# Patient Record
Sex: FEMALE | Race: WHITE | NOT HISPANIC OR LATINO | Employment: OTHER | URBAN - METROPOLITAN AREA
[De-identification: names, ages, dates, MRNs, and addresses within clinical notes are randomized per-mention and may not be internally consistent; named-entity substitution may affect disease eponyms.]

---

## 2019-03-01 ENCOUNTER — APPOINTMENT (OUTPATIENT)
Dept: CT IMAGING | Facility: CLINIC | Age: 69
End: 2019-03-01
Attending: EMERGENCY MEDICINE
Payer: MEDICARE

## 2019-03-01 ENCOUNTER — APPOINTMENT (OUTPATIENT)
Dept: MRI IMAGING | Facility: CLINIC | Age: 69
End: 2019-03-01
Attending: HOSPITALIST
Payer: MEDICARE

## 2019-03-01 ENCOUNTER — APPOINTMENT (OUTPATIENT)
Dept: GENERAL RADIOLOGY | Facility: CLINIC | Age: 69
End: 2019-03-01
Attending: EMERGENCY MEDICINE
Payer: MEDICARE

## 2019-03-01 ENCOUNTER — HOSPITAL ENCOUNTER (OUTPATIENT)
Facility: CLINIC | Age: 69
Setting detail: OBSERVATION
Discharge: HOME | End: 2019-03-02
Attending: EMERGENCY MEDICINE | Admitting: HOSPITALIST
Payer: MEDICARE

## 2019-03-01 DIAGNOSIS — R53.1 LEFT-SIDED WEAKNESS: ICD-10-CM

## 2019-03-01 DIAGNOSIS — F10.920 ALCOHOLIC INTOXICATION WITHOUT COMPLICATION (H): ICD-10-CM

## 2019-03-01 PROBLEM — G45.9 TIA (TRANSIENT ISCHEMIC ATTACK): Status: ACTIVE | Noted: 2019-03-01

## 2019-03-01 LAB
ALBUMIN SERPL-MCNC: 3.2 G/DL (ref 3.4–5)
ALBUMIN SERPL-MCNC: NORMAL G/DL (ref 3.4–5)
ALBUMIN UR-MCNC: NEGATIVE MG/DL
ALP SERPL-CCNC: 116 U/L (ref 40–150)
ALP SERPL-CCNC: NORMAL U/L (ref 40–150)
ALT SERPL W P-5'-P-CCNC: 22 U/L (ref 0–50)
ALT SERPL W P-5'-P-CCNC: NORMAL U/L (ref 0–50)
ANION GAP SERPL CALCULATED.3IONS-SCNC: 12 MMOL/L (ref 3–14)
APPEARANCE UR: CLEAR
APTT PPP: 30 SEC (ref 22–37)
AST SERPL W P-5'-P-CCNC: 16 U/L (ref 0–45)
AST SERPL W P-5'-P-CCNC: NORMAL U/L (ref 0–45)
BASOPHILS # BLD AUTO: 0 10E9/L (ref 0–0.2)
BASOPHILS NFR BLD AUTO: 0.2 %
BILIRUB DIRECT SERPL-MCNC: <0.1 MG/DL (ref 0–0.2)
BILIRUB DIRECT SERPL-MCNC: NORMAL MG/DL (ref 0–0.2)
BILIRUB SERPL-MCNC: 0.2 MG/DL (ref 0.2–1.3)
BILIRUB SERPL-MCNC: NORMAL MG/DL (ref 0.2–1.3)
BILIRUB UR QL STRIP: NEGATIVE
BUN SERPL-MCNC: 11 MG/DL (ref 7–30)
CALCIUM SERPL-MCNC: 8.3 MG/DL (ref 8.5–10.1)
CHLORIDE SERPL-SCNC: 99 MMOL/L (ref 94–109)
CO2 SERPL-SCNC: 20 MMOL/L (ref 20–32)
COLOR UR AUTO: NORMAL
CREAT SERPL-MCNC: 0.62 MG/DL (ref 0.52–1.04)
DIFFERENTIAL METHOD BLD: ABNORMAL
EOSINOPHIL # BLD AUTO: 0 10E9/L (ref 0–0.7)
EOSINOPHIL NFR BLD AUTO: 0 %
ERYTHROCYTE [DISTWIDTH] IN BLOOD BY AUTOMATED COUNT: 13.7 % (ref 10–15)
ETHANOL SERPL-MCNC: 0.09 G/DL
ETHANOL SERPL-MCNC: NORMAL G/DL
GFR SERPL CREATININE-BSD FRML MDRD: >90 ML/MIN/{1.73_M2}
GLUCOSE SERPL-MCNC: 83 MG/DL (ref 70–99)
GLUCOSE UR STRIP-MCNC: NEGATIVE MG/DL
HCT VFR BLD AUTO: 34.4 % (ref 35–47)
HGB BLD-MCNC: 11.4 G/DL (ref 11.7–15.7)
HGB UR QL STRIP: NEGATIVE
IMM GRANULOCYTES # BLD: 0 10E9/L (ref 0–0.4)
IMM GRANULOCYTES NFR BLD: 0.4 %
INR PPP: 1.06 (ref 0.86–1.14)
INTERPRETATION ECG - MUSE: NORMAL
KETONES UR STRIP-MCNC: NEGATIVE MG/DL
LEUKOCYTE ESTERASE UR QL STRIP: NEGATIVE
LYMPHOCYTES # BLD AUTO: 1.3 10E9/L (ref 0.8–5.3)
LYMPHOCYTES NFR BLD AUTO: 24.1 %
MCH RBC QN AUTO: 28.1 PG (ref 26.5–33)
MCHC RBC AUTO-ENTMCNC: 33.1 G/DL (ref 31.5–36.5)
MCV RBC AUTO: 85 FL (ref 78–100)
MONOCYTES # BLD AUTO: 0.4 10E9/L (ref 0–1.3)
MONOCYTES NFR BLD AUTO: 7.1 %
NEUTROPHILS # BLD AUTO: 3.7 10E9/L (ref 1.6–8.3)
NEUTROPHILS NFR BLD AUTO: 68.2 %
NITRATE UR QL: NEGATIVE
NRBC # BLD AUTO: 0 10*3/UL
NRBC BLD AUTO-RTO: 0 /100
PH UR STRIP: 6 PH (ref 5–7)
PLATELET # BLD AUTO: 146 10E9/L (ref 150–450)
POTASSIUM SERPL-SCNC: 3.7 MMOL/L (ref 3.4–5.3)
PROT SERPL-MCNC: 7 G/DL (ref 6.8–8.8)
PROT SERPL-MCNC: NORMAL G/DL (ref 6.8–8.8)
RBC # BLD AUTO: 4.06 10E12/L (ref 3.8–5.2)
SODIUM SERPL-SCNC: 131 MMOL/L (ref 133–144)
SOURCE: NORMAL
SP GR UR STRIP: 1.01 (ref 1–1.03)
UROBILINOGEN UR STRIP-MCNC: NORMAL MG/DL (ref 0–2)
WBC # BLD AUTO: 5.5 10E9/L (ref 4–11)

## 2019-03-01 PROCEDURE — 25000125 ZZHC RX 250: Performed by: HOSPITALIST

## 2019-03-01 PROCEDURE — 25000128 H RX IP 250 OP 636: Performed by: HOSPITALIST

## 2019-03-01 PROCEDURE — 25000128 H RX IP 250 OP 636: Performed by: EMERGENCY MEDICINE

## 2019-03-01 PROCEDURE — 25000125 ZZHC RX 250: Performed by: EMERGENCY MEDICINE

## 2019-03-01 PROCEDURE — 80320 DRUG SCREEN QUANTALCOHOLS: CPT | Performed by: EMERGENCY MEDICINE

## 2019-03-01 PROCEDURE — 25000132 ZZH RX MED GY IP 250 OP 250 PS 637: Performed by: HOSPITALIST

## 2019-03-01 PROCEDURE — 72040 X-RAY EXAM NECK SPINE 2-3 VW: CPT

## 2019-03-01 PROCEDURE — G0378 HOSPITAL OBSERVATION PER HR: HCPCS

## 2019-03-01 PROCEDURE — 70551 MRI BRAIN STEM W/O DYE: CPT

## 2019-03-01 PROCEDURE — A9270 NON-COVERED ITEM OR SERVICE: HCPCS | Mod: GY | Performed by: EMERGENCY MEDICINE

## 2019-03-01 PROCEDURE — 96365 THER/PROPH/DIAG IV INF INIT: CPT | Mod: 59

## 2019-03-01 PROCEDURE — 70498 CT ANGIOGRAPHY NECK: CPT

## 2019-03-01 PROCEDURE — 99285 EMERGENCY DEPT VISIT HI MDM: CPT | Mod: 25

## 2019-03-01 PROCEDURE — 99215 OFFICE O/P EST HI 40 MIN: CPT | Performed by: PSYCHIATRY & NEUROLOGY

## 2019-03-01 PROCEDURE — A9270 NON-COVERED ITEM OR SERVICE: HCPCS | Mod: GY | Performed by: HOSPITALIST

## 2019-03-01 PROCEDURE — 80076 HEPATIC FUNCTION PANEL: CPT | Performed by: EMERGENCY MEDICINE

## 2019-03-01 PROCEDURE — 93005 ELECTROCARDIOGRAM TRACING: CPT

## 2019-03-01 PROCEDURE — 99220 ZZC INITIAL OBSERVATION CARE,LEVL III: CPT | Performed by: HOSPITALIST

## 2019-03-01 PROCEDURE — 0042T CT HEAD PERFUSION WITH CONTRAST: CPT

## 2019-03-01 PROCEDURE — 96376 TX/PRO/DX INJ SAME DRUG ADON: CPT

## 2019-03-01 PROCEDURE — 25800030 ZZH RX IP 258 OP 636: Performed by: HOSPITALIST

## 2019-03-01 PROCEDURE — 85025 COMPLETE CBC W/AUTO DIFF WBC: CPT | Performed by: EMERGENCY MEDICINE

## 2019-03-01 PROCEDURE — 85730 THROMBOPLASTIN TIME PARTIAL: CPT | Performed by: EMERGENCY MEDICINE

## 2019-03-01 PROCEDURE — 80048 BASIC METABOLIC PNL TOTAL CA: CPT | Performed by: EMERGENCY MEDICINE

## 2019-03-01 PROCEDURE — 96374 THER/PROPH/DIAG INJ IV PUSH: CPT

## 2019-03-01 PROCEDURE — 96366 THER/PROPH/DIAG IV INF ADDON: CPT

## 2019-03-01 PROCEDURE — 70450 CT HEAD/BRAIN W/O DYE: CPT

## 2019-03-01 PROCEDURE — 96375 TX/PRO/DX INJ NEW DRUG ADDON: CPT | Mod: 59

## 2019-03-01 PROCEDURE — 25000132 ZZH RX MED GY IP 250 OP 250 PS 637: Performed by: EMERGENCY MEDICINE

## 2019-03-01 PROCEDURE — 85610 PROTHROMBIN TIME: CPT | Performed by: EMERGENCY MEDICINE

## 2019-03-01 PROCEDURE — 81003 URINALYSIS AUTO W/O SCOPE: CPT | Mod: XU | Performed by: EMERGENCY MEDICINE

## 2019-03-01 RX ORDER — FOLIC ACID 1 MG/1
1 TABLET ORAL DAILY
Status: DISCONTINUED | OUTPATIENT
Start: 2019-03-02 | End: 2019-03-02 | Stop reason: HOSPADM

## 2019-03-01 RX ORDER — ALBUTEROL SULFATE 90 UG/1
1-2 AEROSOL, METERED RESPIRATORY (INHALATION) EVERY 6 HOURS PRN
COMMUNITY

## 2019-03-01 RX ORDER — AMOXICILLIN 250 MG
1 CAPSULE ORAL 2 TIMES DAILY
Status: DISCONTINUED | OUTPATIENT
Start: 2019-03-01 | End: 2019-03-02 | Stop reason: HOSPADM

## 2019-03-01 RX ORDER — LIDOCAINE 40 MG/G
CREAM TOPICAL
Status: DISCONTINUED | OUTPATIENT
Start: 2019-03-01 | End: 2019-03-02 | Stop reason: HOSPADM

## 2019-03-01 RX ORDER — THIAMINE HYDROCHLORIDE 100 MG/ML
100 INJECTION, SOLUTION INTRAMUSCULAR; INTRAVENOUS ONCE
Status: COMPLETED | OUTPATIENT
Start: 2019-03-01 | End: 2019-03-01

## 2019-03-01 RX ORDER — MAGNESIUM SULFATE HEPTAHYDRATE 40 MG/ML
4 INJECTION, SOLUTION INTRAVENOUS EVERY 4 HOURS PRN
Status: DISCONTINUED | OUTPATIENT
Start: 2019-03-01 | End: 2019-03-02 | Stop reason: HOSPADM

## 2019-03-01 RX ORDER — LORAZEPAM 2 MG/ML
1-2 INJECTION INTRAMUSCULAR EVERY 30 MIN PRN
Status: DISCONTINUED | OUTPATIENT
Start: 2019-03-01 | End: 2019-03-02 | Stop reason: HOSPADM

## 2019-03-01 RX ORDER — ALBUTEROL SULFATE 90 UG/1
2 AEROSOL, METERED RESPIRATORY (INHALATION) EVERY 6 HOURS PRN
Status: DISCONTINUED | OUTPATIENT
Start: 2019-03-01 | End: 2019-03-02 | Stop reason: HOSPADM

## 2019-03-01 RX ORDER — MULTIPLE VITAMINS W/ MINERALS TAB 9MG-400MCG
1 TAB ORAL DAILY
Status: DISCONTINUED | OUTPATIENT
Start: 2019-03-02 | End: 2019-03-02 | Stop reason: HOSPADM

## 2019-03-01 RX ORDER — ASPIRIN 300 MG/1
300 SUPPOSITORY RECTAL ONCE
Status: COMPLETED | OUTPATIENT
Start: 2019-03-01 | End: 2019-03-01

## 2019-03-01 RX ORDER — FLUTICASONE PROPIONATE 110 UG/1
1 AEROSOL, METERED RESPIRATORY (INHALATION) 2 TIMES DAILY
COMMUNITY

## 2019-03-01 RX ORDER — POTASSIUM CHLORIDE 1.5 G/1.58G
20-40 POWDER, FOR SOLUTION ORAL
Status: DISCONTINUED | OUTPATIENT
Start: 2019-03-01 | End: 2019-03-02 | Stop reason: HOSPADM

## 2019-03-01 RX ORDER — ACETAMINOPHEN 325 MG/1
650 TABLET ORAL EVERY 4 HOURS PRN
Status: DISCONTINUED | OUTPATIENT
Start: 2019-03-01 | End: 2019-03-02 | Stop reason: HOSPADM

## 2019-03-01 RX ORDER — IOPAMIDOL 755 MG/ML
120 INJECTION, SOLUTION INTRAVASCULAR ONCE
Status: COMPLETED | OUTPATIENT
Start: 2019-03-01 | End: 2019-03-01

## 2019-03-01 RX ORDER — AMOXICILLIN 250 MG
2 CAPSULE ORAL 2 TIMES DAILY
Status: DISCONTINUED | OUTPATIENT
Start: 2019-03-01 | End: 2019-03-02 | Stop reason: HOSPADM

## 2019-03-01 RX ORDER — ONDANSETRON 4 MG/1
4 TABLET, ORALLY DISINTEGRATING ORAL EVERY 6 HOURS PRN
Status: DISCONTINUED | OUTPATIENT
Start: 2019-03-01 | End: 2019-03-02 | Stop reason: HOSPADM

## 2019-03-01 RX ORDER — NALOXONE HYDROCHLORIDE 0.4 MG/ML
.1-.4 INJECTION, SOLUTION INTRAMUSCULAR; INTRAVENOUS; SUBCUTANEOUS
Status: DISCONTINUED | OUTPATIENT
Start: 2019-03-01 | End: 2019-03-02 | Stop reason: HOSPADM

## 2019-03-01 RX ORDER — GUAIFENESIN 600 MG/1
600 TABLET, EXTENDED RELEASE ORAL 2 TIMES DAILY
COMMUNITY

## 2019-03-01 RX ORDER — FLUTICASONE PROPIONATE 110 UG/1
1 AEROSOL, METERED RESPIRATORY (INHALATION) EVERY 12 HOURS
Status: DISCONTINUED | OUTPATIENT
Start: 2019-03-01 | End: 2019-03-01 | Stop reason: CLARIF

## 2019-03-01 RX ORDER — POTASSIUM CHLORIDE 29.8 MG/ML
20 INJECTION INTRAVENOUS
Status: DISCONTINUED | OUTPATIENT
Start: 2019-03-01 | End: 2019-03-02 | Stop reason: HOSPADM

## 2019-03-01 RX ORDER — LEVOTHYROXINE SODIUM 175 UG/1
175 TABLET ORAL DAILY
COMMUNITY

## 2019-03-01 RX ORDER — POTASSIUM CL/LIDO/0.9 % NACL 10MEQ/0.1L
10 INTRAVENOUS SOLUTION, PIGGYBACK (ML) INTRAVENOUS
Status: DISCONTINUED | OUTPATIENT
Start: 2019-03-01 | End: 2019-03-02 | Stop reason: HOSPADM

## 2019-03-01 RX ORDER — BENZTROPINE MESYLATE 1 MG/1
1 TABLET ORAL 2 TIMES DAILY
Status: DISCONTINUED | OUTPATIENT
Start: 2019-03-01 | End: 2019-03-02 | Stop reason: HOSPADM

## 2019-03-01 RX ORDER — ACETAMINOPHEN 650 MG/1
650 SUPPOSITORY RECTAL EVERY 4 HOURS PRN
Status: DISCONTINUED | OUTPATIENT
Start: 2019-03-01 | End: 2019-03-02 | Stop reason: HOSPADM

## 2019-03-01 RX ORDER — POTASSIUM CHLORIDE 7.45 MG/ML
10 INJECTION INTRAVENOUS
Status: DISCONTINUED | OUTPATIENT
Start: 2019-03-01 | End: 2019-03-02 | Stop reason: HOSPADM

## 2019-03-01 RX ORDER — ONDANSETRON 2 MG/ML
4 INJECTION INTRAMUSCULAR; INTRAVENOUS EVERY 6 HOURS PRN
Status: DISCONTINUED | OUTPATIENT
Start: 2019-03-01 | End: 2019-03-02 | Stop reason: HOSPADM

## 2019-03-01 RX ORDER — SIMVASTATIN 40 MG
40 TABLET ORAL AT BEDTIME
Status: DISCONTINUED | OUTPATIENT
Start: 2019-03-01 | End: 2019-03-02 | Stop reason: HOSPADM

## 2019-03-01 RX ORDER — LANOLIN ALCOHOL/MO/W.PET/CERES
100 CREAM (GRAM) TOPICAL DAILY
Status: DISCONTINUED | OUTPATIENT
Start: 2019-03-02 | End: 2019-03-02 | Stop reason: HOSPADM

## 2019-03-01 RX ORDER — GUAIFENESIN 600 MG/1
1200 TABLET, EXTENDED RELEASE ORAL 2 TIMES DAILY
Status: DISCONTINUED | OUTPATIENT
Start: 2019-03-01 | End: 2019-03-02 | Stop reason: HOSPADM

## 2019-03-01 RX ORDER — SIMVASTATIN 40 MG
40 TABLET ORAL AT BEDTIME
COMMUNITY
End: 2020-04-01

## 2019-03-01 RX ORDER — ASPIRIN 81 MG/1
81 TABLET ORAL DAILY
Status: DISCONTINUED | OUTPATIENT
Start: 2019-03-02 | End: 2019-03-02 | Stop reason: HOSPADM

## 2019-03-01 RX ORDER — DESOXIMETASONE 2.5 MG/G
OINTMENT TOPICAL 2 TIMES DAILY PRN
Status: DISCONTINUED | OUTPATIENT
Start: 2019-03-01 | End: 2019-03-02 | Stop reason: HOSPADM

## 2019-03-01 RX ORDER — POTASSIUM CHLORIDE 1500 MG/1
20-40 TABLET, EXTENDED RELEASE ORAL
Status: DISCONTINUED | OUTPATIENT
Start: 2019-03-01 | End: 2019-03-02 | Stop reason: HOSPADM

## 2019-03-01 RX ORDER — LORAZEPAM 1 MG/1
1-2 TABLET ORAL EVERY 30 MIN PRN
Status: DISCONTINUED | OUTPATIENT
Start: 2019-03-01 | End: 2019-03-02 | Stop reason: HOSPADM

## 2019-03-01 RX ORDER — BENZTROPINE MESYLATE 1 MG/1
1 TABLET ORAL 3 TIMES DAILY
COMMUNITY

## 2019-03-01 RX ADMIN — SERTRALINE HYDROCHLORIDE 150 MG: 50 TABLET ORAL at 21:12

## 2019-03-01 RX ADMIN — ASPIRIN 300 MG: 300 SUPPOSITORY RECTAL at 16:00

## 2019-03-01 RX ADMIN — IOPAMIDOL 120 ML: 755 INJECTION, SOLUTION INTRAVENOUS at 13:17

## 2019-03-01 RX ADMIN — SIMVASTATIN 40 MG: 40 TABLET, FILM COATED ORAL at 21:12

## 2019-03-01 RX ADMIN — SODIUM CHLORIDE 100 ML: 9 INJECTION, SOLUTION INTRAVENOUS at 13:17

## 2019-03-01 RX ADMIN — Medication 1 TABLET: at 21:13

## 2019-03-01 RX ADMIN — FOLIC ACID: 5 INJECTION, SOLUTION INTRAMUSCULAR; INTRAVENOUS; SUBCUTANEOUS at 19:38

## 2019-03-01 RX ADMIN — CLOZAPINE 300 MG: 100 TABLET ORAL at 21:12

## 2019-03-01 RX ADMIN — THIAMINE HYDROCHLORIDE 100 MG: 100 INJECTION, SOLUTION INTRAMUSCULAR; INTRAVENOUS at 16:00

## 2019-03-01 RX ADMIN — BENZTROPINE MESYLATE 1 MG: 1 TABLET ORAL at 21:13

## 2019-03-01 ASSESSMENT — MIFFLIN-ST. JEOR: SCORE: 1262.09

## 2019-03-01 ASSESSMENT — ENCOUNTER SYMPTOMS
WEAKNESS: 1
HEADACHES: 0

## 2019-03-01 NOTE — ED PROVIDER NOTES
"  History     Chief Complaint:  Gait abnormality    HPI   Liz Lieberman is a 68 year old female who presents via EMS with concern for a gait abnormality. EMS reports that the patient did her morning exercises with her  around 0900 , four hours ago, at her apartment and was believe to be normal at that time. She then left to do her \"hobbies\" around 1000. She then called EMS within the last hour with concern for a gait abnormality. On arrival, EMS observed the patient's apartment cluttered with alcohol, and the patient reported drinking four beers today, and her gait is abnormal because she lunges to the left. En route, her blood pressure remained around 155/100, blood sugar of 116. Here in the emergency department the patient denies headache.     Allergies:  Latex  Formaldehyde    Medications:    Simvastatin  Zoloft  Miralax  Levothyroxine  Omeprazole  Clozapine  Cyanobalamin  Cogentin  Tessalon  Aspirin  Albuterol    Past Medical History:    Schizophrenia  RLS  Hypothyroid  HLD  GERD  Depression  Chronic pain  Pneumonia    Past Surgical History:    Arthroplasty knee, left  ENT    Family History:    No past pertinent family history.    Social History:  Former smoker: quit 1994  Postive for alcohol use, 6 pack daily    Review of Systems   Unable to perform ROS: Acuity of condition   Neurological: Positive for weakness. Negative for headaches.       Physical Exam     Patient Vitals for the past 24 hrs:   BP Temp Temp src Pulse Heart Rate Resp SpO2 Weight   03/01/19 1500 97/78 -- -- 87 86 (!) 86 94 % --   03/01/19 1412 -- -- -- -- 83 12 96 % --   03/01/19 1411 151/89 -- -- 82 -- -- -- --   03/01/19 1343 149/82 -- -- 86 89 30 97 % --   03/01/19 1304 131/82 97.8  F (36.6  C) Tympanic 89 89 16 96 % 78.1 kg (172 lb 2.9 oz)       Physical Exam    General: Alert, interactive in mild distress  Head:  Scalp is atraumatic, periorbital ecchymosis left eye  Eyes:  The pupils are equal, round, and reactive to light    EOM's " intact    No scleral icterus  ENT:      Nose:  The external nose is normal  Ears:  External ears are normal  Mouth/Throat: The oropharynx is normal    Mucus membranes are dry      Neck:  Normal range of motion.      There is no rigidity.    Trachea is in the midline         CV:  Regular rate and rhythm    No murmur   Resp:  Breath sounds are clear bilaterally    Non-labored, no retractions or accessory muscle use      GI:  Abdomen is soft, no distension, no tenderness.       MS:  Left sided weakness, mild, No midline cervical, thoracic, or lumbar tenderness  Skin:  Warm and dry, No rash or lesions noted.  Neuro:   National Institutes of Health Stroke Scale  Exam Interval: Baseline   Score    Level of consciousness: (0)   Alert, keenly responsive    LOC questions: (0)   Answers both questions correctly    LOC commands: (0)   Performs both tasks correctly    Best gaze: (0)   Normal    Visual: (0)   No visual loss    Facial palsy: (0)   Normal symmetrical movements    Motor arm (left): (1)   Drift    Motor arm (right): (0)   No drift    Motor leg (left): (1)   Drift    Motor leg (right): (0)   No drift    Limb ataxia: (0)   Absent    Sensory: (0)   Normal- no sensory loss    Best language: (0)   Normal- no aphasia    Dysarthria: (0)   Normal    Extinction and inattention: (0)   No abnormality        Total Score:  2       Psych:  Awake. Alert.  Normal affect.      Appropriate interactions.      Emergency Department Course   ECG:    Indication: weakness  Time: 1330  Vent. Rate 85 bpm. NC interval 150. QRS duration 82. QT/QTc 408/485. P-R-T axis 71 49 48.  Normal sinus rhythm. Normal ECG. No significant change compared to EKG dated 10/26/16. Read time: 1337    Imaging:  Radiographic findings were communicated with the patient who voiced understanding of the findings.    CT Head without contrast:   Normal CT scan of the head. as per radiology.    CT Head Perfusion w contrast  Normal CT brain perfusion imaging.    CTA Head  neck   1. Minimal atherosclerotic disease involving the right carotid  bifurcation without stenosis.  2. No evidence for stenosis, dissection, thromboembolism, or aneurysm.    XR cervical spine   No convincing acute fracture or dislocation. Presumed  degenerative change at C4-C6. as per radiology    Laboratory:    CBC: WBC: 5.5, HGB: 11.4, PLT: 146  BMP: Sodium 131, Calcium: 8.3, o/w WNL (Creatinine: 0.62)  Hepatic: albumin: 3.2, O/W WNL  Alcohol:  0.09  UA with Microscopic: All WNL    Interventions:    Medications   sodium chloride 0.9 % 1,000 mL with INFUVITE ADULT 10 mL, thiamine 100 mg, folic acid 1 mg infusion ( Intravenous Infusion stopped per MD order 3/1/19 1600)   100mL Saline Flush (100 mLs Intravenous Given 3/1/19 1317)   iopamidol (ISOVUE-370) solution 120 mL (120 mLs Intravenous Given 3/1/19 1317)   aspirin (ASA) Suppository 300 mg (300 mg Rectal Given 3/1/19 1600)   thiamine (B-1) injection 100 mg (100 mg Intravenous Given 3/1/19 1600)       Emergency Department Course:  (1259) Patient arrives in ED. Due to concerns for the acuity of her condition she is brought immidieatly to stabilization room one where I am waiting.     (1301) Code stroke called    (1311) Dr. Traylor, neurology is here in the emergency department while we are both in CT.     IV inserted. Medicine administered as documented above. Blood drawn. This was sent to the lab for further testing, results above.    The patient was sent for a head CT, head perfusion CT, CTA head neck, while in the emergency department, findings above.     (1505) Repeat NIH is normal.     (1524)  I consulted with Dr. Deng of the hospitalist services. They are in agreement to accept the patient for admission.    Findings and plan explained to the Patient who consents to admission. Discussed the patient with Dr. Deng, who will admit the patient to a observation bed for further monitoring, evaluation, and treatment.  Impression & Plan    The patient has  stroke symptoms:           ED Stroke specific documentation           NIHSS PDF          Protocol PDF     Patient last known well time: 0900  ED Provider first to bedside at: Arrival  Patient was not treated with TPA due to the following reason(s):  Rapidly improving symptoms    Stroke Mimics were considered (including migraine headache, seizure disorder, hypoglycemia (or hyperglycemia), head or spinal trauma, CNS infection, Toxin ingestion and shock state (e.g. sepsis) .      National Institutes of Health Stroke Scale  Time Performed: Return from CT      Score    Level of consciousness: (0)   Alert, keenly responsive    LOC questions: (0)   Answers both questions correctly    LOC commands: (0)   Performs both tasks correctly    Best gaze: (0)   Normal    Visual: (0)   No visual loss    Facial palsy: (0)   Normal symmetrical movements    Motor arm (left): (0)   No drift      Motor arm (right): (0)   No drift    Motor leg (left): (0)   No drift    Motor leg (right): (0)   No drift    Limb ataxia: (0)   Absent    Sensory: (0)   Normal- no sensory loss    Best language: (0)   Normal- no aphasia    Dysarthria: (0)   Normal    Extinction and inattention: (0)   No abnormality        Total Score:  0      Medical Decision Making:  Mrs. Lieberman presents via EMS with concern for possible cerebrovascular infarction. She states that some time around 1000 she had a sudden onset of left sided weakness and was falling to the side. Code stroke was initiated and the above workup was undertaken . CT CTA were unremarkable. Upon repeat evaluation her symptoms have greatly improved. Patient was noted to have large amounts of beer strewn throughout her house and certainly alcohol may be playing a roll, however we cannot completely rule out posterior posterior circulation stroke. Given no large vessel occlusion and rapidly improving symptoms she is not a candidate for interventional procedure. She will likely need an MRI during her time in  [facility]. I spoke with Dr. Deng who is in agreement with this plan of action. Of note, the patient is demonstrating no signs of acute alcohol withdrawal at this time. She did receive a banana bag and a rectal aspirin during her time in the ED. NO signs of acte infectious etiology or more concerning illness. Patient was in agreement with this plan of action and subsequently admit the patient for further care and treatment.       Diagnosis:    ICD-10-CM    1. Left-sided weakness R53.1    2. Alcoholic intoxication without complication (H) F10.920        Disposition:  Admitted to observation under care of Dr. Deng.     Scribe Disclosure:  I, Sam Dolan, am serving as a scribe on 3/1/2019 at 1:15 PM to personally document services performed by Jesus Calixto,* based on my observations and the provider's statements to me.     Sam Dolan  3/1/2019    EMERGENCY DEPARTMENT       Jesus Calixto MD  03/01/19 173

## 2019-03-01 NOTE — CONSULTS
[facility]      Stroke Consult Note    Reason for Consult:  Stroke Code    HPI  Liz Lieberman is a 68 year old female presenting to the hospital from home with a compaint of a sudden onsent of an inability to sit upright. She was sitting at the table writing out checks to pay her bills when all of a sudden she states that she was unable to sit upright and felt like she was going to fall over. There was some report of left sided weakness and leaning to the left.     She has a history of schizoaffective disorder per her report and has feelings of akithisia and tardive dyskinesia with associated movements. She states that the Cogentin she is prescribed sometimes helps with the urge to move constantly.     TPA Treatment   Not given due to minor / isolated / quickly resolving stroke symptoms.    Endovascular Treatment  Not initiated due to absence of proximal vessel occlusion    Impression  Sudden onset of vertigo, cannot exclude a posterior circulation process.     Recommendations  MRI brain to evaluate for stroke given poor imaging of posterior fossa on CT scan  Stroke work up to continue if MRI negative    Please contact the Stroke Service with any questions. Will follow up on MRI.    Chuckie Traylor MD  Vascular Neurology/Neurocritical Care  March 1, 2019    Text Page ([phone])  __________________________________________________    Past Medical History:   Diagnosis Date     Allergic rhinitis      Bronchiectasis (H)     mild RML     Chronic pain     ft, knee, shoulders     Depression      Gastro-oesophageal reflux disease      GERD (gastroesophageal reflux disease)      Hearing loss      Hiatal hernia      History of blood transfusion     after tonsillectomy     Hyperlipidemia      Hypothyroidism      Leukocytosis      Lung nodule      Mild intermittent asthma      Numbness and tingling     hands ceasar numb R/T carpal tunnel     Osteoarthritis     knee, ft, shoulders     Paranoid schizophrenia, in  remission      RLS (restless legs syndrome)      Tardive dyskinesia      Urinary incontinence        Past Surgical History:   Procedure Laterality Date     ARTHROPLASTY KNEE  2/20/2013    Procedure: ARTHROPLASTY KNEE;  LEFT TOTAL KNEE ARTHROPLASTY ([employer]HE[employer])^;  Surgeon: Khanh Bee MD;  Location:  OR     ENT SURGERY      tonsillectomy     ORTHOPEDIC SURGERY  2011    (R) total knee       Medications   Current Facility-Administered Medications   Medication     aspirin (ASA) Suppository 300 mg     sodium chloride 0.9 % 1,000 mL with INFUVITE ADULT 10 mL, thiamine 100 mg, folic acid 1 mg infusion     sodium chloride 0.9 % 1,000 mL with INFUVITE ADULT 10 mL, thiamine 100 mg, folic acid 1 mg infusion     thiamine (B-1) injection 100 mg     Current Outpatient Medications   Medication Sig     Acetaminophen (TYLENOL PO) Take 1,000 mg by mouth every 6 hours as needed.     Albuterol (VENTOLIN IN)      ASPIRIN EC PO Take 81 mg by mouth daily.     azithromycin (ZITHROMAX Z-BINDU) 250 MG tablet Take 1 tablet daily for 4 days.     benzonatate (TESSALON) 100 MG capsule Take 1 capsule (100 mg) by mouth 3 times daily as needed for cough     Benztropine Mesylate (COGENTIN IJ)      BUPROPION HCL PO      Calcium Carbonate-Vitamin D (CALCIUM + D PO) Take 1 tablet by mouth. Calcium 500 with Vit D 400mg BID. One before breakfast and one with supper     Cholecalciferol (VITAMIN D3) 5000 UNITS TABS Take 1 tablet by mouth daily (with dinner).     CLOZAPINE PO Take 300 mg by mouth At Bedtime.     CYANOCOBALAMIN SL Place 1,000 mcg under the tongue every other day.     desoximetasone (TOPICORT) 0.25 % OINT Apply  topically as needed.     ferrous sulfate 325 (65 FE) MG tablet Take 325 mg by mouth every other day. One tab every other day before breakfast     fish oil-omega-3 fatty acids (FISH OIL) 1000 MG capsule Take 2 g by mouth daily.     fluticasone (FLONASE) 50 MCG/ACT nasal spray Spray 2 sprays into both nostrils daily      Fluticasone Propionate, Inhal, (FLOVENT IN)      GLUCOSAMINE CHONDROITIN COMPLX PO Take 3 tablets by mouth daily (with dinner). Is Double strength     guaiFENesin-dextromethorphan (ROBITUSSIN DM) 100-10 MG/5ML syrup Take 10 mLs by mouth every 4 hours as needed for cough.     LEVOTHYROXINE SODIUM 175 mcg. Take 1/2 tab on Monday and Friday  And 1 full tablet all other days. Take 1 hr before eating.     Omeprazole (PRILOSEC PO) Take 20 mg by mouth every morning. Take 1 hr before eating     polyethylene glycol (MIRALAX/GLYCOLAX) packet Take 8.5 g by mouth every other day.     Sertraline HCl (ZOLOFT PO) Take 150 mg by mouth At Bedtime.     SIMVASTATIN PO        Allergies   Allergies   Allergen Reactions     Formaldehyde Rash     Latex Rash       Family History       Social History   Social History     Socioeconomic History     Marital status:      Spouse name: Not on file     Number of children: Not on file     Years of education: Not on file     Highest education level: Not on file   Occupational History     Not on file   Social Needs     Financial resource strain: Not on file     Food insecurity:     Worry: Not on file     Inability: Not on file     Transportation needs:     Medical: Not on file     Non-medical: Not on file   Tobacco Use     Smoking status: Former Smoker     Last attempt to quit: 2/15/1994     Years since quittin.0   Substance and Sexual Activity     Alcohol use: Yes     Drug use: No     Sexual activity: Not on file   Lifestyle     Physical activity:     Days per week: Not on file     Minutes per session: Not on file     Stress: Not on file   Relationships     Social connections:     Talks on phone: Not on file     Gets together: Not on file     Attends [employer] service: Not on file     Active member of club or organization: Not on file     Attends meetings of clubs or organizations: Not on file     Relationship status: Not on file     Intimate partner violence:     Fear of current or ex  partner: Not on file     Emotionally abused: Not on file     Physically abused: Not on file     Forced sexual activity: Not on file   Other Topics Concern     Not on file   Social History Narrative     Not on file          ROS  Review of systems not obtained due to patient factors - intoxicated    PHYSICAL EXAMINATION  B/P:     97/78 (03/01/19 1500)    Heart Rate: 86 (03/01/19 1500)    Pulse: 87 (03/01/19 1500)   Resp:  (!) 86 (03/01/19 1500)  Temp: 97.8  F (36.6  C)   Tympanic (03/01/19 1304)    General:  patient lying in bed without any acute distress    HEENT:  normocephalic/atraumatic  Cardio:  RRR  Pulmonary:  no respiratory distress  Abdomen:  soft, non-tender, non-distended  Extremities:  no edema  Skin:  intact     Neurologic  Mental Status:  fully alert, attentive and oriented, follows commands, speech clear and fluent  Cranial Nerves:  visual fields intact, PERRL, EOMI with normal smooth pursuit, facial sensation intact and symmetric, facial movements symmetric, hearing not formally tested but intact to conversation, no dysarthria, shoulder shrug strong bilaterally, tongue protrusion midline  Motor:  normal tone throughout, able to move all limbs spontaneously, strength 5/5 throughout upper and lower extremities, tardive dyskinesia present  Reflexes:  no clonus  Sensory:  intact/symmetric to light touch and pin prick throughout upper and lower extremities  Coordination:  FNF and HS intact without dysmetria  Station/Gait:  not tested at this time    Stroke Scales    NIHSS  Interval and Comments baseline   1a. Level of Consciousness 0-->Alert, keenly responsive   1b. LOC Questions 0-->Answers both questions correctly   1c. LOC Commands 0-->Performs both tasks correctly   2.   Best Gaze 0-->Normal   3.   Visual 0-->No visual loss   4.   Facial Palsy 0-->Normal symmetrical movements   5a. Motor Arm, Left 0-->No drift, limb holds 90 (or 45) degrees for full 10 secs   5b. Motor Arm, Right 0-->No drift, limb  holds 90 (or 45) degrees for full 10 secs   6a. Motor Leg, Left 0-->No drift, leg holds 30 degree position for full 5 secs   6b. Motor Leg, right 0-->No drift, leg holds 30 degree position for full 5 secs   7.   Limb Ataxia 0-->Absent   8.   Sensory 0-->Normal, no sensory loss   9.   Best Language 0-->No aphasia, normal   10. Dysarthria 0-->Normal   11. Extinction and Inattention  0-->No abnormality   Total 0       Labs/Imaging  Labs and imaging were reviewed and used in developing plan; pertinent results included.  Data   CBC  WBC (10e9/L)   Date Value   03/01/2019 5.5   10/21/2016 10.5   05/09/2015 7.9    RBC Count (10e12/L)   Date Value   03/01/2019 4.06   10/21/2016 4.56   05/09/2015 3.96    Hemoglobin (g/dL)   Date Value   03/01/2019 11.4 (L)   10/21/2016 12.9   05/09/2015 10.9 (L)      Hematocrit (%)   Date Value   03/01/2019 34.4 (L)   10/21/2016 39.7   05/09/2015 33.3 (L)    Platelet Count (10e9/L)   Date Value   03/01/2019 146 (L)   10/21/2016 226   05/09/2015 286         BMP  Sodium (mmol/L)   Date Value   03/01/2019 131 (L)   10/21/2016 140   05/09/2015 136    Potassium (mmol/L)   Date Value   03/01/2019 3.7   10/21/2016 3.8   05/09/2015 4.0    Chloride (mmol/L)   Date Value   03/01/2019 99   10/21/2016 106   05/09/2015 103      Carbon Dioxide (mmol/L)   Date Value   03/01/2019 20   10/21/2016 27   05/09/2015 24    Glucose (mg/dL)   Date Value   03/01/2019 83   10/21/2016 130 (H)   05/09/2015 109 (H)    Urea Nitrogen (mg/dL)   Date Value   03/01/2019 11   10/21/2016 10   05/09/2015 11      Creatinine (mg/dL)   Date Value   03/01/2019 0.62   10/21/2016 0.63   05/09/2015 0.58    Calcium (mg/dL)   Date Value   03/01/2019 8.3 (L)   10/21/2016 9.0   05/09/2015 8.7         INR Troponin I A1C   INR (no units)   Date Value   03/01/2019 1.06   05/18/2011 1.11    Troponin I ES (ug/L)   Date Value   10/21/2016     <0.015  The 99th percentile for upper reference range is 0.045 ug/L.  Troponin values in   the range of  0.045 - 0.120 ug/L may be associated with risks of adverse   clinical events.     06/22/2014 <0.012   06/18/2013 <0.012    No results found for: A1C     Liver Panel  Protein Total (g/dL)   Date Value   03/01/2019 7.0   03/01/2019 Canceled, Test credited   06/18/2013 6.7 (L)    Albumin (g/dL)   Date Value   03/01/2019 3.2 (L)   03/01/2019 Canceled, Test credited   06/18/2013 3.6    Bilirubin Total (mg/dL)   Date Value   03/01/2019 0.2   03/01/2019 Canceled, Test credited   06/18/2013 0.2      Alkaline Phosphatase (U/L)   Date Value   03/01/2019 116   03/01/2019 Canceled, Test credited   06/18/2013 112    AST (U/L)   Date Value   03/01/2019 16   03/01/2019 Canceled, Test credited   06/18/2013 12    ALT (U/L)   Date Value   03/01/2019 22   03/01/2019 Canceled, Test credited   06/18/2013 25      No results found for: BILIDIRECT       Lipid Profile  No results found for: CHOL No results found for: HDL No results found for: LDL   No results found for: TRIG No results found for: CHOLHDLRATIO           I have personally spent a total of 70 minutes providing care supervising this patient's stroke code activation.  I personally reviewed all lab values and radiology images.     Stroke Code Data  (for stroke code without tele)  Stroke code activated 03/01/19   1302   First stroke provider response 03/01/19   1304   Last known normal 03/01/19   1200   Time of discovery   (or onset of symptoms) 03/01/19   1200   Head CT read by me 03/01/19   1315   Was stroke code de-escalated? Yes 03/01/19 7018

## 2019-03-01 NOTE — H&P
[facility]    History and Physical - Hospitalist Service       Date of Admission:  3/1/2019    Assessment & Plan   Liz Lieberman is a 68 year old female who has a past medical history which includes alcohol dependence and chronic schizophrenia.  She lives alone in an apartment and this morning did her morning exercises with her .  At around 1 PM she was sitting on a chair after reportedly having about 4 beers when suddenly she was unable to sit upright and kept falling to the left.  She had no headache or dizziness or vertigo.  She had no change in her vision or speech.  She did not feel weak on either side.  She called 911 and was taken to the emergency room as a code stroke.  In the emergency room she had normal vital signs and a nonfocal neurologic exam.  EKG showed sinus rhythm.  CT head without contrast, CT head perfusion study and CTA of the head and neck were all unremarkable.  X-ray of the cervical spine was unremarkable.  She was given a liter of normal saline aspirin and thiamine.  Her labs were unremarkable except for an ethanol level of 0.09.      Acute gait/truncal instability, rule out cerebellar stroke/TIA  Her symptoms appear to have resolved within a few hours  Head CT/CTA negative   MRI ordered  Stroke neurologist consulted  Admit to observation with a cardiac monitor  Transthoracic echocardiogram, physcial therapy and occupational therapy evaluations    Alcohol intoxication and dependence  She is not currently in withdrawal and she has not had any recent hospitalizations related to alcohol use.  Thiamine and multivitamins ordered  EKTAWA scoring with lorazepam as needed for alcohol withdrawal symptoms    Chronic schizophrenia  Tardive dyskinesia  Restless legs syndrome   Her current regimen consists of clozapine, sertraline, bupropion and benztropine which will be continued    Hypothyroidism   Continue levothyroxine     Mild intermittent asthma   Continue  fluticasone    Hyperlipidemia   Continue simvastatin and fish oil     Diet: npo until bedside swallow evaluation   DVT Prophylaxis: Pneumatic Compression Devices  Castorena Catheter: not present  Code Status: Full     Disposition Plan   Expected discharge: Tomorrow, recommended to prior living arrangement once neurologic status at baseline.  Entered: Amandeep Deng MD 03/01/2019, 3:42 PM     The patient's care was discussed with the Patient and Neurology  Consultant.    Amandeep Deng MD  [facility]    ______________________________________________________________________    Chief Complaint   Falling to the left side     History is obtained from the patient, electronic health record and emergency department physician    History of Present Illness   Liz Lieberman is a 68 year old female who has a past medical history which includes alcohol dependence and chronic schizophrenia.  She lives alone in an apartment and this morning did her morning exercises with her .  At around 1 PM she was sitting on a chair after reportedly having about 4 beers when suddenly she was unable to sit upright and kept falling to the left.  She had no headache or dizziness or vertigo.  She had no change in her vision or speech.  She did not feel weak on either side.  She called 911 and was taken to the emergency room as a code stroke.  In the emergency room she had normal vital signs and a nonfocal neurologic exam.  EKG showed sinus rhythm.  CT head without contrast, CT head perfusion study and CTA of the head and neck were all unremarkable.  X-ray of the cervical spine was unremarkable.  She was given a liter of normal saline aspirin and thiamine.  Her labs were unremarkable except for an ethanol level of 0.09.  She now feels back to her usual self.  She says that she is been feeling fine recently and denies recent fever, chills, cough, cold, chest pain, shortness of breath, nausea, vomiting, diarrhea.  She  does have a history of mild asthma which has not been bothering her lately.    Review of Systems    The 10 point Review of Systems is negative other than noted in the HPI or here.     Past Medical History    I have reviewed this patient's medical history and updated it with pertinent information if needed.   Past Medical History:   Diagnosis Date     Allergic rhinitis      Bronchiectasis (H)     mild RML     Chronic pain     ft, knee, shoulders     Depression      Diverticulosis     on colonoscopy     Gastro-oesophageal reflux disease      GERD (gastroesophageal reflux disease)      Hearing loss      Hiatal hernia      History of blood transfusion     after tonsillectomy     Hyperlipidemia      Hypothyroidism      Leukocytosis      Lung nodule      Mild intermittent asthma      Numbness and tingling     hands ceasar numb R/T carpal tunnel     Osteoarthritis     knee, ft, shoulders     Paranoid schizophrenia, in remission      RLS (restless legs syndrome)      Tardive dyskinesia      Urinary incontinence        Past Surgical History   I have reviewed this patient's surgical history and updated it with pertinent information if needed.  Past Surgical History:   Procedure Laterality Date     ARTHROPLASTY KNEE  2013    Procedure: ARTHROPLASTY KNEE;  LEFT TOTAL KNEE ARTHROPLASTY (SMITH & NEPHEW)^;  Surgeon: Khanh Bee MD;  Location:  OR     ENT SURGERY      tonsillectomy     ORTHOPEDIC SURGERY      (R) total knee       Social History   I have reviewed this patient's social history and updated it with pertinent information if needed.  Social History     Tobacco Use     Smoking status: Former Smoker     Last attempt to quit: 2/15/1994     Years since quittin.0   Substance Use Topics     Alcohol use: Yes     Drug use: No       Family History   I have reviewed this patient's family history and updated it with pertinent information if needed.   Family History   Problem Relation Age of Onset      Cerebrovascular Disease No family hx of      Diabetes No family hx of        Prior to Admission Medications   Prior to Admission Medications   Prescriptions Last Dose Informant Patient Reported? Taking?   ASPIRIN EC PO  Self Yes No   Sig: Take 81 mg by mouth daily.   Acetaminophen (TYLENOL PO)  Self Yes No   Sig: Take 1,000 mg by mouth every 6 hours as needed.   BUPROPION HCL PO   Yes No   Benztropine Mesylate (COGENTIN IJ)   Yes No   CLOZAPINE PO  Self Yes No   Sig: Take 300 mg by mouth At Bedtime.   CYANOCOBALAMIN SL  Self Yes No   Sig: Place 1,000 mcg under the tongue every other day.   Calcium Carbonate-Vitamin D (CALCIUM + D PO)  Self Yes No   Sig: Take 1 tablet by mouth. Calcium 500 with Vit D 400mg BID. One before breakfast and one with supper   Cholecalciferol (VITAMIN D3) 5000 UNITS TABS  Self Yes No   Sig: Take 1 tablet by mouth daily (with dinner).   Fluticasone Propionate, Inhal, (FLOVENT IN)   Yes No   GLUCOSAMINE CHONDROITIN COMPLX PO  Self Yes No   Sig: Take 3 tablets by mouth daily (with dinner). Is Double strength   LEVOTHYROXINE SODIUM  Self Yes No   Si mcg. Take 1/2 tab on Monday and Friday  And 1 full tablet all other days. Take 1 hr before eating.   Omeprazole (PRILOSEC PO)  Self Yes No   Sig: Take 20 mg by mouth every morning. Take 1 hr before eating   SIMVASTATIN PO   Yes No   Sertraline HCl (ZOLOFT PO)  Self Yes No   Sig: Take 150 mg by mouth At Bedtime.   benzonatate (TESSALON) 100 MG capsule   No No   Sig: Take 1 capsule (100 mg) by mouth 3 times daily as needed for cough   desoximetasone (TOPICORT) 0.25 % OINT  Self Yes No   Sig: Apply  topically as needed.   ferrous sulfate 325 (65 FE) MG tablet  Self Yes No   Sig: Take 325 mg by mouth every other day. One tab every other day before breakfast   fish oil-omega-3 fatty acids (FISH OIL) 1000 MG capsule  Self Yes No   Sig: Take 2 g by mouth daily.   fluticasone (FLONASE) 50 MCG/ACT nasal spray   Yes No   Sig: Spray 2 sprays into both  nostrils daily   guaiFENesin-dextromethorphan (ROBITUSSIN DM) 100-10 MG/5ML syrup   No No   Sig: Take 10 mLs by mouth every 4 hours as needed for cough.   polyethylene glycol (MIRALAX/GLYCOLAX) packet  Self Yes No   Sig: Take 8.5 g by mouth every other day.      Facility-Administered Medications: None     Allergies   Allergies   Allergen Reactions     Formaldehyde Rash     Latex Rash       Physical Exam   Vital Signs: Temp: 97.8  F (36.6  C) Temp src: Tympanic BP: 97/78 Pulse: 87 Heart Rate: 86 Resp: (!) 86 SpO2: 94 % O2 Device: None (Room air)    Weight: 172 lbs 2.87 oz    Constitutional: awake, alert, cooperative, no apparent distress, and appears stated age  Eyes: Lids and lashes normal, pupils equal, round and reactive to light, extra ocular muscles intact, sclera clear, conjunctiva normal  ENT: Normocephalic, without obvious abnormality, atraumatic, oral pharynx with moist mucous membranes, tonsils without erythema or exudates  Hematologic / Lymphatic: no cervical lymphadenopathy  Respiratory: No increased work of breathing, good air exchange, clear to auscultation bilaterally, no crackles or wheezing  Cardiovascular:  regular rate and rhythm, normal S1 and S2, no S3 or S4, and no murmur noted  GI: No scars, normal bowel sounds, soft, non-distended, non-tender, no masses palpated  Skin: no bruising or bleeding and no rashes  Musculoskeletal: There is no redness, warmth, or swelling of the joints.  Full range of motion noted  Neurologic: Awake, alert, oriented to name, place and time.  Cranial nerves II-XII are grossly intact.  Motor is 5 out of 5 bilaterally.  Cerebellar finger to nose intact . There is no tremor  Neuropsychiatric: General: normal, calm and normal eye contact  Level of consciousness: alert / normal  Affect: normal    Data   Data reviewed today: I reviewed all medications, new labs and imaging results over the last 24 hours. I personally reviewed the EKG tracing showing NSR  Chest X-ray  ordered    Recent Labs   Lab 03/01/19  1330 03/01/19  1305   WBC 5.5  --    HGB 11.4*  --    MCV 85  --    *  --    INR 1.06  --    *  --    POTASSIUM 3.7  --    CHLORIDE 99  --    CO2 20  --    BUN 11  --    CR 0.62  --    ANIONGAP 12  --    ADRIAN 8.3*  --    GLC 83  --    ALBUMIN 3.2* Canceled, Test credited   PROTTOTAL 7.0 Canceled, Test credited   BILITOTAL 0.2 Canceled, Test credited   ALKPHOS 116 Canceled, Test credited   ALT 22 Canceled, Test credited   AST 16 Canceled, Test credited     Recent Results (from the past 24 hour(s))   CT Head w/o Contrast    Narrative    CT SCAN OF THE HEAD WITHOUT CONTRAST   3/1/2019 1:16 PM     HISTORY: Transient ischemic attack, initial exam. Code Stroke.    TECHNIQUE:  Axial images of the head and coronal reformations without  IV contrast material.  Radiation dose for this scan was reduced using  automated exposure control, adjustment of the mA and/or kV according  to patient size, or iterative reconstruction technique.    COMPARISON: None.    FINDINGS:  The ventricles are normal in size, shape and configuration.   The brain parenchyma and subarachnoid spaces are normal. There is no  evidence of intracranial hemorrhage, mass, acute infarct or anomaly.     The visualized portions of the sinuses and mastoids appear normal.  There is no evidence of trauma.      Impression    IMPRESSION:   Normal CT scan of the head.     ARIEL GASPAR MD   CTA Head Neck with Contrast    Narrative    CTA HEAD AND NECK WITH CONTRAST 3/1/2019 1:17 PM     HISTORY: Transient ischemic attack, initial exam. Code Stroke.    TECHNIQUE: Axial images were obtained through the head and neck  without and with intravenous contrast. 70 mL of Isovue-370 was given.  Multiplanar reconstructions were performed. 3-D reconstructions off a  remote workstation for CT angiography were also acquired. Carotid  stenoses were evaluated by comparing the caliber of the proximal  internal carotid artery to the  caliber of the distal internal carotid  artery. Radiation dose for this scan was reduced using automated  exposure control, adjustment of the mA and/or kV according to patient  size, or iterative reconstruction technique.    FINDINGS:    Brachiocephalic vessels: Normal.    Right carotid system: Mild calcific and noncalcified plaque is present  but there is no stenosis or dissection.    Left carotid system: Normal.    Right vertebral artery: Normal.    Left vertebral artery: Normal.    [place] of [place]: Normal.    Other findings: Some degenerative changes are seen in the spine.      Impression    IMPRESSION:  1. Minimal atherosclerotic disease involving the right carotid  bifurcation without stenosis.  2. No evidence for stenosis, dissection, thromboembolism, or aneurysm.    ARIEL GASPAR MD   CT Head Perfusion w Contrast    Narrative    CT HEAD PERFUSION WITH CONTRAST 3/1/2019 1:27 PM     HISTORY: TIA, initial exam. Code stroke.    TECHNIQUE: Multiplanar multisequence images were obtained through the  brain without and with contrast for CT brain perfusion imaging. 50 mL  of Isovue-370 was given. Radiation dose for this scan was reduced  using automated exposure control, adjustment of the mA and/or kV  according to patient size, or iterative reconstruction technique.    FINDINGS: Cerebral blood flow, cerebral blood volume, and mean transit  time maps are relatively symmetric. There is no evidence for ischemia  or infarct.      Impression    IMPRESSION: Normal CT brain perfusion imaging.    ARIEL GASPAR MD   XR Cervical Spine 2/3 Views    Narrative    CERVICAL SPINE TWO - THREE VIEWS   3/1/2019 1:59 PM     HISTORY: Falls.    COMPARISON: None.      Impression    IMPRESSION: No convincing acute fracture or dislocation. Presumed  degenerative change at C4-C6.    XIOMARA CAMERON MD

## 2019-03-01 NOTE — PROGRESS NOTES
RECEIVING UNIT ED HANDOFF REVIEW    ED Nurse Handoff Report was reviewed by: Francine Carmen on March 1, 2019 at 5:18 PM

## 2019-03-01 NOTE — ED NOTES
"[facility]  ED Nurse Handoff Report    ED Chief complaint: Cerebrovascular Accident (pt well between 9-10 am.  Pt having weakness to left side and leaning to left.  Pt reports feeling \"odd\".  Pt has had multiple falls recently.  Reports having 4 beers today.)      ED Diagnosis:   Final diagnoses:   Left-sided weakness   Alcoholic intoxication without complication (H)       Code Status: Full Code    Allergies:   Allergies   Allergen Reactions     Formaldehyde Rash     Latex Rash       Activity level - Baseline/Home:  Independent    Activity Level - Current:   Stand with Assist     Needed?: No    Isolation: No  Infection: Not Applicable  Bariatric?: No    Vital Signs:   Vitals:    03/01/19 1304 03/01/19 1343 03/01/19 1411 03/01/19 1412   BP: 131/82 149/82 151/89    Pulse: 89 86 82    Resp: 16 30  12   Temp: 97.8  F (36.6  C)      TempSrc: Tympanic      SpO2: 96% 97%  96%   Weight: 78.1 kg (172 lb 2.9 oz)          Cardiac Rhythm: ,    NSR    Pain level:  0    Is this patient confused?: No   Does this patient have a guardian?  No         If yes, is there guardianship documents in the Epic \"Code/ACP\" activity?  N/A         Guardian Notified?  N/A  [place] - Suicide Severity Rating Scale Completed?  Yes  If yes, what color did the patient score?  White    Patient Report: Initial Complaint: Stroke sx  Focused Assessment: Pt comes into ED with gait abnormality while she was doing a lunging exercise. Patient reports drinking 4 beers today and normally has restless leg syndrome and is tremulous.  CT negative.  ETOH 0.9.  Obtained cervical XR d/t patient reporting frequent falls.  Negative XR.  Patient denied pain while in ED, \"maybe some shoulder soreness.\"  Code stroke was deescalated.  Alert/oriented x4.  Tests Performed: blood work/XR/CT/UA  Abnormal Results:   Labs Ordered and Resulted from Time of ED Arrival Up to the Time of Departure from the ED   HEPATIC PANEL - Abnormal; Notable for the " following components:       Result Value    Albumin 3.2 (*)     All other components within normal limits   BASIC METABOLIC PANEL - Abnormal; Notable for the following components:    Sodium 131 (*)     Calcium 8.3 (*)     All other components within normal limits   CBC WITH PLATELETS DIFFERENTIAL - Abnormal; Notable for the following components:    Hemoglobin 11.4 (*)     Hematocrit 34.4 (*)     Platelet Count 146 (*)     All other components within normal limits   ALCOHOL ETHYL - Abnormal; Notable for the following components:    Ethanol g/dL 0.09 (*)     All other components within normal limits   HEPATIC PANEL   ALCOHOL ETHYL   INR   PARTIAL THROMBOPLASTIN TIME   URINE MACROSCOPIC     Treatments provided: SEE MAR    Family Comments: na    OBS brochure/video discussed/provided to patient/family: YES brochure              Name of person given brochure if not patient:               Relationship to patient:     ED Medications:   Medications   sodium chloride 0.9 % 1,000 mL with INFUVITE ADULT 10 mL, thiamine 100 mg, folic acid 1 mg infusion ( Intravenous Infusion stopped per MD order 3/1/19 1600)   100mL Saline Flush (100 mLs Intravenous Given 3/1/19 1317)   iopamidol (ISOVUE-370) solution 120 mL (120 mLs Intravenous Given 3/1/19 1317)   aspirin (ASA) Suppository 300 mg (300 mg Rectal Given 3/1/19 1600)   thiamine (B-1) injection 100 mg (100 mg Intravenous Given 3/1/19 1600)       Drips infusing?:  No    For the majority of the shift this patient was Green.   Interventions performed were .    Severe Sepsis OR Septic Shock Diagnosis Present: No    To be done/followed up on inpatient unit:  na    ED NURSE PHONE NUMBER: *[id number]

## 2019-03-01 NOTE — ED NOTES
Bed: ST01  Expected date:   Expected time:   Means of arrival:   Comments:  [facility] [place] - 427 - 68F stroke alert eta 1300

## 2019-03-01 NOTE — PHARMACY-ADMISSION MEDICATION HISTORY
Admission Medication History    Admission medication history interview status for the 3/1/2019 admission is complete. See EPIC admission navigator for prior to admission medications     Medication history source reliability:Moderate, Patient did not have her list but was confident correcting list.  Clarified Cogentin and Simvastatin doses with [facility]    Actions taken by pharmacist (provider contacted, etc): Contacted Retail pharmacy      Additional medication history information not noted on PTA med list :None    Medication reconciliation/reorder completed by provider prior to medication history? No    Time spent in this activity: 30 minutes    Prior to Admission medications    Medication Sig Last Dose Taking? Auth Provider   Acetaminophen (TYLENOL PO) Take 1,000 mg by mouth every 6 hours as needed. Past Week at PRN Yes Reported, Patient   albuterol (PROAIR HFA/PROVENTIL HFA/VENTOLIN HFA) 108 (90 Base) MCG/ACT inhaler Inhale 2 puffs into the lungs every 6 hours Past Week at PRN Yes Unknown, Entered By History   ASPIRIN EC PO Take 81 mg by mouth daily. 3/1/2019 at AM Yes Unknown, Entered By History   benztropine (COGENTIN) 1 MG tablet Take 1 mg by mouth 2 times daily 3/1/2019 at AM Yes Unknown, Entered By History   Calcium Carbonate-Vitamin D (CALCIUM + D PO) Take 1 tablet by mouth. Calcium 500 with Vit D 400mg BID. One before breakfast and one with supper 3/1/2019 at AM Yes Reported, Patient   CLOZAPINE PO Take 300 mg by mouth At Bedtime. 2/28/2019 at HS Yes Unknown, Entered By History   desoximetasone (TOPICORT) 0.25 % OINT Apply  topically as needed.  at PRN Yes Reported, Patient   fluticasone (FLOVENT HFA) 110 MCG/ACT inhaler Inhale 1 puff into the lungs 2 times daily as needed  at PRN Yes Unknown, Entered By History   guaiFENesin (MUCINEX) 600 MG 12 hr tablet Take 1,200 mg by mouth 2 times daily 3/1/2019 at AM Yes Unknown, Entered By History   levothyroxine (SYNTHROID/LEVOTHROID) 175 MCG  tablet Take 175 mcg by mouth daily 3/1/2019 at AM Yes Unknown, Entered By History   Omeprazole (PRILOSEC PO) Take 20 mg by mouth every morning. Take 1 hr before eating 3/1/2019 at AM Yes Reported, Patient   polyethylene glycol (MIRALAX/GLYCOLAX) packet Take 17 g by mouth daily as needed for constipation   at PRN Yes Unknown, Entered By History   sertraline (ZOLOFT) 100 MG tablet Take 150 mg by mouth At Bedtime 2/28/2019 at HS Yes Unknown, Entered By History   simvastatin (ZOCOR) 40 MG tablet Take 40 mg by mouth At Bedtime 2/28/2019 at HS Yes Unknown, Entered By History       Khanh Paredes, PharmD

## 2019-03-02 ENCOUNTER — APPOINTMENT (OUTPATIENT)
Dept: GENERAL RADIOLOGY | Facility: CLINIC | Age: 69
End: 2019-03-02
Attending: HOSPITALIST
Payer: MEDICARE

## 2019-03-02 ENCOUNTER — APPOINTMENT (OUTPATIENT)
Dept: PHYSICAL THERAPY | Facility: CLINIC | Age: 69
End: 2019-03-02
Attending: HOSPITALIST
Payer: MEDICARE

## 2019-03-02 VITALS
WEIGHT: 160.1 LBS | RESPIRATION RATE: 20 BRPM | HEART RATE: 92 BPM | HEIGHT: 65 IN | BODY MASS INDEX: 26.67 KG/M2 | TEMPERATURE: 97.8 F | DIASTOLIC BLOOD PRESSURE: 61 MMHG | OXYGEN SATURATION: 96 % | SYSTOLIC BLOOD PRESSURE: 128 MMHG

## 2019-03-02 LAB
GLUCOSE SERPL-MCNC: 121 MG/DL (ref 70–99)
MAGNESIUM SERPL-MCNC: 2.3 MG/DL (ref 1.6–2.3)

## 2019-03-02 PROCEDURE — 97161 PT EVAL LOW COMPLEX 20 MIN: CPT | Mod: GP | Performed by: PHYSICAL THERAPIST

## 2019-03-02 PROCEDURE — 96366 THER/PROPH/DIAG IV INF ADDON: CPT

## 2019-03-02 PROCEDURE — 25000132 ZZH RX MED GY IP 250 OP 250 PS 637: Performed by: HOSPITALIST

## 2019-03-02 PROCEDURE — 83735 ASSAY OF MAGNESIUM: CPT | Performed by: HOSPITALIST

## 2019-03-02 PROCEDURE — 25000128 H RX IP 250 OP 636: Performed by: PHYSICIAN ASSISTANT

## 2019-03-02 PROCEDURE — 97116 GAIT TRAINING THERAPY: CPT | Mod: GP | Performed by: PHYSICAL THERAPIST

## 2019-03-02 PROCEDURE — 71046 X-RAY EXAM CHEST 2 VIEWS: CPT

## 2019-03-02 PROCEDURE — 99217 ZZC OBSERVATION CARE DISCHARGE: CPT | Performed by: PHYSICIAN ASSISTANT

## 2019-03-02 PROCEDURE — 36415 COLL VENOUS BLD VENIPUNCTURE: CPT | Performed by: HOSPITALIST

## 2019-03-02 PROCEDURE — 82947 ASSAY GLUCOSE BLOOD QUANT: CPT | Performed by: HOSPITALIST

## 2019-03-02 PROCEDURE — G0378 HOSPITAL OBSERVATION PER HR: HCPCS

## 2019-03-02 PROCEDURE — A9270 NON-COVERED ITEM OR SERVICE: HCPCS | Mod: GY | Performed by: HOSPITALIST

## 2019-03-02 RX ORDER — LANOLIN ALCOHOL/MO/W.PET/CERES
100 CREAM (GRAM) TOPICAL DAILY
Qty: 30 TABLET | Refills: 0 | Status: SHIPPED | OUTPATIENT
Start: 2019-03-02 | End: 2020-05-04

## 2019-03-02 RX ORDER — FOLIC ACID 1 MG/1
1 TABLET ORAL DAILY
Qty: 30 TABLET | Refills: 0 | Status: SHIPPED | OUTPATIENT
Start: 2019-03-02

## 2019-03-02 RX ORDER — MULTIPLE VITAMINS W/ MINERALS TAB 9MG-400MCG
1 TAB ORAL DAILY
Qty: 30 TABLET | Refills: 0 | Status: SHIPPED | OUTPATIENT
Start: 2019-03-03 | End: 2019-03-02

## 2019-03-02 RX ORDER — LANOLIN ALCOHOL/MO/W.PET/CERES
100 CREAM (GRAM) TOPICAL DAILY
Qty: 30 TABLET | Refills: 0 | Status: SHIPPED | OUTPATIENT
Start: 2019-03-03 | End: 2019-03-02

## 2019-03-02 RX ORDER — FOLIC ACID 1 MG/1
1 TABLET ORAL DAILY
Qty: 30 TABLET | Refills: 0 | Status: SHIPPED | OUTPATIENT
Start: 2019-03-03 | End: 2019-03-02

## 2019-03-02 RX ORDER — MULTIPLE VITAMINS W/ MINERALS TAB 9MG-400MCG
1 TAB ORAL DAILY
Qty: 30 TABLET | Refills: 0 | Status: SHIPPED | OUTPATIENT
Start: 2019-03-02

## 2019-03-02 RX ADMIN — GUAIFENESIN 1200 MG: 600 TABLET, EXTENDED RELEASE ORAL at 08:45

## 2019-03-02 RX ADMIN — BENZTROPINE MESYLATE 1 MG: 1 TABLET ORAL at 08:47

## 2019-03-02 RX ADMIN — ASPIRIN 81 MG: 81 TABLET, COATED ORAL at 08:46

## 2019-03-02 RX ADMIN — OMEPRAZOLE 20 MG: 20 CAPSULE, DELAYED RELEASE ORAL at 08:46

## 2019-03-02 RX ADMIN — LEVOTHYROXINE SODIUM 175 MCG: 125 TABLET ORAL at 08:46

## 2019-03-02 RX ADMIN — SENNOSIDES AND DOCUSATE SODIUM 1 TABLET: 8.6; 5 TABLET ORAL at 08:47

## 2019-03-02 RX ADMIN — FLUTICASONE FUROATE 1 PUFF: 100 POWDER RESPIRATORY (INHALATION) at 08:54

## 2019-03-02 RX ADMIN — SODIUM CHLORIDE 500 ML: 9 INJECTION, SOLUTION INTRAVENOUS at 11:52

## 2019-03-02 NOTE — PROGRESS NOTES
Observation Goals:    -diagnostic tests and consults completed and resulted - not met  -vital signs normal or at patient baseline - met  -returns to baseline functional status - not met  -safe disposition plan has been identified - not met

## 2019-03-02 NOTE — PROGRESS NOTES
Alert and oriented, vss. Tolerating liquids without diff.  Lungs cta. Up with assistance to bathroom with gait belt. Slight unsteadiness noted.  Oriented to obs.  neuros intact scoring 2 on ciwa.  Voiding without diff.

## 2019-03-02 NOTE — PLAN OF CARE
A&Ox4, VSS on RA. Tele NSR. C/o allover general body aches, med offered but declined. Up SBA with walker, slightly unsteady baseline due to tardive dyskinesia. Neuro cleared. Reg diet, voiding adequately. CIWA 0 this shift. IV SL. 500 ml bolus given, orthos neg after bolus. Plan to discharge today. Discharge paperwork reviewed and medications given.

## 2019-03-02 NOTE — PROGRESS NOTES
Observation Goals:    -diagnostic tests and consults completed and resulted - met   -vital signs normal or at patient baseline - met  -returns to baseline functional status - met   -safe disposition plan has been identified - met

## 2019-03-02 NOTE — PLAN OF CARE
Alert and oriented, reviewed obs protocal with pt. Vss denies pain  neuros intact. Scoring low on ci

## 2019-03-02 NOTE — PLAN OF CARE
Discharge Planner OT   Patient plan for discharge: home  Current status: OT order received on 67yo female admitted under observation status and has been evaluated by physical therapy who reports patient is up independently, indep with all mobilities and is able to perform her self-cares independently as well. Therefore, no skilled OT intervention needed at this time, OT will sign off and complete order.   Barriers to return to prior living situation: per PT, no barriers noted  Recommendations for discharge: PT has recommended home  Rationale for recommendations: defer to PT       Entered by: Apolinar Ghosh 03/02/2019 12:18 PM

## 2019-03-02 NOTE — PLAN OF CARE
Frequebncy to restroom bladder scanned with out large residual.  Neuros remain unchanged. . Poor appetite. Resting quietly

## 2019-03-02 NOTE — PLAN OF CARE
AVSS; tele SR; neuro's intact; pt denied pain; slightly unsteady; (= baseline per pt); so up with 1-2 assist and a walker; voiding; CIWA score 2; pt appeared to sleep well overnight; cxray ordered for this am.

## 2019-03-02 NOTE — DISCHARGE SUMMARY
[facility]  Hospitalist Discharge Summary       Date of Admission:  3/1/2019  Date of Discharge:  3/2/2019  Discharging Provider: Shannon Fraire PA-C      Discharge Diagnoses   Orthostatic hypotension, improved  Acute alcohol intoxication  Alcohol dependence  Unsteadiness, frequent falls  Chronic schizophrenia  Tardive dyskinesia  Restless leg syndrome  Mild intermittent asthma  Hyperlipidemia  Hypothyroidism    Follow-ups Needed After Discharge   Follow-up with primary care provider and psychiatry.    Discharge Disposition   Discharged to home  Condition at discharge: Stable    Hospital Course   Liz Lieberman is a 68 year old female with PMH alcohol dependence and chronic schizophrenia.  She lives alone in an apartment and this morning did her morning exercises with her .  At around 1 PM she was sitting on a chair after reportedly having about 4 beers when suddenly she was unable to sit upright and kept falling to the left.  She had no headache or dizziness or vertigo. She had no change in her vision or speech.  She did not feel weak on either side.  She called 911 and was taken to the ED as a code stroke where she had normal vital signs and a nonfocal neurologic exam. EKG SR. CT head without contrast, CT head perfusion study and CTA of the head and neck were all unremarkable.  X-ray of the cervical spine was unremarkable. She was given 1L NS, ASA, and thiamine. Labs unremarkable except for an ethanol level of 0.09.     Acute gait/truncal instability, ruled out cerebellar stroke/TIA  Her symptoms appear to have resolved within a few hours. Head CT/CTA negative. Brain MRI without evidence of intracranial pathology or infarct. +Mild diffuse parencymal volume loss w/ white matter changes likely c/w chronic microvascular ischemic changes. Stroke neurologist consulted and cleared for discharge, they do not feel that the gait instability was related to any type of stroke or TIA but likely more  related to her baseline alcohol dependence/intoxication gets of hernia possibly contributing. Telemetry without significant arrhythmias.      Orthostatic hypotension: Improved with IV fluid and PO intake.     Alcohol intoxication and dependence  She is not currently in withdrawal and she has not had any recent hospitalizations related to alcohol use. Thiamine and multivitamins ordered on admission and will subsequently be prescribed for discharge, patient should follow-up with primary care for continued use and refills. CIWA scoring with lorazepam as needed for alcohol withdrawal symptoms however patient did not go with through withdrawal, CIWA 0.  Encourage complete cessation of alcohol use with patient and recommended she use detox if needed to prevent withdrawal.  Continue to use alcohol will put her at risk of further gait instability, falls, bleeding, liver dysfunction, medication interactions and could result in death.     Chronic schizophrenia  Tardive dyskinesia  Restless legs syndrome   Her current regimen consists of clozapine, sertraline, bupropion and benztropine which will be continued.  She should follow-up with her psychiatrist and the person who prescribes her clozapine and benztropine to verify proper dosages and any adjustments needed.     Hypothyroidism Continue levothyroxine      Mild intermittent asthma   CXR with mild interstitial prominence c/w edema, infxn, or fibrosis - no clear etiology. Continue fluticasone     Hyperlipidemia  Continue simvastatin and fish oil    Consultations This Hospital Stay   NEUROLOGY IP CONSULT  PHYSICAL THERAPY ADULT IP CONSULT  OCCUPATIONAL THERAPY ADULT IP CONSULT    Code Status   Full Code    Time Spent on this Encounter   IShannon, personally saw the patient today and spent greater than 30 minutes discharging this patient.       Shannon Fraire PA-C  [facility]  Hospital  ______________________________________________________________________    Physical Exam   Vital Signs: Temp: 97.8  F (36.6  C) Temp src: Oral BP: 128/61 Pulse: 92 Heart Rate: 95 Resp: 20 SpO2: 96 % O2 Device: None (Room air)    Weight: 160 lbs 1.6 oz  Physical Exam   Constitutional: She is oriented to person, place, and time. Vital signs are normal. She appears well-developed and well-nourished. She is cooperative. She does not appear ill. No distress.   HENT:   Head: Normocephalic and atraumatic.   Eyes: EOM are normal. Pupils are equal, round, and reactive to light.   Neck: Normal range of motion.   Cardiovascular: Normal rate, regular rhythm, normal heart sounds and intact distal pulses. Exam reveals no gallop and no friction rub.   No murmur heard.  Pulmonary/Chest: Effort normal and breath sounds normal. No respiratory distress. She has no wheezes. She has no rales.   Abdominal: Soft. Bowel sounds are normal. She exhibits no distension. There is no tenderness. There is no guarding.   Musculoskeletal: Normal range of motion. She exhibits no edema.   Neurological: She is alert and oriented to person, place, and time. She has normal strength. Gait abnormal.   Skin: Skin is warm and dry. No rash noted. No erythema. There is pallor.   Psychiatric: Her speech is tangential. She is agitated. She is not slowed.        Primary Care Physician   Era Livingston    Immunizations       Discharge Orders      Reason for your hospital stay    Admitted with gait instability, imaging unremarkable, and seen by neurology.     Follow-up and recommended labs and tests     Follow up with primary care provider, Era Livingston, within 7 days for hospital follow- up.  The following labs/tests are recommended: follow up on any recurrence of symptoms, alcohol cessation.    Follow up with your psychiatrist regarding your dosing of clozapine and benztropine to ensure not contributing to symptoms and levels are normal.      Activity    Your activity upon discharge: activity as tolerated and no driving for today     Discharge Instructions    I recommend you stop drinking alcohol completely which can contribute to the symptoms that brought you into the hospital and put you at risk of falling, bleeding, liver problems, medication interactions and even death. Detox for withdrawal help.     Full Code     Diet    Follow this diet upon discharge: Regular Diet Adult with small frequently meals and good intake of fluids and meals.       Significant Results and Procedures   Most Recent 3 CBC's:  Recent Labs   Lab Test 03/01/19  1330 10/21/16  1456 05/09/15  2330   WBC 5.5 10.5 7.9   HGB 11.4* 12.9 10.9*   MCV 85 87 84   * 226 286     Most Recent 3 BMP's:  Recent Labs   Lab Test 03/02/19  0601 03/01/19  1330 10/21/16  1456 05/09/15  2330   NA  --  131* 140 136   POTASSIUM  --  3.7 3.8 4.0   CHLORIDE  --  99 106 103   CO2  --  20 27 24   BUN  --  11 10 11   CR  --  0.62 0.63 0.58   ANIONGAP  --  12 7 9   ADRIAN  --  8.3* 9.0 8.7   * 83 130* 109*     Most Recent 2 LFT's:  Recent Labs   Lab Test 03/01/19  1330 03/01/19  1305   AST 16 Canceled, Test credited   ALT 22 Canceled, Test credited   ALKPHOS 116 Canceled, Test credited   BILITOTAL 0.2 Canceled, Test credited     Most Recent 3 INR's:  Recent Labs   Lab Test 03/01/19  1330 05/18/11  1006   INR 1.06 1.11   ,   Results for orders placed or performed during the hospital encounter of 03/01/19   CTA Head Neck with Contrast    Narrative    CTA HEAD AND NECK WITH CONTRAST 3/1/2019 1:17 PM     HISTORY: Transient ischemic attack, initial exam. Code Stroke.    TECHNIQUE: Axial images were obtained through the head and neck  without and with intravenous contrast. 70 mL of Isovue-370 was given.  Multiplanar reconstructions were performed. 3-D reconstructions off a  remote workstation for CT angiography were also acquired. Carotid  stenoses were evaluated by comparing the caliber of the  proximal  internal carotid artery to the caliber of the distal internal carotid  artery. Radiation dose for this scan was reduced using automated  exposure control, adjustment of the mA and/or kV according to patient  size, or iterative reconstruction technique.    FINDINGS:    Brachiocephalic vessels: Normal.    Right carotid system: Mild calcific and noncalcified plaque is present  but there is no stenosis or dissection.    Left carotid system: Normal.    Right vertebral artery: Normal.    Left vertebral artery: Normal.    [place]: Normal.    Other findings: Some degenerative changes are seen in the spine.      Impression    IMPRESSION:  1. Minimal atherosclerotic disease involving the right carotid  bifurcation without stenosis.  2. No evidence for stenosis, dissection, thromboembolism, or aneurysm.    ARIEL GASPAR MD   CT Head Perfusion w Contrast    Narrative    CT HEAD PERFUSION WITH CONTRAST 3/1/2019 1:27 PM     HISTORY: TIA, initial exam. Code stroke.    TECHNIQUE: Multiplanar multisequence images were obtained through the  brain without and with contrast for CT brain perfusion imaging. 50 mL  of Isovue-370 was given. Radiation dose for this scan was reduced  using automated exposure control, adjustment of the mA and/or kV  according to patient size, or iterative reconstruction technique.    FINDINGS: Cerebral blood flow, cerebral blood volume, and mean transit  time maps are relatively symmetric. There is no evidence for ischemia  or infarct.      Impression    IMPRESSION: Normal CT brain perfusion imaging.    ARIEL GASPAR MD   CT Head w/o Contrast    Narrative    CT SCAN OF THE HEAD WITHOUT CONTRAST   3/1/2019 1:16 PM     HISTORY: Transient ischemic attack, initial exam. Code Stroke.    TECHNIQUE:  Axial images of the head and coronal reformations without  IV contrast material.  Radiation dose for this scan was reduced using  automated exposure control, adjustment of the mA and/or kV according  to  patient size, or iterative reconstruction technique.    COMPARISON: None.    FINDINGS:  The ventricles are normal in size, shape and configuration.   The brain parenchyma and subarachnoid spaces are normal. There is no  evidence of intracranial hemorrhage, mass, acute infarct or anomaly.     The visualized portions of the sinuses and mastoids appear normal.  There is no evidence of trauma.      Impression    IMPRESSION:   Normal CT scan of the head.     ARIEL GASPAR MD   XR Cervical Spine 2/3 Views    Narrative    CERVICAL SPINE TWO - THREE VIEWS   3/1/2019 1:59 PM     HISTORY: Falls.    COMPARISON: None.      Impression    IMPRESSION: No convincing acute fracture or dislocation. Presumed  degenerative change at C4-C6.    XIOMARA CAMERON MD   MR Brain w/o Contrast    Narrative    MRI BRAIN WITHOUT CONTRAST  3/1/2019 5:22 PM    HISTORY:  Focal neuro deficit, > 6 hours, stroke suspected.    TECHNIQUE:  Multiplanar, multisequence MRI of the brain without  gadolinium IV contrast material.      COMPARISON:  Head CT from earlier the same day.    FINDINGS: There is no evidence of acute infarct, hemorrhage, mass, or  herniation. Mild diffuse parenchymal volume loss. Mild patchy deep and  subcortical white matter T2 hyperintensities which are nonspecific,  but likely related to chronic microvascular ischemic disease.  Ventricular size within normal limits without hydrocephalus.     The facial structures appear normal.       Impression    IMPRESSION:     1. No evidence of acute infarct, mass, hemorrhage, or herniation.  2. Mild diffuse parenchymal volume loss and white matter changes  likely due to chronic microvascular ischemic disease.      KIRA PAN MD   XR Chest 2 Views    Narrative    CHEST TWO VIEWS  3/2/2019 7:36 AM     HISTORY: Asthma.    COMPARISON: 10/21/2016.      Impression    IMPRESSION: Mild interstitial prominence throughout both lungs has  increased since the previous exam, and could represent  edema,  infection, or fibrosis. There is mild scarring and/or linear  atelectasis in the left lower lung laterally. No pneumothorax. Heart  size and pulmonary vascularity are within normal limits. No pleural  effusions.     JENN HOGAN MD       Discharge Medications   Current Discharge Medication List      START taking these medications    Details   folic acid (FOLVITE) 1 MG tablet Take 1 tablet (1 mg) by mouth daily  Qty: 30 tablet, Refills: 0    Associated Diagnoses: Alcoholic intoxication without complication (H)      multivitamin w/minerals (THERA-VIT-M) tablet Take 1 tablet by mouth daily  Qty: 30 tablet, Refills: 0    Associated Diagnoses: Alcoholic intoxication without complication (H)      vitamin B1 (THIAMINE) 100 MG tablet Take 1 tablet (100 mg) by mouth daily  Qty: 30 tablet, Refills: 0    Associated Diagnoses: Alcoholic intoxication without complication (H)         CONTINUE these medications which have NOT CHANGED    Details   Acetaminophen (TYLENOL PO) Take 1,000 mg by mouth every 6 hours as needed.      albuterol (PROAIR HFA/PROVENTIL HFA/VENTOLIN HFA) 108 (90 Base) MCG/ACT inhaler Inhale 2 puffs into the lungs every 6 hours      ASPIRIN EC PO Take 81 mg by mouth daily.      benztropine (COGENTIN) 1 MG tablet Take 1 mg by mouth 2 times daily      Calcium Carbonate-Vitamin D (CALCIUM + D PO) Take 1 tablet by mouth. Calcium 500 with Vit D 400mg BID. One before breakfast and one with supper      CLOZAPINE PO Take 300 mg by mouth At Bedtime.      desoximetasone (TOPICORT) 0.25 % OINT Apply  topically as needed.      fluticasone (FLOVENT HFA) 110 MCG/ACT inhaler Inhale 1 puff into the lungs 2 times daily as needed      guaiFENesin (MUCINEX) 600 MG 12 hr tablet Take 1,200 mg by mouth 2 times daily      levothyroxine (SYNTHROID/LEVOTHROID) 175 MCG tablet Take 175 mcg by mouth daily      Omeprazole (PRILOSEC PO) Take 20 mg by mouth every morning. Take 1 hr before eating      polyethylene glycol  (MIRALAX/GLYCOLAX) packet Take 17 g by mouth daily as needed for constipation       sertraline (ZOLOFT) 100 MG tablet Take 150 mg by mouth At Bedtime      simvastatin (ZOCOR) 40 MG tablet Take 40 mg by mouth At Bedtime           Allergies   Allergies   Allergen Reactions     Formaldehyde Rash     Latex Rash

## 2019-03-02 NOTE — PROGRESS NOTES
Hirsch Balance Scale (BBS) Cutoff Scores: A score of ? 45/56 indicates an increased risk for falls.   Patient Score: 47/56    The BBS is a measure of static and dynamic standing balance that has been validated in community dwelling elderly individuals and individuals who have Parkinson's Disease, MS, and those who are s/p CVA and TBI. The test is administered without an assistive device. Scores from the Hirsch are used to determine the probability of falling based on the patient's previous history of falls and their test performance.     Minimal Detectable Change = 6.5   & Minimal Detectable Change (Parkinson's Disease) = 5 according to Be & Tim 2008    Assessment (rationale for performing, application to patient s function & care plan): Performed to further assess patient balance   Minutes billed as physical performance test: None billed as Hirsch was part of initial eval. 15 min spent administering.

## 2019-03-02 NOTE — PHARMACY
Pharmacy Clozapine Note    Date of Service: 3/1/2019  Patient's : 1950  68 year old, female    Current clozapine regimen: 300 mg QHS  Has there been a known interruption in therapy for greater than/equal to 48 hours? No, last dose PTA was  PM.    Recent ANC Value(s):  Recent Labs   Lab Test 19  1330 05/09/15  2330 11/15/14  1953 14  1758 13  0830 13  1245 13  1940   ANEU 3.7 4.9 6.0 8.2 5.7 6.9 6.1       Is the patient enrolled in the clozapine REMS program? Yes  Ordering prescriber: Amandeep Deng MD  Is this provider certified in the clozapine REMS program? Yes  Is the ANC within recommended limits? Yes  Does the patient have any signs or symptoms of infection, including fever or sore throat? No    Plan:  1. Continue clozapine therapy at 300 mg PO QHS.  2. A WBC with differential will be ordered at least weekly.  ANC values will be entered into the REMS program.  3. Signs/symptoms of infection will be monitored daily.    Halima Diane PharmD  Phone / Pager: [phone]

## 2019-03-02 NOTE — CONSULTS
[facility]      Stroke Consult Note    Reason for Consult:  follow up stroke code    HPI  Liz Lieberman is a 68 year old woman w hx schizophrenia, etoh use, tardive dyskinesias frequent falls, come in for falling off her chair. She had a few beers prior to this. She was writing checks and then suddenly felt off balance, was falling towards either side, unsteady, called 911. Came here as a stroke code for imbalance. BAL 0.09. MRI brain negative for stroke. No LVO.    TPA Treatment   Not given due to not stroke.    Endovascular Treatment  Not initiated due to absence of proximal vessel occlusion    Impression  Unsteadiness, frequent falls,  Cannot exclude acute alcohol intoxication as reason for imbalance    Recommendations  No further stroke workup necessary    Patient Follow-up    With PCP      Please contact the Stroke Service with any questions. Discussed w Dr. Layton Yu MD  Stroke fellow  __________________________________________________    Past Medical History:   Diagnosis Date     Allergic rhinitis      Bronchiectasis (H)     mild RML     Chronic pain     ft, knee, shoulders     Depression      Diverticulosis     on colonoscopy     Gastro-oesophageal reflux disease      GERD (gastroesophageal reflux disease)      Hearing loss      Hiatal hernia      History of blood transfusion     after tonsillectomy     Hyperlipidemia      Hypothyroidism      Leukocytosis      Lung nodule      Mild intermittent asthma      Numbness and tingling     hands ceasar numb R/T carpal tunnel     Osteoarthritis     knee, ft, shoulders     Paranoid schizophrenia, in remission      RLS (restless legs syndrome)      Tardive dyskinesia      Urinary incontinence        Past Surgical History:   Procedure Laterality Date     ARTHROPLASTY KNEE  2/20/2013    Procedure: ARTHROPLASTY KNEE;  LEFT TOTAL KNEE ARTHROPLASTY (SM[employer] NEPHEW)^;  Surgeon: Khanh Bee MD;  Location:  OR     ENT SURGERY       tonsillectomy     ORTHOPEDIC SURGERY  2011    (R) total knee       Medications   Current Facility-Administered Medications   Medication     acetaminophen (TYLENOL) Suppository 650 mg     acetaminophen (TYLENOL) tablet 650 mg     albuterol (PROAIR HFA/PROVENTIL HFA/VENTOLIN HFA) 108 (90 Base) MCG/ACT inhaler 2 puff     aspirin EC tablet 81 mg     benztropine (COGENTIN) tablet 1 mg     calcium citrate-vitamin D (CITRACAL) 315-250 MG-UNIT per tablet 1 tablet     cloZAPine (CLOZARIL) tablet 300 mg     desoximetasone (TOPICORT) 0.25 % ointment     fluticasone (ARNUITY ELLIPTA) 100 MCG/ACT inhaler 1 puff     folic acid (FOLVITE) tablet 1 mg     guaiFENesin (MUCINEX) 12 hr tablet 1,200 mg     levothyroxine (SYNTHROID/LEVOTHROID) tablet 175 mcg     lidocaine (LMX4) cream     lidocaine 1 % 0.1-1 mL     LORazepam (ATIVAN) tablet 1-2 mg    Or     LORazepam (ATIVAN) injection 1-2 mg     magnesium sulfate 4 g in 100 mL sterile water (premade)     melatonin tablet 1 mg     multivitamin w/minerals (THERA-VIT-M) tablet 1 tablet     naloxone (NARCAN) injection 0.1-0.4 mg     omeprazole (priLOSEC) CR capsule 20 mg     ondansetron (ZOFRAN-ODT) ODT tab 4 mg    Or     ondansetron (ZOFRAN) injection 4 mg     potassium chloride (KLOR-CON) Packet 20-40 mEq     potassium chloride 10 mEq in 100 mL intermittent infusion with 10 mg lidocaine     potassium chloride 10 mEq in 100 mL sterile water intermittent infusion (premix)     potassium chloride 20 mEq in 50 mL intermittent infusion     potassium chloride ER (K-DUR/KLOR-CON M) CR tablet 20-40 mEq     senna-docusate (SENOKOT-S/PERICOLACE) 8.6-50 MG per tablet 1 tablet    Or     senna-docusate (SENOKOT-S/PERICOLACE) 8.6-50 MG per tablet 2 tablet     sertraline (ZOLOFT) tablet 150 mg     simvastatin (ZOCOR) tablet 40 mg     sodium chloride (PF) 0.9% PF flush 3 mL     sodium chloride (PF) 0.9% PF flush 3 mL     sodium chloride 0.9 % 1,000 mL with INFUVITE ADULT 10 mL, thiamine 100 mg, folic acid  1 mg infusion     vitamin B1 (THIAMINE) tablet 100 mg       Medications Prior to Admission   Medication Sig Dispense Refill Last Dose     Acetaminophen (TYLENOL PO) Take 1,000 mg by mouth every 6 hours as needed.   Past Week at PRN     albuterol (PROAIR HFA/PROVENTIL HFA/VENTOLIN HFA) 108 (90 Base) MCG/ACT inhaler Inhale 2 puffs into the lungs every 6 hours   Past Week at PRN     ASPIRIN EC PO Take 81 mg by mouth daily.   3/1/2019 at AM     benztropine (COGENTIN) 1 MG tablet Take 1 mg by mouth 2 times daily   3/1/2019 at AM     Calcium Carbonate-Vitamin D (CALCIUM + D PO) Take 1 tablet by mouth. Calcium 500 with Vit D 400mg BID. One before breakfast and one with supper   3/1/2019 at AM     CLOZAPINE PO Take 300 mg by mouth At Bedtime.   2/28/2019 at HS     desoximetasone (TOPICORT) 0.25 % OINT Apply  topically as needed.    at PRN     fluticasone (FLOVENT HFA) 110 MCG/ACT inhaler Inhale 1 puff into the lungs 2 times daily as needed    at PRN     guaiFENesin (MUCINEX) 600 MG 12 hr tablet Take 1,200 mg by mouth 2 times daily   3/1/2019 at AM     levothyroxine (SYNTHROID/LEVOTHROID) 175 MCG tablet Take 175 mcg by mouth daily   3/1/2019 at AM     Omeprazole (PRILOSEC PO) Take 20 mg by mouth every morning. Take 1 hr before eating   3/1/2019 at AM     polyethylene glycol (MIRALAX/GLYCOLAX) packet Take 17 g by mouth daily as needed for constipation     at PRN     sertraline (ZOLOFT) 100 MG tablet Take 150 mg by mouth At Bedtime   2/28/2019 at HS     simvastatin (ZOCOR) 40 MG tablet Take 40 mg by mouth At Bedtime   2/28/2019 at HS       Allergies   Allergies   Allergen Reactions     Formaldehyde Rash     Latex Rash       Family History   Family History        Negative family history of: Cerebrovascular Disease, Diabetes          Social History   Social History     Socioeconomic History     Marital status:      Spouse name: Not on file     Number of children: Not on file     Years of education: Not on file      Highest education level: Not on file   Occupational History     Not on file   Social Needs     Financial resource strain: Not on file     Food insecurity:     Worry: Not on file     Inability: Not on file     Transportation needs:     Medical: Not on file     Non-medical: Not on file   Tobacco Use     Smoking status: Former Smoker     Last attempt to quit: 2/15/1994     Years since quittin.0   Substance and Sexual Activity     Alcohol use: Yes     Drug use: No     Sexual activity: Not on file   Lifestyle     Physical activity:     Days per week: Not on file     Minutes per session: Not on file     Stress: Not on file   Relationships     Social connections:     Talks on phone: Not on file     Gets together: Not on file     Attends Islam service: Not on file     Active member of club or organization: Not on file     Attends meetings of clubs or organizations: Not on file     Relationship status: Not on file     Intimate partner violence:     Fear of current or ex partner: Not on file     Emotionally abused: Not on file     Physically abused: Not on file     Forced sexual activity: Not on file   Other Topics Concern     Not on file   Social History Narrative     Not on file          ROS  The 10 point Review of Systems is negative other than noted in the HPI or here. Body aches    PHYSICAL EXAMINATION  B/P:     128/61 (19 1332)    Heart Rate: 95 (19 1332)    Pulse: 92 (19 0329)   Resp:  20 (19 1332)  Temp: 97.8  F (36.6  C)   Oral (19 0724)    General:  patient lying in bed without any acute distress    HEENT:  normocephalic/atraumatic  Cardio:  RRR  Pulmonary:  no respiratory distress  Abdomen:  non-tender  Extremities:  no edema  Skin:  Some bruises, around Left eye bruising     Neurologic  Mental Status:  fully alert, attentive and oriented, follows commands, speech clear and fluent  Cranial Nerves:  visual fields intact, EOMI with normal smooth pursuit, facial sensation intact  and symmetric, facial movements symmetric, hearing not formally tested but intact to conversation, palate elevation symmetric and uvula midline, no dysarthria, shoulder shrug strong bilaterally, tongue protrusion midline  Motor:  normal tone throughout, normal muscle bulk, no pronator drift, normal and symmetric rapid finger tapping, able to move all limbs spontaneously, strength 5/5 throughout upper and lower extremities, dyskinetic movements mouth and arms.  Reflexes:  2+ and symmetric throughout, no clonus, toes downgoing  Sensory:  Intact to LT x 4, no extinction  Coordination:  FNF and HS intact without dysmetria  Station/Gait:  deferred    Stroke Scales    NIHSS 0    Labs/Imaging  Labs and imaging were reviewed and used in developing plan; pertinent results included.  Data   CBC  WBC (10e9/L)   Date Value   03/01/2019 5.5   10/21/2016 10.5   05/09/2015 7.9    RBC Count (10e12/L)   Date Value   03/01/2019 4.06   10/21/2016 4.56   05/09/2015 3.96    Hemoglobin (g/dL)   Date Value   03/01/2019 11.4 (L)   10/21/2016 12.9   05/09/2015 10.9 (L)      Hematocrit (%)   Date Value   03/01/2019 34.4 (L)   10/21/2016 39.7   05/09/2015 33.3 (L)    Platelet Count (10e9/L)   Date Value   03/01/2019 146 (L)   10/21/2016 226   05/09/2015 286         BMP  Sodium (mmol/L)   Date Value   03/01/2019 131 (L)   10/21/2016 140   05/09/2015 136    Potassium (mmol/L)   Date Value   03/01/2019 3.7   10/21/2016 3.8   05/09/2015 4.0    Chloride (mmol/L)   Date Value   03/01/2019 99   10/21/2016 106   05/09/2015 103      Carbon Dioxide (mmol/L)   Date Value   03/01/2019 20   10/21/2016 27   05/09/2015 24    Glucose (mg/dL)   Date Value   03/02/2019 121 (H)   03/01/2019 83   10/21/2016 130 (H)    Urea Nitrogen (mg/dL)   Date Value   03/01/2019 11   10/21/2016 10   05/09/2015 11      Creatinine (mg/dL)   Date Value   03/01/2019 0.62   10/21/2016 0.63   05/09/2015 0.58    Calcium (mg/dL)   Date Value   03/01/2019 8.3 (L)   10/21/2016 9.0    05/09/2015 8.7         INR Troponin I A1C   INR (no units)   Date Value   03/01/2019 1.06   05/18/2011 1.11    Troponin I ES (ug/L)   Date Value   10/21/2016     <0.015  The 99th percentile for upper reference range is 0.045 ug/L.  Troponin values in   the range of 0.045 - 0.120 ug/L may be associated with risks of adverse   clinical events.     06/22/2014 <0.012   06/18/2013 <0.012    No results found for: A1C     Liver Panel  Protein Total (g/dL)   Date Value   03/01/2019 7.0   03/01/2019 Canceled, Test credited   06/18/2013 6.7 (L)    Albumin (g/dL)   Date Value   03/01/2019 3.2 (L)   03/01/2019 Canceled, Test credited   06/18/2013 3.6    Bilirubin Total (mg/dL)   Date Value   03/01/2019 0.2   03/01/2019 Canceled, Test credited   06/18/2013 0.2      Alkaline Phosphatase (U/L)   Date Value   03/01/2019 116   03/01/2019 Canceled, Test credited   06/18/2013 112    AST (U/L)   Date Value   03/01/2019 16   03/01/2019 Canceled, Test credited   06/18/2013 12    ALT (U/L)   Date Value   03/01/2019 22   03/01/2019 Canceled, Test credited   06/18/2013 25      No results found for: BILIDIRECT       Lipid Profile  No results found for: CHOL No results found for: HDL No results found for: LDL   No results found for: TRIG No results found for: CHOLHDLRATIO               This was a non-emergent, non-tele stroke consult.

## 2019-03-02 NOTE — PROGRESS NOTES
"   03/02/19 1058   Quick Adds   Type of Visit Initial PT Evaluation   Living Environment   Lives With alone   Living Arrangements condominium   Home Accessibility no concerns  (Patient on 3rd floor and has an elevator. )   Transportation Anticipated car, drives self   Self-Care   Usual Activity Tolerance moderate   Regular Exercise Yes   Equipment Currently Used at Home cane, straight   Activity/Exercise/Self-Care Comment Does seated ex in chair 3 days a week and yoga one time a week. Does water aerobics sometimes.    Functional Level Prior   Ambulation 0-->independent  (Reports she uses her cane outside in the winter. )   Transferring 0-->independent   Toileting 0-->independent   Bathing 0-->independent   Communication 0-->understands/communicates without difficulty   Swallowing 0-->swallows foods/liquids without difficulty   Cognition (Patient reports short term memory issues. )   Fall history within last six months yes   Number of times patient has fallen within last six months 4   Prior Functional Level Comment Patient has low back pain and has been going to OPPT one time/week for that.    General Information   Onset of Illness/Injury or Date of Surgery - Date 03/01/19   Patient/Family Goals Statement To return home.    Precautions/Limitations fall precautions   General Observations 156/76 in supine. 145/81 sitting   General Info Comments Typically amb without an AD but uses her cane on the ice because it has \"a grippy thing\" on it. She has a walker but reports she has \"so much stuff\" in her apartment she can't get through with it.    Cognitive Status Examination   Orientation orientation to person, place and time   Level of Consciousness alert   Follows Commands and Answers Questions 100% of the time   Pain Assessment   Patient Currently in Pain Yes, see Vital Sign flowsheet  (Pain in right foot. )   Integumentary/Edema   Integumentary/Edema Comments Has a bruise left eye from fall a few weeks ago.    Posture  " "  Posture Forward head position   Range of Motion (ROM)   ROM Quick Adds No deficits were identified   Strength   Manual Muscle Testing Quick Adds No deficits were identified   Bed Mobility   Bed Mobility Comments Independent   Transfer Skills   Transfer Comments Independent   Gait   Gait Comments SBA provided but no assist needed and no LOB. Due to extraneous trunk movement patient does appear slightly unsteady.    Balance   Balance Comments See chacko balance test.    General Therapy Interventions   Planned Therapy Interventions gait training   Clinical Impression   Criteria for Skilled Therapeutic Intervention yes, treatment indicated   PT Diagnosis Impaired functional independence, history of frequent falls.    Functional limitations due to impairments Needed education on safety with amb.    Clinical Presentation Stable/Uncomplicated   Clinical Decision Making (Complexity) Low complexity   Therapy Frequency` (One time eval and treat only. )   Predicted Duration of Therapy Intervention (days/wks) One time only.    Anticipated Discharge Disposition Home   Risk & Benefits of therapy have been explained Yes   Patient, Family & other staff in agreement with plan of care Yes   [facility] TM \"6 Clicks\"   2016, Trustees of [facility], under license to [employer].  All rights reserved.   6 Clicks Short Forms Basic Mobility Inpatient Short Form   [facility]  \"6 Clicks\" V.2 Basic Mobility Inpatient Short Form   1. Turning from your back to your side while in a flat bed without using bedrails? 4 - None   2. Moving from lying on your back to sitting on the side of a flat bed without using bedrails? 4 - None   3. Moving to and from a bed to a chair (including a wheelchair)? 4 - None   4. Standing up from a chair using your arms (e.g., wheelchair, or bedside chair)? 4 - None   5. To walk in hospital room? 4 - None   6. Climbing 3-5 steps with a railing? 3 - A Little   Basic Mobility Raw Score " (Score out of 24.Lower scores equate to lower levels of function) 23   Total Evaluation Time   Total Evaluation Time (Minutes) 9

## 2019-03-02 NOTE — PLAN OF CARE
Discharge Planner PT   Patient plan for discharge: To go home.  Current status: Patient seen for initial eval and treatment initiated. Patient lives in a condo with elevator access. Patient reports she is typically independent without an AD but does use a cane in the winter when outside because it has a  on the bottom of it. She also reports 4-5 falls in the past 6 months. She reports her falls are related to dizziness. Currently she is able to demonstrate independence with bed mobility, transfers and amb on level surface. Patient does have extraneous trunk movement that makes her appear slightly unsteady but no LOB. Patient scored 47/56 on chacko balance scale (45 and below is indicative of increased risk of falling). Educated patient on making sure she has items picked up off the floor to decrease tripping hazards. She was receptive. Patient did c/o dizziness after standing for about 4 min. BP had dropped from 156/76 in supine to 114/61. PA informed. No further skilled PT noted.   Barriers to return to prior living situation: Fall risk  Recommendations for discharge: Home  Rationale for recommendations: Patient was able to demonstrate independence with functional mobility and score above increased fall risk level on Chacko balance test.        Entered by: Raquel Bhardwaj 03/02/2019 11:38 AM

## 2019-10-18 ENCOUNTER — APPOINTMENT (OUTPATIENT)
Dept: CT IMAGING | Facility: CLINIC | Age: 69
End: 2019-10-18
Attending: EMERGENCY MEDICINE
Payer: MEDICARE

## 2019-10-18 ENCOUNTER — HOSPITAL ENCOUNTER (EMERGENCY)
Facility: CLINIC | Age: 69
Discharge: HOME | End: 2019-10-18
Attending: EMERGENCY MEDICINE | Admitting: EMERGENCY MEDICINE
Payer: MEDICARE

## 2019-10-18 VITALS
DIASTOLIC BLOOD PRESSURE: 80 MMHG | SYSTOLIC BLOOD PRESSURE: 150 MMHG | OXYGEN SATURATION: 97 % | HEIGHT: 65 IN | WEIGHT: 160 LBS | TEMPERATURE: 99.1 F | BODY MASS INDEX: 26.66 KG/M2 | HEART RATE: 86 BPM | RESPIRATION RATE: 16 BRPM

## 2019-10-18 DIAGNOSIS — S81.812A LACERATION OF LEFT LOWER EXTREMITY, INITIAL ENCOUNTER: ICD-10-CM

## 2019-10-18 DIAGNOSIS — R42 DIZZINESS: ICD-10-CM

## 2019-10-18 LAB
ANION GAP SERPL CALCULATED.3IONS-SCNC: 5 MMOL/L (ref 3–14)
BASOPHILS # BLD AUTO: 0 10E9/L (ref 0–0.2)
BASOPHILS NFR BLD AUTO: 0.1 %
BUN SERPL-MCNC: 10 MG/DL (ref 7–30)
CALCIUM SERPL-MCNC: 8.9 MG/DL (ref 8.5–10.1)
CHLORIDE SERPL-SCNC: 102 MMOL/L (ref 94–109)
CO2 SERPL-SCNC: 26 MMOL/L (ref 20–32)
CREAT SERPL-MCNC: 0.63 MG/DL (ref 0.52–1.04)
DIFFERENTIAL METHOD BLD: NORMAL
EOSINOPHIL # BLD AUTO: 0 10E9/L (ref 0–0.7)
EOSINOPHIL NFR BLD AUTO: 0 %
ERYTHROCYTE [DISTWIDTH] IN BLOOD BY AUTOMATED COUNT: 14.2 % (ref 10–15)
ETHANOL SERPL-MCNC: <0.01 G/DL
GFR SERPL CREATININE-BSD FRML MDRD: >90 ML/MIN/{1.73_M2}
GLUCOSE SERPL-MCNC: 105 MG/DL (ref 70–99)
HCT VFR BLD AUTO: 35.6 % (ref 35–47)
HGB BLD-MCNC: 11.8 G/DL (ref 11.7–15.7)
IMM GRANULOCYTES # BLD: 0 10E9/L (ref 0–0.4)
IMM GRANULOCYTES NFR BLD: 0.6 %
LYMPHOCYTES # BLD AUTO: 1.9 10E9/L (ref 0.8–5.3)
LYMPHOCYTES NFR BLD AUTO: 27.1 %
MCH RBC QN AUTO: 28.5 PG (ref 26.5–33)
MCHC RBC AUTO-ENTMCNC: 33.1 G/DL (ref 31.5–36.5)
MCV RBC AUTO: 86 FL (ref 78–100)
MONOCYTES # BLD AUTO: 0.5 10E9/L (ref 0–1.3)
MONOCYTES NFR BLD AUTO: 7.1 %
NEUTROPHILS # BLD AUTO: 4.5 10E9/L (ref 1.6–8.3)
NEUTROPHILS NFR BLD AUTO: 65.1 %
NRBC # BLD AUTO: 0 10*3/UL
NRBC BLD AUTO-RTO: 0 /100
PLATELET # BLD AUTO: 261 10E9/L (ref 150–450)
POTASSIUM SERPL-SCNC: 3.9 MMOL/L (ref 3.4–5.3)
RBC # BLD AUTO: 4.14 10E12/L (ref 3.8–5.2)
SODIUM SERPL-SCNC: 133 MMOL/L (ref 133–144)
WBC # BLD AUTO: 6.9 10E9/L (ref 4–11)

## 2019-10-18 PROCEDURE — 80320 DRUG SCREEN QUANTALCOHOLS: CPT | Performed by: EMERGENCY MEDICINE

## 2019-10-18 PROCEDURE — 70450 CT HEAD/BRAIN W/O DYE: CPT

## 2019-10-18 PROCEDURE — 85025 COMPLETE CBC W/AUTO DIFF WBC: CPT | Performed by: EMERGENCY MEDICINE

## 2019-10-18 PROCEDURE — 12004 RPR S/N/AX/GEN/TRK7.6-12.5CM: CPT

## 2019-10-18 PROCEDURE — 99285 EMERGENCY DEPT VISIT HI MDM: CPT | Mod: 25

## 2019-10-18 PROCEDURE — 93005 ELECTROCARDIOGRAM TRACING: CPT

## 2019-10-18 PROCEDURE — 80048 BASIC METABOLIC PNL TOTAL CA: CPT | Performed by: EMERGENCY MEDICINE

## 2019-10-18 ASSESSMENT — ENCOUNTER SYMPTOMS
WOUND: 1
FATIGUE: 1
ABDOMINAL PAIN: 0
FREQUENCY: 1
DIZZINESS: 1
VOMITING: 0
DIARRHEA: 0
FEVER: 0
NAUSEA: 0
HEADACHES: 0

## 2019-10-18 ASSESSMENT — MIFFLIN-ST. JEOR: SCORE: 1256.64

## 2019-10-18 NOTE — ED AVS SNAPSHOT
Emergency Department  [address] [facility] [address]  Phone:  [phone]  Fax:  [phone]                                    Liz Lieberman   MRN: [id number]    Department:   Emergency Department   Date of Visit:  10/18/2019           After Visit Summary Signature Page    I have received my discharge instructions, and my questions have been answered. I have discussed any challenges I see with this plan with the nurse or doctor.    ..........................................................................................................................................  Patient/Patient Representative Signature      ..........................................................................................................................................  Patient Representative Print Name and Relationship to Patient    ..................................................               ................................................  Date                                   Time    ..........................................................................................................................................  Reviewed by Signature/Title    ...................................................              ..............................................  Date                                               Time          22EPIC Rev 08/18

## 2019-10-19 LAB — INTERPRETATION ECG - MUSE: NORMAL

## 2019-10-19 NOTE — ED PROVIDER NOTES
History     Chief Complaint:  Dizziness and Laceration     HPI   Liz Lieberman is a 69 year old female who presents via EMS for evaluation of dizziness and a laceration. Over the last few days the patient reports that she has felt abnormally dizzy, fatigued, and unsteady on her feet. Tonight shortly prior to arrival the patient accidentally struck her left shin against her bed frame sustaining a laceration to the area. Due to this, the patient called EMS to bring her into the ED for evaluation.  The patient reports she drinks beer, [employer] light, on a regular basis.  She denies drinking any recently.  The patient reports some urinary frequency at baseline but otherwise she denies any recent fever, chest pain, abdominal pain, nausea, vomiting, diarrhea, or headache.     PE/DVT Risk Factors:  The patient denies a personal or family history of PE, DVT, or other clotting disorders. The patient denies any recent travel, surgery, or other prolonged immobilization. The patient denies tobacco use or hormone use.    Cardiac Risk Factors:  The patient has high cholesterol, but denies a history of diabetes, hypertension, known cardiac disease or current smoking.    Allergies:  Formaldehyde   Latex      Medications:    Tylenol  Albuterol inhaler  Aspirin  Cogentin  Calcium + D   Clozapine   Topicort  Flovent HFA  Folic acid   Mucinex  Levothyroxine   Multivitamin   Omeprazole  Miralax  Zoloft  Zocor  Thiamine     Past Medical History:    Chronic pain  Depression  Diverticulosis   GERD   Hearing loss  Hiatal hernia  Hyperlipidemia  Hypothyroidism   Mild intermittent asthma  Osteoarthritis  Paranoid schizophrenia, in remission  Restless legs syndrome  Tardive dyskinesia  Urinary incontinence     Past Surgical History:    Left total knee arthroplasty   Tonsillectomy  Right total knee     Family History:    History reviewed. No pertinent family history.     Social History:  Tobacco use:    Former smoker, quit 1994   Alcohol use:  "   Positive  Drug use:    Negative   Marital status:       Accompanied to ED by:  EMS      Review of Systems   Constitutional: Positive for fatigue. Negative for fever.   Gastrointestinal: Negative for abdominal pain, diarrhea, nausea and vomiting.   Genitourinary: Positive for frequency.   Skin: Positive for wound (left shin).   Neurological: Positive for dizziness. Negative for headaches.   All other systems reviewed and are negative.       Physical Exam     Patient Vitals for the past 24 hrs:   BP Temp Temp src Pulse Resp SpO2 Height Weight   10/18/19 1905 (!) 150/80 99.1  F (37.3  C) Oral 86 16 97 % 1.651 m (5' 5\") 72.6 kg (160 lb)        Physical Exam  Physical Exam   General:  Sitting on bed.   HENT:  No obvious trauma to head  Right Ear:  External ear normal.   Left Ear:  External ear normal.   Nose:  Nose normal.   Eyes:  Conjunctivae and EOM are normal. Pupils are equal, round, and reactive.   Neck: Normal range of motion. Neck supple. No tracheal deviation present.   CV:  Normal heart sounds. No murmur heard.  Pulm/Chest: Effort normal and breath sounds normal.   Abd: Soft. No distension. There is no tenderness. There is no rigidity, no rebound and no guarding.   M/S: Normal range of motion. Left lower anterior shin region there is a laceration that is V shaped with a flap measuring 10.6 cm that has another segment flap to it as well.  Skin is extremely thin.  Neuro: Alert. GCS 15. CN II-XII Grossly intact.   Skin: Skin is warm and dry. No rash noted. Not diaphoretic.   Psych: Normal mood and affect. Behavior is normal.     Emergency Department Course   ECG (19:16:05):  Indication: Screening for cardiovascular disease.   Rate 85 bpm. MN interval 138 ms. QRS duration 78 ms. QT/QTc 388/461 ms. P-R-T axes 40 1 35.   Interpretation: Normal sinus rhythm, Normal ECG   Agree with computer interpretation. Yes   No significant change compared to EKG dated 3/1/2019.   Interpreted at 1917 by Dr. Bee.  "     Imaging:  Radiographic findings were communicated with the patient who voiced understanding of the findings.    CT Head w/o Contrast:  IMPRESSION:   No acute intracranial abnormality.  Preliminary report per radiology.     Laboratory:  CBC: WNL (WBC 6.9, HGB 11.8, )  BMP: Glucose 105 high, o/w WNL (Creatinine 0.63)  Alcohol ethyl: <0.01      Procedures:    Laceration Repair        LACERATION:  A V-shaped minimally Contaminated, moderate complexity 10.6 cm laceration.      LOCATION:  Left lower anterior shin       FUNCTION:  Distally sensation, circulation, motor and tendon function are intact.      ANESTHESIA:  Local using 1.0% Lidocaine total of 6 mLs      PREPARATION:  Irrigation with Normal Saline and Shur Clens      DEBRIDEMENT:  no debridement      CLOSURE:  Wound was closed with One Layer.  Skin closed with 16 x 4.0 Ethylon using interrupted sutures.    Emergency Department Course:  Nursing notes and vitals reviewed.  1920: I performed an exam of the patient as documented above.     2015: A laceration repair was performed as outlined in the procedure note above.  The patient tolerated well and there were no complications.     2118: I updated and reassessed the patient.     Findings and plan explained to the Patient. Patient discharged home with instructions regarding supportive care, medications, and reasons to return. The importance of close follow-up was reviewed.     Impression & Plan    Medical Decision Making:  Liz Lieberman is a very pleasant 69 year old female who presents with a feeling of dizziness.  I considered a broad differential including cardiac arrhythmia, ACS, aortic stenosis, HOCM, PE, orthostatic hypotension, anemia.  There is no murmur on exam making AS and HOCM unlikely.  There are no malignant arrhthymias on EKG or during the patient's time on the monitor.  The patient does not appear clinically dehydrated and I would not expect this based on history and is not anemic.   Electrolytes are otherwise normal.  There are no signs of a concerning etiology for dizziness at this point.  She has no chest pain concerning for CAD, no seizure activity or post-ictal period, no murmur, and no signs of orthostasis in the ED, no focal neurologic symptoms and no complaints of concerning headache.  She admits to drinking regularly.  She is not intoxicated here.  She is able to ambulate without concern here no signs of Wernicke's.  CT of the head shows no intracranial bleed or old subdural hematoma.  The workup in the ED is negative and the physical exam is re-assuring.  The patient also had a laceration to her left lower shin after striking it against a bed frame. The wound was carefully evaluated and explored.  There is a complicated laceration to repair secondary to the skin being extremely thin.  The V-shaped flap of skin had retracted quite a bit.  Taking a prolonged period of time and stretching the skin, I was able to successfully repair the laceration without tearing through the skin.  The laceration was closed with sutures as noted above.  There is no evidence of muscular, tendon, or bony damage with this laceration.  No signs of foreign body.  Possible complications (infection, scarring) were reviewed with the patient.  Follow up with primary care as noted in the discharge section.    The treatment plan was discussed with the patient and they expressed understanding of this plan and consented to the plan.  In addition, the patient will return to the emergency department if their symptoms persist, worsen, if new symptoms arise or if there is any concern as other pathology may be present that is not evident at this time. They also understand the importance of close follow up in the clinic and if unable to do so will return to the emergency department for a reevaluation. All questions were answered.    Diagnosis:    ICD-10-CM   1. Dizziness R42   2. Laceration of left lower extremity, initial  encounter S81.991N     Disposition:  Discharged to home.     I, Jeff Lyman, am serving as a scribe at 7:20 PM on 10/18/2019 to document services personally performed by Arie Bee DO, based on my observations and the provider's statements to me.     EMERGENCY DEPARTMENT       Paramjit Bee DO  10/18/19 7877

## 2019-10-19 NOTE — ED TRIAGE NOTES
Left lower leg laceration from hitting it on the bedframe. Takes 81mg Aspirin daily. Schizophrenia. 12 lead shows NSR. Been feeling dizzy and increased fatigue for the past few days.

## 2019-10-19 NOTE — ED NOTES
Bed: ED06  Expected date:   Expected time:   Means of arrival:   Comments:  424  69 F dizziness/leg lac  Here now

## 2020-01-05 ENCOUNTER — APPOINTMENT (OUTPATIENT)
Dept: GENERAL RADIOLOGY | Facility: CLINIC | Age: 70
End: 2020-01-05
Attending: EMERGENCY MEDICINE
Payer: MEDICARE

## 2020-01-05 ENCOUNTER — HOSPITAL ENCOUNTER (EMERGENCY)
Facility: CLINIC | Age: 70
Discharge: HOME | End: 2020-01-05
Attending: EMERGENCY MEDICINE | Admitting: EMERGENCY MEDICINE
Payer: MEDICARE

## 2020-01-05 VITALS
TEMPERATURE: 98.3 F | HEIGHT: 65 IN | HEART RATE: 84 BPM | BODY MASS INDEX: 26.63 KG/M2 | OXYGEN SATURATION: 97 % | SYSTOLIC BLOOD PRESSURE: 145 MMHG | DIASTOLIC BLOOD PRESSURE: 99 MMHG

## 2020-01-05 DIAGNOSIS — S39.012A LOW BACK STRAIN, INITIAL ENCOUNTER: ICD-10-CM
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
## 2020-01-05 DIAGNOSIS — F10.920 ALCOHOLIC INTOXICATION WITHOUT COMPLICATION (H): ICD-10-CM

## 2020-01-05 LAB
ALBUMIN SERPL-MCNC: 3.2 G/DL (ref 3.4–5)
ALP SERPL-CCNC: 143 U/L (ref 40–150)
ALT SERPL W P-5'-P-CCNC: 28 U/L (ref 0–50)
ANION GAP SERPL CALCULATED.3IONS-SCNC: 9 MMOL/L (ref 3–14)
AST SERPL W P-5'-P-CCNC: 28 U/L (ref 0–45)
BASOPHILS # BLD AUTO: 0 10E9/L (ref 0–0.2)
BASOPHILS NFR BLD AUTO: 0.1 %
BILIRUB SERPL-MCNC: 0.2 MG/DL (ref 0.2–1.3)
BUN SERPL-MCNC: 8 MG/DL (ref 7–30)
CALCIUM SERPL-MCNC: 8.5 MG/DL (ref 8.5–10.1)
CHLORIDE SERPL-SCNC: 99 MMOL/L (ref 94–109)
CO2 SERPL-SCNC: 24 MMOL/L (ref 20–32)
CREAT SERPL-MCNC: 0.58 MG/DL (ref 0.52–1.04)
DIFFERENTIAL METHOD BLD: NORMAL
EOSINOPHIL # BLD AUTO: 0 10E9/L (ref 0–0.7)
EOSINOPHIL NFR BLD AUTO: 0 %
ERYTHROCYTE [DISTWIDTH] IN BLOOD BY AUTOMATED COUNT: 14.2 % (ref 10–15)
ETHANOL SERPL-MCNC: 0.13 G/DL
GFR SERPL CREATININE-BSD FRML MDRD: >90 ML/MIN/{1.73_M2}
GLUCOSE SERPL-MCNC: 94 MG/DL (ref 70–99)
HCT VFR BLD AUTO: 37 % (ref 35–47)
HGB BLD-MCNC: 12.4 G/DL (ref 11.7–15.7)
IMM GRANULOCYTES # BLD: 0.1 10E9/L (ref 0–0.4)
IMM GRANULOCYTES NFR BLD: 0.7 %
LYMPHOCYTES # BLD AUTO: 1.7 10E9/L (ref 0.8–5.3)
LYMPHOCYTES NFR BLD AUTO: 25.7 %
MCH RBC QN AUTO: 28.9 PG (ref 26.5–33)
MCHC RBC AUTO-ENTMCNC: 33.5 G/DL (ref 31.5–36.5)
MCV RBC AUTO: 86 FL (ref 78–100)
MONOCYTES # BLD AUTO: 0.3 10E9/L (ref 0–1.3)
MONOCYTES NFR BLD AUTO: 4.2 %
NEUTROPHILS # BLD AUTO: 4.7 10E9/L (ref 1.6–8.3)
NEUTROPHILS NFR BLD AUTO: 69.3 %
NRBC # BLD AUTO: 0 10*3/UL
NRBC BLD AUTO-RTO: 0 /100
PLATELET # BLD AUTO: 235 10E9/L (ref 150–450)
POTASSIUM SERPL-SCNC: 3.7 MMOL/L (ref 3.4–5.3)
PROT SERPL-MCNC: 7.4 G/DL (ref 6.8–8.8)
RBC # BLD AUTO: 4.29 10E12/L (ref 3.8–5.2)
SODIUM SERPL-SCNC: 132 MMOL/L (ref 133–144)
WBC # BLD AUTO: 6.7 10E9/L (ref 4–11)

## 2020-01-05 PROCEDURE — 80053 COMPREHEN METABOLIC PANEL: CPT | Performed by: EMERGENCY MEDICINE

## 2020-01-05 PROCEDURE — 99284 EMERGENCY DEPT VISIT MOD MDM: CPT

## 2020-01-05 PROCEDURE — 72100 X-RAY EXAM L-S SPINE 2/3 VWS: CPT

## 2020-01-05 PROCEDURE — 80320 DRUG SCREEN QUANTALCOHOLS: CPT | Performed by: EMERGENCY MEDICINE

## 2020-01-05 PROCEDURE — 85025 COMPLETE CBC W/AUTO DIFF WBC: CPT | Performed by: EMERGENCY MEDICINE

## 2020-01-05 ASSESSMENT — ENCOUNTER SYMPTOMS
ABDOMINAL PAIN: 0
BACK PAIN: 1

## 2020-01-05 NOTE — ED TRIAGE NOTES
Pt was found on the floor at her assisted living facility after coming home from being out to eat. Pt states she fell. Has pain on her right side.

## 2020-01-05 NOTE — ED AVS SNAPSHOT
Emergency Department  [address] [facility] [address]  Phone:  [phone]  Fax:  [phone]                                    Liz Lieberman   MRN: [id number]    Department:   Emergency Department   Date of Visit:  1/5/2020           After Visit Summary Signature Page    I have received my discharge instructions, and my questions have been answered. I have discussed any challenges I see with this plan with the nurse or doctor.    ..........................................................................................................................................  Patient/Patient Representative Signature      ..........................................................................................................................................  Patient Representative Print Name and Relationship to Patient    ..................................................               ................................................  Date                                   Time    ..........................................................................................................................................  Reviewed by Signature/Title    ...................................................              ..............................................  Date                                               Time          22EPIC Rev 08/18

## 2020-01-05 NOTE — ED NOTES
Bed: ED22  Expected date: 1/5/20  Expected time: 5:21 PM  Means of arrival: Ambulance  Comments:  424 69f etoh, fall, back pain  ETA [id number]

## 2020-01-05 NOTE — ED PROVIDER NOTES
History     Chief Complaint:  Fall    The history is provided by the patient and the EMS personnel. History limited by: dementia and altered mental status.      Liz Lieberman is a 69 year old female with a history of dementia and chronic pain who presents via EMS after a fall. EMS report that the patient had 4 beers at [employer] [facility] and shortly after returning to her assisted living facility, staff found her on the floor. Patient states that she fell and was just reading a book at [employer]. She says she did not walk home, but does not say how she got there. She complains of low back pain here. No abdominal pain or chest wall pain.     Allergies:  Formaldehyde   Latex     Medications:    Albuterol  Aspirin, 81 mg   Cogentin   Clozapine  Levothyroxine  Ditropan   Prilosec  Zoloft  Norvasc  Lipitor     Past Medical History:    Bronchiectasis    Chronic pain   Dementia   Depression   Diverticulosis   Gastro-oesophageal reflux disease   Hearing loss    Hyperlipidemia   Hypothyroidism    Lung nodule   Mild intermittent asthma   Osteoarthritis   Paranoid schizophrenia, in remission   Pre-diabetes   Restless legs syndrome  Tardive dyskinesia   Transient ischemic attack   Urinary incontinence   Vitreous degeneration     Past Surgical History:    Arthroplasty knee, left   Tonsillectomy  Breast biopsy, right  Knee surgery, right     Family History:    Father: heart disease, myocardial infarction, hyperlipidemia   Mother: dementia, hypertension, thyroid disease   Sister: asthma, hypertension, migraines, anesthesia reaction     Social History:  Smoking Status: Former cigarette smoker, quit in 1994   Smokeless Tobacco: Never used   Alcohol Use: Positive  Drug Use: Negative  PCP: Era Livingston  Marital Status:       Review of Systems   Gastrointestinal: Negative for abdominal pain.   Musculoskeletal: Positive for back pain.        No chest wall pain   All other systems reviewed and are negative.  ROS limited  "due to dementia and altered mental status      Physical Exam     Patient Vitals for the past 24 hrs:   BP Temp Temp src Pulse SpO2 Height   01/05/20 1735 -- -- -- -- -- 1.651 m (5' 5\")   01/05/20 1731 126/85 98.3  F (36.8  C) Oral 79 95 % --   01/05/20 1730 126/85 98.3  F (36.8  C) -- 79 96 % --     Physical Exam  Nursing note and vitals reviewed.  General: Oriented to person, place, and time. Appears well-developed and well-nourished.   Head: No signs of trauma.   Mouth/Throat: Oropharynx is clear. Dry mucous membranes.   Eyes: Conjunctivae are normal. Pupils are equal, round, and reactive to light.   Neck: Normal range of motion. No nuchal rigidity.    Cardiovascular: Normal rate and regular rhythm. Systolic murmur.   Respiratory: Effort normal and breath sounds normal. No respiratory distress.   Abdominal: Soft. There is no tenderness. There is no guarding.   Musculoskeletal: Normal range of motion. No edema. She complains of low back pain, but no obvious trauma.    Neurological: The patient is alert and oriented to person, place, and time. PERRLA, EOMI, visual fields intact, strength in upper/lower extremities normal and symmetrical.   Sensation normal. Speech normal. Frequent chorea-like movements.    GCS eye subscore is 4. GCS verbal subscore is 5. GCS motor subscore is 6.   Skin: Skin is warm and dry. No rash noted.   Psychiatric: Normal mood and affect. Behavior is normal.     Emergency Department Course     Imaging:  Radiology findings were communicated with the patient who voiced understanding of the findings.    Lumbar spine XR, 2-3 views  Lumbar spine shows satisfactory height and alignment. Endplate osteophytes and mid/lower lumbar spine facet arthropathy. Moderate amount of gas and stool in the large bowel including descending colon with paucity of gas above the rectum may indicate an element of clinical constipation. Pelvic ring appears intact. Satisfactory sacral alignment. Remainder negative. "   Reading per radiology     Laboratory:  Laboratory findings were communicated with the patient who voiced understanding of the findings.    CBC: WBC 6.7, HGB 12.4,   CMP: (L), Albumin 3.2(L) o/w WNL (Creatinine 0.58)  Alcohol ethyl: 0.13(H)     Emergency Department Course:    1730 Nursing notes and vitals reviewed.    1738 I performed an exam of the patient as documented above.     1752 IV was inserted and blood was drawn for laboratory testing, results above.    1756 The patient was sent for an XR while in the emergency department, results above.     1817 Rechecked and updated.      1840 Findings and plan explained to the patient. Patient discharged home with instructions regarding supportive care, medications, and reasons to return. The importance of close follow-up was reviewed.     Impression & Plan      Medical Decision Making:  Liz Lieberman is a 69 year old female who presents to the emergency department today for evaluation of low back pain after a fall she was returning from drinking at the bar.  Patient is legally intoxicated.  Her lower back x-rays are negative for fracture.  No signs of acute cord compression, she is ambulatory here in the ED. Her sisters are here to take her home.  They will stay with her until she is sober and encouraged her not to overindulge in alcohol going forward.    Diagnosis:    ICD-10-CM    1. Low back strain, initial encounter S39.012A    2. Fall, initial encounter W19.XXXA    3. Alcoholic intoxication without complication (H) F10.920      Disposition:   The patient is discharged to home.    Scribe Disclosure:  DARIN, Deya Artis, am serving as a scribe at 5:33 PM on 1/5/2020 to document services personally performed by Federico Lewis MD based on my observations and the provider's statements to me.       EMERGENCY DEPARTMENT       Federico Lewis MD  01/06/20 0110

## 2020-01-06 NOTE — ED NOTES
Pt able to ambulate without assistance. Pt walks with slight limp but sisters say her gait looks normal for her.

## 2020-01-12 ENCOUNTER — HOSPITAL ENCOUNTER (EMERGENCY)
Facility: CLINIC | Age: 70
Discharge: HOME | End: 2020-01-12
Attending: EMERGENCY MEDICINE | Admitting: EMERGENCY MEDICINE
Payer: MEDICARE

## 2020-01-12 VITALS
HEART RATE: 94 BPM | RESPIRATION RATE: 14 BRPM | SYSTOLIC BLOOD PRESSURE: 128 MMHG | OXYGEN SATURATION: 98 % | DIASTOLIC BLOOD PRESSURE: 89 MMHG | TEMPERATURE: 97.4 F

## 2020-01-12 DIAGNOSIS — L29.89 PRURITIC ERYTHEMATOUS RASH: ICD-10-CM

## 2020-01-12 PROCEDURE — 25000132 ZZH RX MED GY IP 250 OP 250 PS 637: Mod: GY | Performed by: EMERGENCY MEDICINE

## 2020-01-12 PROCEDURE — 99283 EMERGENCY DEPT VISIT LOW MDM: CPT

## 2020-01-12 RX ORDER — ACETAMINOPHEN 500 MG
1000 TABLET ORAL ONCE
Status: COMPLETED | OUTPATIENT
Start: 2020-01-12 | End: 2020-01-12

## 2020-01-12 RX ADMIN — ACETAMINOPHEN 1000 MG: 500 TABLET, FILM COATED ORAL at 08:55

## 2020-01-12 ASSESSMENT — ENCOUNTER SYMPTOMS
EYE PAIN: 0
MYALGIAS: 1
FEVER: 0

## 2020-01-12 NOTE — ED TRIAGE NOTES
Patient states rash developed a few days ago and is now spreading into her face. She cannot think of anything that has changed recently to cause the rash.

## 2020-01-12 NOTE — ED AVS SNAPSHOT
Emergency Department  [address] [facility] [address]  Phone:  [phone]  Fax:  [phone]                                    Liz Lieberman   MRN: [id number]    Department:   Emergency Department   Date of Visit:  1/12/2020           After Visit Summary Signature Page    I have received my discharge instructions, and my questions have been answered. I have discussed any challenges I see with this plan with the nurse or doctor.    ..........................................................................................................................................  Patient/Patient Representative Signature      ..........................................................................................................................................  Patient Representative Print Name and Relationship to Patient    ..................................................               ................................................  Date                                   Time    ..........................................................................................................................................  Reviewed by Signature/Title    ...................................................              ..............................................  Date                                               Time          22EPIC Rev 08/18

## 2020-01-12 NOTE — DISCHARGE INSTRUCTIONS
CONTINUE TO USE THE TOPICORT ON THE AREAS OF REDNESS AND VANICREAM LOTION ON YOUR BODY. USE ZYRTEC OR BENADRYL AS NEEDED FOR ITCHING. THE RASH COULD BE CAUSE BY YOUR CLOZARIL ESPECIALLY IF THE LEVEL CONTINUES TO BE HIGH. FOLLOW UP WITH YOUR PSYCHIATRIST AS SOON AS POSSIBLE. SEE YOUR DERMATOLOGIST AS SOON AS POSSIBLE.    RETURN TO THE HOSPITAL IF YOU DEVELOP OPEN SORES OR LESIONS IN THE MOUTH, EYES, OR GENITALS.

## 2020-01-12 NOTE — ED PROVIDER NOTES
History     Chief Complaint:  Rash    The history is provided by the patient.      Liz Lieberman is a 69 year old female who presents for a rash. The patient reports a rash that developed a few weeks ago that was initially on her arms and neck but has progressed and now has involved her face which is why she presented to the ED today. The rash is itchy. She denies any fevers.  She denies any sores in her mouth or eye pain. On chart review, the patient was seen by her primary care on January 2nd for the same rash and was given Topicort and Vanicream.  She reports she has been using the Vanicream but she has not use the Topicort very much.  Patient has been on clozapine for more than 30 years.  She denies any other recent antibiotics or new medications.  No chest pain shortness of breath fevers or other symptoms.    Allergies:  Formaldehyde  Latex     Medications:    Albuterol  Aspirin  Benztropine  Clozapine  Synthroid  Prilosec  Zoloft  Zocor    Past Medical History:    Allergic rhinitis  Bronchiectasis  Chronic pain  Depression  Diverticulosis  GERD  Hearing loss  Hiatal hernia  Hyperlipidemia  Hypothyroidism  Leukocytosis  Lung nodule  Asthma  Numbness and tingling  Osteoarthritis  Paranoid schizophrenia  Restless legs syndrome  Tardive dyskinesia  Urinary incontinence  TIA    Past Surgical History:    Arthroplasty knee left  Tonsillectomy  Right total knee replacement    Family History:    History reviewed. No pertinent family history.    Social History:  Patient is   Tobacco Use: Former smoker  Alcohol Use: Yes  PCP: Era Livingston     Review of Systems   Constitutional: Negative for fever.   HENT: Negative for mouth sores.    Eyes: Negative for pain.   Musculoskeletal: Positive for myalgias.   Skin: Positive for rash.   All other systems reviewed and are negative.    Physical Exam   First Vitals:  Patient Vitals for the past 24 hrs:   BP Temp Temp src Pulse Heart Rate Resp SpO2   01/12/20 0827  128/89 -- -- 94 -- -- --   01/12/20 0729 -- 97.4  F (36.3  C) Oral 93 93 14 98 %     Physical Exam  General: Well appearing, nontoxic. Resting comfortably  Head:  Scalp, face, and head appear normal  Eyes:  Pupils are equal, round, and reactive to light    Conjunctivae non-injected and sclerae white  ENT:    The external nose is normal    Pinnae are normal    The oropharynx is normal, mucous membranes moist    Posterior pharynx clear without swelling, exudates or erythema.  No oropharyngeal or mucosal lesions.  Tongue is normal.    Uvula is in the midline  Neck:  Normal range of motion    There is no rigidity noted    Trachea is in the midline  CV:  Regular rate and rhythm     Normal S1/S2, no S3/S4    No murmur or rub  Resp:  Lungs are clear and equal bilaterally    There is no tachypnea    No increased work of breathing    No rales, wheezing, or rhonchi  GI:  Abdomen is soft, no rigidity or guarding    No distension, or mass    No tenderness or rebound tenderness   MS:  Normal muscular tone    Symmetric motor strength    No lower extremity edema  Skin:  Dense erythematous nonblanching rash with fine overlying scaling and dry skin to the posterior and lateral aspects of the neck, the face most predominantly around the mouth and upper lip but not involving any mucous membranes.  Similar rash also present over the volar aspects of the bilateral wrists and distal forearms.  No palmar lesions.  No vesicles, petechiae, purpura.  No crusting weeping or purulence.  Neuro: Awake and alert    Speech is normal and fluent    Moves all extremities spontaneously  Psych:  Normal affect.  Appropriate interactions.      Emergency Department Course     Interventions:  0855: Tylenol 1000mg PO    Emergency Department Course:  7:36 AM Nursing notes and vitals reviewed.  I performed an exam of the patient as documented above.     8:29 AM Findings and plan explained to the patient. Patient discharged home with instructions regarding  supportive care, medications, and reasons to return. The importance of close follow-up was reviewed.     Impression & Plan      Medical Decision Making:  Liz Lieberman is a 69 year old female who presents with persistent and worsening erythematous rash. On my evaluation she is well appearing, hemodynamically stable and afebrile. She has a non blanching finely scaling erythematous rash over the neck, perioral region, and the bilateral wrists which has been present for approximately 2 weeks. She has been on long term Clozaril and notes that her Clozaril level was too high recently and is waiting for a recheck on the Clozaril levels. Broad diagnosis differential is considered. She has no signs of an infectious etiology such as impetigo, cellulitis, abscess, or necrotizing fasciitis. Adverse drug reaction related to the Clozaril is certainly considered given that her levels were reportedly elevated. Clozaril has been known to cause erythema multiforme, Alexandro Jim syndrome, erythroderma, among others. Her symptoms certainly could fit with erythroderma. She as no mucosal lesions or skin sloughing to suggest severe Alexandro Jim syndrome or targetoid lesions to suggest erythema multiforme. She has already been prescribed Topicort cream by her PCP and instructed to use Vanicream moisturizer. She has been on long term Clozaril for greater than 30 years by her report. At this time I do not feel that her rash is severe enough to abruptly stop her from the Clozaril. She reports that she has repeat Clozaril level pending and I stressed the importance of very close follow up with this. I have also encouraged her to follow up with dermatology if the rash is progressive as she may need further intervention. No indication for hospitalization at this point however if she develops fever, mucosal lesions, or any other worsening of her condition I recommended she return to the hospital immediately. I have encouraged her to continue  to use the Topicort and Vanicream.  I also recommended Benadryl or Zyrtec as needed for itching. Patient is agreeable to the plan of care and she was discharged in stable condition. Return precautions were discussed with patient. The patient's questions were answered and the patient was agreeable with discharge.     Diagnosis:    ICD-10-CM    1. Pruritic erythematous rash L29.8        Disposition:  discharged to home      I, Bradley Aasen, am serving as a scribe on 1/12/2020 at 7:35 AM to personally document services performed by Jean Pierre Johnson MD based on my observations and the provider's statements to me.          Jean Pierre Johnson MD  01/12/20 2523

## 2020-01-31 ENCOUNTER — HOSPITAL ENCOUNTER (EMERGENCY)
Facility: CLINIC | Age: 70
Discharge: HOME | End: 2020-01-31
Attending: PHYSICIAN ASSISTANT | Admitting: PHYSICIAN ASSISTANT
Payer: MEDICARE

## 2020-01-31 VITALS
RESPIRATION RATE: 16 BRPM | SYSTOLIC BLOOD PRESSURE: 128 MMHG | HEART RATE: 95 BPM | OXYGEN SATURATION: 92 % | TEMPERATURE: 98.5 F | DIASTOLIC BLOOD PRESSURE: 74 MMHG

## 2020-01-31 DIAGNOSIS — R21 FACIAL RASH: ICD-10-CM

## 2020-01-31 DIAGNOSIS — R21 RASH OF NECK: ICD-10-CM

## 2020-01-31 PROCEDURE — 99283 EMERGENCY DEPT VISIT LOW MDM: CPT

## 2020-01-31 PROCEDURE — 25000132 ZZH RX MED GY IP 250 OP 250 PS 637: Mod: GY | Performed by: PHYSICIAN ASSISTANT

## 2020-01-31 RX ORDER — DIPHENHYDRAMINE HCL 25 MG
25 CAPSULE ORAL ONCE
Status: COMPLETED | OUTPATIENT
Start: 2020-01-31 | End: 2020-01-31

## 2020-01-31 RX ADMIN — DIPHENHYDRAMINE HYDROCHLORIDE 25 MG: 25 CAPSULE ORAL at 16:00

## 2020-01-31 ASSESSMENT — ENCOUNTER SYMPTOMS: COLOR CHANGE: 1

## 2020-01-31 NOTE — DISCHARGE INSTRUCTIONS
Call primary provider to discuss coordination of care. You need to have someone to check repeat Clozaril to ensure it has return to normal levels. You need to follow up with dermatology to see if they have additional treatment recommendations with respect to facial rash.   Use benadryl sparingly to help with itching of rash.

## 2020-01-31 NOTE — ED AVS SNAPSHOT
Emergency Department  [address] [facility] [address]  Phone:  [phone]  Fax:  [phone]                                    Liz Lieberman   MRN: [id number]    Department:   Emergency Department   Date of Visit:  1/31/2020           After Visit Summary Signature Page    I have received my discharge instructions, and my questions have been answered. I have discussed any challenges I see with this plan with the nurse or doctor.    ..........................................................................................................................................  Patient/Patient Representative Signature      ..........................................................................................................................................  Patient Representative Print Name and Relationship to Patient    ..................................................               ................................................  Date                                   Time    ..........................................................................................................................................  Reviewed by Signature/Title    ...................................................              ..............................................  Date                                               Time          22EPIC Rev 08/18

## 2020-01-31 NOTE — ED PROVIDER NOTES
History     Chief Complaint:  Rash      HPI   Liz Lieberman is a 69 year old female, with a history of hyperlipidemia and hypothyroidism, who presents with rash. Patient states she was here on January 5th and January 12th because of a rash to her face. She first noticed the rash on January 5th. There was some concern that the rash is related to Clozaril because it didn't fit any other standard rashes. She also notes the rash is on her back, and it stings. She notes she had been using Topicort for about 5 days, then she stopped taking it. She also always takes Vanicream after her shower. Additionally, notes she scheduled an appointment for a repeat draw of her Clozaril last week, but she hasn't received a result yet. Furthermore, she has seen a dermatologist, and the dermatologist did patch testing. She was told that one of the allergens was a certain yarn or material that would inflame her skin, so she stopped using the scarves that she was using. Today she notes she has had pus from her right eye and her rash has worsened. Of note, she has been taking a higher level of Clozaril for the past couple days    Allergies:  Formaldehyde  Latex    Medications:    Albuterol  Benztropine  Clozapine  Synthroid  Prilosec  Zoloft  Zocor    Past Medical History:    Allergic rhinitis  Bronchiectasis  Chronic pain  Depression  Diverticulosis  GERD  Hiatal hernia  Hyperlipidemia  Hypothyroidism  Lung nodule  Asthma  Osteoarthritis  Paranoid schizophrenia  RLS  Tardive dyskinesia  TIA    Past Surgical History:    Arthroplasty knee, left  Tonsillectomy  Right total knee replacement    Family History:    No past pertinent family history.    Social History:  Former tobacco use  Occasional alcohol use  Denies drug use  Marital Status:   [4]     Review of Systems   HENT: Positive for ear discharge.    Skin: Positive for color change and rash.   All other systems reviewed and are negative.      Physical Exam     Patient Vitals  for the past 24 hrs:   BP Temp Temp src Pulse Heart Rate Resp SpO2   01/31/20 1437 128/74 98.5  F (36.9  C) Temporal 95 95 16 92 %       Physical Exam  Constitutional: Pleasant. Cooperative.  Eyes: Pupils equally round  HENT: Head is normal in appearance. Oropharynx is normal with moist mucus membranes. No intraoral lesions or swelling.  Cardiovascular: Regular rate and rhythm without murmurs.  Respiratory: Normal respiratory effort, lungs clear to auscultation  Musculoskeletal: No asymmetry  Skin: Erythematous nonblanching rash with overlying scaling to anterior and posterior neck as well as entirety of face. No vesicles, weeping lesions, or purulence. Does not involve remainder of body.  Lymphatic: No edema  Neurologic: Cranial nerves grossly intact, normal cognition. Tardive dyskinesia-like movements.  Psychiatric: Normal affect.  Nursing notes and vital signs reviewed.    Emergency Department Course     Interventions:  1600 Benadryl 25 mg Oral    Emergency Department Course:  Nursing notes and vitals reviewed. (1515) I performed an exam of the patient as documented above.      Medicine administered as documented above.     1528 I rechecked the patient and discussed the results of her workup thus far.     1627 I rechecked the patient and discussed the results of her workup thus far.     Findings and plan explained to the Patient. Patient discharged home with instructions regarding supportive care, medications, and reasons to return. The importance of close follow-up was reviewed.     Impression & Plan      Medical Decision Making:  Liz Lieberman is a 69 year old female with a history of schizophrenia on Clozapine who presents to the ED for evaluation of a rash.  Patient has been evaluated twice for this rash since prior to January 5.  She called her nurse line prior to presentation today and noted that she had some swelling just below her right eye and was sent to the emergency department for further evaluation.   She was noted to have an elevated Clozapine level prior to the onset of the rash and was supposed to have this level redrawn but has not done so as of yet.  In addition to being seen twice in the emergency department, patient has additionally been seen by dermatology, who thought rash may be related to allergens.  She has been using Topicort and Vanicream for her rash without improvement.  See HPI as above for additional details.  On my exam, no swelling is noted.  Patient does have diffuse erythema to face and anterior and posterior neck.  This rash may certainly be a drug eruption rash as clozapine is noted to have rash as a side effect.  No involvement of mucosa and no sloughing of skin to suggest SJS or TEN.  Rash not consistent with urticaria or anaphylaxis.  Doubt necrotizing fasciitis.  Rash only involves face and neck so not consistent with erythroderma.  As patient had previously used steroid to the face without improvement, will not recommend further use of this at this time.  Advised her to continue with the Vanicream and to apply on a more frequent basis, as she had only been doing so after a shower.  I feel that patient needs coordination of care by her PCP at this point.  She likely needs her clozapine levels checked to ensure they have returned to normal levels.  It is unclear why patient's follow-up appointment with dermatology was canceled, however she likely needs follow-up as her symptoms have not improved.  She can use Benadryl sparingly to help with the itching of the rash, however we discussed that her anticholinergic symptoms may worsen in doing so.  She likely needs follow-up with her psychiatrist to discuss discontinuation of clozapine if this is the culprit. I do however think patient is safe for discharge to home at this time.  We discussed reasons to return, such as lesions to the mouth, worsening swelling of the face, difficulty breathing, spreading of a rash to the remainder of the  body. of her rash.  Patient reports understanding of plan.  All questions answered.  Patient discharged home in stable condition.    Diagnosis:    ICD-10-CM    1. Facial rash R21    2. Rash of neck R21      Disposition:  discharged to home    Discharge Medications:  New Prescriptions    No medications on file     Scribe Disclosure:  Moses WEBSTER, am serving as a scribe on 1/31/2020 at 3:15 PM to personally document services performed by Jean Pierre Knight PA-C based on my observations and the provider's statements to me.       M[facility] Milligan  1/31/2020    EMERGENCY DEPARTMENT       Jean Pierre Knight PA-C  01/31/20 6095

## 2020-02-29 ENCOUNTER — HOSPITAL ENCOUNTER (EMERGENCY)
Facility: CLINIC | Age: 70
Discharge: HOME | End: 2020-02-29
Attending: NURSE PRACTITIONER | Admitting: NURSE PRACTITIONER
Payer: MEDICARE

## 2020-02-29 VITALS
TEMPERATURE: 98.2 F | DIASTOLIC BLOOD PRESSURE: 71 MMHG | OXYGEN SATURATION: 100 % | SYSTOLIC BLOOD PRESSURE: 160 MMHG | BODY MASS INDEX: 26.66 KG/M2 | RESPIRATION RATE: 20 BRPM | HEIGHT: 65 IN | WEIGHT: 160 LBS

## 2020-02-29 DIAGNOSIS — K02.9 DENTAL DECAY: ICD-10-CM

## 2020-02-29 DIAGNOSIS — K08.89 PAIN, DENTAL: ICD-10-CM

## 2020-02-29 PROCEDURE — 64400 NJX AA&/STRD TRIGEMINAL NRV: CPT

## 2020-02-29 PROCEDURE — 99283 EMERGENCY DEPT VISIT LOW MDM: CPT | Mod: 25

## 2020-02-29 RX ORDER — BUPIVACAINE HYDROCHLORIDE AND EPINEPHRINE 5; 5 MG/ML; UG/ML
1.8 INJECTION, SOLUTION PERINEURAL ONCE
Status: DISCONTINUED | OUTPATIENT
Start: 2020-02-29 | End: 2020-02-29 | Stop reason: HOSPADM

## 2020-02-29 RX ORDER — PENICILLIN V POTASSIUM 500 MG/1
500 TABLET, FILM COATED ORAL 3 TIMES DAILY
Qty: 30 TABLET | Refills: 0 | Status: SHIPPED | OUTPATIENT
Start: 2020-02-29 | End: 2020-04-01

## 2020-02-29 ASSESSMENT — MIFFLIN-ST. JEOR: SCORE: 1256.64

## 2020-02-29 ASSESSMENT — ENCOUNTER SYMPTOMS
FEVER: 0
TROUBLE SWALLOWING: 0

## 2020-02-29 NOTE — ED AVS SNAPSHOT
Emergency Department  [address] [facility] [address]  Phone:  [phone]  Fax:  [phone]                                    Liz Lieberman   MRN: [id number]    Department:   Emergency Department   Date of Visit:  2/29/2020           After Visit Summary Signature Page    I have received my discharge instructions, and my questions have been answered. I have discussed any challenges I see with this plan with the nurse or doctor.    ..........................................................................................................................................  Patient/Patient Representative Signature      ..........................................................................................................................................  Patient Representative Print Name and Relationship to Patient    ..................................................               ................................................  Date                                   Time    ..........................................................................................................................................  Reviewed by Signature/Title    ...................................................              ..............................................  Date                                               Time          22EPIC Rev 08/18

## 2020-03-01 NOTE — DISCHARGE INSTRUCTIONS
You can take Tylenol or Ibuprofen for the pain.     Discharge Instructions  Dental Pain    You have been seen today for a toothache. Your pain may be caused by an exposed nerve, an infection (pulpitis), a root abscess (pocket of pus), or other problems. You will need to see a dentist for a solution to your tooth problem. Emergency Department care is only to help control your problem until you can see a dentist; we cannot provide complete dental care.  Today, we did not find any sign that your toothache was caused by any dangerous or life-threatening condition, but sometimes symptoms develop over time and cannot be found during an emergency visit, so it is very important that you follow up with your dentist.      Generally, every Emergency Department visit should have a follow-up clinic visit with either a primary or a specialty clinic/provider. Please follow-up as instructed by your emergency provider today.    Return to the Emergency Department if:  You develop a new fever over 100.4 F.  You cannot open your mouth normally, cannot move your tongue well, or cannot swallow.  You have new or increased swelling of your face or neck.  You develop drainage of pus or foul smelling material from around your tooth.  What can I do to help myself?  Take any antibiotic the provider may have prescribed for you today.  Avoid very hot or very cold foods as both can cause pain.  Make an appointment to see a dentist as soon as possible. Dentists are generally not  on-staff  at hospitals so we cannot  refer  to you to dentist but we may be able to provide a list of dental clinics to help you.  If you were given a prescription for medicine here today, be sure to read all of the information (including the package insert) that comes with your prescription.  This will include important information about the medicine, its side effects, and any warnings that you need to know about.  The pharmacist who fills the prescription can provide  more information and answer questions you may have about the medicine.  If you have questions or concerns that the pharmacist cannot address, please call or return to the Emergency Department.   Remember that you can always come back to the Emergency Department if you are not able to see your regular provider in the amount of time listed above, if you get any new symptoms, or if there is anything that worries you.

## 2020-03-01 NOTE — ED PROVIDER NOTES
"  History     Chief Complaint:  Dental Pain and Rash    HPI   Liz Lieberman is a 69 year old female who presents with dental pain and a rash. The patient developed dental pain today on the lower right side. She has felt warm but has not recorded a fever and denies difficulty swallowing. The patient has a dentist that she sees who is suggesting dentures.     The patient also has an ongoing rash for which she has seen dermatology. They gave her a topical medication, though she can only apply it so many times a day. She does not currently have a follow-up appointment scheduled with them.     Allergies:  Formaldehyde  Latex     Medications:    Albuterol  Benztropine  Clozapine  Synthroid  Prilosec  Zoloft  Zocor     Past Medical History:    Allergic rhinitis  Bronchiectasis  Chronic pain  Depression  Diverticulosis  GERD  Hiatal hernia  Hyperlipidemia  Hypothyroidism  Lung nodule  Asthma  Osteoarthritis  Paranoid schizophrenia  RLS  Tardive dyskinesia  TIA     Past Surgical History:    Arthroplasty knee, left  Tonsillectomy  Right total knee replacement     Family History:    No past pertinent family history.     Social History:  Former 26 year tobacco use, quit in 1994  Occasional alcohol use, 3 to 5 beers weekly  Denies drug use  Marital Status:   [4]  PCP: Era Livingston     Review of Systems   Constitutional: Negative for fever.   HENT: Positive for dental problem. Negative for trouble swallowing.    Skin: Positive for rash.   All other systems reviewed and are negative.        Physical Exam     Patient Vitals for the past 24 hrs:   BP Temp Temp src Heart Rate Resp SpO2 Height Weight   02/29/20 1851 -- -- -- -- -- 100 % -- --   02/29/20 1849 (!) 160/71 98.2  F (36.8  C) Oral 70 20 -- 1.651 m (5' 5\") 72.6 kg (160 lb)        Physical Exam  Physical Exam   Constitutional:  Sitting up in bed with movements consistent with tardive dyskinesia.   Head: Head moves freely with normal range of motion.   ENT: " Oropharynx is clear and moist. Teeth overall are in poor repair. Tenderness to percussion of right lower 1st molar. No gingival erythema, edema or fluctuance. No trismus. Uvula midline with no edema. No submandibular fullness.   Eyes: Conjunctivae pink. EOMs intact.   Neck: Normal range of motion. No nuchal rigidity. No cervical lymphadenopathy.   Cardiovascular: Regular rate and rhythm. Normal heart sounds. No concerning murmur. Intact distal pulses: radial pulses 2+ on the right, 2+ on the left.   Pulmonary/Chest: No respiratory distress. No decreased breath sounds. No wheezes. No rhonchi. No rales. Lungs clear throughout.   Abdominal: Soft. Non-tender. No rebound, no guarding.   Musculoskeletal: No peripheral edema. Distal capillary refill and sensation intact.   Neurological: Oriented to person, place, and time. No focal deficits.   Skin: Confluent red rash extending over head and neck, she notes it is ongoing for years. Skin is flaking and dry in appearance.      Emergency Department Course     Procedures:    Dental Block     INDICATIONS:  Dental Pain   LOCATION:  Lower right molar  ANESTHESIA: Right inferior alveolar using 0.5% bupivacaine with epinephrine   PROCEDURE NOTE: The patient tolerated the procedure well with good relief of discomfort and there were no complications.    Emergency Department Course:  Past medical records, nursing notes, and vitals reviewed.    1922 I performed an exam of the patient as documented above.     1932 Patient rechecked and updated.  Procedure performed, as above.     I personally reviewed the care plan with the Patient and answered all related questions prior to discharge. I prescribed penicillin.     Impression & Plan     Medical Decision Making:  Liz Lieberman is a 69 year old female who presents to the emergency department today with dental pain.  There is no abscess detected around the tooth amenable to bedside incision and drainage. No exam concerns for Ludwigs angina,  facial cellulitis or deep space infection. She notes she was advised to get full dentures and that her teeth have been problematic. Dental block was performed as noted above.  Patient tolerated this well and had relief of dental pain.  Follow up with a dentist in the coming days is indicated for further work up and treatment. We discussed treatment with Ibuprofen or Tylenol, Penicillin as prescribed and follow up with Dentist. Reasons to return here were discussed. She is amenable to plan.     Discharge Diagnosis:    ICD-10-CM    1. Pain, dental K08.89    2. Dental decay K02.9      Disposition:  Discharged to home     Discharge Medications:  Discharge Medication List as of 2/29/2020  7:36 PM      START taking these medications    Details   penicillin V (VEETID) 500 MG tablet Take 1 tablet (500 mg) by mouth 3 times daily for 10 days, Disp-30 tablet, R-0, Local Print             Scribe Disclosure:  Sheree WEBSTER, am serving as a scribe at 7:22 PM on 2/29/2020 to document services personally performed by Lorraine Johnson NP based on my observations and the provider's statements to me.       Lorraine Johnson APRN CNP  03/01/20 6246

## 2020-04-01 ENCOUNTER — APPOINTMENT (OUTPATIENT)
Dept: CT IMAGING | Facility: CLINIC | Age: 70
End: 2020-04-01
Attending: EMERGENCY MEDICINE
Payer: MEDICARE

## 2020-04-01 ENCOUNTER — HOSPITAL ENCOUNTER (EMERGENCY)
Facility: CLINIC | Age: 70
Discharge: HOME | End: 2020-04-01
Attending: EMERGENCY MEDICINE | Admitting: EMERGENCY MEDICINE
Payer: MEDICARE

## 2020-04-01 VITALS
WEIGHT: 152 LBS | TEMPERATURE: 98.4 F | SYSTOLIC BLOOD PRESSURE: 115 MMHG | HEIGHT: 65 IN | RESPIRATION RATE: 20 BRPM | OXYGEN SATURATION: 97 % | BODY MASS INDEX: 25.33 KG/M2 | DIASTOLIC BLOOD PRESSURE: 76 MMHG

## 2020-04-01 DIAGNOSIS — M25.571 PAIN IN JOINT, ANKLE AND FOOT, RIGHT: ICD-10-CM

## 2020-04-01 DIAGNOSIS — R42 DIZZINESS: ICD-10-CM

## 2020-04-01 LAB
ALBUMIN SERPL-MCNC: 3.5 G/DL (ref 3.4–5)
ALP SERPL-CCNC: 152 U/L (ref 40–150)
ALT SERPL W P-5'-P-CCNC: 24 U/L (ref 0–50)
AMPHETAMINES UR QL SCN: NEGATIVE
ANION GAP SERPL CALCULATED.3IONS-SCNC: 6 MMOL/L (ref 3–14)
AST SERPL W P-5'-P-CCNC: 21 U/L (ref 0–45)
BARBITURATES UR QL: NEGATIVE
BENZODIAZ UR QL: NEGATIVE
BILIRUB SERPL-MCNC: 0.2 MG/DL (ref 0.2–1.3)
BUN SERPL-MCNC: 11 MG/DL (ref 7–30)
CALCIUM SERPL-MCNC: 8.9 MG/DL (ref 8.5–10.1)
CANNABINOIDS UR QL SCN: NEGATIVE
CHLORIDE SERPL-SCNC: 103 MMOL/L (ref 94–109)
CO2 SERPL-SCNC: 24 MMOL/L (ref 20–32)
COCAINE UR QL: NEGATIVE
CREAT SERPL-MCNC: 0.59 MG/DL (ref 0.52–1.04)
ETHANOL SERPL-MCNC: <0.01 G/DL
GFR SERPL CREATININE-BSD FRML MDRD: >90 ML/MIN/{1.73_M2}
GLUCOSE SERPL-MCNC: 76 MG/DL (ref 70–99)
INTERPRETATION ECG - MUSE: NORMAL
OPIATES UR QL SCN: NEGATIVE
PCP UR QL SCN: NEGATIVE
POTASSIUM SERPL-SCNC: 3.9 MMOL/L (ref 3.4–5.3)
PROT SERPL-MCNC: 7.7 G/DL (ref 6.8–8.8)
SODIUM SERPL-SCNC: 133 MMOL/L (ref 133–144)

## 2020-04-01 PROCEDURE — 70450 CT HEAD/BRAIN W/O DYE: CPT

## 2020-04-01 PROCEDURE — 25000132 ZZH RX MED GY IP 250 OP 250 PS 637: Mod: GY | Performed by: EMERGENCY MEDICINE

## 2020-04-01 PROCEDURE — 80053 COMPREHEN METABOLIC PANEL: CPT | Performed by: EMERGENCY MEDICINE

## 2020-04-01 PROCEDURE — 80320 DRUG SCREEN QUANTALCOHOLS: CPT | Performed by: EMERGENCY MEDICINE

## 2020-04-01 PROCEDURE — 93005 ELECTROCARDIOGRAM TRACING: CPT

## 2020-04-01 PROCEDURE — 80307 DRUG TEST PRSMV CHEM ANLYZR: CPT | Performed by: EMERGENCY MEDICINE

## 2020-04-01 PROCEDURE — 99285 EMERGENCY DEPT VISIT HI MDM: CPT | Mod: 25

## 2020-04-01 RX ORDER — ALBUTEROL SULFATE 0.83 MG/ML
2.5 SOLUTION RESPIRATORY (INHALATION) EVERY 6 HOURS PRN
COMMUNITY
End: 2020-05-08

## 2020-04-01 RX ORDER — ACETAMINOPHEN 325 MG/1
650 TABLET ORAL ONCE
Status: COMPLETED | OUTPATIENT
Start: 2020-04-01 | End: 2020-04-01

## 2020-04-01 RX ORDER — AMLODIPINE BESYLATE 5 MG/1
5 TABLET ORAL DAILY
Status: ON HOLD | COMMUNITY
End: 2020-05-06

## 2020-04-01 RX ORDER — MECLIZINE HYDROCHLORIDE 25 MG/1
25 TABLET ORAL ONCE
Status: COMPLETED | OUTPATIENT
Start: 2020-04-01 | End: 2020-04-01

## 2020-04-01 RX ORDER — DIPHENHYDRAMINE HCL 25 MG
25 TABLET ORAL EVERY 6 HOURS PRN
COMMUNITY
End: 2020-05-04

## 2020-04-01 RX ORDER — ATORVASTATIN CALCIUM 20 MG/1
20 TABLET, FILM COATED ORAL AT BEDTIME
COMMUNITY

## 2020-04-01 RX ORDER — MECLIZINE HYDROCHLORIDE 25 MG/1
25 TABLET ORAL 3 TIMES DAILY PRN
Qty: 15 TABLET | Refills: 0 | Status: SHIPPED | OUTPATIENT
Start: 2020-04-01 | End: 2020-05-04

## 2020-04-01 RX ADMIN — MECLIZINE HYDROCHLORIDE 25 MG: 25 TABLET ORAL at 18:32

## 2020-04-01 RX ADMIN — ACETAMINOPHEN 650 MG: 325 TABLET, FILM COATED ORAL at 19:46

## 2020-04-01 ASSESSMENT — ENCOUNTER SYMPTOMS
DIFFICULTY URINATING: 0
CONSTIPATION: 0
NUMBNESS: 0
WEAKNESS: 0
SHORTNESS OF BREATH: 0
DYSURIA: 0
HEMATURIA: 0
BLOOD IN STOOL: 0
ABDOMINAL PAIN: 0
COUGH: 0
DIZZINESS: 1
DIARRHEA: 0

## 2020-04-01 ASSESSMENT — MIFFLIN-ST. JEOR: SCORE: 1220.35

## 2020-04-01 NOTE — ED PROVIDER NOTES
History     Chief Complaint:  Dizziness    HPI   Liz Lieberman is a 69 year old female with history of Schizophrenia and dyskinesia presents to the ED for dizziness. Today patient comes from [facility] because she was told to come to the ED for further imaging. She reports that for four days she has had dizziness where she feels like she is going to fall down.  She further explains that she hasn't had these symptoms before without having a cause for them to occur.   The patient denies any fall that resulted in hitting her head, or any recent falls but she has fallen in the past.  She also reports to have right foot pain at the ankle that appeared from no injury.  She denies any headaches, vision problems, chest pain, shortness of breath, cough, abdominal pain, bowel movement complications, urinary complications, weakness, or numbness.  She also denies ringing to the ears.  She admits to have leg swelling, but to have had this for years.  patient had labs done today at Clinic that are noted below.    CBC: WBC 6.3, HGB 11.4,     UA: Negative    Allergies:  Formaldehyde  Latex     Medications:    Albuterol  Cogentin   Clozapine  Topicort  Flovent  Folvite  Mucinex  Synthroid   Prilosec  Miralax  Zoloft  Zocor      Past Medical History:    Allergic rhinitis  Bronchiectasis   Chronic pain   Depression  Diverticulitis   Gastro-oesophageal reflux disease  GERD  Hearing loss  Hiatal hernia  History of blood transfusion  Hyperlipidemia  Hypothyroidism  Leukocytes  Lung nodule  Mild intermittent asthma   Numbness and tingling  Osteoarthritis   Paranoid Schizophrenia  Restless legs syndrome  Tardive dyskinesia   Urinary incontinence    Past Surgical History:    Arthroplasty knee  Tonsillectomy   Total knee surgery R    Family History:    Cerebrovascular disease  Diabetes    Social History:  Smoking status: Former smoker  Alcohol use: Yes  Drug use: No  PCP: Era Livingston  Presents to the ED  "by herself  Marital Status:   [4]     Review of Systems   Respiratory: Negative for cough and shortness of breath.    Cardiovascular: Negative for chest pain.   Gastrointestinal: Negative for abdominal pain, blood in stool, constipation and diarrhea.   Genitourinary: Negative for difficulty urinating, dysuria and hematuria.   Neurological: Positive for dizziness. Negative for weakness and numbness.   All other systems reviewed and are negative.      Physical Exam     Patient Vitals for the past 24 hrs:   BP Temp Temp src Heart Rate Resp SpO2 Height Weight   04/01/20 2011 115/76 -- -- 78 -- 97 % -- --   04/01/20 1910 107/77 -- -- 83 20 96 % -- --   04/01/20 1720 (!) 151/90 98.4  F (36.9  C) Oral 84 18 98 % 1.651 m (5' 5\") 68.9 kg (152 lb)       Physical Exam  Constitutional: Middle aged white female.  Sitting with obvious dyskinesia of head and extremities  HENT: No signs of trauma.  Oral pharynx clear.   Eyes: EOM are normal. Pupils are equal, round, and reactive to light.   Neck: Normal range of motion. No JVD present. No cervical adenopathy.  Cardiovascular: Regular rhythm.  Exam reveals no gallop and no friction rub.    No murmur heard.  Pulmonary/Chest: Bilateral breath sounds normal. No wheezes, rhonchi or rales.  Abdominal: Soft. No tenderness. No rebound or guarding.   Musculoskeletal: No edema. Right ankle no effusion. Unable to find tenderness nor redness.   Lymphadenopathy: No lymphadenopathy.   Neurological: Alert and oriented to person, place, and time. Normal strength. Coordination normal. Movement disorder as noted  [place] stable except  when turning quickly  PSYCH: No overt psychosis.  Skin: Skin is warm and dry. No rash noted. No erythema.     Emergency Department Course   ECG (17:59:59):  Rate 76 bpm. PA interval 148. QRS duration 80. QT/QTc 396/445. P-R-T axes 57 18 39. Normal sinus rhythm with sinus arrhythmia. Normal ECG. Interpreted at 1800 by Yamil Morrison, " MD.    Imaging:  Radiology findings were communicated with the patient who voiced understanding of the findings.    CT Head without contrast:  IMPRESSION: No evidence of acute intracranial hemorrhage, mass, or   herniation.     Imaging independently reviewed and agree with radiologist interpretation.     Laboratory:  Laboratory findings were communicated with the patient who voiced understanding of the findings.    CMP: Alkphos 152, and Creatinine 0.59    Alcohol ethyl: <0.01    Drug abuse screen 77 urine: Negative    Interventions:  1829: Meclizine 25 mg tablet  1946: Tylenol 650 mg PO    Emergency Department Course:  Past medical records, nursing notes, and vitals reviewed.    1734 I performed an exam of the patient as documented above.     EKG obtained in the ED, see results above.   IV was inserted and blood was drawn for laboratory testing, results above.  The patient provided a urine sample here in the emergency department. This was sent for laboratory testing, findings above.  The patient was sent for a CT while in the emergency department, results above.     1948 I rechecked the patient and discussed the results of her workup thus far.     Findings and plan explained to the Patient. Patient discharged home with instructions regarding supportive care, medications, and reasons to return. The importance of close follow-up was reviewed. The patient was prescribed Antivert.    I personally reviewed the laboratory and imaging results with the Patient and answered all related questions prior to discharge.     Impression & Plan   Medical Decision Making:  Liz Lieberman is a 69 year old female presents to the ED with dizziness.  She is underling schizophrenia and she recently noticed she had some pain on her right ankle. She was seen on her primary care office today where a CBC was obtained that was unremarkable a urine which was clean and an EKG. Because she continued to complain of dizziness she was sent here.   Patient denies any recent falls although she sates in the past she has fallen. She does not notice any new changes in her medicine.  She has had no chest pain or irregular heart beats. She does have underlining tardive dyskinesia fairly extensive, but she is awake and alert and appropriate. I did ambulate her and other then when she tried to turned quickly she was fine. As far as her ankle went there was no swelling. I could not reproduce any pain and when the patient tried to show me where the pain was she could not show me a spot.  Patient was worked up here. We obtained chemistry, alcohol level, and CT of the head and repeated EKG which was all unremarkable. She did receive some Meclizine.  She is uncertain if this helped. However she is feeling better and feels comfortable going home.  We have placed a strap on her ankle and it is possible she has a mild sprain or mild planar fascitis.  There was no bony tenderness to consider XR. Her dizziness is unlikely to be due to sepsis, stroke, or seizure maybe something due to her medications or possibly labyrinthitis.  She should make a follow up with her PMD in the next week and if symptoms worsen to return to the ED.       Diagnosis:    ICD-10-CM    1. Dizziness  R42    2. Pain in joint, ankle and foot, right  M25.571        Disposition:  Discharged to home.    Discharge Medications:  Discharge Medication List as of 4/1/2020  7:53 PM      START taking these medications    Details   meclizine (ANTIVERT) 25 MG tablet Take 1 tablet (25 mg) by mouth 3 times daily as needed for dizziness, Disp-15 tablet,R-0, Local Print             Scribe Disclosure:  I, Andi Purdy, am serving as a scribe at 5:34 PM on 4/1/2020 to document services personally performed by Yamil Morrison MD based on my observations and the provider's statements to me.        Yamil Morrison MD  04/01/20 5105

## 2020-04-01 NOTE — ED AVS SNAPSHOT
Emergency Department  [address] [facility] [place] [address]  Phone:  [phone]  Fax:  [phone]                                    Liz Lieberman   MRN: [id number]    Department:   Emergency Department   Date of Visit:  4/1/2020           After Visit Summary Signature Page    I have received my discharge instructions, and my questions have been answered. I have discussed any challenges I see with this plan with the nurse or doctor.    ..........................................................................................................................................  Patient/Patient Representative Signature      ..........................................................................................................................................  Patient Representative Print Name and Relationship to Patient    ..................................................               ................................................  Date                                   Time    ..........................................................................................................................................  Reviewed by Signature/Title    ...................................................              ..............................................  Date                                               Time          22EPIC Rev 08/18

## 2020-04-01 NOTE — ED TRIAGE NOTES
Feeling dizzy since Sunday, feels like she is going to fall down. States that she was sent to ED for CT scan per pmd.

## 2020-04-02 NOTE — ED NOTES
Assisted pt in calling a Taxi.  EDT at bedside to apply ace wrap to right ankle/foot per order.  Kassy Nielson RN,.......................................... 4/1/2020   8:00 PM

## 2020-05-04 ENCOUNTER — APPOINTMENT (OUTPATIENT)
Dept: MRI IMAGING | Facility: CLINIC | Age: 70
DRG: 312 | End: 2020-05-04
Attending: EMERGENCY MEDICINE
Payer: MEDICARE

## 2020-05-04 ENCOUNTER — HOSPITAL ENCOUNTER (INPATIENT)
Facility: CLINIC | Age: 70
LOS: 1 days | Discharge: NURSING, REHAB OR LONG-TERM CARE | DRG: 312 | End: 2020-05-06
Attending: EMERGENCY MEDICINE | Admitting: HOSPITALIST
Payer: MEDICARE

## 2020-05-04 DIAGNOSIS — I10 HYPERTENSION, UNSPECIFIED TYPE: Primary | ICD-10-CM

## 2020-05-04 DIAGNOSIS — M62.81 GENERALIZED MUSCLE WEAKNESS: ICD-10-CM

## 2020-05-04 DIAGNOSIS — F20.0 PARANOID SCHIZOPHRENIA IN REMISSION (H): ICD-10-CM

## 2020-05-04 PROBLEM — R26.81 GAIT INSTABILITY: Status: ACTIVE | Noted: 2020-05-04

## 2020-05-04 LAB
ALBUMIN SERPL-MCNC: 3 G/DL (ref 3.4–5)
ALBUMIN UR-MCNC: NEGATIVE MG/DL
ALP SERPL-CCNC: 143 U/L (ref 40–150)
ALT SERPL W P-5'-P-CCNC: 19 U/L (ref 0–50)
ANION GAP SERPL CALCULATED.3IONS-SCNC: 6 MMOL/L (ref 3–14)
APPEARANCE UR: CLEAR
AST SERPL W P-5'-P-CCNC: 13 U/L (ref 0–45)
BASOPHILS # BLD AUTO: 0 10E9/L (ref 0–0.2)
BASOPHILS NFR BLD AUTO: 0.2 %
BILIRUB SERPL-MCNC: 0.1 MG/DL (ref 0.2–1.3)
BILIRUB UR QL STRIP: NEGATIVE
BUN SERPL-MCNC: 7 MG/DL (ref 7–30)
CALCIUM SERPL-MCNC: 8.5 MG/DL (ref 8.5–10.1)
CHLORIDE SERPL-SCNC: 102 MMOL/L (ref 94–109)
CO2 SERPL-SCNC: 25 MMOL/L (ref 20–32)
COLOR UR AUTO: YELLOW
CREAT SERPL-MCNC: 0.55 MG/DL (ref 0.52–1.04)
DIFFERENTIAL METHOD BLD: NORMAL
EOSINOPHIL # BLD AUTO: 0 10E9/L (ref 0–0.7)
EOSINOPHIL NFR BLD AUTO: 0 %
ERYTHROCYTE [DISTWIDTH] IN BLOOD BY AUTOMATED COUNT: 13.2 % (ref 10–15)
ETHANOL SERPL-MCNC: <0.01 G/DL
GFR SERPL CREATININE-BSD FRML MDRD: >90 ML/MIN/{1.73_M2}
GLUCOSE SERPL-MCNC: 91 MG/DL (ref 70–99)
GLUCOSE UR STRIP-MCNC: NEGATIVE MG/DL
HCT VFR BLD AUTO: 36.1 % (ref 35–47)
HGB BLD-MCNC: 11.8 G/DL (ref 11.7–15.7)
HGB UR QL STRIP: NEGATIVE
IMM GRANULOCYTES # BLD: 0 10E9/L (ref 0–0.4)
IMM GRANULOCYTES NFR BLD: 0.5 %
KETONES UR STRIP-MCNC: NEGATIVE MG/DL
LEUKOCYTE ESTERASE UR QL STRIP: NEGATIVE
LYMPHOCYTES # BLD AUTO: 1.4 10E9/L (ref 0.8–5.3)
LYMPHOCYTES NFR BLD AUTO: 22.3 %
MCH RBC QN AUTO: 27.7 PG (ref 26.5–33)
MCHC RBC AUTO-ENTMCNC: 32.7 G/DL (ref 31.5–36.5)
MCV RBC AUTO: 85 FL (ref 78–100)
MONOCYTES # BLD AUTO: 0.5 10E9/L (ref 0–1.3)
MONOCYTES NFR BLD AUTO: 7.6 %
NEUTROPHILS # BLD AUTO: 4.5 10E9/L (ref 1.6–8.3)
NEUTROPHILS NFR BLD AUTO: 69.4 %
NITRATE UR QL: NEGATIVE
PH UR STRIP: 6 PH (ref 5–7)
PLATELET # BLD AUTO: 293 10E9/L (ref 150–450)
POTASSIUM SERPL-SCNC: 3.8 MMOL/L (ref 3.4–5.3)
PROT SERPL-MCNC: 6.7 G/DL (ref 6.8–8.8)
RBC # BLD AUTO: 4.26 10E12/L (ref 3.8–5.2)
RBC #/AREA URNS AUTO: <1 /HPF (ref 0–2)
SODIUM SERPL-SCNC: 133 MMOL/L (ref 133–144)
SOURCE: ABNORMAL
SP GR UR STRIP: 1.01 (ref 1–1.03)
SQUAMOUS #/AREA URNS AUTO: 3 /HPF (ref 0–1)
UROBILINOGEN UR STRIP-MCNC: NORMAL MG/DL (ref 0–2)
WBC # BLD AUTO: 6.5 10E9/L (ref 4–11)
WBC #/AREA URNS AUTO: 1 /HPF (ref 0–5)

## 2020-05-04 PROCEDURE — 25000132 ZZH RX MED GY IP 250 OP 250 PS 637: Mod: GY | Performed by: HOSPITALIST

## 2020-05-04 PROCEDURE — 25800030 ZZH RX IP 258 OP 636: Performed by: HOSPITALIST

## 2020-05-04 PROCEDURE — 99285 EMERGENCY DEPT VISIT HI MDM: CPT | Mod: 25

## 2020-05-04 PROCEDURE — 81001 URINALYSIS AUTO W/SCOPE: CPT | Performed by: EMERGENCY MEDICINE

## 2020-05-04 PROCEDURE — 80053 COMPREHEN METABOLIC PANEL: CPT | Performed by: EMERGENCY MEDICINE

## 2020-05-04 PROCEDURE — 80320 DRUG SCREEN QUANTALCOHOLS: CPT | Performed by: EMERGENCY MEDICINE

## 2020-05-04 PROCEDURE — 25000128 H RX IP 250 OP 636: Performed by: EMERGENCY MEDICINE

## 2020-05-04 PROCEDURE — 99220 ZZC INITIAL OBSERVATION CARE,LEVL III: CPT | Performed by: HOSPITALIST

## 2020-05-04 PROCEDURE — 96361 HYDRATE IV INFUSION ADD-ON: CPT

## 2020-05-04 PROCEDURE — A9585 GADOBUTROL INJECTION: HCPCS | Performed by: EMERGENCY MEDICINE

## 2020-05-04 PROCEDURE — 70553 MRI BRAIN STEM W/O & W/DYE: CPT

## 2020-05-04 PROCEDURE — 85025 COMPLETE CBC W/AUTO DIFF WBC: CPT | Performed by: EMERGENCY MEDICINE

## 2020-05-04 PROCEDURE — 80320 DRUG SCREEN QUANTALCOHOLS: CPT | Performed by: HOSPITALIST

## 2020-05-04 PROCEDURE — G0378 HOSPITAL OBSERVATION PER HR: HCPCS

## 2020-05-04 PROCEDURE — 25500064 ZZH RX 255 OP 636: Performed by: EMERGENCY MEDICINE

## 2020-05-04 PROCEDURE — 96374 THER/PROPH/DIAG INJ IV PUSH: CPT | Mod: 59

## 2020-05-04 RX ORDER — POLYETHYLENE GLYCOL 3350 17 G/17G
17 POWDER, FOR SOLUTION ORAL DAILY PRN
Status: DISCONTINUED | OUTPATIENT
Start: 2020-05-04 | End: 2020-05-06 | Stop reason: HOSPADM

## 2020-05-04 RX ORDER — MULTIVITAMIN,THERAPEUTIC
1 TABLET ORAL DAILY
Status: DISCONTINUED | OUTPATIENT
Start: 2020-05-04 | End: 2020-05-06 | Stop reason: HOSPADM

## 2020-05-04 RX ORDER — PROCHLORPERAZINE MALEATE 5 MG
5 TABLET ORAL EVERY 6 HOURS PRN
Status: DISCONTINUED | OUTPATIENT
Start: 2020-05-04 | End: 2020-05-06 | Stop reason: HOSPADM

## 2020-05-04 RX ORDER — LANOLIN ALCOHOL/MO/W.PET/CERES
100 CREAM (GRAM) TOPICAL DAILY
Status: DISCONTINUED | OUTPATIENT
Start: 2020-05-04 | End: 2020-05-06 | Stop reason: HOSPADM

## 2020-05-04 RX ORDER — BENZTROPINE MESYLATE 1 MG/1
1 TABLET ORAL 3 TIMES DAILY
Status: DISCONTINUED | OUTPATIENT
Start: 2020-05-04 | End: 2020-05-06 | Stop reason: HOSPADM

## 2020-05-04 RX ORDER — AMLODIPINE BESYLATE 5 MG/1
5 TABLET ORAL DAILY
Status: DISCONTINUED | OUTPATIENT
Start: 2020-05-05 | End: 2020-05-06 | Stop reason: HOSPADM

## 2020-05-04 RX ORDER — ONDANSETRON 2 MG/ML
4 INJECTION INTRAMUSCULAR; INTRAVENOUS EVERY 6 HOURS PRN
Status: DISCONTINUED | OUTPATIENT
Start: 2020-05-04 | End: 2020-05-06 | Stop reason: HOSPADM

## 2020-05-04 RX ORDER — EMOLLIENT BASE
CREAM (GRAM) TOPICAL 3 TIMES DAILY PRN
COMMUNITY

## 2020-05-04 RX ORDER — ONDANSETRON 4 MG/1
4 TABLET, ORALLY DISINTEGRATING ORAL EVERY 6 HOURS PRN
Status: DISCONTINUED | OUTPATIENT
Start: 2020-05-04 | End: 2020-05-06 | Stop reason: HOSPADM

## 2020-05-04 RX ORDER — ALBUTEROL SULFATE 0.83 MG/ML
2.5 SOLUTION RESPIRATORY (INHALATION) EVERY 6 HOURS PRN
Status: DISCONTINUED | OUTPATIENT
Start: 2020-05-04 | End: 2020-05-06 | Stop reason: HOSPADM

## 2020-05-04 RX ORDER — AMOXICILLIN 250 MG
1 CAPSULE ORAL 2 TIMES DAILY PRN
Status: DISCONTINUED | OUTPATIENT
Start: 2020-05-04 | End: 2020-05-06 | Stop reason: HOSPADM

## 2020-05-04 RX ORDER — FOLIC ACID 1 MG/1
1 TABLET ORAL DAILY
Status: DISCONTINUED | OUTPATIENT
Start: 2020-05-05 | End: 2020-05-06 | Stop reason: HOSPADM

## 2020-05-04 RX ORDER — ACETAMINOPHEN 500 MG
1000 TABLET ORAL EVERY 6 HOURS PRN
COMMUNITY

## 2020-05-04 RX ORDER — FERROUS GLUCONATE 324(37.5)
324 TABLET ORAL
COMMUNITY

## 2020-05-04 RX ORDER — FLUTICASONE PROPIONATE 50 MCG
2 SPRAY, SUSPENSION (ML) NASAL DAILY
COMMUNITY

## 2020-05-04 RX ORDER — SODIUM CHLORIDE 9 MG/ML
INJECTION, SOLUTION INTRAVENOUS CONTINUOUS
Status: ACTIVE | OUTPATIENT
Start: 2020-05-04 | End: 2020-05-05

## 2020-05-04 RX ORDER — LORAZEPAM 2 MG/ML
1 INJECTION INTRAMUSCULAR ONCE
Status: COMPLETED | OUTPATIENT
Start: 2020-05-04 | End: 2020-05-04

## 2020-05-04 RX ORDER — GADOBUTROL 604.72 MG/ML
7 INJECTION INTRAVENOUS ONCE
Status: COMPLETED | OUTPATIENT
Start: 2020-05-04 | End: 2020-05-04

## 2020-05-04 RX ORDER — NALOXONE HYDROCHLORIDE 0.4 MG/ML
.1-.4 INJECTION, SOLUTION INTRAMUSCULAR; INTRAVENOUS; SUBCUTANEOUS
Status: DISCONTINUED | OUTPATIENT
Start: 2020-05-04 | End: 2020-05-06 | Stop reason: HOSPADM

## 2020-05-04 RX ORDER — AMOXICILLIN 250 MG
2 CAPSULE ORAL 2 TIMES DAILY PRN
Status: DISCONTINUED | OUTPATIENT
Start: 2020-05-04 | End: 2020-05-06 | Stop reason: HOSPADM

## 2020-05-04 RX ORDER — ACETAMINOPHEN 325 MG/1
650 TABLET ORAL EVERY 4 HOURS PRN
Status: DISCONTINUED | OUTPATIENT
Start: 2020-05-04 | End: 2020-05-06 | Stop reason: HOSPADM

## 2020-05-04 RX ORDER — ACETAMINOPHEN 650 MG/1
650 SUPPOSITORY RECTAL EVERY 4 HOURS PRN
Status: DISCONTINUED | OUTPATIENT
Start: 2020-05-04 | End: 2020-05-06 | Stop reason: HOSPADM

## 2020-05-04 RX ORDER — CLINDAMYCIN PHOSPHATE 11.9 MG/ML
SOLUTION TOPICAL DAILY PRN
COMMUNITY
End: 2021-05-25

## 2020-05-04 RX ORDER — PROCHLORPERAZINE 25 MG
12.5 SUPPOSITORY, RECTAL RECTAL EVERY 12 HOURS PRN
Status: DISCONTINUED | OUTPATIENT
Start: 2020-05-04 | End: 2020-05-06 | Stop reason: HOSPADM

## 2020-05-04 RX ADMIN — ACETAMINOPHEN 650 MG: 325 TABLET, FILM COATED ORAL at 21:05

## 2020-05-04 RX ADMIN — THIAMINE HCL TAB 100 MG 100 MG: 100 TAB at 18:53

## 2020-05-04 RX ADMIN — OMEPRAZOLE 20 MG: 20 CAPSULE, DELAYED RELEASE ORAL at 20:57

## 2020-05-04 RX ADMIN — THERA TABS 1 TABLET: TAB at 18:53

## 2020-05-04 RX ADMIN — BENZTROPINE MESYLATE 1 MG: 1 TABLET ORAL at 20:58

## 2020-05-04 RX ADMIN — CLOZAPINE 250 MG: 25 TABLET ORAL at 20:58

## 2020-05-04 RX ADMIN — GADOBUTROL 7 ML: 604.72 INJECTION INTRAVENOUS at 15:20

## 2020-05-04 RX ADMIN — LORAZEPAM 1 MG: 2 INJECTION INTRAMUSCULAR; INTRAVENOUS at 15:11

## 2020-05-04 RX ADMIN — SERTRALINE HYDROCHLORIDE 150 MG: 100 TABLET ORAL at 20:57

## 2020-05-04 RX ADMIN — SODIUM CHLORIDE: 9 INJECTION, SOLUTION INTRAVENOUS at 18:54

## 2020-05-04 ASSESSMENT — ENCOUNTER SYMPTOMS
DIZZINESS: 1
HEADACHES: 1
WEAKNESS: 0
NUMBNESS: 0

## 2020-05-04 ASSESSMENT — MIFFLIN-ST. JEOR
SCORE: 1233.96
SCORE: 1195.88

## 2020-05-04 NOTE — ED TRIAGE NOTES
Pt called 911 because she felt like she was going to fall. Pt has had episode like this in the past. Pt has  Amada Viera

## 2020-05-04 NOTE — PHARMACY-ADMISSION MEDICATION HISTORY
Pharmacy Medication History  Admission medication history interview status for the 5/4/2020  admission is complete. See EPIC admission navigator for prior to admission medications     Medication history sources: Patient, Surescripts and Pharmacy (Melinda)  Medication history source reliability: Moderate  Adherence assessment: Moderate    Significant changes made to the medication list:  Added: ferrous gluconate, vanicream, clindamycin topical, flonase  Stopped: vitamin B1, meclizine   Adjusted doses for benztropine from BID to TID and clozapine from 300 to 250 mg at bedtime    Additional medication history information:   Moderate historian for med history - was able to say exact doses for some medications and not familiar with others. I verified doses/medications based on recent clinic visit in March and recent pharmacy fill history as well.     Medication reconciliation completed by provider prior to medication history? No    Time spent in this activity: 30 minutes      Prior to Admission medications    Medication Sig Last Dose Taking? Auth Provider   acetaminophen (TYLENOL) 500 MG tablet Take 1,000 mg by mouth every 6 hours as needed for mild pain  at prn Yes Unknown, Entered By History   albuterol (PROAIR HFA/PROVENTIL HFA/VENTOLIN HFA) 108 (90 Base) MCG/ACT inhaler Inhale 2 puffs into the lungs every 6 hours as needed   at prn Yes Unknown, Entered By History   albuterol (PROVENTIL) (2.5 MG/3ML) 0.083% neb solution Take 2.5 mg by nebulization every 6 hours as needed for shortness of breath / dyspnea or wheezing  at prn Yes Unknown, Entered By History   amLODIPine (NORVASC) 5 MG tablet Take 5 mg by mouth daily 5/4/2020 at am Yes Unknown, Entered By History   aspirin 81 MG EC tablet Take 81 mg by mouth daily  5/4/2020 at am Yes Unknown, Entered By History   atorvastatin (LIPITOR) 20 MG tablet Take 20 mg by mouth daily 5/4/2020 at am Yes Unknown, Entered By History   benztropine (COGENTIN) 1 MG tablet Take 1 mg by  mouth 3 times daily  5/4/2020 at am Yes Unknown, Entered By History   Calcium Carbonate-Vitamin D (CALCIUM + D PO) Take 1 tablet by mouth. Calcium 500 with Vit D 400mg BID. One before breakfast and one with supper 5/4/2020 at am Yes Reported, Patient   clindamycin (CLEOCIN T) 1 % external solution Apply topically daily as needed  at prn Yes Unknown, Entered By History   cloZAPine (CLOZARIL) 100 MG tablet Take 250 mg by mouth At Bedtime  5/3/2020 at pm Yes Unknown, Entered By History   desoximetasone (TOPICORT) 0.25 % OINT Apply  topically as needed.  at prn Yes Reported, Patient   emollient (VANICREAM) external cream Apply topically 3 times daily as needed for other (irritation)  at prn Yes Unknown, Entered By History   Ferrous Gluconate 324 (37.5 Fe) MG TABS Take 324 mg by mouth daily 5/4/2020 at am Yes Unknown, Entered By History   fluticasone (FLONASE) 50 MCG/ACT nasal spray Spray 2 sprays into both nostrils daily as needed for rhinitis or allergies  at prn Yes Unknown, Entered By History   fluticasone (FLOVENT HFA) 110 MCG/ACT inhaler Inhale 1 puff into the lungs 2 times daily as needed  at prn Yes Unknown, Entered By History   folic acid (FOLVITE) 1 MG tablet Take 1 tablet (1 mg) by mouth daily 5/4/2020 at am Yes Shannon Martinez PA-C   guaiFENesin (MUCINEX) 600 MG 12 hr tablet Take 1,200 mg by mouth 2 times daily as needed for congestion   at prn Yes Unknown, Entered By History   levothyroxine (SYNTHROID/LEVOTHROID) 175 MCG tablet Take 175 mcg by mouth daily 5/4/2020 at am Yes Unknown, Entered By History   omeprazole (PRILOSEC) 20 MG DR capsule Take 20 mg by mouth 2 times daily 5/4/2020 at am Yes Unknown, Entered By History   polyethylene glycol (MIRALAX/GLYCOLAX) packet Take 17 g by mouth daily as needed for constipation   at prn Yes Unknown, Entered By History   sertraline (ZOLOFT) 50 MG tablet Take 150 mg by mouth At Bedtime  5/3/2020 at pm Yes Unknown, Entered By History   multivitamin w/minerals  (THERA-VIT-M) tablet Take 1 tablet by mouth daily   Shannon Martinez PA-C Chelsea Mayer, PharmD  Inpatient Clinical Pharmacist  [phone]

## 2020-05-04 NOTE — PROGRESS NOTES
RECEIVING UNIT ED HANDOFF REVIEW    ED Nurse Handoff Report was reviewed by: Shannon Oates RN on May 4, 2020 at 6:03 PM

## 2020-05-04 NOTE — ED NOTES
Bed: ED18  Expected date:   Expected time:   Means of arrival:   Comments:  448  69 F unable to ambulate [place] to involuntary movements/psych meds  1342

## 2020-05-04 NOTE — H&P
[facility]    History and Physical  Hospitalist       Date of Admission:  5/4/2020    Assessment & Plan   Liz Lieberman is a 69 year old female who presents with dizziness/gait instability intermittently for one month    Dizziness  Orthostatic hypotension  Noted with standing/ambulating, present intermittently for 1 month. Sometimes if she stands for a while, she can wait it out. Admitted to hospital for similar March 2019, but had more specific truncal instability and stroke was ruled out. She was intoxicated and found to have orthostatic hypotension at that time. MRI brain with no acute ischemia or hemorrhage, does show chronic small vessel ischemic change and some sphenoid sinus mucosal thickening. UA clear, no suspicion of infectious etiology contributing. Does have lower albumin level (3), so perhaps there is some decreased dietary/fluid intake contributing. Could also consider psychiatric medications having impact on her dizziness.  - Observation status  - check orthostatic vitals q shift (positive on arrival to floor)  - Give 1L IVF overnight  - fall precautions  - PT/OT eval--lives alone  - Care transition consult--she does have  that follows her outside the hospital    Hypertension  PTA amlodipine 5mg daily  - Hold parameters for SBP<110. Monitor orthostatics as above. Consideration for changing timing (switch to evening dosing?), splitting dose pending how BP changes after med administration.    Hx alcohol use disorder  Last admitted March 2019, at that time recommended alcohol cessation--reports she drinks 2 drinks maybe twice per week  - Daily MVI, thiamine, folic acid, continued  - Check alcohol level     Chronic schizophrenia  Tardive dyskinesia  Restless legs syndrome   Chronic suicidal ideation  PTA clozapine 250mg at bedtime, sertraline 150mg at bedtime and benztropine 1mg TID. No intent to act on suicidal thoughts.  - Continue PTA medications, monitor orthostatics  -  "Follow up with outpatient psychiatrist.     Iron deficiency anemia  Hgb 11.8 on admit. Started on daily iron supplement by PCP recently, on hold due to obs status    Hypothyroidism  Hold PTA levothyroxine while observation status, resume on discharge    Mild intermittent asthma   PTA fluticasone and PRN albuterol, held due to obs status, resume on discharge. No signs of acute exacerbation.  - PRN albuterol neb available     Hyperlipidemia  PTA atorvastatin, resume on discharge    GERD    Continue PTA omeprazole 20mg BID     DVT Prophylaxis: Pneumatic Compression Devices  Code Status: DNR / DNI, discussed with patient on admission, she elects for no intervention in this scenario.  Expected discharge: 24-48 hours, recommended to prior living arrangement vs TCU vs home with home care once mobility at baseline or plan in place.    Gerri Stoddard DO    Primary Care Physician   [facility]ston    Chief Complaint   Gait instability, dizziness    History is obtained from the patient and record review    History of Present Illness   Liz Lieberman is a 69 year old female who presents with dizziness. To her this means that when she stands up, she feels like she is going to fall. Sometimes she can ride it out and wait for the feeling to subside then continue to ambulate, then other times it continues and she uses chairs to hold herself up. She has had close calls where she has almost fallen, but no falls yet. She does not use a walker or cane unless there is ice outside. Denies room spinning, change in feeling with laying to sitting. Just that it occurs when going up to standing. Denies change in her intake, reports getting frozen meals from neighbors during the pandemic. Drinks about 2 beers \"every so often\" which can be twice per week. She does not feel that feeling of dizziness is worse when she drinks or the next day.  Denies fevers, chills, nausea, vomiting, falls, swelling, numbness, tingling.    Past Medical History  "   I have reviewed this patient's medical history and updated it with pertinent information if needed.   Past Medical History:   Diagnosis Date     Allergic rhinitis      Bronchiectasis (H)     mild RML     Chronic pain     ft, knee, shoulders     Depression      Diverticulosis     on colonoscopy     Gastro-oesophageal reflux disease      GERD (gastroesophageal reflux disease)      Hearing loss      Hiatal hernia      History of blood transfusion     after tonsillectomy     Hyperlipidemia      Hypothyroidism      Leukocytosis      Lung nodule      Mild intermittent asthma      Numbness and tingling     hands ceasar numb R/T carpal tunnel     Osteoarthritis     knee, ft, shoulders     Paranoid schizophrenia, in remission      RLS (restless legs syndrome)      Tardive dyskinesia      Urinary incontinence        Past Surgical History   I have reviewed this patient's surgical history and updated it with pertinent information if needed.  Past Surgical History:   Procedure Laterality Date     ARTHROPLASTY KNEE  2/20/2013    Procedure: ARTHROPLASTY KNEE;  LEFT TOTAL KNEE ARTHROPLASTY (SMITH & NEPHEW)^;  Surgeon: Khanh Bee MD;  Location:  OR     ENT SURGERY      tonsillectomy     ORTHOPEDIC SURGERY  2011    (R) total knee       Prior to Admission Medications   Prior to Admission Medications   Prescriptions Last Dose Informant Patient Reported? Taking?   Calcium Carbonate-Vitamin D (CALCIUM + D PO) 5/4/2020 at am Self Yes Yes   Sig: Take 1 tablet by mouth. Calcium 500 with Vit D 400mg BID. One before breakfast and one with supper   Ferrous Gluconate 324 (37.5 Fe) MG TABS 5/4/2020 at am  Yes Yes   Sig: Take 324 mg by mouth daily   acetaminophen (TYLENOL) 500 MG tablet  at prn  Yes Yes   Sig: Take 1,000 mg by mouth every 6 hours as needed for mild pain   albuterol (PROAIR HFA/PROVENTIL HFA/VENTOLIN HFA) 108 (90 Base) MCG/ACT inhaler  at prn Self Yes Yes   Sig: Inhale 2 puffs into the lungs every 6 hours as needed     albuterol (PROVENTIL) (2.5 MG/3ML) 0.083% neb solution  at prn  Yes Yes   Sig: Take 2.5 mg by nebulization every 6 hours as needed for shortness of breath / dyspnea or wheezing   amLODIPine (NORVASC) 5 MG tablet 5/4/2020 at am  Yes Yes   Sig: Take 5 mg by mouth daily   aspirin 81 MG EC tablet 5/4/2020 at am Self Yes Yes   Sig: Take 81 mg by mouth daily    atorvastatin (LIPITOR) 20 MG tablet 5/4/2020 at am  Yes Yes   Sig: Take 20 mg by mouth daily   benztropine (COGENTIN) 1 MG tablet 5/4/2020 at am Self Yes Yes   Sig: Take 1 mg by mouth 3 times daily    clindamycin (CLEOCIN T) 1 % external solution  at prn  Yes Yes   Sig: Apply topically daily as needed   cloZAPine (CLOZARIL) 100 MG tablet 5/3/2020 at pm Self Yes Yes   Sig: Take 250 mg by mouth At Bedtime    desoximetasone (TOPICORT) 0.25 % OINT  at prn Self Yes Yes   Sig: Apply  topically as needed.   emollient (VANICREAM) external cream  at prn  Yes Yes   Sig: Apply topically 3 times daily as needed for other (irritation)   fluticasone (FLONASE) 50 MCG/ACT nasal spray  at prn  Yes Yes   Sig: Spray 2 sprays into both nostrils daily as needed for rhinitis or allergies   fluticasone (FLOVENT HFA) 110 MCG/ACT inhaler  at prn Self Yes Yes   Sig: Inhale 1 puff into the lungs 2 times daily as needed   folic acid (FOLVITE) 1 MG tablet 5/4/2020 at am  No Yes   Sig: Take 1 tablet (1 mg) by mouth daily   guaiFENesin (MUCINEX) 600 MG 12 hr tablet  at prn Self Yes Yes   Sig: Take 1,200 mg by mouth 2 times daily as needed for congestion    levothyroxine (SYNTHROID/LEVOTHROID) 175 MCG tablet 5/4/2020 at am Self Yes Yes   Sig: Take 175 mcg by mouth daily   multivitamin w/minerals (THERA-VIT-M) tablet   No No   Sig: Take 1 tablet by mouth daily   omeprazole (PRILOSEC) 20 MG DR capsule 5/4/2020 at am  Yes Yes   Sig: Take 20 mg by mouth 2 times daily   polyethylene glycol (MIRALAX/GLYCOLAX) packet  at prn Self Yes Yes   Sig: Take 17 g by mouth daily as needed for constipation     sertraline (ZOLOFT) 50 MG tablet 5/3/2020 at pm Self Yes Yes   Sig: Take 150 mg by mouth At Bedtime       Facility-Administered Medications: None     Allergies   Allergies   Allergen Reactions     Formaldehyde Rash     Latex Rash       Social History   I have reviewed this patient's social history and updated it with pertinent information if needed. Liz Lieberman  reports that she quit smoking about 26 years ago. She smoked 0.00 packs per day. She has never used smokeless tobacco. She reports current alcohol use of about 3.0 - 5.0 standard drinks of alcohol per week. She reports that she does not use drugs.    Family History   I have reviewed this patient's family history and updated it with pertinent information if needed.   Family History   Problem Relation Age of Onset     Cerebrovascular Disease No family hx of      Diabetes No family hx of        Review of Systems   The 10 point Review of Systems is negative other than noted in the HPI    Physical Exam   Temp: 98.6  F (37  C) Temp src: Oral BP: (!) 141/73 Pulse: 81 Heart Rate: 81 Resp: 18 SpO2: 98 %      Vital Signs with Ranges  Temp:  [98.6  F (37  C)] 98.6  F (37  C)  Pulse:  [81] 81  Heart Rate:  [81] 81  Resp:  [18] 18  BP: (141)/(73) 141/73  SpO2:  [98 %] 98 %  155 lbs 0 oz    Constitutional: Slightly drowsy, cooperative, no apparent distress.  Eyes: Conjunctiva and pupils examined and normal.  HEENT: Moist mucous membranes, normal dentition.  Respiratory: Clear to auscultation bilaterally, no crackles or wheezing.  Cardiovascular: Regular rate and rhythm, normal S1 and S2, and no murmur noted.  GI: Soft, non-distended, non-tender, normal bowel sounds.  Lymph/Hematologic: No anterior cervical or supraclavicular adenopathy.  Skin: No rashes, no cyanosis, no edema. Old scars left lower extremity from healed wound, scattered ecchymosis (healing)  Musculoskeletal: No joint swelling, erythema or tenderness.  Neurologic: Cranial nerves 2-12 intact, normal  strength and sensation. + dyskinetic movements of head/mouth/arms. Moving feet constantly  Psychiatric: Alert, oriented to person, place and time, no obvious anxiety or depression.    Data   Data reviewed today:  I personally reviewed MRI brain with no acute ischemia or hemorrhage, does show chronic small vessel ischemic change and some sphenoid sinus mucosal thickening.  Recent Labs   Lab 05/04/20  1454   WBC 6.5   HGB 11.8   MCV 85         POTASSIUM 3.8   CHLORIDE 102   CO2 25   BUN 7   CR 0.55   ANIONGAP 6   ADRIAN 8.5   GLC 91   ALBUMIN 3.0*   PROTTOTAL 6.7*   BILITOTAL 0.1*   ALKPHOS 143   ALT 19   AST 13       Recent Results (from the past 24 hour(s))   MR Brain w/o & w Contrast    Narrative    MRI BRAIN WITHOUT AND WITH CONTRAST  5/4/2020 3:54 PM    HISTORY: Ataxia, balance difficulty.     TECHNIQUE:  Multiplanar, multisequence MRI of the brain without and  with 7 mL Gadavist.    COMPARISON: Head CT 10/18/2019, head MRI 3/1/2019.    FINDINGS: Mild parenchymal volume loss is present. Frontoparietal  prominent white matter T2 hyperintensities likely represent chronic  small vessel ischemic change, considered to be within normal limits  for age, not significantly changed. No evidence of recent ischemia,  hemorrhage, mass, mass effect, or hydrocephalus. Major intracranial  flow voids are maintained. No abnormal enhancement or diffusion  restriction. The visualized calvarium and tympanic cavities are  unremarkable. Right sphenoid sinus mucosal thickening. Trace  opacification of left mastoid cavity, likely inflammatory.      Impression    IMPRESSION:  1. No evidence of acute ischemia or hemorrhage.  2. Mild volume loss and white matter T2 hyperintensities likely  representing chronic small vessel ischemic change.  3. Sphenoid sinus mucosal thickening.    MAHESH JORDAN MD

## 2020-05-04 NOTE — ED NOTES
"[facility]  ED Nurse Handoff Report    ED Chief complaint: Fall      ED Diagnosis:   Final diagnoses:   Generalized muscle weakness       Code Status: Full Code    Allergies:   Allergies   Allergen Reactions     Formaldehyde Rash     Latex Rash       Patient Story: \"I felt like I was going to fall\"  Focused Assessment:   69 year old female  who presents to the emergency department today via EMS for evaluation of gait instability.  The patient is somewhat of a difficult historian secondary to mumbling speech and facemask.  She comes from home where she lives alone.  She states that she feels \"so dizzy, I feel like I am going to fall.\"  They are most notable when she tries to walk.  She does not note a particular feeling of vertigo or room spinning.   She denies any focal numbness or weakness.  She denies any actual falls.  She told the nurse that she ran out of her meds about a week ago but since then has been able to resume her normal medications.  She has been shopping on her own and has neighbors that are helping provide her with food during the shelter at home for the coronavirus epidemic.  She does states that she has chronic feelings of suicidal ideation, specifically stating that \"sometimes I just think about swimming out into the ocean and just going on and on.\"  She states that she would never actually harm herself, and these symptoms have been ongoing and not changed in strength or frequency for some time.    Treatments and/or interventions provided:   Patient's response to treatments and/or interventions:     To be done/followed up on inpatient unit:      Does this patient have any cognitive concerns?:     Activity level - Baseline/Home:  Independent  Activity Level - Current:   Independent    Patient's Preferred language: English   Needed?: No    Isolation: None  Infection: Not Applicable  Bariatric?: No    Vital Signs:   Vitals:    05/04/20 1356   BP: (!) 141/73   Pulse: 81 " "  Resp: 18   Temp: 98.6  F (37  C)   TempSrc: Oral   SpO2: 98%   Weight: 70.3 kg (155 lb)   Height: 1.651 m (5' 5\")       Cardiac Rhythm:     Was the PSS-3 completed:   Yes  What interventions are required if any?    Interventions: DEC consulted;Monitored via video  Required Interventions: Provider notified;Room searched;Room made safe;Patient searched;Belongings removed  High Risk Required Interventions: On continuous in person observation    Family Comments: no family here  OBS brochure/video discussed/provided to patient/family: No              Name of person given brochure if not patient:               Relationship to patient:     For the majority of the shift this patient's behavior was Green.   Behavioral interventions performed were .    ED NURSE PHONE NUMBER: [phone]         "

## 2020-05-04 NOTE — ED PROVIDER NOTES
"  History     Chief Complaint:  Gait Instability     HPI   Liz Lieberman is a 69 year old female with a history of TIA, paranoid schizophrenia, hypertriglyceridemia, prediabetes, transient ischemic attack, and dementia among others who presents to the emergency department today via EMS for evaluation of gait instability.  The patient is somewhat of a difficult historian secondary to mumbling speech and facemask.  She comes from home where she lives alone.  She states that she feels \"so dizzy, I feel like I am going to fall.\"  He was seen in the ED April 1 for dizziness, and feels like the symptoms have not completely abated.  They are most notable when she tries to walk.  She does not note a particular feeling of vertigo or room spinning.  She has had intermittent headaches, and when pressed she says they are at the top of her head.  She wears hearing aids, and has been dealing with tinnitus for some period of time.  She denies any focal numbness or weakness.  She denies any actual falls.  She told the nurse that she ran out of her meds about a week ago but since then has been able to resume her normal medications.  She has been shopping on her own and has neighbors that are helping provide her with food during the shelter at home for the coronavirus epidemic.  She does states that she has chronic feelings of suicidal ideation, specifically stating that \"sometimes I just think about swimming out into the ocean and just going on and on.\"  She states that she would never actually harm herself, and these symptoms have been ongoing and not changed in strength or frequency for some time.    Allergies:  Formaldehyde  Latex      Medications:    Albuterol  Norvasc  Aspirin  Lipitor  Cogentin  Clozapine  Flovent  Synthroid  Antivert  Prilosec  Miralax  Zoloft  Fergon  Zoloft    Past Medical History:    Transient ischemic attack  Pneumonia  Paranoid schizophrenia  Allergic rhinitis  Bronchiectasis  Chronic " "pain  Depression  Diverticulosis  Gastroesophageal reflux disease  Hearing loss  Hiatal hernia  Blood transfusion  Hyperlipidemia  Hypothyroidism  Lung nodule  Asthma  Osteoarthritis  Restless legs syndrome  Tardive dyskinesia   Mixed stress and urge urinary incontinence  Left ovarian cyst  Dysphagia, pharyngeal phase  Pre-diabetes  Atopic dermatitis   Hypertriglyceridemia   Cold sore  Iron deficiency anemia  Vitamin D deficiency   Talipes cavus  Dementia   Osteoarthrosis  Thyroiditis   Vitreous degeneration  Irregular Z line of esophagus    Past Surgical History:    Arthroplasty knee  Tonsillectomy  Total knee, right  Breast biopsy    Family History:    Father: coronary artery disease, hyperlipidemia  Mother: dementia, hypertension, thyroid disorder  Sister: asthma, hypertension, migraines, anesthesia reaction    Social History:  The patient was accompanied to the ED by EMS.  Patient's : Amada Viera.  Smoking Status: Former Smoker  Smokeless Tobacco: Never Used  Alcohol Use: Positive  Drug Use: Negative  PCP: Era Livingston  Marital Status:       Review of Systems   HENT: Positive for tinnitus.    Musculoskeletal: Positive for gait problem.   Neurological: Positive for dizziness and headaches. Negative for weakness and numbness.   Psychiatric/Behavioral: Positive for suicidal ideas. Negative for self-injury.   All other systems reviewed and are negative.      Physical Exam     Patient Vitals for the past 24 hrs:   BP Temp Temp src Pulse Heart Rate Resp SpO2 Height Weight   05/04/20 1356 (!) 141/73 98.6  F (37  C) Oral 81 81 18 98 % 1.651 m (5' 5\") 70.3 kg (155 lb)     Physical Exam  General: thin female, wearing mask.  Difficult to understand.  HENT: mucous membranes moist  CV: regular rate, regular rhythm  Resp: normal effort, clear throughout, no crackles or wheezing  GI: abdomen soft and nontender, no guarding  MSK: no bony tenderness  Skin: appropriately warm and dry  Neuro: alert, " answering questions appropriately.  Normal strength in BUE and BLE.  Sensation is intact.  When asleep, she is still but when awake, she has involuntary movements of head, arms and sometimes legs.  Gait is unsteady, shuffling and narrow based.  Psych: endorses chronic thoughts of self harm, no change or increase.    Emergency Department Course     Imaging:  Radiology findings were communicated with the patient who voiced understanding of the findings.    MR Brain w/o & w Contrast  1. No evidence of acute ischemia or hemorrhage.  2. Mild volume loss and white matter T2 hyperintensities likely representing chronic small vessel ischemic change.  3. Sphenoid sinus mucosal thickening.  Reading per radiology    Laboratory:  Laboratory findings were communicated with the patient who voiced understanding of the findings.    CBC: WBC 6.5, HGB 11.8,   CMP: Bilirubin Total 0.1 (L), Albumin 3.0 (L), Protein Total 6.7 (L) o/w WNL (Creatinine 0.55)  Alcohol Ethyl: <0.01    UA with Microscopic: Squamous Epithelial 3 (H), o/w WNL    Interventions:  1511 Ativan 1 mg IV    Emergency Department Course:    1417 Nursing notes and vitals reviewed. I performed an exam of the patient as documented above.     1454 IV was inserted and blood was drawn for laboratory testing, results above. The patient provided a urine sample here in the emergency department. This was sent for laboratory testing, findings above.    1520 The patient was sent for an MRI of the brain while in the emergency department, results above.     I spoke with Dr. Stoddard of the hospitalist service from [facility] regarding patient's presentation, findings, and plan of care.    Findings and plan explained to the patient who consents to admission. Discussed the patient with Dr. Stoddard, who will admit the patient to an observation bed for further monitoring, evaluation, and treatment.    Impression & Plan      Medical Decision Making:  Liz Lieberman is a 69 year  old female  history of TIA, paranoid schizophrenia, hypertriglyceridemia, prediabetes, transient ischemic attack, and dementia  who presents to the emergency department today for evaluation of gait insecurity.  No history of fall and no trauma on exam.  Neuro exam is notable for shuffling, unsteady gait - it is unclear if this is baseline, but the patient feels much more unsteady than usual.  MRI checked given ongoing dizziness for the past month, but it is negative for stroke, mass, or bleed.  Labs are unremarkable.  Plan for observation for PT evaluation.    Diagnosis:    ICD-10-CM    1. Generalized muscle weakness  M62.81      Disposition:   The patient is admitted into the care of Dr. Stoddard.    Scribe Disclosure:  I, Clarisas Joyce, am serving as a scribe at 2:15 PM on 5/4/2020 to document services personally performed by Puja Escobar MD based on my observations and the provider's statements to me.     EMERGENCY DEPARTMENT       Puja Escobar MD  05/05/20 6449

## 2020-05-05 ENCOUNTER — APPOINTMENT (OUTPATIENT)
Dept: OCCUPATIONAL THERAPY | Facility: CLINIC | Age: 70
DRG: 312 | End: 2020-05-05
Attending: HOSPITALIST
Payer: MEDICARE

## 2020-05-05 PROBLEM — I95.1 ORTHOSTATIC HYPOTENSION: Status: ACTIVE | Noted: 2020-05-05

## 2020-05-05 LAB
ANION GAP SERPL CALCULATED.3IONS-SCNC: 5 MMOL/L (ref 3–14)
BUN SERPL-MCNC: 7 MG/DL (ref 7–30)
CALCIUM SERPL-MCNC: 8.7 MG/DL (ref 8.5–10.1)
CHLORIDE SERPL-SCNC: 109 MMOL/L (ref 94–109)
CO2 SERPL-SCNC: 26 MMOL/L (ref 20–32)
CREAT SERPL-MCNC: 0.49 MG/DL (ref 0.52–1.04)
ERYTHROCYTE [DISTWIDTH] IN BLOOD BY AUTOMATED COUNT: 13.3 % (ref 10–15)
GFR SERPL CREATININE-BSD FRML MDRD: >90 ML/MIN/{1.73_M2}
GLUCOSE SERPL-MCNC: 132 MG/DL (ref 70–99)
HCT VFR BLD AUTO: 38.2 % (ref 35–47)
HGB BLD-MCNC: 12.3 G/DL (ref 11.7–15.7)
MCH RBC QN AUTO: 28.3 PG (ref 26.5–33)
MCHC RBC AUTO-ENTMCNC: 32.2 G/DL (ref 31.5–36.5)
MCV RBC AUTO: 88 FL (ref 78–100)
PLATELET # BLD AUTO: 278 10E9/L (ref 150–450)
POTASSIUM SERPL-SCNC: 4 MMOL/L (ref 3.4–5.3)
RBC # BLD AUTO: 4.34 10E12/L (ref 3.8–5.2)
SODIUM SERPL-SCNC: 140 MMOL/L (ref 133–144)
WBC # BLD AUTO: 7 10E9/L (ref 4–11)

## 2020-05-05 PROCEDURE — 25800030 ZZH RX IP 258 OP 636: Performed by: PHYSICIAN ASSISTANT

## 2020-05-05 PROCEDURE — 80048 BASIC METABOLIC PNL TOTAL CA: CPT | Performed by: HOSPITALIST

## 2020-05-05 PROCEDURE — 25000132 ZZH RX MED GY IP 250 OP 250 PS 637: Mod: GY | Performed by: HOSPITALIST

## 2020-05-05 PROCEDURE — 36415 COLL VENOUS BLD VENIPUNCTURE: CPT | Performed by: HOSPITALIST

## 2020-05-05 PROCEDURE — 25800030 ZZH RX IP 258 OP 636: Performed by: HOSPITALIST

## 2020-05-05 PROCEDURE — 12000000 ZZH R&B MED SURG/OB

## 2020-05-05 PROCEDURE — 25000132 ZZH RX MED GY IP 250 OP 250 PS 637: Mod: GY | Performed by: PHYSICIAN ASSISTANT

## 2020-05-05 PROCEDURE — 85027 COMPLETE CBC AUTOMATED: CPT | Performed by: HOSPITALIST

## 2020-05-05 PROCEDURE — G0378 HOSPITAL OBSERVATION PER HR: HCPCS

## 2020-05-05 PROCEDURE — 99225 ZZC SUBSEQUENT OBSERVATION CARE,LEVEL II: CPT | Performed by: PHYSICIAN ASSISTANT

## 2020-05-05 PROCEDURE — 99207 ZZC CDG-CODE CATEGORY CHANGED: CPT | Performed by: PHYSICIAN ASSISTANT

## 2020-05-05 PROCEDURE — 96361 HYDRATE IV INFUSION ADD-ON: CPT

## 2020-05-05 PROCEDURE — 97165 OT EVAL LOW COMPLEX 30 MIN: CPT | Mod: GO

## 2020-05-05 RX ORDER — GUAIFENESIN 600 MG/1
1200 TABLET, EXTENDED RELEASE ORAL 2 TIMES DAILY PRN
Status: DISCONTINUED | OUTPATIENT
Start: 2020-05-05 | End: 2020-05-06 | Stop reason: HOSPADM

## 2020-05-05 RX ORDER — SODIUM CHLORIDE 9 MG/ML
INJECTION, SOLUTION INTRAVENOUS CONTINUOUS
Status: ACTIVE | OUTPATIENT
Start: 2020-05-05 | End: 2020-05-05

## 2020-05-05 RX ORDER — GABAPENTIN 100 MG/1
200 CAPSULE ORAL ONCE
Status: COMPLETED | OUTPATIENT
Start: 2020-05-06 | End: 2020-05-06

## 2020-05-05 RX ORDER — SODIUM CHLORIDE 9 MG/ML
INJECTION, SOLUTION INTRAVENOUS CONTINUOUS
Status: DISCONTINUED | OUTPATIENT
Start: 2020-05-05 | End: 2020-05-05

## 2020-05-05 RX ADMIN — CLOZAPINE 250 MG: 25 TABLET ORAL at 21:12

## 2020-05-05 RX ADMIN — THIAMINE HCL TAB 100 MG 100 MG: 100 TAB at 09:02

## 2020-05-05 RX ADMIN — ACETAMINOPHEN 650 MG: 325 TABLET, FILM COATED ORAL at 01:46

## 2020-05-05 RX ADMIN — BENZTROPINE MESYLATE 1 MG: 1 TABLET ORAL at 16:20

## 2020-05-05 RX ADMIN — OMEPRAZOLE 20 MG: 20 CAPSULE, DELAYED RELEASE ORAL at 09:01

## 2020-05-05 RX ADMIN — OMEPRAZOLE 20 MG: 20 CAPSULE, DELAYED RELEASE ORAL at 21:13

## 2020-05-05 RX ADMIN — SODIUM CHLORIDE: 9 INJECTION, SOLUTION INTRAVENOUS at 09:47

## 2020-05-05 RX ADMIN — BENZTROPINE MESYLATE 1 MG: 1 TABLET ORAL at 09:01

## 2020-05-05 RX ADMIN — BENZTROPINE MESYLATE 1 MG: 1 TABLET ORAL at 21:12

## 2020-05-05 RX ADMIN — SODIUM CHLORIDE: 9 INJECTION, SOLUTION INTRAVENOUS at 13:39

## 2020-05-05 RX ADMIN — SERTRALINE HYDROCHLORIDE 150 MG: 100 TABLET ORAL at 21:13

## 2020-05-05 RX ADMIN — SODIUM CHLORIDE 500 ML: 9 INJECTION, SOLUTION INTRAVENOUS at 13:39

## 2020-05-05 RX ADMIN — GUAIFENESIN 1200 MG: 600 TABLET, EXTENDED RELEASE ORAL at 09:47

## 2020-05-05 RX ADMIN — SODIUM CHLORIDE: 9 INJECTION, SOLUTION INTRAVENOUS at 17:25

## 2020-05-05 RX ADMIN — SODIUM CHLORIDE, POTASSIUM CHLORIDE, SODIUM LACTATE AND CALCIUM CHLORIDE 1000 ML: 600; 310; 30; 20 INJECTION, SOLUTION INTRAVENOUS at 05:36

## 2020-05-05 RX ADMIN — FOLIC ACID 1 MG: 1 TABLET ORAL at 09:01

## 2020-05-05 RX ADMIN — ACETAMINOPHEN 650 MG: 325 TABLET, FILM COATED ORAL at 21:12

## 2020-05-05 RX ADMIN — THERA TABS 1 TABLET: TAB at 09:01

## 2020-05-05 ASSESSMENT — ACTIVITIES OF DAILY LIVING (ADL): ADLS_ACUITY_SCORE: 15

## 2020-05-05 NOTE — PLAN OF CARE
Arrived to floor at 1845. A&Ox4. VSS ex HTN. Denies pain. Up SBA to BR. R PIV infusing 100 mL/hr. PT/OT/SW consulted.

## 2020-05-05 NOTE — PLAN OF CARE
PT-Patient seen by OT and current recommendation is TCU. Since plan is in place for discharge will hold PT eval. Will check back tomorrow to make sure plan hasn't changed. If plan for TCU still in place will cancel PT order and hold PT eval for next level of care.

## 2020-05-05 NOTE — PROGRESS NOTES
Met with patient to discuss observation status.  Explained observation status and gave patient an observation brochure. Informed patient that OT recommends TCU.  Patient stated that she lives alone in a condo and has been to [facility]CU in the past.  Patient stated she is ok with TCU at discharge and requested a referral be sent to [facility] TCU.  Explained to patient that Medicare may be able to waive the observation status and cover TCU stay under pt's Medicare benefit during this time of Covid-19.  Informed patient that  would be following up with patient to discuss TCU stay and coverage.

## 2020-05-05 NOTE — PROGRESS NOTES
"MD Notification    Notified Person: MD    Notified Person Name: Gerri Stoddard     Notification Date/Time: 5/4/2020 at 2143    Notification Interaction: AMCOM    Purpose of Notification: \"FYI, pt orthostatics positive from sitting to standing\"    Orders Received: Patient called this writer back and stated no need for new orders at this time.     Comments:    "

## 2020-05-05 NOTE — PROGRESS NOTES
Observation goals  PRIOR TO DISCHARGE     Comments: -diagnostic tests and consults completed and resulted (not met-pt/ot/sw to see)  -vital signs normal or at patient baseline (not met, orthos positive)  -safe disposition plan has been identified (partially met- Need for TCU established).    Nurse to notify provider when observation goals have been met and patient is ready for discharge.

## 2020-05-05 NOTE — PLAN OF CARE
Discharge Planner OT   Patient plan for discharge: home  Current status: OT juanal completed. Pt lives alone in a condo and is ind w/ all ADL's/IADL's, including driving and med mgmt w/o use of A.D. Pt does own a cane and walker but only uses when its icy out.    Currently, pt SBA for bed mobility, SBA-CGA for sit>stand tx, CGA for functional mobility to/from chair, very unsteady but no overt LOB. Pt SBA-CGA to don/doff socks, had slight LOB but self-corrected. Ambulated ~ 7 ft. Pt positive for orthostatics, /80 in supine, 116/58 standing, after getting to chair, BP decreased to 84/67. Pt dizzy when OOB. RN informed of BP drop.   Barriers to return to prior living situation: lives alone, decreased balance, functional act tolerance, dizziness w/ mobility/orthostatic hypotension  Recommendations for discharge: TCU  Rationale for recommendations: Currently, pt would require TCU to improve safety w/ functional mobility, ADL's, and IADL's prior to discharge as pt lives alone. Pt states her sister could A that lives in [place] but states it wouldn't be feasible to sleep overnight and assist 24/7.     Pt would currently need A x 1 for all ADL's and mobility to maximize safety and reduce risk of falls.       Entered by: Karen Joseph 05/05/2020 8:52 AM

## 2020-05-05 NOTE — CONSULTS
Care Transition Initial Assessment - SW     Met with: met with patient and then called her sister Mia with her permission  Active Problems:    Gait instability       DATA  Lives With: alone   Living Arrangements: condominium  Insurance is Medicare and [employer] supplement  Health Care Directive none on file.  Identified issues/concerns regarding health management:   Patient admitted on 5/4/20 under Observation status with dizziness, orthostatic hypotension.  Per H&P, patient has history of alcohol use disorder with current use of maybe 1-2 drinks per week. She has chronic schizophrenia, chronic suicidal ideation with no intact to act on thoughts, Tardive dyskinesia.  Followed by outpatient psychiatrist.    Patient was seen by OT today and therapist recommended TCU as patient was not independent with walking a short distance and not independent with transfers.  OT and Care Transition RN report patient is in agreement with TCU. Patient requesting [facility] as she has been there previously.  ASSESSMENT  Cognitive Status:  Alert and oriented x 4 per RN notes, per H&P, patient had neuro testing in 2019 which indicated a level of dementia.  Concerns to be addressed: Safe discharge plan.  Writer met with patient to discuss the TCU arrangement and to explain [facility], if she is d/c'd tomorrow,  doesn't have a vacancy.  Suggested other TCU's in the [facility] area.  Patient explained she now wants to go home and feels she is able to function fine at home.  Writer reviewed the OT notes with her.  She shares at baseline, she is independent with self cares and home making.  She can get some meals thru the condo building but makes most of her meals.  She states she would like a  to check on her once a week or once a month.   Patient did give writer permission to speak with her sister Lashay or Mia.  Writer contacted Lashay who was busy with work however reached Mia who lives with Lashay.  Introduced  role of SW and the discussion with patient regarding TCU and patient's plan to discharge home.  Mia said patient is independent, handles her finances well and is financially well off.  She said she and her sisters are concerned that patient may be still drinking.  They are concerned with recent trips patient has taken to the ED questioning the need.  Mia reports patient is under the care of a psychiatrist and thinks patient may also see a psychologist.   Patient does drive however has been told to limit her driving to the immediate area.   Mia would like to see patient move into assisted living.   If TCU is recommended she reports she and her sisters would strongly support this and are willing to speak with patient about TCU.     PLAN  Patient agrees to work with therapy tomorrow and writer will return to further discuss her discharge plan.  Patient's sisters will call patient tonight to share their opinion with patient.  Financial costs for the patient includes to be determined  Patient given options and choices for discharge yes  Patient/family is agreeable to the plan? At this time, patient is not in agreement with recommended TCU.   Transportation:  Patient reports her sister can transport.  Patient Goals and Preferences: to discharge home.  Patient anticipates discharging to:  home

## 2020-05-05 NOTE — PLAN OF CARE
A&O, flat affect. [place]. Pain in R foot, tylenol given x1. VSS ex hypotensive while standing. 1L IVF given overnight. Baseline tremor. Still having positive orthostatics after IVF- feels dizzy upon standing. MD notified and giving a 1L bolus of LR. Consults to see today.

## 2020-05-05 NOTE — PROGRESS NOTES
Observation Goals:     -diagnostic tests and consults completed and resulted - not met.   -vital signs normal or at patient baseline. Not met. Orthostatic's positive  -safe disposition plan has been identified. Not met.     Nurse to notify provider when observation goals have been met and patient is ready for discharge.

## 2020-05-05 NOTE — PLAN OF CARE
Alert and oriented x 4. R foot pain controlled with PRN tylenol. Declines ice or elevation. Orthostatics positive and provider notified. Tremors present and speech slowed dt hx of tardive dyskinesia. [place]. Regular diet. Stand by assist when out of bed due to dizziness. NS running at 100 mL/hr. Voiding adequatley. Bruising on BLE, but otherwise skin intact. Bed alarm as patient will get out of bed before calling for assistance.

## 2020-05-05 NOTE — PLAN OF CARE
A&O, flat affect. [place]. . VSS ex hypotensive while standing. Baseline tremor. Still having positive orthostatics after IVF- feels dizzy upon standing: MD aware; NS restarted at 100 mL/hr. Plan to discharge to TCU when able.

## 2020-05-05 NOTE — PROGRESS NOTES
Observation goals  PRIOR TO DISCHARGE      Comments: -diagnostic tests and consults completed and resulted (not met-pt/ot/sw to see)  -vital signs normal or at patient baseline (not met, orthos positive)  -safe disposition plan has been identified (not met, SW to see, pt comes from home independently).  Nurse to notify provider when observation goals have been met and patient is ready for discharge.

## 2020-05-05 NOTE — PROGRESS NOTES
SW:  D:  Care Coordinator reports she met with patient to explain OBS admission status and during this conversation, patient shared she lives alone in a condo and anticipates she may be too weak to go home directly and she may need TCU.  She has been at [facility] before and would like to return there for TCU.  Discharge date is not known yet though LOS anticipated short due to OBS status.    Writer contacted [facility] and they do not have any vacancies for today however do anticipate vacancy for tomorrow.   Referral has been sent to [facility].  [facility] will follow for a therapy evaluation for their recommendation of home vs TCU.    The TCU may be able to accept patient under the COVID 19 guidelines where Medicare is not requiring the standard 3 night In-patient stay.

## 2020-05-05 NOTE — PROGRESS NOTES
05/05/20 0827   Quick Adds   Type of Visit Initial Occupational Therapy Evaluation   Living Environment   Lives With alone   Living Arrangements condominium   Home Accessibility no concerns   Living Environment Comment Pt has a tub shower w/ grab bars   Self-Care   Activity/Exercise/Self-Care Comment pt owns a walker and cane but only uses in the winter when it is icy out   Functional Level   Ambulation 0-->independent   Transferring 0-->independent   Toileting 0-->independent   Bathing 0-->independent   Dressing 0-->independent   Eating 0-->independent   Communication 0-->understands/communicates without difficulty   Swallowing 0-->swallows foods/liquids without difficulty   Cognition 0 - no cognition issues reported   Fall history within last six months no   Which of the above functional risks had a recent onset or change? ambulation;toileting;bathing;dressing;transferring   Prior Functional Level Comment PTA, pt ind w/ all ADL's/IADL's w/o A.D., including driving and med mgmt. Per chart, pt does have a  that follows her.   General Information   Onset of Illness/Injury or Date of Surgery - Date 05/04/20   Referring Physician Gerri Stoddard,     Patient/Family Goals Statement return home   Additional Occupational Profile Info/Pertinent History of Current Problem Liz Lieberman is a 69 year old female who presents with dizziness/gait instability intermittently for one month. Pt found to have orthostatic hypotension. Noted with standing/ambulating, present intermittently for 1 month. Sometimes if she stands for a while, she can wait it out. Admitted to hospital for similar March 2019, but had more specific truncal instability and stroke was ruled out. She was intoxicated and found to have orthostatic hypotension at that time. MRI brain with no acute ischemia or hemorrhage, does show chronic small vessel ischemic change and some sphenoid sinus mucosal thickening. UA clear, no suspicion of  infectious etiology contributing. Does have lower albumin level (3), so perhaps there is some decreased dietary/fluid intake contributing. Could also consider psychiatric medications having impact on her dizziness.   Precautions/Limitations fall precautions   General Info Comments pt is [place]   Cognitive Status Examination   Orientation orientation to person, place and time   Level of Consciousness alert   Follows Commands (Cognition) WNL   Cognitive Comment Pt alert and oriented to all except was off 1 day on the date, stated it was May 6th instead of May 5th   Visual Perception   Visual Perception Wears glasses   Sensory Examination   Sensory Comments denied any numbness/tingling   Range of Motion (ROM)   ROM Comment BUE AROM WFL   Strength   Strength Comments BUE strength 4-/5, symmetrical   Hand Strength   Hand Strength Comments decreased bilaterally, symmetrical   Coordination   Coordination Comments difficulty w/ serial finger to thumb opposition, impaired coordination at baseline   Mobility   Bed Mobility Comments SBA   Transfer Skill: Sit to Stand   Level of [place]: Sit/Stand stand-by assist   Balance   Balance Comments unsteady w/ standing and mobility   Bathing   Level of [place] minimum assist (75% patients effort)   Upper Body Dressing   Level of [place]: Dress Upper Body stand-by assist   Lower Body Dressing   Level of [place]: Dress Lower Body contact guard   Toileting   Level of [place]: Toilet contact guard   Grooming   Level of [place]: Grooming contact guard   Eating/Self Feeding   Level of [place]: Eating independent   General Therapy Interventions   Planned Therapy Interventions ADL retraining;strengthening;home program guidelines;progressive activity/exercise   Clinical Impression   Criteria for Skilled Therapeutic Interventions Met yes, treatment indicated   OT Diagnosis decreased ind/safety w/ ADL's and IADL's   Influenced by the following impairments Impaired  "balance, functional act tolerance, dizziness w/ OOB mobility   Assessment of Occupational Performance 1-3 Performance Deficits   Identified Performance Deficits decreased ind/safety w/ dressing, g/h, toileting, bathing, all IADL's, functional mobility   Clinical Decision Making (Complexity) Low complexity   Therapy Frequency 3x/week   Predicted Duration of Therapy Intervention (days/wks) 7 days   Anticipated Discharge Disposition Transitional Care Facility   Risks and Benefits of Treatment have been explained. Yes   Patient, Family & other staff in agreement with plan of care Yes   [facility] TM \"6 Clicks\"   2016, Trustees of [facility], under license to [employer]).  All rights reserved.   6 Clicks Short Forms Daily Activity Inpatient Short Form   [facility]  \"6 Clicks\" Daily Activity Inpatient Short Form   1. Putting on and taking off regular lower body clothing? 3 - A Little   2. Bathing (including washing, rinsing, drying)? 3 - A Little   3. Toileting, which includes using toilet, bedpan or urinal? 3 - A Little   4. Putting on and taking off regular upper body clothing? 3 - A Little   5. Taking care of personal grooming such as brushing teeth? 3 - A Little   6. Eating meals? 4 - None   Daily Activity Raw Score (Score out of 24.Lower scores equate to lower levels of function) 19   Total Evaluation Time   Total Evaluation Time (Minutes) 24     "

## 2020-05-05 NOTE — PROVIDER NOTIFICATION
Paged dr. fitzgerald regarding positive orthostatic bp- pt symptomatic and states feeling dizzy upon standing.

## 2020-05-05 NOTE — PROGRESS NOTES
[facility]    Hospitalist Progress Note      Assessment & Plan   Liz Lieberman is a 69 year old female with a past medical history significnt for hypertension, hx alcohol use disorder, schizophrenia, tardive dyskinesia, RLS, chronic SI, hypothyroidism, RAJANI, HLD, and asthma who was admitted on 5/4/2020 after presenting with dizziness, feeling unsafe at home.    Dizziness  Orthostatic hypotension  Noted with standing/ambulating, present intermittently for 1 month. Sometimes if she stands for a while, she can wait it out. Admitted to hospital for similar March 2019, but had more specific truncal instability and stroke was ruled out. She was intoxicated and found to have orthostatic hypotension at that time. MRI brain with no acute ischemia or hemorrhage, does show chronic small vessel ischemic change and some sphenoid sinus mucosal thickening. UA clear, no suspicion of infectious etiology contributing. Does have lower albumin level (3), so perhaps there is some decreased dietary/fluid intake contributing. Could also consider psychiatric medications having impact on her dizziness.  - check orthostatic vitals q shift, will recheck later this afternoon   - fall precautions  - PT/OT eval--lives alone  - Care transition consult--she does have  that follows her outside the hospital  --compression socks   --if persistent symptomatic orthostatic hypotension after continued volume ressuscition could consider midodrine tomorrow. Historically this has improved with fluids.      Hypertension  PTA amlodipine 5mg daily .  - Held today, plan to resume tomorrow pending symptoms/orthostatic vitals. Will consider for changing timing (switch to evening dosing?), splitting dose pending how BP changes after med administration.     Hx alcohol use disorder  Last admitted March 2019, at that time recommended alcohol cessation--reports she drinks 2 drinks maybe twice per week though may be minimizing. Etoh 0 on  admission.  - Daily MVI, thiamine, folic acid, continued  --encouraged cessation      Chronic schizophrenia  Tardive dyskinesia  Restless legs syndrome   Chronic suicidal ideation  PTA clozapine 250mg at bedtime, sertraline 150mg at bedtime and benztropine 1mg TID. No intent to act on suicidal thoughts. She reports clozaril dose decreased recently, per chart review appears this was back in January.   - Continue PTA medications, monitor orthostatics  - Follow up with outpatient psychiatrist.    Dementia. Note Neuropsych testing completed 9/2019 in Care Everywhere with diagnosis of alcohol abuse and dementia in the setting of schizophrenia/schizoaffective disorder. Oriented x4 here.   --outpatient follow up     Iron deficiency anemia  Hgb 11.8 on admit. Started on daily iron supplement by PCP recently, on hold due to obs status     Hypothyroidism  Hold PTA levothyroxine while observation status, resume on discharge     Mild intermittent asthma   PTA fluticasone and PRN albuterol, held due to obs status, resume on discharge. No signs of acute exacerbation.  - PRN albuterol neb available     Hyperlipidemia  PTA atorvastatin, resume on discharge     GERD    Continue PTA omeprazole 20mg BID    DVT Prophylaxis: Pneumatic Compression Devices  Code Status: DNR/DNI    Disposition: Expected discharge in 1-2 days pending improvement in orthostatic vitals, dizziness. Anticipate she will discharge to TCU    This patient was discussed with Dr. James who agrees with the above plan.    Isabel Neeyl PA-C    Interval History   Continues to feel dizzy when standing resulting in some unsteadiness. No dizziness at rest.  Feels slight improvement from admit but still quite symptomatic. Feels dehydrated. Reports poor fluid intake at home. Denies CP, SOB, nausea, abdominal pain, palpitations. Denies fevers or chills. No cough.  Right foot pain from plantar fascitis. Agreeable to TCU.     -Data reviewed today: I reviewed all new  labs and imaging results over the last 24 hours. I personally reviewed no images or EKG's today.    Physical Exam   Temp: 96.5  F (35.8  C) Temp src: Oral BP: 122/61 Pulse: 79 Heart Rate: 97 Resp: 16 SpO2: 94 % O2 Device: None (Room air)    Vitals:    05/04/20 1356 05/04/20 1835   Weight: 70.3 kg (155 lb) 66.5 kg (146 lb 9.7 oz)     Vital Signs with Ranges  Temp:  [95.9  F (35.5  C)-98.6  F (37  C)] 96.5  F (35.8  C)  Pulse:  [79-82] 79  Heart Rate:  [81-97] 97  Resp:  [16-20] 16  BP: (122-158)/(61-80) 122/61  SpO2:  [94 %-98 %] 94 %  I/O last 3 completed shifts:  In: 1240 [P.O.:240; I.V.:1000]  Out: -     Constitutional: Alert and oriented to person, place, time, situation. Can be forgetful and knowledge of timing (when were meds changed, when did you see PCP) she is less confident with. appropriately conversant   ENT: normal cephalic, moist mucous membranes  Eyes:  Sclera anicteric, EOMI.   Respiratory: Lungs clear to auscultation bilaterally, no increased work of breathing or wheezing  Cardiovascular: Regular rate and rhythm, no murmur. Trace bilateral LE edema   GI:  active bowel sounds, abdomen soft, non-tender  MSK:  Moves all four extremities. Normal tone  Neuro:  Speech slow at times. +dyskinetic movements. Cranial nerves 2-12 grossly intact. Strength +5/5 and equal bilaterally.     Medications     sodium chloride         amLODIPine  5 mg Oral Daily     benztropine  1 mg Oral TID     cloZAPine  250 mg Oral At Bedtime     folic acid  1 mg Oral Daily     lactated ringers  1,000 mL Intravenous Once     multivitamin, therapeutic  1 tablet Oral Daily     omeprazole  20 mg Oral BID     sertraline  150 mg Oral At Bedtime     vitamin B1  100 mg Oral Daily       Data   Recent Labs   Lab 05/04/20  1454   WBC 6.5   HGB 11.8   MCV 85         POTASSIUM 3.8   CHLORIDE 102   CO2 25   BUN 7   CR 0.55   ANIONGAP 6   ADRIAN 8.5   GLC 91   ALBUMIN 3.0*   PROTTOTAL 6.7*   BILITOTAL 0.1*   ALKPHOS 143   ALT 19   AST 13        Recent Results (from the past 24 hour(s))   MR Brain w/o & w Contrast    Narrative    MRI BRAIN WITHOUT AND WITH CONTRAST  5/4/2020 3:54 PM    HISTORY: Ataxia, balance difficulty.     TECHNIQUE:  Multiplanar, multisequence MRI of the brain without and  with 7 mL Gadavist.    COMPARISON: Head CT 10/18/2019, head MRI 3/1/2019.    FINDINGS: Mild parenchymal volume loss is present. Frontoparietal  prominent white matter T2 hyperintensities likely represent chronic  small vessel ischemic change, considered to be within normal limits  for age, not significantly changed. No evidence of recent ischemia,  hemorrhage, mass, mass effect, or hydrocephalus. Major intracranial  flow voids are maintained. No abnormal enhancement or diffusion  restriction. The visualized calvarium and tympanic cavities are  unremarkable. Right sphenoid sinus mucosal thickening. Trace  opacification of left mastoid cavity, likely inflammatory.      Impression    IMPRESSION:  1. No evidence of acute ischemia or hemorrhage.  2. Mild volume loss and white matter T2 hyperintensities likely  representing chronic small vessel ischemic change.  3. Sphenoid sinus mucosal thickening.    MAHESH JORDAN MD

## 2020-05-06 ENCOUNTER — APPOINTMENT (OUTPATIENT)
Dept: OCCUPATIONAL THERAPY | Facility: CLINIC | Age: 70
DRG: 312 | End: 2020-05-06
Payer: MEDICARE

## 2020-05-06 VITALS
DIASTOLIC BLOOD PRESSURE: 79 MMHG | TEMPERATURE: 96.8 F | WEIGHT: 146.61 LBS | HEART RATE: 79 BPM | SYSTOLIC BLOOD PRESSURE: 139 MMHG | OXYGEN SATURATION: 97 % | RESPIRATION RATE: 20 BRPM | BODY MASS INDEX: 24.43 KG/M2 | HEIGHT: 65 IN

## 2020-05-06 PROCEDURE — 25000132 ZZH RX MED GY IP 250 OP 250 PS 637: Mod: GY | Performed by: HOSPITALIST

## 2020-05-06 PROCEDURE — 99238 HOSP IP/OBS DSCHRG MGMT 30/<: CPT | Performed by: PHYSICIAN ASSISTANT

## 2020-05-06 PROCEDURE — 97530 THERAPEUTIC ACTIVITIES: CPT | Mod: GO | Performed by: OCCUPATIONAL THERAPIST

## 2020-05-06 PROCEDURE — 97535 SELF CARE MNGMENT TRAINING: CPT | Mod: GO | Performed by: OCCUPATIONAL THERAPIST

## 2020-05-06 RX ORDER — CLOZAPINE 100 MG/1
250 TABLET ORAL AT BEDTIME
Qty: 60 TABLET | Refills: 0 | Status: SHIPPED | OUTPATIENT
Start: 2020-05-06

## 2020-05-06 RX ORDER — AMLODIPINE BESYLATE 5 MG/1
2.5 TABLET ORAL AT BEDTIME
DISCHARGE
Start: 2020-05-06 | End: 2020-05-15

## 2020-05-06 RX ADMIN — ACETAMINOPHEN 650 MG: 325 TABLET, FILM COATED ORAL at 14:20

## 2020-05-06 RX ADMIN — FOLIC ACID 1 MG: 1 TABLET ORAL at 08:31

## 2020-05-06 RX ADMIN — THERA TABS 1 TABLET: TAB at 08:31

## 2020-05-06 RX ADMIN — GABAPENTIN 200 MG: 100 CAPSULE ORAL at 00:03

## 2020-05-06 RX ADMIN — THIAMINE HCL TAB 100 MG 100 MG: 100 TAB at 08:31

## 2020-05-06 RX ADMIN — BENZTROPINE MESYLATE 1 MG: 1 TABLET ORAL at 08:31

## 2020-05-06 RX ADMIN — OMEPRAZOLE 20 MG: 20 CAPSULE, DELAYED RELEASE ORAL at 08:31

## 2020-05-06 ASSESSMENT — ACTIVITIES OF DAILY LIVING (ADL)
ADLS_ACUITY_SCORE: 15

## 2020-05-06 NOTE — PLAN OF CARE
Aox4, VSS on RA. [place]. Ortho (+). Baseline tremors. C/o pain in R foot/leg, PRN tylenol given. Tolerating regular diet, good appetite. Up SBA with GB/IV pole to BR; voiding adequately; ambulated in halls this evening to relieve restlessness. Compression stockings off for the night. ROQUE. PIV infusing NS at 100 mL/hr. Discharge to TCU pending.

## 2020-05-06 NOTE — PROVIDER NOTIFICATION
MD Notification    Notified Person: MD    Notified Person Name: Dr. Silva    Notification Date/Time: 5/6/2020, 9am    Notification Interaction: Text page    Purpose of Notification:     Positive orthostatic BPs this morning, pt denies symptoms. Please advise. Thanks     Orders Received: No new interventions at this time, continue to monitor.     Comments:

## 2020-05-06 NOTE — PLAN OF CARE
Discharge Planner PT   Patient plan for discharge: per discussion with OT and chart review, plan is for TCU  Current status: PT eval received, chart reviewed. OT have completed their assessment and per discussion with the evaluating OT, PT can defer assessment to the next LOC. PT is at [facility] for mobility and will benefit from TCU. PT will complete orders this date.   Barriers to return to prior living situation:Refer to OT notes  Recommendations for discharge: Defer to OT  Rationale for recommendations: Defer to OT.        Entered by: Mayco Romero 05/06/2020 4:04 PM

## 2020-05-06 NOTE — PROGRESS NOTES
[facility]    Hospitalist Progress Note    Assessment & Plan   Liz Lieberman is a 69 year old female who was admitted on 5/4/2020.     Past medical history significant for hypertension, hx alcohol use disorder, schizophrenia, tardive dyskinesia, RLS, chronic SI, hypothyroidism, RAJANI, HLD, and asthma who was admitted on 5/4/2020 after presenting with dizziness, feeling unsafe at home.     Dizziness  Orthostatic hypotension  Noted with standing/ambulating, present intermittently for 1 month. Sometimes if she stands for a while, she can wait it out.   -Admitted to hospital for similar March 2019, but had more specific truncal instability and stroke was ruled out. She was intoxicated and found to have orthostatic hypotension at that time. MRI brain with no acute ischemia or hemorrhage, does show chronic small vessel ischemic change and some sphenoid sinus mucosal thickening.   -UA clear, no suspicion of infectious etiology contributing. Does have lower albumin level (3), so perhaps there is some decreased dietary/fluid intake contributing. Could also consider psychiatric medications having impact on her dizziness.  --Monitor orthostatic vitals q shift.   --Overnight orthostatic blood pressures negative.     --Negative orthostatic blood pressures this morning/afternoon.   --Fall precautions.  --PT/OT eval--lives alone.   --Recommending TCU though patient is not in agreement at this time.     --Sister might be available to assist.    --Care transition consult--she does have  that follows her outside the hospital.  --Compression socks.   --IVF at 100 ml/hr with NS.    --If persistent symptomatic orthostatic hypotension after continued volume ressuscition could consider midodrine tomorrow. Historically this has improved with fluids.      Hypertension:  PTA amlodipine 5mg daily.  --PTA amlodipine held.   --Will discharge with reduced dose 2.5 mg and to be administered at bedtime.       Right foot  pain:  Overnight Hospitalist paged regarding foot pain.  One time dose of gabapentin administered (200 mg).     Hx alcohol use disorder:  Last admitted March 2019, at that time recommended alcohol cessation--reports she drinks 2 drinks maybe twice per week though may be minimizing. Etoh 0 on admission.  --Daily MVI, thiamine, folic acid, continued.  --Encouraged cessation.     Chronic schizophrenia  Tardive dyskinesia  Restless legs syndrome   Chronic suicidal ideation  PTA clozapine 250mg at bedtime, sertraline 150mg at bedtime and benztropine 1mg TID. No intent to act on suicidal thoughts. She reports clozaril dose decreased recently, per chart review appears this was back in January.   --Continue PTA medications, monitor orthostatics  --Follow up with outpatient psychiatrist.     Dementia:  Note Neuropsych testing completed 9/2019 in [facility] with diagnosis of alcohol abuse and dementia in the setting of schizophrenia/schizoaffective disorder. Oriented x4 here.   --Outpatient follow up.     Iron deficiency anemia:  Hgb 11.8 on admit. Started on daily iron supplement by PCP recently, on hold due to obs status.     Hypothyroidism:  Hold PTA levothyroxine while observation status, resume on discharge.     Mild intermittent asthma:  PTA fluticasone and PRN albuterol, held due to obs status, resume on discharge. No signs of acute exacerbation.  --PRN albuterol neb available.     Hyperlipidemia:  --Hold PTA atorvastatin, resume on discharge.     GERD:  Continue PTA omeprazole 20mg BID.    Diet: Regular Diet Adult     DVT Prophylaxis: Ambulation   [place]na Catheter: not present  Code Status: DNR/DNI     Disposition Plan    Expected discharge: Today, recommended to transitional care unit once blood pressures stable and orthostatic pressures improved.   Entered: Chuckie Silva PA-C 05/06/2020, 7:53 AM        The patient has been discussed with Dr. James, who agrees with the assessment and plan at this  time.  Dr. James will evaluate the patient independently.      Xander Silva PA-C   [phone]    Interval History   Patient was seated in a chair upon arrival.  She denied fever, chest pain, shortness of breath or abdominal pain.  She said she always feels cold/chilled.  She complained of foot pain.  She had minimal lightheadedness or dizziness today.      Discussed plan for discharge.      -Data reviewed today: I reviewed all new labs and imaging results over the last 24 hours. I personally reviewed no images or EKG's today.    Physical Exam   Temp: 96.2  F (35.7  C) Temp src: Oral BP: (!) 158/70   Heart Rate: 88 Resp: 20 SpO2: 94 % O2 Device: None (Room air)    Vitals:    05/04/20 1356 05/04/20 1835   Weight: 70.3 kg (155 lb) 66.5 kg (146 lb 9.7 oz)     Vital Signs with Ranges  Temp:  [95.5  F (35.3  C)-98.3  F (36.8  C)] 96.2  F (35.7  C)  Heart Rate:  [73-89] 88  Resp:  [16-20] 20  BP: (129-158)/(63-80) 158/70  SpO2:  [94 %-96 %] 94 %  I/O last 3 completed shifts:  In: 1019 [I.V.:1019]  Out: -       Constitutional: Awake, alert, cooperative, no apparent distress.    ENT: Normocephalic, without obvious abnormality, atraumatic, oral pharynx with moist mucus membranes, tonsils without erythema or exudates.  Neck: Supple, symmetrical, trachea midline, no adenopathy.  Pulmonary: No increased work of breathing, good air exchange, clear to auscultation bilaterally, no crackles or wheezing.  Cardiovascular: Regular rate and rhythm, normal S1 and S2, no S3 or S4, and no murmur noted.  GI: Normal bowel sounds, soft, non-distended, non-tender.  Skin/Integumen: Clear.  Neuro: CN II-XII grossly intact.  Psych:  Alert and oriented x 3. Flat affect and poor eye contact.    Extremities: No lower extremity edema noted, and calves are non-TTP bilaterally.       Medications       [Held by provider] amLODIPine  5 mg Oral Daily     benztropine  1 mg Oral TID     cloZAPine  250 mg Oral At Bedtime     folic acid  1 mg Oral  Daily     multivitamin, therapeutic  1 tablet Oral Daily     omeprazole  20 mg Oral BID     sertraline  150 mg Oral At Bedtime     vitamin B1  100 mg Oral Daily       Data   Recent Labs   Lab 05/05/20  1024 05/04/20  1454   WBC 7.0 6.5   HGB 12.3 11.8   MCV 88 85    293    133   POTASSIUM 4.0 3.8   CHLORIDE 109 102   CO2 26 25   BUN 7 7   CR 0.49* 0.55   ANIONGAP 5 6   ADRIAN 8.7 8.5   * 91   ALBUMIN  --  3.0*   PROTTOTAL  --  6.7*   BILITOTAL  --  0.1*   ALKPHOS  --  143   ALT  --  19   AST  --  13       No results found for this or any previous visit (from the past 24 hour(s)).

## 2020-05-06 NOTE — PLAN OF CARE
A&Ox4. VSS ex +Orthos, MD aware. C/o R foot pain, tylenol given x1. BLE edema +1, compression stockings on. Up SBA + walker to BR, unsteady at times. R PIV SL. Plan to discharge to [facility] at 4pm.

## 2020-05-06 NOTE — PROGRESS NOTES
SW:  D:  Met with patient following OT. Reviewed OT note with patient and patient states she is  agreement with TCU.  She is requesting [facility]. Patient given their Medicare rating report and address/phone number of [facility].  Explained [facility] staff will transport her.  Discussed the need for clothing and writer volunteered to call her sister.  Patient requested writer coordinate plans with her family.    PLAN:  Discharge is pending BP checks.    Will await PA decision.    Update: Patient has been discharged.  [facility] will arrive at 1600 to transport.PA approved.  Effective date: 5/6/20  PA reference #: [id number]  Pt. notified:   patient

## 2020-05-06 NOTE — PROVIDER NOTIFICATION
MD Notification    Notified Person: MD    Notified Person Name: Dr. Loera    Notification Date/Time: 05/05/20 @ 9028    Notification Interaction: Phone Call    Purpose of Notification: Neuropathy in feet    Orders Received: One time dose of Gabapentin 200 mg

## 2020-05-06 NOTE — PROGRESS NOTES
SW:  D:  Met with patient this morning to review discharge plan.  She continues to prefer discharge to home and be given exercises to use.  Writer reviewed concern that she is not walking independently here.   Expressed concern if she goes home and is not stable on her feet, she could sustain a serious injury as a fracture bone.  Patient is slow to respond however did acknowledge writer's concerns.  She did agree to work with therapy again and listen to their recommendation.  We did speak about TCU options if she agrees with TCU.  She will accept a private room at [facility] and is aware of the private room fee.  P: Anticipate patient will agree with TCU placement  Both OT and PT were paged to ask one discipline can see patient today.

## 2020-05-06 NOTE — PLAN OF CARE
Patient is A&Ox4. VSS ex hypertensive. C/o right foot pain, gave one time dose of Gabapentin. Up with assist of 1 and walker/gait belt. BLE edema +1.Can be ROQUE. R PIV SL. Calls appropriately.

## 2020-05-06 NOTE — PROGRESS NOTES
Paged for pain in the feet; one time low dose of Neurontin carefully ordered, to monitor for sedation

## 2020-05-06 NOTE — DISCHARGE SUMMARY
Admit Date:     05/04/2020   Discharge Date:      05/06/2020     PRIMARY CARE PHYSICIAN:  Era Livingston PA-C      DATE OF ADMISSION:  05/04/2020   DATE OF DISCHARGE:  05/06/2020      DISCHARGE DIAGNOSES:   1.  Dizziness with orthostatic hypotension.   2.  Hypertension.   3.  Right foot pain.   4.  History of alcohol use disorder.   5.  Chronic schizophrenia with tardive dyskinesia, restless legs syndrome and chronic suicidal ideation.   6.  Dementia.   7.  Iron deficiency anemia.   8.  Hypothyroidism.   9.  Mild intermittent asthma.   10.  Hyperlipidemia.      DISCHARGE MEDICATIONS:       Review of your medicines      CONTINUE these medicines which may have CHANGED, or have new prescriptions. If we are uncertain of the size of tablets/capsules you have at home, strength may be listed as something that might have changed.      Dose / Directions   amLODIPine 5 MG tablet  Commonly known as:  NORVASC  This may have changed:      how much to take    when to take this  Used for:  Hypertension, unspecified type      Dose:  2.5 mg  Take 0.5 tablets (2.5 mg) by mouth At Bedtime  Refills:  0        CONTINUE these medicines which have NOT CHANGED      Dose / Directions   acetaminophen 500 MG tablet  Commonly known as:  TYLENOL      Dose:  1,000 mg  Take 1,000 mg by mouth every 6 hours as needed for mild pain  Refills:  0     * albuterol 108 (90 Base) MCG/ACT inhaler  Commonly known as:  PROAIR HFA/PROVENTIL HFA/VENTOLIN HFA      Dose:  2 puff  Inhale 2 puffs into the lungs every 6 hours as needed  Refills:  0     * albuterol (2.5 MG/3ML) 0.083% neb solution  Commonly known as:  PROVENTIL      Dose:  2.5 mg  Take 2.5 mg by nebulization every 6 hours as needed for shortness of breath / dyspnea or wheezing  Refills:  0     aspirin 81 MG EC tablet      Dose:  81 mg  Take 81 mg by mouth daily  Refills:  0     atorvastatin 20 MG tablet  Commonly known as:  LIPITOR      Dose:  20 mg  Take 20 mg by mouth daily  Refills:  0      benztropine 1 MG tablet  Commonly known as:  COGENTIN      Dose:  1 mg  Take 1 mg by mouth 3 times daily  Refills:  0     CALCIUM + D PO      Dose:  1 tablet  Take 1 tablet by mouth. Calcium 500 with Vit D 400mg BID. One before breakfast and one with supper  Refills:  0     clindamycin 1 % external solution  Commonly known as:  CLEOCIN T      Apply topically daily as needed  Refills:  0     cloZAPine 100 MG tablet  Commonly known as:  CLOZARIL  Used for:  Paranoid schizophrenia in remission (H)      Dose:  250 mg  Take 2.5 tablets (250 mg) by mouth At Bedtime  Quantity:  60 tablet  Refills:  0     desoximetasone 0.25 % Oint ointment  Commonly known as:  TOPICORT      Apply  topically as needed.  Refills:  0     emollient external cream      Apply topically 3 times daily as needed for other (irritation)  Refills:  0     Ferrous Gluconate 324 (37.5 Fe) MG Tabs      Dose:  324 mg  Take 324 mg by mouth daily  Refills:  0     fluticasone 110 MCG/ACT inhaler  Commonly known as:  FLOVENT HFA      Dose:  1 puff  Inhale 1 puff into the lungs 2 times daily as needed  Refills:  0     fluticasone 50 MCG/ACT nasal spray  Commonly known as:  FLONASE      Dose:  2 spray  Spray 2 sprays into both nostrils daily as needed for rhinitis or allergies  Refills:  0     folic acid 1 MG tablet  Commonly known as:  FOLVITE  Used for:  Alcoholic intoxication without complication (H)      Dose:  1 mg  Take 1 tablet (1 mg) by mouth daily  Quantity:  30 tablet  Refills:  0     guaiFENesin 600 MG 12 hr tablet  Commonly known as:  MUCINEX      Dose:  1,200 mg  Take 1,200 mg by mouth 2 times daily as needed for congestion  Refills:  0     levothyroxine 175 MCG tablet  Commonly known as:  SYNTHROID/LEVOTHROID      Dose:  175 mcg  Take 175 mcg by mouth daily  Refills:  0     multivitamin w/minerals tablet  Used for:  Alcoholic intoxication without complication (H)      Dose:  1 tablet  Take 1 tablet by mouth daily  Quantity:  30 tablet  Refills:   0     omeprazole 20 MG DR capsule  Commonly known as:  priLOSEC      Dose:  20 mg  Take 20 mg by mouth 2 times daily  Refills:  0     polyethylene glycol 17 g packet  Commonly known as:  MIRALAX      Dose:  17 g  Take 17 g by mouth daily as needed for constipation  Refills:  0     sertraline 50 MG tablet  Commonly known as:  ZOLOFT      Dose:  150 mg  Take 150 mg by mouth At Bedtime  Refills:  0         * This list has 2 medication(s) that are the same as other medications prescribed for you. Read the directions carefully, and ask your doctor or other care provider to review them with you.               Where to get your medicines      Some of these will need a paper prescription and others can be bought over the counter. Ask your nurse if you have questions.    Bring a paper prescription for each of these medications    cloZAPine 100 MG tablet          ALLERGIES:    Allergies   Allergen Reactions     Formaldehyde Rash     Latex Rash       DISPOSITION:  TCU.      FOLLOWUP WITH RECOMMENDATIONS:     1.  The patient should follow up with primary care provider once discharged from TCU.   2.  The patient will follow up with nursing home provider per protocol.      AFTERCARE INSTRUCTIONS:   1.  Activity is up with nursing assistance and to ambulate the hallways every shift.   2.  Diet:  Regular diet.   3.  Monitoring of daily weights and I's and O's.   4.  Patient will be maintained on fall precautions.   5.  Encouragement of oral fluids.      ADDITIONAL DISCHARGE INSTRUCTIONS:  The patient should continue wearing compression socks or stockings throughout the day and remove at night.      CONSULTS:  None.      LABORATORY AND PROCEDURES:   1.  Routine laboratory studies that included a CBC with platelet differential, comprehensive metabolic panel, urinalysis, ethyl alcohol level, repeat CBC with platelet differential, basic metabolic panel and CBC with platelets.   2.  Brain MRI with and without contrast.      PENDING  RESULTS:  None.      PHYSICAL EXAMINATION ON DAY OF DISCHARGE:  Please review the progress note as written earlier today.      BRIEF HISTORY OF PRESENT ILLNESS:  Liz Lieberman is a 69-year-old female with a past medical history significant for hypertension, hx alcohol use disorder, schizophrenia, tardive dyskinesia, RLS, chronic SI, hypothyroidism, RAJANI, HLD, and asthma who was admitted on 5/4/2020 after presenting with dizziness, feeling unsafe at home.      HOSPITAL COURSE:   1.  Dizziness with orthostatic hypotension:  The patient was noted to have dizziness upon standing and ambulating that has been present for 1 month.  She was admitted previously in 03/2019 with a similar presentation and at that time stroke was ruled out.  She was intoxicated at the time and also found to have orthostatic hypotension.  A brain MRI was performed during this admission, which was negative for acute ischemia or hemorrhage, but did show chronic small vessel ischemic changes and some sphenoid sinus mucosal thickening.  Urinalysis was clear.  The patient's amlodipine was held and the patient was started on IV fluids and compression stockings with improvement of blood pressures.  PT evaluated the patient and recommended TCU, which the patient is agreeable to.  At the time of discharge, the patient will continue with compression stocking and will decrease amlodipine to 2.5 mg daily, but to be administered in the evening.  The patient will continue working with PT and OT at TCU.   2.  Hypertension:  The patient is historically on 5 mg daily.  This was held during admission due to orthostatic hypotension that was symptomatic.  At time of discharge, amlodipine will be reduced to 2.5 mg and to be administered at bedtime.     3.  Right foot pain:  The patient complained of some right foot pain and received a single dose of gabapentin 200 mg.  No further interventions were necessary and will not prescribe gabapentin upon discharge.   4.   Alcohol use disorder:  The patient was admitted in 03/2019 with a noted elevated alcohol level and a similar presentation.  She has been counseled regarding alcohol cessation and I believe, drinks 2 drinks twice per week.  She was maintained on a multivitamin, thiamine and folic acid during this stay.   5.  Chronic schizophrenia, tardive dyskinesia, restless legs syndrome and chronic suicidal ideation:  The patient's zefhy-li-dbdqk clozapine 250 mg every evening at bedtime, sertraline 150 mg every evening at bedtime and benztropine 1  mg 3 times daily were continued during this stay and will be resumed at time of discharge.   6.  Dementia:  Noted.  Neuropsych testing completed 09/2019 in [facility]where with a diagnosis of alcohol abuse and dementia in the setting of schizophrenia and schizoaffective disorder.  Recommend outpatient followup as needed.   7.  Iron deficiency anemia:  Hemoglobin was stable throughout this stay.  Iron supplement was held due to observation status, but will be resumed at the time of discharge.   8.  Hypothyroidism:  The patient's txpld-gu-fmpdu levothyroxine was held during this stay due to observation status, but will be resumed at the time of discharge.   9.  Mild intermittent asthma:  This remained stable.  The patient had no acute exacerbations during this stay.  As-needed albuterol was available and will be continued at the time of discharge.   10.  Hyperlipidemia:  The patient's ymmuj-cv-imoqt atorvastatin was held during this stay, but will be resumed at the time of discharge.   11.  Gastroesophageal reflux disease:  The patient's zrecv-tk-kpsse omeprazole 20 mg 2 times daily was continued and will be resumed at the time of discharge.      CODE STATUS:  DNR/DNI.      The patient was discussed with Dr. Robinson James, who agrees with discharge at this time.  Dr. James will evaluate the patient independently.      TOTAL DISCHARGE TIME:  Less than 30 minutes.         ROBINSON ALCARAZ  DO GABRIEL       As dictated by DIONICIO CHAVEZ PA-C            D: 2020   T: 2020   MT: JOEY      Name:     KATT DILL   MRN:      [id number]        Account:        [id number]   :      1950           Admit Date:     2020                                  Discharge Date:       Document: [id number]       cc: Era Livingston PA-C

## 2020-05-06 NOTE — PLAN OF CARE
"Discharge Planner OT   Patient plan for discharge: home   Current status: Supine to sit independent, sit <> stand with SBA/CGA with FWW and CGA to ambulate to BR and back with FWW. Pt completed toilet transfer with SBA/CGA. Pt requires cues for FWW use and hand placement. Pt able to stand at sink for grooming and  hygiene tasks with SBA with cues for FWW placement. Pt completed SL[facility] cognitive screen to assess safety with IADL\"S. Pt scored 25/30 indicating minimal impairments with executive functioning, sequencing and comprehension. Pt reports she does have some dementia and feels things are going ok at home except needs A with finances (balancing check book) and housekeeping ie vacuuming, washing floors etc. Pt reports meds and shopping etc OK. Pt denies any dizziness and orthostatics taken, positive from supine to sit but BP WFL with sit to stand and post activity. Pt agrees to use ww. Pt educated in TCU rec and reluctantly agreed.    Barriers to return to prior living situation: lives alone, impaired balance, activity tolerance, cognition ie executive functioning, falls risk, etc  Recommendations for discharge: TCU  Rationale for recommendations: pt lives alone and requires increased A with functional mobility and ADL's and Pt will require daily therapy to increase ADL and functional mobility independence and safety to PLOF. Pt reports if the professionals feel she needs TCU then she'll go. If pt declines will need A with IADl's ie med mgmt, finances, cleaning and home RN for med mgmt, HHA for bathing and cleaning, etc and home PT and OT.       Entered by: Melinda Lira 05/06/2020 11:06 AM      "

## 2020-05-07 ENCOUNTER — NURSING HOME VISIT (OUTPATIENT)
Dept: GERIATRICS | Facility: CLINIC | Age: 70
End: 2020-05-07
Payer: MEDICARE

## 2020-05-07 VITALS
RESPIRATION RATE: 20 BRPM | WEIGHT: 209.2 LBS | HEART RATE: 90 BPM | HEIGHT: 65 IN | TEMPERATURE: 98.3 F | OXYGEN SATURATION: 94 % | SYSTOLIC BLOOD PRESSURE: 115 MMHG | DIASTOLIC BLOOD PRESSURE: 72 MMHG | BODY MASS INDEX: 34.85 KG/M2

## 2020-05-07 DIAGNOSIS — R42 DIZZINESS: Primary | ICD-10-CM

## 2020-05-07 DIAGNOSIS — R53.81 PHYSICAL DECONDITIONING: ICD-10-CM

## 2020-05-07 DIAGNOSIS — I10 ESSENTIAL HYPERTENSION: ICD-10-CM

## 2020-05-07 DIAGNOSIS — Z87.898 HISTORY OF ALCOHOL USE DISORDER: ICD-10-CM

## 2020-05-07 DIAGNOSIS — G25.81 RESTLESS LEGS SYNDROME: ICD-10-CM

## 2020-05-07 DIAGNOSIS — R45.851 SUICIDAL IDEATION: ICD-10-CM

## 2020-05-07 DIAGNOSIS — E78.5 HYPERLIPIDEMIA LDL GOAL <100: ICD-10-CM

## 2020-05-07 DIAGNOSIS — F20.0 CHRONIC PARANOID SCHIZOPHRENIA (H): ICD-10-CM

## 2020-05-07 DIAGNOSIS — G24.01 TARDIVE DYSKINESIA: ICD-10-CM

## 2020-05-07 DIAGNOSIS — F03.90 DEMENTIA WITHOUT BEHAVIORAL DISTURBANCE, UNSPECIFIED DEMENTIA TYPE: ICD-10-CM

## 2020-05-07 DIAGNOSIS — D50.0 IRON DEFICIENCY ANEMIA DUE TO CHRONIC BLOOD LOSS: ICD-10-CM

## 2020-05-07 DIAGNOSIS — J45.20 MILD INTERMITTENT ASTHMA WITHOUT COMPLICATION: ICD-10-CM

## 2020-05-07 DIAGNOSIS — E03.9 HYPOTHYROIDISM, UNSPECIFIED TYPE: ICD-10-CM

## 2020-05-07 DIAGNOSIS — I95.1 ORTHOSTATIC HYPOTENSION: ICD-10-CM

## 2020-05-07 DIAGNOSIS — Z71.89 ADVANCED DIRECTIVES, COUNSELING/DISCUSSION: ICD-10-CM

## 2020-05-07 PROCEDURE — 99309 SBSQ NF CARE MODERATE MDM 30: CPT | Performed by: NURSE PRACTITIONER

## 2020-05-07 ASSESSMENT — MIFFLIN-ST. JEOR: SCORE: 1479.8

## 2020-05-07 NOTE — PROGRESS NOTES
[facility]  PRIMARY CARE PROVIDER AND CLINIC:  Era [facility]ston, Formerly [facility] [address] / [place]*  Chief Complaint   Patient presents with     Hospital F/U     [facility] Record Number:  [id number]  Place of Service where encounter took place:  [facility] [[id number]]    Liz Lieberman  is a 69 year old  (1950), admitted to the above facility from  [facility]. Hospital stay 5/4/2020 through 5/6/2020..  Admitted to this facility for  rehab, medical management and nursing care.    HPI:    HPI information obtained from: facility chart records, facility staff, patient report and [facility] chart review.   Brief Summary of Hospital Course:   69-year-old female with hx of HTN, alcohol use disorder, schizophrenia, dementia, TD, RLS, chronic SI, hypothyroidism, RAJANI, HLD on statin, GERD on PPI and asthma hospitalized with dizziness and orthostatic hypotension, felt unsafe at home. Previous hospital stay March 2019 with similar c/o dizziness found to be intoxicated and had orthostatic hypotension. At this time given IV fluids, recommend compression stockings and decreased Norvasc to 2.5. ETOH: had counseling, on MVI, thiamine and folic acid. Chronic schizophrenia, TD, RLS and suicidal ideation: continued on PTA meds of clozapine 250 mg HS, sertraline 150 mg HS and benztropine 1 mg TID. On iron due to iron def anemia. Hypothyroid on synthroid. Asthma: stable, PRN albuterol available.     Updates on Status Since Skilled nursing Admission:   Patient seen for initial TCU visit. Reports doing OK - anxious about how soon she can discharge home. Denies headaches, dizziness, chest pain, dyspnea, bowel or  Bladder issues. Reports she is up with walker at home. At TCU has been up 30 ft with 2WW and CGA. Per EMR review note SBP range 115-154 and sats 94% on room air. Takes Clozaril - only has valid scrip for three days. Per chart review  primary psychiatrist listed as Dr Villanueva - gave name/contact info to nurse manager to contact for further Clozaril order and monitoring parameters. Discussed POLST: Request DNR/DNI status    CODE STATUS/ADVANCE DIRECTIVES DISCUSSION:   DNR/DNI per patient request  Patient's living condition: lives alone  ALLERGIES: Formaldehyde and Latex  PAST MEDICAL HISTORY:  has a past medical history of Allergic rhinitis, Bronchiectasis (H), Chronic pain, Depression, Diverticulosis, Gastro-oesophageal reflux disease, GERD (gastroesophageal reflux disease), Hearing loss, Hiatal hernia, History of blood transfusion, Hyperlipidemia, Hypothyroidism, Leukocytosis, Lung nodule, Mild intermittent asthma, Numbness and tingling, Osteoarthritis, Paranoid schizophrenia, in remission, RLS (restless legs syndrome), Tardive dyskinesia, and Urinary incontinence.  PAST SURGICAL HISTORY:   has a past surgical history that includes orthopedic surgery (2011); ENT surgery; and Arthroplasty knee (2/20/2013).  FAMILY HISTORY: family history is not on file.  SOCIAL HISTORY:   reports that she quit smoking about 26 years ago. She smoked 0.00 packs per day. She has never used smokeless tobacco. She reports current alcohol use of about 3.0 - 5.0 standard drinks of alcohol per week. She reports that she does not use drugs.    Post Discharge Medication Reconciliation Status: discharge medications reconciled and changed, per note/orders (see AVS)    Current Outpatient Medications   Medication Sig Dispense Refill     acetaminophen (TYLENOL) 500 MG tablet Take 1,000 mg by mouth every 6 hours as needed for mild pain       albuterol (PROAIR HFA/PROVENTIL HFA/VENTOLIN HFA) 108 (90 Base) MCG/ACT inhaler Inhale 2 puffs into the lungs every 6 hours as needed        amLODIPine (NORVASC) 5 MG tablet Take 0.5 tablets (2.5 mg) by mouth At Bedtime       aspirin 81 MG EC tablet Take 81 mg by mouth daily        atorvastatin (LIPITOR) 20 MG tablet Take 20 mg by mouth  "daily       benztropine (COGENTIN) 1 MG tablet Take 1 mg by mouth 3 times daily        Calcium Carbonate-Vitamin D (CALCIUM + D PO) Take 1 tablet by mouth. Calcium 500 with Vit D 400mg BID. One before breakfast and one with supper       clindamycin (CLEOCIN T) 1 % external solution Apply topically daily as needed       cloZAPine (CLOZARIL) 100 MG tablet Take 2.5 tablets (250 mg) by mouth At Bedtime 60 tablet 0     desoximetasone (TOPICORT) 0.25 % OINT Apply  topically as needed.       emollient (VANICREAM) external cream Apply topically 3 times daily as needed for other (irritation)       Ferrous Gluconate 324 (37.5 Fe) MG TABS Take 324 mg by mouth daily       fluticasone (FLONASE) 50 MCG/ACT nasal spray Spray 2 sprays into both nostrils daily as needed for rhinitis or allergies       fluticasone (FLOVENT HFA) 110 MCG/ACT inhaler Inhale 1 puff into the lungs 2 times daily as needed       folic acid (FOLVITE) 1 MG tablet Take 1 tablet (1 mg) by mouth daily 30 tablet 0     guaiFENesin (MUCINEX) 600 MG 12 hr tablet Take 1,200 mg by mouth 2 times daily as needed for congestion        levothyroxine (SYNTHROID/LEVOTHROID) 175 MCG tablet Take 175 mcg by mouth daily       multivitamin w/minerals (THERA-VIT-M) tablet Take 1 tablet by mouth daily 30 tablet 0     omeprazole (PRILOSEC) 20 MG DR capsule Take 20 mg by mouth 2 times daily       polyethylene glycol (MIRALAX/GLYCOLAX) packet Take 17 g by mouth daily as needed for constipation        sertraline (ZOLOFT) 50 MG tablet Take 150 mg by mouth At Bedtime        ROS:  10 point ROS of systems including Constitutional, Eyes, Respiratory, Cardiovascular, Gastroenterology, Genitourinary, Integumentary, Musculoskeletal, Psychiatric were all negative except for pertinent positives noted in my HPI.    Vitals:  /72   Pulse 90   Temp 98.3  F (36.8  C)   Resp 20   Ht 1.651 m (5' 5\")   Wt 94.9 kg (209 lb 3.2 oz)   SpO2 94%   BMI 34.81 kg/m    Exam:  GENERAL APPEARANCE:  " Alert, in no distress, pleasant, cooperative, oriented x self, place and recent events  EYES:  EOM, lids, pupils and irises normal, sclera clear and conjunctiva normal, no discharge or mattering on lids or lashes noted  ENT:  Mouth normal, moist mucous membranes, nose normal without drainage or crusting, external ears without lesions, hearing acuity intact  NECK: symmetrical, trachea midline  RESP:  respiratory effort normal, no respiratory distress, patient is on room air  M/S:   Gait and station walks with assist , no tenderness or swelling of the joints; able to move all extremities  NEURO: cranial nerves 2-12 grossly intact, no facial asymmetry, no speech deficits and able to follow directions, resting extremity and trunk movements  PSYCH:  insight and judgement and memory at baseline, affect and mood flat    Lab/Diagnostic data:  Most Recent 3 CBC's:  Recent Labs   Lab Test 05/05/20  1024 05/04/20  1454 01/05/20  1752   WBC 7.0 6.5 6.7   HGB 12.3 11.8 12.4   MCV 88 85 86    293 235     Most Recent 3 BMP's:  Recent Labs   Lab Test 05/05/20  1024 05/04/20  1454 04/01/20  1758    133 133   POTASSIUM 4.0 3.8 3.9   CHLORIDE 109 102 103   CO2 26 25 24   BUN 7 7 11   CR 0.49* 0.55 0.59   ANIONGAP 5 6 6   ADRIAN 8.7 8.5 8.9   * 91 76       ASSESSMENT/PLAN:  Dizziness  Orthostatic hypotension  Essential hypertension  Acute on chronic issues. Dose of Norvasc reduced, BPs good range since admission. Monitor per routine. Compression socks on daily. F/U with ranges next visit.     History of alcohol use disorder  Chronic paranoid schizophrenia (H)  Tardive dyskinesia  Restless legs syndrome  Suicidal ideation  Dementia without behavioral disturbance, unspecified dementia type (H)  Chronic issues, appears at baseline now. Meds as PTA. Staff to contact primary psychiatrist for further orders and monitoring parameters for Clozaril.     Iron deficiency anemia due to chronic blood loss  Chronic, stable. Meds and  monitoring per PCP.     Hypothyroidism, unspecified type  Chronic, meds as PTA.     Mild intermittent asthma without complication  Chronic, no symptoms. Discontinue albuterol nebs.     Hyperlipidemia LDL goal <100  Chronic, meds and monitoring as PTA.     Physical deconditioning  Acute on chronic. Therapies - f/u with status next visit.     Advanced directives, counseling/discussion  Reviewed POLST: patient requests to be DNR/DNI.     Orders written by provider at facility  DNR/DNI  discontinue albuterol nebs     Electronically signed by:  TOPHER Long CNP

## 2020-05-07 NOTE — PLAN OF CARE
Occupational Therapy Discharge Summary    Reason for therapy discharge:    Discharged to transitional care facility.    Progress towards therapy goal(s). See goals on Care Plan in [facility] electronic health record for goal details.  Goals not met.  Barriers to achieving goals:   discharge from facility.    Therapy recommendation(s):    Continued therapy is recommended.  Rationale/Recommendations:  at TCU to improve ind/safety w/ ADL's.

## 2020-05-07 NOTE — LETTER
5/7/2020        RE: Liz Lieberman  [address]  [place] [facility] [address]        [facility]  [facility] [address] / [place]*  Chief Complaint   Patient presents with     Hospital F/U     [facility] Record Number:  [id number]  Place of Service where encounter took place:  [facility] [[id number]]    Liz Lieberman  is a 69 year old  (1950), admitted to the above facility from  [facility]. Hospital stay 5/4/2020 through 5/6/2020..  Admitted to this facility for  rehab, medical management and nursing care.    HPI:    HPI information obtained from: facility chart records, facility staff, patient report and [facility] chart review.   Brief Summary of Hospital Course:   69-year-old female with hx of HTN, alcohol use disorder, schizophrenia, dementia, TD, RLS, chronic SI, hypothyroidism, RAJANI, HLD on statin, GERD on PPI and asthma hospitalized with dizziness and orthostatic hypotension, felt unsafe at home. Previous hospital stay March 2019 with similar c/o dizziness found to be intoxicated and had orthostatic hypotension. At this time given IV fluids, recommend compression stockings and decreased Norvasc to 2.5. ETOH: had counseling, on MVI, thiamine and folic acid. Chronic schizophrenia, TD, RLS and suicidal ideation: continued on PTA meds of clozapine 250 mg HS, sertraline 150 mg HS and benztropine 1 mg TID. On iron due to iron def anemia. Hypothyroid on synthroid. Asthma: stable, PRN albuterol available.     Updates on Status Since Skilled nursing Admission:   Patient seen for initial TCU visit. Reports doing OK - anxious about how soon she can discharge home. Denies headaches, dizziness, chest pain, dyspnea, bowel or  Bladder issues. Reports she is up with walker at home. At TCU has been up 30 ft with 2WW and CGA. Per EMR review note SBP range 115-154 and  sats 94% on room air. Takes Clozaril - only has valid scrip for three days. Per chart review primary psychiatrist listed as Dr Villanueva - gave name/contact info to nurse manager to contact for further Clozaril order and monitoring parameters. Discussed POLST: Request DNR/DNI status    CODE STATUS/ADVANCE DIRECTIVES DISCUSSION:   DNR/DNI per patient request  Patient's living condition: lives alone  ALLERGIES: Formaldehyde and Latex  PAST MEDICAL HISTORY:  has a past medical history of Allergic rhinitis, Bronchiectasis (H), Chronic pain, Depression, Diverticulosis, Gastro-oesophageal reflux disease, GERD (gastroesophageal reflux disease), Hearing loss, Hiatal hernia, History of blood transfusion, Hyperlipidemia, Hypothyroidism, Leukocytosis, Lung nodule, Mild intermittent asthma, Numbness and tingling, Osteoarthritis, Paranoid schizophrenia, in remission, RLS (restless legs syndrome), Tardive dyskinesia, and Urinary incontinence.  PAST SURGICAL HISTORY:   has a past surgical history that includes orthopedic surgery (2011); ENT surgery; and Arthroplasty knee (2/20/2013).  FAMILY HISTORY: family history is not on file.  SOCIAL HISTORY:   reports that she quit smoking about 26 years ago. She smoked 0.00 packs per day. She has never used smokeless tobacco. She reports current alcohol use of about 3.0 - 5.0 standard drinks of alcohol per week. She reports that she does not use drugs.    Post Discharge Medication Reconciliation Status: discharge medications reconciled and changed, per note/orders (see AVS)    Current Outpatient Medications   Medication Sig Dispense Refill     acetaminophen (TYLENOL) 500 MG tablet Take 1,000 mg by mouth every 6 hours as needed for mild pain       albuterol (PROAIR HFA/PROVENTIL HFA/VENTOLIN HFA) 108 (90 Base) MCG/ACT inhaler Inhale 2 puffs into the lungs every 6 hours as needed        amLODIPine (NORVASC) 5 MG tablet Take 0.5 tablets (2.5 mg) by mouth At Bedtime       aspirin 81 MG EC  tablet Take 81 mg by mouth daily        atorvastatin (LIPITOR) 20 MG tablet Take 20 mg by mouth daily       benztropine (COGENTIN) 1 MG tablet Take 1 mg by mouth 3 times daily        Calcium Carbonate-Vitamin D (CALCIUM + D PO) Take 1 tablet by mouth. Calcium 500 with Vit D 400mg BID. One before breakfast and one with supper       clindamycin (CLEOCIN T) 1 % external solution Apply topically daily as needed       cloZAPine (CLOZARIL) 100 MG tablet Take 2.5 tablets (250 mg) by mouth At Bedtime 60 tablet 0     desoximetasone (TOPICORT) 0.25 % OINT Apply  topically as needed.       emollient (VANICREAM) external cream Apply topically 3 times daily as needed for other (irritation)       Ferrous Gluconate 324 (37.5 Fe) MG TABS Take 324 mg by mouth daily       fluticasone (FLONASE) 50 MCG/ACT nasal spray Spray 2 sprays into both nostrils daily as needed for rhinitis or allergies       fluticasone (FLOVENT HFA) 110 MCG/ACT inhaler Inhale 1 puff into the lungs 2 times daily as needed       folic acid (FOLVITE) 1 MG tablet Take 1 tablet (1 mg) by mouth daily 30 tablet 0     guaiFENesin (MUCINEX) 600 MG 12 hr tablet Take 1,200 mg by mouth 2 times daily as needed for congestion        levothyroxine (SYNTHROID/LEVOTHROID) 175 MCG tablet Take 175 mcg by mouth daily       multivitamin w/minerals (THERA-VIT-M) tablet Take 1 tablet by mouth daily 30 tablet 0     omeprazole (PRILOSEC) 20 MG DR capsule Take 20 mg by mouth 2 times daily       polyethylene glycol (MIRALAX/GLYCOLAX) packet Take 17 g by mouth daily as needed for constipation        sertraline (ZOLOFT) 50 MG tablet Take 150 mg by mouth At Bedtime        ROS:  10 point ROS of systems including Constitutional, Eyes, Respiratory, Cardiovascular, Gastroenterology, Genitourinary, Integumentary, Musculoskeletal, Psychiatric were all negative except for pertinent positives noted in my HPI.    Vitals:  /72   Pulse 90   Temp 98.3  F (36.8  C)   Resp 20   Ht 1.651 m (5'  "5\")   Wt 94.9 kg (209 lb 3.2 oz)   SpO2 94%   BMI 34.81 kg/m    Exam:  GENERAL APPEARANCE:  Alert, in no distress, pleasant, cooperative, oriented x self, place and recent events  EYES:  EOM, lids, pupils and irises normal, sclera clear and conjunctiva normal, no discharge or mattering on lids or lashes noted  ENT:  Mouth normal, moist mucous membranes, nose normal without drainage or crusting, external ears without lesions, hearing acuity intact  NECK: symmetrical, trachea midline  RESP:  respiratory effort normal, no respiratory distress, patient is on room air  M/S:   Gait and station walks with assist , no tenderness or swelling of the joints; able to move all extremities  NEURO: cranial nerves 2-12 grossly intact, no facial asymmetry, no speech deficits and able to follow directions, resting extremity and trunk movements  PSYCH:  insight and judgement and memory at baseline, affect and mood flat    Lab/Diagnostic data:  Most Recent 3 CBC's:  Recent Labs   Lab Test 05/05/20  1024 05/04/20  1454 01/05/20  1752   WBC 7.0 6.5 6.7   HGB 12.3 11.8 12.4   MCV 88 85 86    293 235     Most Recent 3 BMP's:  Recent Labs   Lab Test 05/05/20  1024 05/04/20  1454 04/01/20  1758    133 133   POTASSIUM 4.0 3.8 3.9   CHLORIDE 109 102 103   CO2 26 25 24   BUN 7 7 11   CR 0.49* 0.55 0.59   ANIONGAP 5 6 6   ADRIAN 8.7 8.5 8.9   * 91 76       ASSESSMENT/PLAN:  Dizziness  Orthostatic hypotension  Essential hypertension  Acute on chronic issues. Dose of Norvasc reduced, BPs good range since admission. Monitor per routine. Compression socks on daily. F/U with ranges next visit.     History of alcohol use disorder  Chronic paranoid schizophrenia (H)  Tardive dyskinesia  Restless legs syndrome  Suicidal ideation  Dementia without behavioral disturbance, unspecified dementia type (H)  Chronic issues, appears at baseline now. Meds as PTA. Staff to contact primary psychiatrist for further orders and monitoring " parameters for Clozaril.     Iron deficiency anemia due to chronic blood loss  Chronic, stable. Meds and monitoring per PCP.     Hypothyroidism, unspecified type  Chronic, meds as PTA.     Mild intermittent asthma without complication  Chronic, no symptoms. Discontinue albuterol nebs.     Hyperlipidemia LDL goal <100  Chronic, meds and monitoring as PTA.     Physical deconditioning  Acute on chronic. Therapies - f/u with status next visit.     Advanced directives, counseling/discussion  Reviewed POLST: patient requests to be DNR/DNI.     Orders written by provider at facility  DNR/DNI  discontinue albuterol nebs     Electronically signed by:  TOPHER Long CNP                         Sincerely,        TOPHER Long CNP

## 2020-05-11 ENCOUNTER — VIRTUAL VISIT (OUTPATIENT)
Dept: GERIATRICS | Facility: CLINIC | Age: 70
End: 2020-05-11
Payer: MEDICARE

## 2020-05-11 VITALS
HEIGHT: 65 IN | OXYGEN SATURATION: 96 % | WEIGHT: 144.4 LBS | RESPIRATION RATE: 18 BRPM | HEART RATE: 80 BPM | TEMPERATURE: 97 F | BODY MASS INDEX: 24.06 KG/M2 | SYSTOLIC BLOOD PRESSURE: 118 MMHG | DIASTOLIC BLOOD PRESSURE: 76 MMHG

## 2020-05-11 DIAGNOSIS — J45.20 MILD INTERMITTENT ASTHMA WITHOUT COMPLICATION: ICD-10-CM

## 2020-05-11 DIAGNOSIS — G24.01 TARDIVE DYSKINESIA: ICD-10-CM

## 2020-05-11 DIAGNOSIS — I10 BENIGN ESSENTIAL HYPERTENSION: ICD-10-CM

## 2020-05-11 DIAGNOSIS — F20.0 PARANOID SCHIZOPHRENIA IN REMISSION (H): ICD-10-CM

## 2020-05-11 DIAGNOSIS — E03.9 HYPOTHYROIDISM, UNSPECIFIED TYPE: ICD-10-CM

## 2020-05-11 DIAGNOSIS — R26.81 GAIT INSTABILITY: ICD-10-CM

## 2020-05-11 DIAGNOSIS — Z87.898 HISTORY OF ALCOHOL USE DISORDER: ICD-10-CM

## 2020-05-11 DIAGNOSIS — R53.81 PHYSICAL DECONDITIONING: ICD-10-CM

## 2020-05-11 DIAGNOSIS — I95.1 ORTHOSTATIC HYPOTENSION: Primary | ICD-10-CM

## 2020-05-11 DIAGNOSIS — G25.81 RLS (RESTLESS LEGS SYNDROME): ICD-10-CM

## 2020-05-11 PROCEDURE — 99309 SBSQ NF CARE MODERATE MDM 30: CPT | Mod: 95 | Performed by: NURSE PRACTITIONER

## 2020-05-11 ASSESSMENT — MIFFLIN-ST. JEOR: SCORE: 1185.87

## 2020-05-11 NOTE — PROGRESS NOTES
"[facility]   Liz Lieberman is being evaluated via a billable video visit due to the restrictions of the Covid-19 pandemic.   The patient has been notified of following:  \"This video visit will be conducted via a call between you and your provider. We have found that certain health care needs can be provided without the need for an in-person physical exam.  This service lets us provide the care you need with a video conversation. If during the course of the call the provider feels a video visit is not appropriate, you will not be charged for this service.\"   The provider has received verbal consent for a Video Visit from the patient or first contact? Yes  Patient  or facility staff would like the video invitation sent by: N/A   Video Start Time: 12:18pm    [facility] Medical Record Number:  [id number]  Place of Location at the time of visit: [facility]   Chief Complaint   Patient presents with     RECHECK     HPI:  Liz Lieberman  is a 69 year old (1950), who is being seen today for a visit.  HPI information obtained from: facility chart records, facility staff, patient report and [facility] chart review. Today's concern is:  Patient has been in TCU following hospitalization due to dizziness and orthostatic hypotension. PMH includes hypertension, alcohol use disorder, schizophrenia, dementia, tardive dyskinesia, restless leg syndrome, hypothryoidism, iron deficiency anemia, hyperlipidemia, GERD and asthma. In hospital she was treated with IVF and compression stockings, norvasc was reduced to 2.5mg q day, and counseled on alcohol abuse.   Since in TCU she has walked up to 30' with walker with a goal of 150' with walker. She does live alone.     Past Medical and Surgical History reviewed in Epic today.  MEDICATIONS:    Current Outpatient Medications   Medication Sig Dispense Refill     acetaminophen (TYLENOL) 500 MG tablet Take 1,000 mg by mouth every 6 hours as needed for mild pain       " albuterol (PROAIR HFA/PROVENTIL HFA/VENTOLIN HFA) 108 (90 Base) MCG/ACT inhaler Inhale 2 puffs into the lungs every 6 hours as needed        amLODIPine (NORVASC) 5 MG tablet Take 0.5 tablets (2.5 mg) by mouth At Bedtime       aspirin 81 MG EC tablet Take 81 mg by mouth daily        atorvastatin (LIPITOR) 20 MG tablet Take 20 mg by mouth daily       benztropine (COGENTIN) 1 MG tablet Take 1 mg by mouth 3 times daily        Calcium Carbonate-Vitamin D (CALCIUM + D PO) Take 1 tablet by mouth. Calcium 500 with Vit D 400mg BID. One before breakfast and one with supper       clindamycin (CLEOCIN T) 1 % external solution Apply topically daily as needed       cloZAPine (CLOZARIL) 100 MG tablet Take 2.5 tablets (250 mg) by mouth At Bedtime 60 tablet 0     desoximetasone (TOPICORT) 0.25 % OINT Apply  topically as needed.       emollient (VANICREAM) external cream Apply topically 3 times daily as needed for other (irritation)       Ferrous Gluconate 324 (37.5 Fe) MG TABS Take 324 mg by mouth daily       fluticasone (FLONASE) 50 MCG/ACT nasal spray Spray 2 sprays into both nostrils daily as needed for rhinitis or allergies       fluticasone (FLOVENT HFA) 110 MCG/ACT inhaler Inhale 1 puff into the lungs 2 times daily as needed       folic acid (FOLVITE) 1 MG tablet Take 1 tablet (1 mg) by mouth daily 30 tablet 0     guaiFENesin (MUCINEX) 600 MG 12 hr tablet Take 1,200 mg by mouth 2 times daily as needed for congestion        levothyroxine (SYNTHROID/LEVOTHROID) 175 MCG tablet Take 175 mcg by mouth daily       multivitamin w/minerals (THERA-VIT-M) tablet Take 1 tablet by mouth daily 30 tablet 0     omeprazole (PRILOSEC) 20 MG DR capsule Take 20 mg by mouth 2 times daily       polyethylene glycol (MIRALAX/GLYCOLAX) packet Take 17 g by mouth daily as needed for constipation        sertraline (ZOLOFT) 50 MG tablet Take 150 mg by mouth At Bedtime        REVIEW OF SYSTEMS: 4 point ROS including Respiratory, CV, GI and , other than  "that noted in the HPI,  is negative  Objective: /76   Pulse 80   Temp 97  F (36.1  C)   Resp 18   Ht 1.651 m (5' 5\")   Wt 65.5 kg (144 lb 6.4 oz)   SpO2 96%   BMI 24.03 kg/m    Limited visit exam done given COVID-19 precautions.   GENERAL APPEARANCE:  Alert, in no distress  ENT:hearing acuity adequate     RESP:  respiratory effort normal, no respiratory distress,     M/S:   Gait and station not observed,   SKIN:  Inspection/Palpation of skin and subcutaneous tissue no rash  NEURO: 2-12 in normal limits and at patient's baseline, constant upper body movement, almost writhing  PSYCH:  insight and judgement, memory intact , affect and mood normal  Labs:   CBC RESULTS:   Recent Labs   Lab Test 05/05/20  1024 05/04/20  1454   WBC 7.0 6.5   RBC 4.34 4.26   HGB 12.3 11.8   HCT 38.2 36.1   MCV 88 85   MCH 28.3 27.7   MCHC 32.2 32.7   RDW 13.3 13.2    293       Last Basic Metabolic Panel:  Recent Labs   Lab Test 05/05/20  1024 05/04/20  1454    133   POTASSIUM 4.0 3.8   CHLORIDE 109 102   ADRIAN 8.7 8.5   CO2 26 25   BUN 7 7   CR 0.49* 0.55   * 91       Liver Function Studies -   Recent Labs   Lab Test 05/04/20  1454 04/01/20  1758   PROTTOTAL 6.7* 7.7   ALBUMIN 3.0* 3.5   BILITOTAL 0.1* 0.2   ALKPHOS 143 152*   AST 13 21   ALT 19 24           ASSESSMENT/PLAN:  Orthostatic hypotension  Benign essential hypertension  Gait instability  Alcohol abuse  Recent hospitalization due to orthostatic hypotension, gait instability and dizziness. Apparently this has been occurring for about a month. At previous ED visits she has been intoxicated,   -continue MVI, thiamin, folic acid. During recent hospitalization amlodipine was reduced to 2.5mg q day. She continued to have some orthostasis but was treated with IVF. She did c/o neuropathy in hopsital in feet and was treated with gabapentin.   BPs in /76, 125/55, 134/75. Therapy did report SBP sitting 140 and standing 100. ? Due to medications. At this " point she is not on midodrine. The orthostatic drops raise her potential to fall.   -continue to monitor blood pressures  -continue amlodipine 2.5mg q day  -continue MVI, thiamine and folic acid.   -consider midodrine     Paranoid schizophrenia in remission (H)  Tardive dyskinesia  RLS (restless legs syndrome)  She has chronic comments about suicidal ideation. She is followed by psychiatry at [facility]. It is possible that OH due to clozapine and or sertraline. She does have a video visit with her psychologist today.   -continue PTA clozapine 250mg q hs, benztorpine 1mg TID, and sertraline 150mg q hs    Hypothyroidism, unspecified type  TSH 1.92 03/13/2019 03:21 PM   -no recent change in synthroid dose  -TSH at next lab draw      Mild intermittent asthma without complication  No c/o shortness of breath. No cough  -continue plan of care    Physical deconditioning  Lives alone in [facility]. She has had 6 trips to ED since 1/1/20.  Has a walker and a cane. Therapy in TCU reports she is walking about 30'. SLUMs 26/30. At this point she is still a black tag and cannot go to the BR unattended. She does have a SW in the community Amadaconstantino Gallego  -continue physical therapy and OCCUPATIONAL THERAPY   -care conference 5/12    Orders written by provider at facility  TSH on 5/13  Electronically signed by:  TOPHER Dempsey CNP     Video-Visit Details  Type of service:  Video Visit  Video End Time (time video stopped): 12:24pm  Distant Location (provider location):  [facility]

## 2020-05-11 NOTE — PATIENT INSTRUCTIONS
[facility] TELEPHONE ENCOUNTER    Chief Complaint   Patient presents with     RECHECK       Liz NUZHAT Lieberman is a 69 year old  (1950), seen today for follow up.     ASSESSMENT/PLAN  Hypothyroidism- no recent TSH  Orthostatic hypotension in setting of clozaril and sertraline    Daily orthostatic blood pressure checks    TSH on 5/13  Electronically signed by:   TOPHER Dempsey CNP

## 2020-05-11 NOTE — LETTER
"    5/11/2020        RE: Liz Lieberman  [address]  [facility] [facility] [place] [address]        [facility]   Liz Lieberman is being evaluated via a billable video visit due to the restrictions of the Covid-19 pandemic.   The patient has been notified of following:  \"This video visit will be conducted via a call between you and your provider. We have found that certain health care needs can be provided without the need for an in-person physical exam.  This service lets us provide the care you need with a video conversation. If during the course of the call the provider feels a video visit is not appropriate, you will not be charged for this service.\"   The provider has received verbal consent for a Video Visit from the patient or first contact? Yes  Patient  or facility staff would like the video invitation sent by: N/A   Video Start Time: 12:18pm    [facility] Medical Record Number:  [id number]  Place of Location at the time of visit: [facility]   Chief Complaint   Patient presents with     RECHECK     HPI:  Liz Lieberman  is a 69 year old (1950), who is being seen today for a visit.  HPI information obtained from: facility chart records, facility staff, patient report and [facility] chart review. Today's concern is:  Patient has been in TCU following hospitalization due to dizziness and orthostatic hypotension. PMH includes hypertension, alcohol use disorder, schizophrenia, dementia, tardive dyskinesia, restless leg syndrome, hypothryoidism, iron deficiency anemia, hyperlipidemia, GERD and asthma. In hospital she was treated with IVF and compression stockings, norvasc was reduced to 2.5mg q day, and counseled on alcohol abuse.   Since in TCU she has walked up to 30' with walker with a goal of 150' with walker. She does live alone.     Past Medical and Surgical History reviewed in Epic today.  MEDICATIONS:    Current Outpatient Medications   Medication Sig Dispense Refill     " acetaminophen (TYLENOL) 500 MG tablet Take 1,000 mg by mouth every 6 hours as needed for mild pain       albuterol (PROAIR HFA/PROVENTIL HFA/VENTOLIN HFA) 108 (90 Base) MCG/ACT inhaler Inhale 2 puffs into the lungs every 6 hours as needed        amLODIPine (NORVASC) 5 MG tablet Take 0.5 tablets (2.5 mg) by mouth At Bedtime       aspirin 81 MG EC tablet Take 81 mg by mouth daily        atorvastatin (LIPITOR) 20 MG tablet Take 20 mg by mouth daily       benztropine (COGENTIN) 1 MG tablet Take 1 mg by mouth 3 times daily        Calcium Carbonate-Vitamin D (CALCIUM + D PO) Take 1 tablet by mouth. Calcium 500 with Vit D 400mg BID. One before breakfast and one with supper       clindamycin (CLEOCIN T) 1 % external solution Apply topically daily as needed       cloZAPine (CLOZARIL) 100 MG tablet Take 2.5 tablets (250 mg) by mouth At Bedtime 60 tablet 0     desoximetasone (TOPICORT) 0.25 % OINT Apply  topically as needed.       emollient (VANICREAM) external cream Apply topically 3 times daily as needed for other (irritation)       Ferrous Gluconate 324 (37.5 Fe) MG TABS Take 324 mg by mouth daily       fluticasone (FLONASE) 50 MCG/ACT nasal spray Spray 2 sprays into both nostrils daily as needed for rhinitis or allergies       fluticasone (FLOVENT HFA) 110 MCG/ACT inhaler Inhale 1 puff into the lungs 2 times daily as needed       folic acid (FOLVITE) 1 MG tablet Take 1 tablet (1 mg) by mouth daily 30 tablet 0     guaiFENesin (MUCINEX) 600 MG 12 hr tablet Take 1,200 mg by mouth 2 times daily as needed for congestion        levothyroxine (SYNTHROID/LEVOTHROID) 175 MCG tablet Take 175 mcg by mouth daily       multivitamin w/minerals (THERA-VIT-M) tablet Take 1 tablet by mouth daily 30 tablet 0     omeprazole (PRILOSEC) 20 MG DR capsule Take 20 mg by mouth 2 times daily       polyethylene glycol (MIRALAX/GLYCOLAX) packet Take 17 g by mouth daily as needed for constipation        sertraline (ZOLOFT) 50 MG tablet Take 150 mg by  "mouth At Bedtime        REVIEW OF SYSTEMS: 4 point ROS including Respiratory, CV, GI and , other than that noted in the HPI,  is negative  Objective: /76   Pulse 80   Temp 97  F (36.1  C)   Resp 18   Ht 1.651 m (5' 5\")   Wt 65.5 kg (144 lb 6.4 oz)   SpO2 96%   BMI 24.03 kg/m    Limited visit exam done given COVID-19 precautions.   GENERAL APPEARANCE:  Alert, in no distress  ENT:hearing acuity adequate     RESP:  respiratory effort normal, no respiratory distress,     M/S:   Gait and station not observed,   SKIN:  Inspection/Palpation of skin and subcutaneous tissue no rash  NEURO: 2-12 in normal limits and at patient's baseline, constant upper body movement, almost writhing  PSYCH:  insight and judgement, memory intact , affect and mood normal  Labs:   CBC RESULTS:   Recent Labs   Lab Test 05/05/20  1024 05/04/20  1454   WBC 7.0 6.5   RBC 4.34 4.26   HGB 12.3 11.8   HCT 38.2 36.1   MCV 88 85   MCH 28.3 27.7   MCHC 32.2 32.7   RDW 13.3 13.2    293       Last Basic Metabolic Panel:  Recent Labs   Lab Test 05/05/20  1024 05/04/20  1454    133   POTASSIUM 4.0 3.8   CHLORIDE 109 102   ADRIAN 8.7 8.5   CO2 26 25   BUN 7 7   CR 0.49* 0.55   * 91       Liver Function Studies -   Recent Labs   Lab Test 05/04/20  1454 04/01/20  1758   PROTTOTAL 6.7* 7.7   ALBUMIN 3.0* 3.5   BILITOTAL 0.1* 0.2   ALKPHOS 143 152*   AST 13 21   ALT 19 24           ASSESSMENT/PLAN:  Orthostatic hypotension  Benign essential hypertension  Gait instability  Alcohol abuse  Recent hospitalization due to orthostatic hypotension, gait instability and dizziness. Apparently this has been occurring for about a month. At previous ED visits she has been intoxicated,   -continue MVI, thiamin, folic acid. During recent hospitalization amlodipine was reduced to 2.5mg q day. She continued to have some orthostasis but was treated with IVF. She did c/o neuropathy in hopsital in feet and was treated with gabapentin.   BPs in TCU " 118/76, 125/55, 134/75. Therapy did report SBP sitting 140 and standing 100. ? Due to medications. At this point she is not on midodrine. The orthostatic drops raise her potential to fall.   -continue to monitor blood pressures  -continue amlodipine 2.5mg q day  -continue MVI, thiamine and folic acid.   -consider midodrine     Paranoid schizophrenia in remission (H)  Tardive dyskinesia  RLS (restless legs syndrome)  She has chronic comments about suicidal ideation. She is followed by psychiatry at [facility]. It is possible that OH due to clozapine and or sertraline. She does have a video visit with her psychologist today.   -continue PTA clozapine 250mg q hs, benztorpine 1mg TID, and sertraline 150mg q hs    Hypothyroidism, unspecified type  TSH 1.92 03/13/2019 03:21 PM   -no recent change in synthroid dose  -TSH at next lab draw      Mild intermittent asthma without complication  No c/o shortness of breath. No cough  -continue plan of care    Physical deconditioning  Lives alone in [facility]. She has had 6 trips to ED since 1/1/20.  Has a walker and a cane. Therapy in TCU reports she is walking about 30'. SLUMs 26/30. At this point she is still a black tag and cannot go to the BR unattended. She does have a SW in the community Amada Gallego  -continue physical therapy and OCCUPATIONAL THERAPY   -care conference 5/12    Orders written by provider at facility  TSH on 5/13  Electronically signed by:  TOPHER Dempsey CNP     Video-Visit Details  Type of service:  Video Visit  Video End Time (time video stopped): 12:24pm  Distant Location (provider location):  [facility]             Sincerely,        TOPHER Dempsey CNP

## 2020-05-13 ENCOUNTER — HOSPITAL LABORATORY (OUTPATIENT)
Dept: OTHER | Facility: CLINIC | Age: 70
End: 2020-05-13

## 2020-05-13 LAB — TSH SERPL DL<=0.005 MIU/L-ACNC: 7.13 MU/L (ref 0.4–4)

## 2020-05-14 NOTE — PROGRESS NOTES
"[facility] DISCHARGE SUMMARY  Liz Lieberman is being evaluated via a billable video visit due to the restrictions of the Covid-19 pandemic.   The patient has been notified of following:  \"This video visit will be conducted via a call between you and a [facility] provider. We have found that certain health care needs can be provided without the need for an in-person physical exam.  This service lets us provide the care you need with a video conversation. If during the course of the call the provider feels a video visit is not appropriate, you will not be charged for this service.\"   The provider has received verbal consent for a Video Visit from the patient or family/first contact? Yes  Patient or /facility staff would like the video invitation sent by: N/A   Video Start Time: 11:25 AM    PATIENT'S NAME: Liz Lieberman  YOB: 1950  MEDICAL RECORD NUMBER:  [id number]  Place of Location at the time of visit: [facility]   PRIMARY CARE PROVIDER AND CLINIC RESPONSIBLE AFTER TRANSFER:   Era Livingston [facility] [address] [facility]* ;  Non-[facility] Provider   Transferring providers: TOPHER Dempsey CNP, H Arbabi, MD  Recent Hospitalization/ED:  [facility] stay 5/4 to 5/6/20.  Date of SNF Admission: May / 06 / 2020  Date of SNF (anticipated) Discharge: May / 19 / 2020  Discharged to: previous independent home  Cognitive Scores: SLUMS: 26/30  Physical Function: Ambulating 300 ft with walker  DME: none  CODE STATUS/ADVANCE DIRECTIVES DISCUSSION:  DNR / DNI   ALLERGIES: Formaldehyde and Latex    DISCHARGE DIAGNOSIS/NURSING FACILITY COURSE:     Patient has been in TCU following hospitalization due to dizziness and orthostatic hypotension. PMH includes hypertension, alcohol use disorder, schizophrenia, dementia, tardive dyskinesia, restless leg syndrome, hypothryoidism, iron deficiency anemia, hyperlipidemia, GERD and asthma. In " hospital she was treated with IVF and compression stockings, norvasc was reduced to 2.5mg q day, and counseled on alcohol abuse.     Orthostatic hypotension  Benign essential hypertension  Gait instability  Alcohol abuse  Recent hospitalization due to orthostatic hypotension, gait instability and dizziness. Apparently this has been occurring for about a month. At previous ED visits she has been intoxicated,   -continue MVI, thiamin, folic acid. During recent hospitalization amlodipine was reduced to 2.5mg q day. She continued to have some orthostasis in TCU. Counseled on changing positions slowly. BPs in /76, 125/55, 134/75. Therapy did report SBP sitting 140 and standing 100. ? Due to medications. At this point she is not on midodrine. The orthostatic drops raise her potential to fall.   -continue to monitor blood pressures  -continue amlodipine 2.5mg q day  -continue MVI, thiamine and folic acid.   -consider midodrine- follow up with PCP     Paranoid schizophrenia in remission (H)  Tardive dyskinesia  RLS (restless legs syndrome)  She has chronic comments about suicidal ideation. She is followed by psychiatry at [facility]. It is possible that OH due to clozapine and or sertraline. She does have a video visit with her psychologist while in TCU.   -continue PTA clozapine 250mg q hs, benztorpine 1mg TID, and sertraline 150mg q hs     Hypothyroidism, unspecified type  TSH 7.13 on 5/13  -no recent change in synthroid dose  -follow up with PCP        Mild intermittent asthma without complication  No c/o shortness of breath. No cough  -continue plan of care     Physical deconditioning  Lives alone in [facility]. She has had 6 trips to ED since 1/1/20.  Has a walker and a cane. Therapy in TCU reports she is walking about 300'. SLUMs 26/30. She did make some progress with therapy and is ready to work with home care team. She does have a SW in the community Amada Gallego  -discharge home on 5/19 with home care from  [facility].    Past Medical History:  has a past medical history of Allergic rhinitis, Bronchiectasis (H), Chronic pain, Depression, Diverticulosis, Gastro-oesophageal reflux disease, GERD (gastroesophageal reflux disease), Hearing loss, Hiatal hernia, History of blood transfusion, Hyperlipidemia, Hypothyroidism, Leukocytosis, Lung nodule, Mild intermittent asthma, Numbness and tingling, Osteoarthritis, Paranoid schizophrenia, in remission, RLS (restless legs syndrome), Tardive dyskinesia, and Urinary incontinence.  Discharge Medications:    Current Outpatient Medications   Medication Sig Dispense Refill     acetaminophen (TYLENOL) 500 MG tablet Take 1,000 mg by mouth every 6 hours as needed for mild pain       albuterol (PROAIR HFA/PROVENTIL HFA/VENTOLIN HFA) 108 (90 Base) MCG/ACT inhaler Inhale 2 puffs into the lungs every 6 hours as needed        amLODIPine (NORVASC) 5 MG tablet Take 0.5 tablets (2.5 mg) by mouth At Bedtime       aspirin 81 MG EC tablet Take 81 mg by mouth daily        atorvastatin (LIPITOR) 20 MG tablet Take 20 mg by mouth daily       benztropine (COGENTIN) 1 MG tablet Take 1 mg by mouth 3 times daily        Calcium Carbonate-Vitamin D (CALCIUM + D PO) Take 1 tablet by mouth. Calcium 500 with Vit D 400mg BID. One before breakfast and one with supper       clindamycin (CLEOCIN T) 1 % external solution Apply topically daily as needed       cloZAPine (CLOZARIL) 100 MG tablet Take 2.5 tablets (250 mg) by mouth At Bedtime 60 tablet 0     desoximetasone (TOPICORT) 0.25 % OINT Apply  topically as needed.       emollient (VANICREAM) external cream Apply topically 3 times daily as needed for other (irritation)       Ferrous Gluconate 324 (37.5 Fe) MG TABS Take 324 mg by mouth daily       fluticasone (FLONASE) 50 MCG/ACT nasal spray Spray 2 sprays into both nostrils daily as needed for rhinitis or allergies       fluticasone (FLOVENT HFA) 110 MCG/ACT inhaler Inhale 1 puff into the lungs 2 times daily  "as needed       folic acid (FOLVITE) 1 MG tablet Take 1 tablet (1 mg) by mouth daily 30 tablet 0     guaiFENesin (MUCINEX) 600 MG 12 hr tablet Take 1,200 mg by mouth 2 times daily as needed for congestion        levothyroxine (SYNTHROID/LEVOTHROID) 175 MCG tablet Take 175 mcg by mouth daily       multivitamin w/minerals (THERA-VIT-M) tablet Take 1 tablet by mouth daily 30 tablet 0     omeprazole (PRILOSEC) 20 MG DR capsule Take 20 mg by mouth 2 times daily       polyethylene glycol (MIRALAX/GLYCOLAX) packet Take 17 g by mouth daily as needed for constipation        sertraline (ZOLOFT) 50 MG tablet Take 150 mg by mouth At Bedtime         Medication Changes/Rationale:     none  Controlled medications sent with patient:   not applicable/none     ROS: 4 point ROS including Respiratory, CV, GI and , other than that noted in the HPI,  is negative    Physical Exam: Vitals: /82   Pulse 79   Temp 97  F (36.1  C)   Resp 22   Ht 1.651 m (5' 5\")   Wt 64.8 kg (142 lb 12.8 oz)   SpO2 95%   BMI 23.76 kg/m   BMI= Body mass index is 23.76 kg/m .  Limited Visit exam done for COVID-19 precautions  GENERAL APPEARANCE:  Alert, in no distress  ENT:  hearing acuity adequate   EYES:  EOM, conjunctivae, lids, pupils and irises normal    RESP:  respiratory effort normal, no respiratory distress,   M/S:   Gait and station not observed,   SKIN:  Inspection/Palpation of skin and subcutaneous tissue no rash  NEURO: 2-12 in normal limits and at patient's baseline, constant movement  PSYCH:  insight and judgement, memory intact , affect and mood normal     SNF labs:  CBC RESULTS:   Recent Labs   Lab Test 05/05/20  1024 05/04/20  1454   WBC 7.0 6.5   RBC 4.34 4.26   HGB 12.3 11.8   HCT 38.2 36.1   MCV 88 85   MCH 28.3 27.7   MCHC 32.2 32.7   RDW 13.3 13.2    293       Last Basic Metabolic Panel:  Recent Labs   Lab Test 05/05/20  1024 05/04/20  1454    133   POTASSIUM 4.0 3.8   CHLORIDE 109 102   ADRIAN 8.7 8.5   CO2 26 25 "   BUN 7 7   CR 0.49* 0.55   * 91       Liver Function Studies -   Recent Labs   Lab Test 05/04/20  1454 04/01/20  1758   PROTTOTAL 6.7* 7.7   ALBUMIN 3.0* 3.5   BILITOTAL 0.1* 0.2   ALKPHOS 143 152*   AST 13 21   ALT 19 24       TSH   Date Value Ref Range Status   05/13/2020 7.13 (H) 0.40 - 4.00 mU/L Final   ]    No results found for: A1C  1      DISCHARGE PLAN:    Follow up labs: No labs orders/due    Medical Follow Up:      Follow up with primary care provider in 1 weeks      Discharge Services: Home Care:  Occupational Therapy, Physical Therapy, Registered Nurse, Home Health Aide and From:  [facility]    Discharge Instructions Verbalized to Patient at Discharge:     None    TOTAL DISCHARGE TIME:   Greater than 30 minutes  Electronically signed by:  TOPHER Dempsey CNP     Video-Visit Details  Type of service:  Video Visit  Video End Time (time video stopped): 11:28am  Distant Location (provider location):  [facility]     Documentation of Face to Face and Certification for Home Health Services    I certify that patient: Liz Lieberman is under my care and that I, or a nurse practitioner or physician's assistant working with me, had a face-to-face encounter that meets the physician face-to-face encounter requirements with this patient on: 5/15/2020.    This encounter with the patient was in whole, or in part, for the following medical condition, which is the primary reason for home health care:   Encounter Diagnoses   Name Primary?     Orthostatic hypotension Yes     Benign essential hypertension      Gait instability      Paranoid schizophrenia in remission (H)      Tardive dyskinesia      RLS (restless legs syndrome)      History of alcohol use disorder      Hypothyroidism, unspecified type      Mild intermittent asthma without complication      Physical deconditioning    .    I certify that, based on my findings, the following services are medically necessary home  health services: Nursing, Occupational Therapy, Physical Therapy and HHA.    My clinical findings support the need for the above services because: Nurse is needed: To assess orthostatic blood pressures after changes in medications or other medical regimen.., Occupational Therapy Services are needed to assess and treat cognitive ability and address ADL safety due to impairment in moiblity. and Physical Therapy Services are needed to assess and treat the following functional impairments: fall risk.    Further, I certify that my clinical findings support that this patient is homebound (i.e. absences from home require considerable and taxing effort and are for medical reasons or Denominational services or infrequently or of short duration when for other reasons) because: Requires assistance of another person or specialized equipment to access medical services because patient: Is unable to exit home safely on own due to: fall risk...    Based on the above findings. I certify that this patient is confined to the home and needs intermittent skilled nursing care, physical therapy and/or speech therapy.  The patient is under my care, and I have initiated the establishment of the plan of care.  This patient will be followed by a physician who will periodically review the plan of care.  Physician/Provider to provide follow up care: Era Livingston    Attending [facility] physician (the Medicare certified [facility] provider): Dr.Mohammad Margot MD, signing F2F and only signing for initial order. Please send all follow up questions and concerns or needed follow up signatures to the PCP, who [facility] has on file as:  Era Livingston.  Physician Signature: See electronic signature associated with these discharge orders.  Date: 5/15/2020

## 2020-05-15 ENCOUNTER — VIRTUAL VISIT (OUTPATIENT)
Dept: GERIATRICS | Facility: CLINIC | Age: 70
End: 2020-05-15
Payer: MEDICARE

## 2020-05-15 VITALS
SYSTOLIC BLOOD PRESSURE: 131 MMHG | HEIGHT: 65 IN | DIASTOLIC BLOOD PRESSURE: 82 MMHG | WEIGHT: 142.8 LBS | BODY MASS INDEX: 23.79 KG/M2 | OXYGEN SATURATION: 95 % | HEART RATE: 79 BPM | RESPIRATION RATE: 22 BRPM | TEMPERATURE: 97 F

## 2020-05-15 DIAGNOSIS — I95.1 ORTHOSTATIC HYPOTENSION: Primary | ICD-10-CM

## 2020-05-15 DIAGNOSIS — F20.0 PARANOID SCHIZOPHRENIA IN REMISSION (H): ICD-10-CM

## 2020-05-15 DIAGNOSIS — I10 HYPERTENSION, UNSPECIFIED TYPE: ICD-10-CM

## 2020-05-15 DIAGNOSIS — E03.9 HYPOTHYROIDISM, UNSPECIFIED TYPE: ICD-10-CM

## 2020-05-15 DIAGNOSIS — R26.81 GAIT INSTABILITY: ICD-10-CM

## 2020-05-15 DIAGNOSIS — Z87.898 HISTORY OF ALCOHOL USE DISORDER: ICD-10-CM

## 2020-05-15 DIAGNOSIS — I10 BENIGN ESSENTIAL HYPERTENSION: ICD-10-CM

## 2020-05-15 DIAGNOSIS — J45.20 MILD INTERMITTENT ASTHMA WITHOUT COMPLICATION: ICD-10-CM

## 2020-05-15 DIAGNOSIS — R53.81 PHYSICAL DECONDITIONING: ICD-10-CM

## 2020-05-15 DIAGNOSIS — G25.81 RLS (RESTLESS LEGS SYNDROME): ICD-10-CM

## 2020-05-15 DIAGNOSIS — G24.01 TARDIVE DYSKINESIA: ICD-10-CM

## 2020-05-15 PROCEDURE — 99316 NF DSCHRG MGMT 30 MIN+: CPT | Mod: 95 | Performed by: NURSE PRACTITIONER

## 2020-05-15 RX ORDER — AMLODIPINE BESYLATE 5 MG/1
2.5 TABLET ORAL AT BEDTIME
Qty: 30 TABLET | Refills: 0 | Status: SHIPPED | OUTPATIENT
Start: 2020-05-15 | End: 2021-05-25

## 2020-05-15 ASSESSMENT — MIFFLIN-ST. JEOR: SCORE: 1178.62

## 2020-05-15 NOTE — PATIENT INSTRUCTIONS
[facility] Discharge Orders    Name: Liz Lieberman  : 1950  Planned Discharge Date:   Discharged to: previous independent home    MEDICAL FOLLOW UP  Follow up with PCP in 1-2 weeks  Follow up with Specialists as planned       FUTURE LABS: No labs orders/due    ORDER CHANGES: in hospital amlodipine was reduced to 2.5mg q day    DISCHARGE MEDICATIONS:  The patient s pharmacy is authorized to dispense a 30-day supply of medications. Refill requests should be directed to the primary provider, Era Livingston.   At discharge, the facility may send patient's remaining supply of controlled substances, specifically clozaril.  Current Outpatient Medications   Medication Sig Dispense Refill     amLODIPine (NORVASC) 5 MG tablet Take 0.5 tablets (2.5 mg) by mouth At Bedtime 30 tablet 0     acetaminophen (TYLENOL) 500 MG tablet Take 1,000 mg by mouth every 6 hours as needed for mild pain       albuterol (PROAIR HFA/PROVENTIL HFA/VENTOLIN HFA) 108 (90 Base) MCG/ACT inhaler Inhale 2 puffs into the lungs every 6 hours as needed        aspirin 81 MG EC tablet Take 81 mg by mouth daily        atorvastatin (LIPITOR) 20 MG tablet Take 20 mg by mouth daily       benztropine (COGENTIN) 1 MG tablet Take 1 mg by mouth 3 times daily        Calcium Carbonate-Vitamin D (CALCIUM + D PO) Take 1 tablet by mouth. Calcium 500 with Vit D 400mg BID. One before breakfast and one with supper       clindamycin (CLEOCIN T) 1 % external solution Apply topically daily as needed       cloZAPine (CLOZARIL) 100 MG tablet Take 2.5 tablets (250 mg) by mouth At Bedtime 60 tablet 0     desoximetasone (TOPICORT) 0.25 % OINT Apply  topically as needed.       emollient (VANICREAM) external cream Apply topically 3 times daily as needed for other (irritation)       Ferrous Gluconate 324 (37.5 Fe) MG TABS Take 324 mg by mouth daily       fluticasone (FLONASE) 50 MCG/ACT nasal spray Spray 2 sprays into both nostrils daily as needed for  rhinitis or allergies       fluticasone (FLOVENT HFA) 110 MCG/ACT inhaler Inhale 1 puff into the lungs 2 times daily as needed       folic acid (FOLVITE) 1 MG tablet Take 1 tablet (1 mg) by mouth daily 30 tablet 0     guaiFENesin (MUCINEX) 600 MG 12 hr tablet Take 1,200 mg by mouth 2 times daily as needed for congestion        levothyroxine (SYNTHROID/LEVOTHROID) 175 MCG tablet Take 175 mcg by mouth daily       multivitamin w/minerals (THERA-VIT-M) tablet Take 1 tablet by mouth daily 30 tablet 0     omeprazole (PRILOSEC) 20 MG DR capsule Take 20 mg by mouth 2 times daily       polyethylene glycol (MIRALAX/GLYCOLAX) packet Take 17 g by mouth daily as needed for constipation        sertraline (ZOLOFT) 50 MG tablet Take 150 mg by mouth At Bedtime          SERVICES:  Home Care:  Occupational Therapy, Physical Therapy, Registered Nurse and Home Health Aide    ADDITIONAL INSTRUCTIONS:  None    TOPHER Dempsey CNP  This document was electronically signed on May 15, 2020

## 2020-05-15 NOTE — LETTER
"    5/15/2020        RE: Liz Lieberman  [address]  [facility] [address]        [facility] DISCHARGE SUMMARY  Liz Lieberman is being evaluated via a billable video visit due to the restrictions of the Covid-19 pandemic.   The patient has been notified of following:  \"This video visit will be conducted via a call between you and a [facility] provider. We have found that certain health care needs can be provided without the need for an in-person physical exam.  This service lets us provide the care you need with a video conversation. If during the course of the call the provider feels a video visit is not appropriate, you will not be charged for this service.\"   The provider has received verbal consent for a Video Visit from the patient or family/first contact? Yes  Patient or /facility staff would like the video invitation sent by: N/A   Video Start Time: 11:25 AM    PATIENT'S NAME: Liz Lieberman  YOB: 1950  MEDICAL RECORD NUMBER:  [id number]  Place of Location at the time of visit: [facility]   PRIMARY CARE PROVIDER AND CLINIC RESPONSIBLE AFTER TRANSFER:   [facility] [address] / [place]* ;  Non-[facility] Provider   Transferring providers: TOPHER Dempsey CNP, H Arbabi, MD  Recent Hospitalization/ED:  [facility] stay 5/4 to 5/6/20.  Date of SNF Admission: May / 06 / 2020  Date of SNF (anticipated) Discharge: May / 19 / 2020  Discharged to: previous independent home  Cognitive Scores: SLUMS: 26/30  Physical Function: Ambulating 300 ft with walker  DME: none  CODE STATUS/ADVANCE DIRECTIVES DISCUSSION:  DNR / DNI   ALLERGIES: Formaldehyde and Latex    DISCHARGE DIAGNOSIS/NURSING FACILITY COURSE:     Patient has been in TCU following hospitalization due to dizziness and orthostatic hypotension. PMH includes hypertension, alcohol use disorder, schizophrenia, dementia, tardive dyskinesia, " restless leg syndrome, hypothryoidism, iron deficiency anemia, hyperlipidemia, GERD and asthma. In hospital she was treated with IVF and compression stockings, norvasc was reduced to 2.5mg q day, and counseled on alcohol abuse.     Orthostatic hypotension  Benign essential hypertension  Gait instability  Alcohol abuse  Recent hospitalization due to orthostatic hypotension, gait instability and dizziness. Apparently this has been occurring for about a month. At previous ED visits she has been intoxicated,   -continue MVI, thiamin, folic acid. During recent hospitalization amlodipine was reduced to 2.5mg q day. She continued to have some orthostasis in TCU. Counseled on changing positions slowly. BPs in /76, 125/55, 134/75. Therapy did report SBP sitting 140 and standing 100. ? Due to medications. At this point she is not on midodrine. The orthostatic drops raise her potential to fall.   -continue to monitor blood pressures  -continue amlodipine 2.5mg q day  -continue MVI, thiamine and folic acid.   -consider midodrine- follow up with PCP     Paranoid schizophrenia in remission (H)  Tardive dyskinesia  RLS (restless legs syndrome)  She has chronic comments about suicidal ideation. She is followed by psychiatry at [facility]. It is possible that OH due to clozapine and or sertraline. She does have a video visit with her psychologist while in TCU.   -continue PTA clozapine 250mg q hs, benztorpine 1mg TID, and sertraline 150mg q hs     Hypothyroidism, unspecified type  TSH 7.13 on 5/13  -no recent change in synthroid dose  -follow up with PCP        Mild intermittent asthma without complication  No c/o shortness of breath. No cough  -continue plan of care     Physical deconditioning  Lives alone in [facility]. She has had 6 trips to ED since 1/1/20.  Has a walker and a cane. Therapy in TCU reports she is walking about 300'. SLUMs 26/30. She did make some progress with therapy and is ready to work with home care  team. She does have [facility] SW [facility] the [facility]  -discharge home on 5/19 with home care from [facility].    Past Medical History:  has a past medical history of Allergic rhinitis, Bronchiectasis (H), Chronic pain, Depression, Diverticulosis, Gastro-oesophageal reflux disease, GERD (gastroesophageal reflux disease), Hearing loss, Hiatal hernia, History of blood transfusion, Hyperlipidemia, Hypothyroidism, Leukocytosis, Lung nodule, Mild intermittent asthma, Numbness and tingling, Osteoarthritis, Paranoid schizophrenia, in remission, RLS (restless legs syndrome), Tardive dyskinesia, and Urinary incontinence.  Discharge Medications:    Current Outpatient Medications   Medication Sig Dispense Refill     acetaminophen (TYLENOL) 500 MG tablet Take 1,000 mg by mouth every 6 hours as needed for mild pain       albuterol (PROAIR HFA/PROVENTIL HFA/VENTOLIN HFA) 108 (90 Base) MCG/ACT inhaler Inhale 2 puffs into the lungs every 6 hours as needed        amLODIPine (NORVASC) 5 MG tablet Take 0.5 tablets (2.5 mg) by mouth At Bedtime       aspirin 81 MG EC tablet Take 81 mg by mouth daily        atorvastatin (LIPITOR) 20 MG tablet Take 20 mg by mouth daily       benztropine (COGENTIN) 1 MG tablet Take 1 mg by mouth 3 times daily        Calcium Carbonate-Vitamin D (CALCIUM + D PO) Take 1 tablet by mouth. Calcium 500 with Vit D 400mg BID. One before breakfast and one with supper       clindamycin (CLEOCIN T) 1 % external solution Apply topically daily as needed       cloZAPine (CLOZARIL) 100 MG tablet Take 2.5 tablets (250 mg) by mouth At Bedtime 60 tablet 0     desoximetasone (TOPICORT) 0.25 % OINT Apply  topically as needed.       emollient (VANICREAM) external cream Apply topically 3 times daily as needed for other (irritation)       Ferrous Gluconate 324 (37.5 Fe) MG TABS Take 324 mg by mouth daily       fluticasone (FLONASE) 50 MCG/ACT nasal spray Spray 2 sprays into both nostrils daily as needed for rhinitis or  "allergies       fluticasone (FLOVENT HFA) 110 MCG/ACT inhaler Inhale 1 puff into the lungs 2 times daily as needed       folic acid (FOLVITE) 1 MG tablet Take 1 tablet (1 mg) by mouth daily 30 tablet 0     guaiFENesin (MUCINEX) 600 MG 12 hr tablet Take 1,200 mg by mouth 2 times daily as needed for congestion        levothyroxine (SYNTHROID/LEVOTHROID) 175 MCG tablet Take 175 mcg by mouth daily       multivitamin w/minerals (THERA-VIT-M) tablet Take 1 tablet by mouth daily 30 tablet 0     omeprazole (PRILOSEC) 20 MG DR capsule Take 20 mg by mouth 2 times daily       polyethylene glycol (MIRALAX/GLYCOLAX) packet Take 17 g by mouth daily as needed for constipation        sertraline (ZOLOFT) 50 MG tablet Take 150 mg by mouth At Bedtime         Medication Changes/Rationale:     none  Controlled medications sent with patient:   not applicable/none     ROS: 4 point ROS including Respiratory, CV, GI and , other than that noted in the HPI,  is negative    Physical Exam: Vitals: /82   Pulse 79   Temp 97  F (36.1  C)   Resp 22   Ht 1.651 m (5' 5\")   Wt 64.8 kg (142 lb 12.8 oz)   SpO2 95%   BMI 23.76 kg/m   BMI= Body mass index is 23.76 kg/m .  Limited Visit exam done for COVID-19 precautions  GENERAL APPEARANCE:  Alert, in no distress  ENT:  hearing acuity adequate   EYES:  EOM, conjunctivae, lids, pupils and irises normal    RESP:  respiratory effort normal, no respiratory distress,   M/S:   Gait and station not observed,   SKIN:  Inspection/Palpation of skin and subcutaneous tissue no rash  NEURO: 2-12 in normal limits and at patient's baseline, constant movement  PSYCH:  insight and judgement, memory intact , affect and mood normal     SNF labs:  CBC RESULTS:   Recent Labs   Lab Test 05/05/20  1024 05/04/20  1454   WBC 7.0 6.5   RBC 4.34 4.26   HGB 12.3 11.8   HCT 38.2 36.1   MCV 88 85   MCH 28.3 27.7   MCHC 32.2 32.7   RDW 13.3 13.2    293       Last Basic Metabolic Panel:  Recent Labs   Lab Test " 05/05/20  1024 05/04/20  1454    133   POTASSIUM 4.0 3.8   CHLORIDE 109 102   ADRIAN 8.7 8.5   CO2 26 25   BUN 7 7   CR 0.49* 0.55   * 91       Liver Function Studies -   Recent Labs   Lab Test 05/04/20  1454 04/01/20  1758   PROTTOTAL 6.7* 7.7   ALBUMIN 3.0* 3.5   BILITOTAL 0.1* 0.2   ALKPHOS 143 152*   AST 13 21   ALT 19 24       TSH   Date Value Ref Range Status   05/13/2020 7.13 (H) 0.40 - 4.00 mU/L Final   ]    No results found for: A1C  1      DISCHARGE PLAN:    Follow up labs: No labs orders/due    Medical Follow Up:      Follow up with primary care provider in 1 weeks      Discharge Services: Home Care:  Occupational Therapy, Physical Therapy, Registered Nurse, Home Health Aide and From:  [facility]    Discharge Instructions Verbalized to Patient at Discharge:     None    TOTAL DISCHARGE TIME:   Greater than 30 minutes  Electronically signed by:  TOPHER Dempsey CNP     Video-Visit Details  Type of service:  Video Visit  Video End Time (time video stopped): 11:28am  Distant Location (provider location):  [facility]     Documentation of Face to Face and Certification for Home Health Services    I certify that patient: Liz Lieberman is under my care and that I, or a nurse practitioner or physician's assistant working with me, had a face-to-face encounter that meets the physician face-to-face encounter requirements with this patient on: 5/15/2020.    This encounter with the patient was in whole, or in part, for the following medical condition, which is the primary reason for home health care:   Encounter Diagnoses   Name Primary?     Orthostatic hypotension Yes     Benign essential hypertension      Gait instability      Paranoid schizophrenia in remission (H)      Tardive dyskinesia      RLS (restless legs syndrome)      History of alcohol use disorder      Hypothyroidism, unspecified type      Mild intermittent asthma without complication      Physical  deconditioning    .    I certify that, based on my findings, the following services are medically necessary home health services: Nursing, Occupational Therapy, Physical Therapy and HHA.    My clinical findings support the need for the above services because: Nurse is needed: To assess orthostatic blood pressures after changes in medications or other medical regimen.., Occupational Therapy Services are needed to assess and treat cognitive ability and address ADL safety due to impairment in moiblity. and Physical Therapy Services are needed to assess and treat the following functional impairments: fall risk.    Further, I certify that my clinical findings support that this patient is homebound (i.e. absences from home require considerable and taxing effort and are for medical reasons or [employer] services or infrequently or of short duration when for other reasons) because: Requires assistance of another person or specialized equipment to access medical services because patient: Is unable to exit home safely on own due to: fall risk...    Based on the above findings. I certify that this patient is confined to the home and needs intermittent skilled nursing care, physical therapy and/or speech therapy.  The patient is under my care, and I have initiated the establishment of the plan of care.  This patient will be followed by a physician who will periodically review the plan of care.  Physician/Provider to provide follow up care: Era Livingston    Attending hospital physician (the Medicare certified [facility] provider): Dr.Mohammad Margot MD, signing F2F and only signing for initial order. Please send all follow up questions and concerns or needed follow up signatures to the PCP, who [facility] has on file as:  Era Livingston.  Physician Signature: See electronic signature associated with these discharge orders.  Date: 5/15/2020                    Sincerely,        TOPHER Dempsey CNP

## 2020-06-05 ENCOUNTER — HOSPITAL ENCOUNTER (EMERGENCY)
Facility: CLINIC | Age: 70
Discharge: HOME | End: 2020-06-05
Attending: EMERGENCY MEDICINE | Admitting: EMERGENCY MEDICINE
Payer: MEDICARE

## 2020-06-05 VITALS
BODY MASS INDEX: 24.99 KG/M2 | WEIGHT: 150 LBS | OXYGEN SATURATION: 96 % | HEIGHT: 65 IN | DIASTOLIC BLOOD PRESSURE: 72 MMHG | SYSTOLIC BLOOD PRESSURE: 144 MMHG | RESPIRATION RATE: 18 BRPM | HEART RATE: 92 BPM | TEMPERATURE: 98.7 F

## 2020-06-05 DIAGNOSIS — K12.0 CANKER SORE: ICD-10-CM

## 2020-06-05 DIAGNOSIS — K14.8 TONGUE LESION: ICD-10-CM

## 2020-06-05 PROCEDURE — 99283 EMERGENCY DEPT VISIT LOW MDM: CPT

## 2020-06-05 RX ORDER — DIPHENHYDRAMINE HYDROCHLORIDE AND LIDOCAINE HYDROCHLORIDE AND ALUMINUM HYDROXIDE AND MAGNESIUM HYDRO
5-10 KIT EVERY 6 HOURS PRN
Qty: 1 BOTTLE | Refills: 0 | Status: SHIPPED | OUTPATIENT
Start: 2020-06-05 | End: 2020-06-10

## 2020-06-05 ASSESSMENT — ENCOUNTER SYMPTOMS
FEVER: 0
TROUBLE SWALLOWING: 0
FACIAL SWELLING: 0

## 2020-06-05 ASSESSMENT — MIFFLIN-ST. JEOR: SCORE: 1206.28

## 2020-06-05 NOTE — ED AVS SNAPSHOT
Emergency Department  [address] [facility] [address]  Phone:  [phone]  Fax:  [phone]                                    Liz Lieberman   MRN: [id number]    Department:   Emergency Department   Date of Visit:  6/5/2020           After Visit Summary Signature Page    I have received my discharge instructions, and my questions have been answered. I have discussed any challenges I see with this plan with the nurse or doctor.    ..........................................................................................................................................  Patient/Patient Representative Signature      ..........................................................................................................................................  Patient Representative Print Name and Relationship to Patient    ..................................................               ................................................  Date                                   Time    ..........................................................................................................................................  Reviewed by Signature/Title    ...................................................              ..............................................  Date                                               Time          22EPIC Rev 08/18

## 2020-06-06 NOTE — ED PROVIDER NOTES
"  History     Chief Complaint:  Tongue lesion    HPI   Liz Lieberman is a 70 year old female who presents to the emergency department for evaluation of a painful tongue lesion.  She indicates that she first noticed this about a week and a half ago on the left side of her tongue.  It hurts more when she tries to eat or drink, but it does hurt at baseline as well.  She denies any swelling or any new medications.  She denies any other new complaints today.  She was advised to come in from a triage call.      Allergies:  Formaldehyde  Latex     Medications:    Albuterol  Norvasc  Aspirin 81 mg   Lipitor  Cogentin  Clozaril  Levothyroxine    Prilosec  Zoloft    Past Medical History:    Allergic rhinitis  Bronchiectasis  Chronic pain  Depression  Diverticulosis  Gastroesophageal reflux disease  Hearing loss  Hiatal hernia  History of blood transfusion  Hyperlipidemia  Hypothyroidism  Leukocytosis  Lung nodule  Mild intermittent asthma  Numbness and tingling  Osteoarthritis  Paranoid schizophrenia, in remission  Tardive dyskinesia  Urinary incontinence  History of alcohol use disorder  RAJANI  TIA    Past Surgical History:    Arthroplasty knee  ENT surgery  Orthopedic surgery    Family History:    Cerebrovascular disease  Diabetes    Social History:  The patient presents alone  Smoking Status: Former Smoker  Smokeless Tobacco: Never Used  Alcohol Use: Yes   Marital Status:   [4]       Review of Systems   Constitutional: Negative for fever.   HENT: Negative for facial swelling and trouble swallowing.         Tongue lesions   All other systems reviewed and are negative.        Physical Exam     Patient Vitals for the past 24 hrs:   BP Temp Temp src Pulse Resp SpO2 Height Weight   06/05/20 1717 (!) 144/72 98.7  F (37.1  C) Oral 92 18 96 % 1.651 m (5' 5\") 68 kg (150 lb)       Physical Exam  Nursing note and vitals reviewed.  Constitutional:             Oriented to person, place, and time. Cooperative.   HENT:   Nose:       "                     Nose normal.   Mouth/Throat:               Small aphthous ulcer to the left side of the tongue.  Mucous membranes are normal.   Eyes:                           Conjunctivae normal and EOM are normal.                                       Pupils are equal, round, and reactive to light.   Neck:                           Trachea normal.   Cardiovascular:           Normal rate, regular rhythm, normal heart sounds and normal pulses. No murmur heard.  Pulmonary/Chest:       Effort normal and breath sounds normal.   Abdominal:                  Soft. Normal appearance and bowel sounds are normal.                                       There is no tenderness.                                       There is no rebound and no CVA tenderness.   Musculoskeletal:         Extremities atraumatic x 4.   Lymphadenopathy:     No cervical adenopathy.   Neurological:               Alert and oriented to person, place, and time. Normal strength.                                       No cranial nerve deficit or sensory deficit. GCS eye subscore is 4. GCS verbal subscore is 5. GCS motor subscore is 6.  Some choreiform movements in the upper extremities present.  Skin:                            Skin is intact. No rash noted.   Psychiatric:                 Normal mood and affect.     Emergency Department Course     Emergency Department Course:  Nursing notes and vitals reviewed.  1925: I performed an exam of the patient as documented above.      I rechecked the patient.  Findings and plan explained to the Patient. Patient discharged home with instructions regarding supportive care, medications, and reasons to return. The importance of close follow-up was reviewed.     Impression & Plan      Medical Decision Making:  This is a 70-year-old female who came in for further evaluation of a painful tongue lesion.  This has the appearance of a canker sore, and therefore I agreed to prescribe Magic mouthwash for her which may help  her discomfort.  I recommended she follow-up with her physician no in case this does not improve, as there is a slight possibility of something else going on such as tongue cancer, although I think that is unlikely.  She should return with any concerns or worsening symptoms no.    Diagnosis:    ICD-10-CM   1. Tongue lesion  K14.8   2. Likely canker sore  K12.0       Disposition:  discharged to home    Discharge Medications:  Discharge Medication List as of 6/5/2020  7:30 PM      START taking these medications    Details   magic mouthwash suspension, diphenhydrAMINE, lidocaine, aluminum-magnesium & simethicone, (FIRST-MOUTHWASH BLM) compounding kit Swish and swallow 5-10 mLs in mouth every 6 hours as needed for mouth sores, Disp-1 Bottle,R-0, Local Print           Scribe Disclosure:  I, Skylar Schmid, am serving as a scribe at 7:35 PM on 6/5/2020 to document services personally performed by Alfonso Bermudez MD based on my observations and the provider's statements to me.     6/5/2020    EMERGENCY DEPARTMENT       Alfonso eBrmudez MD  06/05/20 2018

## 2020-07-05 ENCOUNTER — HOSPITAL ENCOUNTER (EMERGENCY)
Facility: CLINIC | Age: 70
Discharge: HOME | End: 2020-07-05
Attending: EMERGENCY MEDICINE | Admitting: EMERGENCY MEDICINE
Payer: MEDICARE

## 2020-07-05 VITALS
SYSTOLIC BLOOD PRESSURE: 129 MMHG | HEART RATE: 88 BPM | TEMPERATURE: 99.6 F | OXYGEN SATURATION: 98 % | DIASTOLIC BLOOD PRESSURE: 78 MMHG | RESPIRATION RATE: 18 BRPM

## 2020-07-05 DIAGNOSIS — R42 LIGHTHEADEDNESS: ICD-10-CM

## 2020-07-05 DIAGNOSIS — F10.920 ALCOHOLIC INTOXICATION WITHOUT COMPLICATION (H): Primary | ICD-10-CM

## 2020-07-05 DIAGNOSIS — E87.1 HYPONATREMIA: ICD-10-CM

## 2020-07-05 LAB
ALBUMIN SERPL-MCNC: 2.9 G/DL (ref 3.4–5)
ALP SERPL-CCNC: 123 U/L (ref 40–150)
ALT SERPL W P-5'-P-CCNC: 18 U/L (ref 0–50)
ANION GAP SERPL CALCULATED.3IONS-SCNC: 9 MMOL/L (ref 3–14)
AST SERPL W P-5'-P-CCNC: 9 U/L (ref 0–45)
BASOPHILS # BLD AUTO: 0 10E9/L (ref 0–0.2)
BASOPHILS NFR BLD AUTO: 0.1 %
BILIRUB SERPL-MCNC: 0.2 MG/DL (ref 0.2–1.3)
BUN SERPL-MCNC: 5 MG/DL (ref 7–30)
CALCIUM SERPL-MCNC: 8.3 MG/DL (ref 8.5–10.1)
CHLORIDE SERPL-SCNC: 97 MMOL/L (ref 94–109)
CO2 SERPL-SCNC: 21 MMOL/L (ref 20–32)
CREAT SERPL-MCNC: 0.43 MG/DL (ref 0.52–1.04)
DIFFERENTIAL METHOD BLD: ABNORMAL
EOSINOPHIL # BLD AUTO: 0 10E9/L (ref 0–0.7)
EOSINOPHIL NFR BLD AUTO: 0 %
ERYTHROCYTE [DISTWIDTH] IN BLOOD BY AUTOMATED COUNT: 14 % (ref 10–15)
ETHANOL SERPL-MCNC: 0.11 G/DL
GFR SERPL CREATININE-BSD FRML MDRD: >90 ML/MIN/{1.73_M2}
GLUCOSE SERPL-MCNC: 103 MG/DL (ref 70–99)
HCT VFR BLD AUTO: 32.7 % (ref 35–47)
HGB BLD-MCNC: 10.8 G/DL (ref 11.7–15.7)
IMM GRANULOCYTES # BLD: 0.1 10E9/L (ref 0–0.4)
IMM GRANULOCYTES NFR BLD: 1.1 %
LYMPHOCYTES # BLD AUTO: 1.7 10E9/L (ref 0.8–5.3)
LYMPHOCYTES NFR BLD AUTO: 24.5 %
MCH RBC QN AUTO: 28.2 PG (ref 26.5–33)
MCHC RBC AUTO-ENTMCNC: 33 G/DL (ref 31.5–36.5)
MCV RBC AUTO: 85 FL (ref 78–100)
MONOCYTES # BLD AUTO: 0.7 10E9/L (ref 0–1.3)
MONOCYTES NFR BLD AUTO: 9.3 %
NEUTROPHILS # BLD AUTO: 4.6 10E9/L (ref 1.6–8.3)
NEUTROPHILS NFR BLD AUTO: 65 %
NRBC # BLD AUTO: 0 10*3/UL
NRBC BLD AUTO-RTO: 0 /100
PLATELET # BLD AUTO: 260 10E9/L (ref 150–450)
POTASSIUM SERPL-SCNC: 3.6 MMOL/L (ref 3.4–5.3)
PROT SERPL-MCNC: 6.7 G/DL (ref 6.8–8.8)
RBC # BLD AUTO: 3.83 10E12/L (ref 3.8–5.2)
SODIUM SERPL-SCNC: 127 MMOL/L (ref 133–144)
WBC # BLD AUTO: 7 10E9/L (ref 4–11)

## 2020-07-05 PROCEDURE — 85025 COMPLETE CBC W/AUTO DIFF WBC: CPT | Performed by: EMERGENCY MEDICINE

## 2020-07-05 PROCEDURE — 96361 HYDRATE IV INFUSION ADD-ON: CPT

## 2020-07-05 PROCEDURE — 80320 DRUG SCREEN QUANTALCOHOLS: CPT | Performed by: EMERGENCY MEDICINE

## 2020-07-05 PROCEDURE — 99284 EMERGENCY DEPT VISIT MOD MDM: CPT | Mod: 25

## 2020-07-05 PROCEDURE — 96374 THER/PROPH/DIAG INJ IV PUSH: CPT

## 2020-07-05 PROCEDURE — 25000128 H RX IP 250 OP 636: Performed by: EMERGENCY MEDICINE

## 2020-07-05 PROCEDURE — 80053 COMPREHEN METABOLIC PANEL: CPT | Performed by: EMERGENCY MEDICINE

## 2020-07-05 PROCEDURE — 25800030 ZZH RX IP 258 OP 636: Performed by: EMERGENCY MEDICINE

## 2020-07-05 RX ORDER — ONDANSETRON 2 MG/ML
4 INJECTION INTRAMUSCULAR; INTRAVENOUS ONCE
Status: COMPLETED | OUTPATIENT
Start: 2020-07-05 | End: 2020-07-05

## 2020-07-05 RX ADMIN — ONDANSETRON 4 MG: 2 INJECTION INTRAMUSCULAR; INTRAVENOUS at 19:16

## 2020-07-05 RX ADMIN — SODIUM CHLORIDE 1000 ML: 9 INJECTION, SOLUTION INTRAVENOUS at 19:16

## 2020-07-05 NOTE — ED TRIAGE NOTES
"Patient arrived via EMS intoxicated with complaints of feeling dizzy. [facility], 5-6 beers per patient report, she got up to use the bathroom, felt dizzy, lowered herself to the floor and EMS was called for this reason. Patient did not fall, did not hit her head, and has not complaints of any injuries. She apparently knows she \"over did it\" and wanted to come here to sober up.   "

## 2020-07-05 NOTE — ED AVS SNAPSHOT
Emergency Department  [address] [facility] [address]  Phone:  [phone]  Fax:  [phone]                                    Liz Lieberman   MRN: [id number]    Department:   Emergency Department   Date of Visit:  7/5/2020           After Visit Summary Signature Page    I have received my discharge instructions, and my questions have been answered. I have discussed any challenges I see with this plan with the nurse or doctor.    ..........................................................................................................................................  Patient/Patient Representative Signature      ..........................................................................................................................................  Patient Representative Print Name and Relationship to Patient    ..................................................               ................................................  Date                                   Time    ..........................................................................................................................................  Reviewed by Signature/Title    ...................................................              ..............................................  Date                                               Time          22EPIC Rev 08/18

## 2020-07-05 NOTE — ED NOTES
Bed: BH3  Expected date:   Expected time:   Means of arrival:   Comments:  Jean Carlos 413 ETOH dizzy no covid 70 female  ETA [id number]

## 2020-07-06 NOTE — ED PROVIDER NOTES
History     Chief Complaint:  Alcohol Intoxication     HPI   Liz Lieberman is a 70-year-old female with a complex past medical history who presents with alcohol intoxication and feeling lightheaded.  Patient was brought by EMS from [facility] after consuming between 5 and 6 beers.  While walking to the restroom within the restaurant she felt increasingly dizzy and lie down on the floor.  She did not have any traumatic injuries since she did gently lie down onto the floor.  On arrival here patient has no complaints.  She denies chest pain, shortness of breath, abdominal pain, vomiting and diarrhea.  She does have some mild nausea.  She does states she probably had one too many drinks today.  Denies suicidal or homicidal ideation.  Denies syncopal events.  Patient does not have any close family or friends in the area to have for sober ride.  She does live independently in a condo unit.    Allergies:  Formaldehyde  Latex     Medications:    Tylenol  Albuterol  Amlodipine  Aspirin 81 mg   Atorvastatin   Cogentin  Cleotin  Clozaril  Topicort  Emollient  Ferrous gluconate   Flonase  Flovent  Mucinex  Levothyroxine  Prilosec  Miralax  Zoloft    Past Medical History:    Allergic rhinitis   Bronchiectasis (H)   Chronic pain   Depression   Diverticulosis   Gastro-oesophageal reflux disease   Hearing loss   Hiatal hernia   History of blood transfusion   Hyperlipidemia   Leukocytosis   Lung nodule   Mild intermittent asthma   Numbness and tingling   Osteoarthritis   Paranoid schizophrenia, in remission    Tardive dyskinesia   Urinary incontinence   Hypothyroidism   Mild intermittent asthma   Physical deconditioning  Iron deficiency anemia  History of alcohol use disorder   Orthostatic hypotension   Gait instability   Pruritic erythematous rash   Transient ischemic attack   Pneumonia   S/P total knee arthroplasty   Paranoid schizophrenia in remission      Past Surgical History:    Arthroplasty knee  Ent surgery  Orthopedic  surgery     Family History:    Family history reviewed. No pertinent family history    Social History:  The patient presented alone.  Smoking Status: Former Smoker  Smokeless Tobacco: Never Used  Alcohol Use: Positive (3-5 standard drinks)  Drug Use: Negative  PCP: Era Livingston    Review of Systems   Unable to perform ROS: Mental status change (Alcohol intoxication)     Physical Exam     Patient Vitals for the past 24 hrs:   BP Temp Temp src Pulse Heart Rate Resp SpO2   07/05/20 2218 129/78 -- -- -- 82 18 98 %   07/05/20 2100 -- -- -- -- -- -- 93 %   07/05/20 2000 -- -- -- -- -- -- 94 %   07/05/20 1908 -- 99.6  F (37.6  C) Oral -- -- -- 94 %   07/05/20 1853 (!) 147/81 -- -- 88 -- -- --      Physical Exam  General: Intoxicated, appears well-developed and well-nourished. Cooperative.     In mild distress  HEENT:  Head:  Atraumatic  Ears:  External ears are normal  Mouth/Throat:  Oropharynx is without erythema or exudate and mucous membranes are dry.   Eyes:   Conjunctivae normal and EOM are normal. No scleral icterus.  CV:  Normal rate, regular rhythm, normal heart sounds and radial pulses are 2+ and symmetric.  No murmur.  Resp:  Breath sounds are clear bilaterally    Non-labored, no retractions or accessory muscle use  GI:  Abdomen is soft, no distension, no tenderness. No rebound or guarding.  No CVA tenderness bilaterally  MS:  Normal range of motion. No edema.    Normal strength in all 4 extremities.     Back atraumatic.    No midline cervical, thoracic, or lumbar tenderness  Skin:  Warm and dry.  No rash or lesions noted.  Neuro: Alert. Normal strength.  GCS: 15  Psych:  Normal mood and affect.      Emergency Department Course     Laboratory:  Laboratory findings were communicated with the patient who voiced understanding of the findings.    CBC: WBC 7, HGB 10.8 (L),   CMP:  (L), glucose 103 (H), BUN 5 (L), creatinine 0.43 (L), calcium 8.3 (L), albumin 2.9 (L), protein total 6.7 (L) o/w WNL      Alcohol ethyl: 0.11 (H)    Interventions:  1916 zofran 4 mg IV  1916 0.9% NS 1000 mL     Emergency Department Course:     Nursing notes and vitals reviewed. I performed an exam of the patient as documented above.     1908 IV was inserted and blood was drawn for laboratory testing, results above.     Prior to discharge, I personally reviewed the results with the patient and all related questions were answered. The patient verbalized understanding and is amenable to plan.     Impression & Plan      Medical Decision Making:  Liz Lieberman is a 70 year old female who presents for evaluation of alcohol intoxication.  She is intoxicated here in ED by blood work.  Blood work otherwise looks ok; no signs of alcoholic ketoacidosis, significant liver impairment or acute alcoholic hepatitis. She has no history of DT's or alcohol withdrawal seizures. There are no signs of co-ingestion including acetaminophen, drugs, medications, volatile alcohols. She has no signs of trauma related to alcohol use and no further workup is needed including head CT. Patient sobered appropriately.  Safe for discharge home.  Sent by wheelchair van to [facility].  She does have keys to get in her house.  Questions answered before discharge.  Return precautions understood.       Diagnosis:    ICD-10-CM    1. Alcoholic intoxication without complication (H)  F10.920    2. Lightheadedness  R42    3. Hyponatremia  E87.1        Disposition:   The patient is discharged to home.    Discharge Medications:  Discharge Medication List as of 7/5/2020  9:47 PM            Scribe Disclosure:  I, Nicol Jose Raul, am serving as a scribe at 7:26 PM on 7/5/2020 to document services personally performed by Deric Dumont MD based on my observations and the provider's statements to me.       Deric Dumont MD  07/05/20 0671

## 2020-07-06 NOTE — ED NOTES
Assumed care of pt at this time.    Report: Pt here to be evaluated for alcohol intoxication and inability to care for self. Pt comlpliant with care and polite with staff. 20 G RFA. Labs drawn and sent for analysis. Pt on ritika.    Current Vitals: BP (!) 147/81   Pulse 88   Temp 99.6  F (37.6  C) (Oral)   SpO2 94%

## 2020-07-06 NOTE — DISCHARGE INSTRUCTIONS

## 2020-07-26 ENCOUNTER — HOSPITAL ENCOUNTER (EMERGENCY)
Facility: CLINIC | Age: 70
Discharge: HOME | End: 2020-07-26
Attending: EMERGENCY MEDICINE | Admitting: EMERGENCY MEDICINE
Payer: MEDICARE

## 2020-07-26 VITALS
TEMPERATURE: 98.8 F | WEIGHT: 145 LBS | OXYGEN SATURATION: 94 % | HEART RATE: 79 BPM | BODY MASS INDEX: 24.16 KG/M2 | RESPIRATION RATE: 16 BRPM | HEIGHT: 65 IN | DIASTOLIC BLOOD PRESSURE: 71 MMHG | SYSTOLIC BLOOD PRESSURE: 106 MMHG

## 2020-07-26 DIAGNOSIS — Z78.9 ALCOHOL USE: ICD-10-CM

## 2020-07-26 DIAGNOSIS — M62.81 GENERALIZED MUSCLE WEAKNESS: ICD-10-CM

## 2020-07-26 LAB
ALBUMIN SERPL-MCNC: 3.1 G/DL (ref 3.4–5)
ALBUMIN UR-MCNC: NEGATIVE MG/DL
ALP SERPL-CCNC: 133 U/L (ref 40–150)
ALT SERPL W P-5'-P-CCNC: 22 U/L (ref 0–50)
ANION GAP SERPL CALCULATED.3IONS-SCNC: 10 MMOL/L (ref 3–14)
APPEARANCE UR: CLEAR
AST SERPL W P-5'-P-CCNC: 19 U/L (ref 0–45)
BASOPHILS # BLD AUTO: 0 10E9/L (ref 0–0.2)
BASOPHILS NFR BLD AUTO: 0.1 %
BILIRUB SERPL-MCNC: 0.2 MG/DL (ref 0.2–1.3)
BILIRUB UR QL STRIP: NEGATIVE
BUN SERPL-MCNC: 6 MG/DL (ref 7–30)
CALCIUM SERPL-MCNC: 8 MG/DL (ref 8.5–10.1)
CHLORIDE SERPL-SCNC: 100 MMOL/L (ref 94–109)
CO2 SERPL-SCNC: 21 MMOL/L (ref 20–32)
COLOR UR AUTO: NORMAL
CREAT SERPL-MCNC: 0.52 MG/DL (ref 0.52–1.04)
DIFFERENTIAL METHOD BLD: NORMAL
EOSINOPHIL # BLD AUTO: 0 10E9/L (ref 0–0.7)
EOSINOPHIL NFR BLD AUTO: 0 %
ERYTHROCYTE [DISTWIDTH] IN BLOOD BY AUTOMATED COUNT: 14.2 % (ref 10–15)
ETHANOL SERPL-MCNC: 0.07 G/DL
GFR SERPL CREATININE-BSD FRML MDRD: >90 ML/MIN/{1.73_M2}
GLUCOSE BLDC GLUCOMTR-MCNC: 82 MG/DL (ref 70–99)
GLUCOSE SERPL-MCNC: 84 MG/DL (ref 70–99)
GLUCOSE UR STRIP-MCNC: NEGATIVE MG/DL
HCT VFR BLD AUTO: 36.7 % (ref 35–47)
HGB BLD-MCNC: 12.3 G/DL (ref 11.7–15.7)
HGB UR QL STRIP: NEGATIVE
IMM GRANULOCYTES # BLD: 0 10E9/L (ref 0–0.4)
IMM GRANULOCYTES NFR BLD: 0.6 %
INR PPP: 1.02 (ref 0.86–1.14)
INTERPRETATION ECG - MUSE: NORMAL
KETONES UR STRIP-MCNC: NEGATIVE MG/DL
LEUKOCYTE ESTERASE UR QL STRIP: NEGATIVE
LIPASE SERPL-CCNC: 35 U/L (ref 73–393)
LYMPHOCYTES # BLD AUTO: 1.9 10E9/L (ref 0.8–5.3)
LYMPHOCYTES NFR BLD AUTO: 28.3 %
MCH RBC QN AUTO: 28.6 PG (ref 26.5–33)
MCHC RBC AUTO-ENTMCNC: 33.5 G/DL (ref 31.5–36.5)
MCV RBC AUTO: 85 FL (ref 78–100)
MONOCYTES # BLD AUTO: 0.4 10E9/L (ref 0–1.3)
MONOCYTES NFR BLD AUTO: 5.3 %
NEUTROPHILS # BLD AUTO: 4.5 10E9/L (ref 1.6–8.3)
NEUTROPHILS NFR BLD AUTO: 65.7 %
NITRATE UR QL: NEGATIVE
NRBC # BLD AUTO: 0 10*3/UL
NRBC BLD AUTO-RTO: 0 /100
PH UR STRIP: 6 PH (ref 5–7)
PLATELET # BLD AUTO: 240 10E9/L (ref 150–450)
POTASSIUM SERPL-SCNC: 3.7 MMOL/L (ref 3.4–5.3)
PROT SERPL-MCNC: 6.8 G/DL (ref 6.8–8.8)
RBC # BLD AUTO: 4.3 10E12/L (ref 3.8–5.2)
RBC #/AREA URNS AUTO: 0 /HPF (ref 0–2)
SODIUM SERPL-SCNC: 131 MMOL/L (ref 133–144)
SOURCE: NORMAL
SP GR UR STRIP: 1 (ref 1–1.03)
TROPONIN I SERPL-MCNC: <0.015 UG/L (ref 0–0.04)
UROBILINOGEN UR STRIP-MCNC: NORMAL MG/DL (ref 0–2)
WBC # BLD AUTO: 6.8 10E9/L (ref 4–11)
WBC #/AREA URNS AUTO: 0 /HPF (ref 0–5)

## 2020-07-26 PROCEDURE — 84484 ASSAY OF TROPONIN QUANT: CPT | Performed by: EMERGENCY MEDICINE

## 2020-07-26 PROCEDURE — 99284 EMERGENCY DEPT VISIT MOD MDM: CPT | Mod: 25

## 2020-07-26 PROCEDURE — 25000128 H RX IP 250 OP 636: Performed by: EMERGENCY MEDICINE

## 2020-07-26 PROCEDURE — 80320 DRUG SCREEN QUANTALCOHOLS: CPT | Performed by: EMERGENCY MEDICINE

## 2020-07-26 PROCEDURE — 25800030 ZZH RX IP 258 OP 636: Performed by: EMERGENCY MEDICINE

## 2020-07-26 PROCEDURE — 80053 COMPREHEN METABOLIC PANEL: CPT | Performed by: EMERGENCY MEDICINE

## 2020-07-26 PROCEDURE — 96374 THER/PROPH/DIAG INJ IV PUSH: CPT

## 2020-07-26 PROCEDURE — 00000146 ZZHCL STATISTIC GLUCOSE BY METER IP

## 2020-07-26 PROCEDURE — 83690 ASSAY OF LIPASE: CPT | Performed by: EMERGENCY MEDICINE

## 2020-07-26 PROCEDURE — 85610 PROTHROMBIN TIME: CPT | Performed by: EMERGENCY MEDICINE

## 2020-07-26 PROCEDURE — 85025 COMPLETE CBC W/AUTO DIFF WBC: CPT | Performed by: EMERGENCY MEDICINE

## 2020-07-26 PROCEDURE — 81001 URINALYSIS AUTO W/SCOPE: CPT | Performed by: EMERGENCY MEDICINE

## 2020-07-26 PROCEDURE — 96361 HYDRATE IV INFUSION ADD-ON: CPT

## 2020-07-26 RX ORDER — ALBUTEROL SULFATE 0.83 MG/ML
2.5 SOLUTION RESPIRATORY (INHALATION) EVERY 6 HOURS PRN
COMMUNITY

## 2020-07-26 RX ORDER — DIPHENHYDRAMINE HYDROCHLORIDE 50 MG/ML
25 INJECTION INTRAMUSCULAR; INTRAVENOUS ONCE
Status: COMPLETED | OUTPATIENT
Start: 2020-07-26 | End: 2020-07-26

## 2020-07-26 RX ORDER — SODIUM CHLORIDE 9 MG/ML
INJECTION, SOLUTION INTRAVENOUS CONTINUOUS
Status: DISCONTINUED | OUTPATIENT
Start: 2020-07-26 | End: 2020-07-26 | Stop reason: HOSPADM

## 2020-07-26 RX ADMIN — DIPHENHYDRAMINE HYDROCHLORIDE 25 MG: 50 INJECTION, SOLUTION INTRAMUSCULAR; INTRAVENOUS at 19:10

## 2020-07-26 RX ADMIN — SODIUM CHLORIDE 1000 ML: 9 INJECTION, SOLUTION INTRAVENOUS at 18:59

## 2020-07-26 ASSESSMENT — ENCOUNTER SYMPTOMS
SHORTNESS OF BREATH: 0
DIARRHEA: 0
VOMITING: 0
NAUSEA: 0
WEAKNESS: 1
HEADACHES: 0
ABDOMINAL PAIN: 0
NUMBNESS: 0

## 2020-07-26 ASSESSMENT — MIFFLIN-ST. JEOR: SCORE: 1183.6

## 2020-07-26 NOTE — ED TRIAGE NOTES
Pt presents to the ED for evaluation of weakness. Per EMS, pt has been feeling weak since this AM. Per EMS, pt has had a 6pack of beer today. Per EMS, pt leaning to the right side since they picked her up on scene. BS : 56

## 2020-07-26 NOTE — ED PROVIDER NOTES
History   Chief Complaint:  Generalized Weakness       The history is provided by the patient.      Liz Lieberman is a 70 year old female with a history of alcohol intoxication and tardive dyskinesia, who presents via EMS for evaluation of generalized weakness. Per EMS, the patient notes that she comes from her apartment today after not feeling well all day. She reports that she started feeling off this morning. She states that she drank a 6 pack of beer with her last beer around an hour ago. EMS states that she has a BS of 56. She denies numbness and tingling, focal weakness, headache, nausea, emesis, chest pain, shortness of breath, or diarrhea.    Allergies:  Formaldehyde  Latex    Medications:   Albuterol inhaler  Amlodipine  Aspirin   Atorvastatin   Cogentin   Clozaril   Levothyroxine   Sertraline   Omeprazole     Past Medical History:    Bronchiectasis  Chronic pain   Depression   Diverticulosis  GERD  Hiatal hernia  Blood transfusion  Hyperlipidemia  Hypothyroidism   Asthma  Paranoid schizophrenia  Urinary incontinence  Tardive dyskinesia  RLS  Osteoarthritis  Pneumonia  Gait instability  Hypertension   TIA     Past Surgical History:    Arthroplasty knee, right  Tonsillectomy    Family History:    No past pertinent family history.     Social History:  Smoking status: Former smoker  Alcohol use: Yes  Drug use: No  PCP: Era Livingston  Presents to the ED via EMS  Up to date on immunization     Review of Systems   Respiratory: Negative for shortness of breath.    Cardiovascular: Negative for chest pain.   Gastrointestinal: Negative for abdominal pain, diarrhea, nausea and vomiting.   Neurological: Positive for weakness. Negative for numbness and headaches.   All other systems reviewed and are negative.        Physical Exam     Patient Vitals for the past 24 hrs:   BP Temp Temp src Pulse Heart Rate Resp SpO2 Height Weight   07/26/20 1935 -- -- -- -- -- -- 94 % -- --   07/26/20 1930 -- -- -- -- -- -- 93 % --  "--   07/26/20 1925 -- -- -- -- -- -- 94 % -- --   07/26/20 1920 -- -- -- -- -- -- 94 % -- --   07/26/20 1915 -- -- -- -- -- -- 95 % -- --   07/26/20 1910 -- -- -- -- -- -- 95 % -- --   07/26/20 1905 -- -- -- -- -- -- 96 % -- --   07/26/20 1900 -- -- -- -- -- -- 94 % -- --   07/26/20 1855 106/71 -- -- 79 -- -- 96 % -- --   07/26/20 1854 (!) 118/94 98.8  F (37.1  C) Oral 76 76 16 95 % 1.651 m (5' 5\") 65.8 kg (145 lb)       Physical Exam  Nursing note and vitals reviewed.  Constitutional:  Oriented to person, place, and time. Cooperative. Smells of alcohol.   HENT:   Nose:    Nose normal.   Mouth/Throat:   Mucous membranes are normal.   Eyes:    Conjunctivae normal and EOM are normal.      Pupils are equal, round, and reactive to light.   Neck:    Trachea normal.   Cardiovascular:  Normal rate, regular rhythm, normal heart sounds and normal pulses. No murmur heard.  Pulmonary/Chest:  Effort normal and breath sounds normal.   Abdominal:   Soft. Normal appearance and bowel sounds are normal.      There is no tenderness.      There is no rebound and no CVA tenderness.   Musculoskeletal:  Extremities atraumatic x 4.   Lymphadenopathy:  No cervical adenopathy.   Neurological:   Alert and oriented to person, place, and time. Normal strength.      No cranial nerve deficit or sensory deficit. GCS eye subscore is 4. GCS verbal subscore is 5. GCS motor subscore is 6. Some choreiform movements in the upper extremities present.   Skin:    Skin is intact. No rash noted.   Psychiatric:   Normal mood and affect.       Emergency Department Course     ECG:  ECG taken at 1859, ECG read at 1900  Normal sinus rhythm with sinus arrythmia  Normal ECG  Rate 78 bpm. GA interval 140 ms. QRS duration 80 ms. QT/QTc 404/460 ms. P-R-T axes 52 27 52.     Laboratory:  Laboratory findings were communicated with the patient who voiced understanding of the findings.    CBC: WBC 6.8, HGB 12.3,   CMP: (L), BUN 6(L), Calcium 8.30(L), Albumin " 3.1(L) o/w WNL (Creatinine 0.52)  INR: 1.02  Troponin (Collected 1928): <0.015   UA with micro: o/w negative   Alcohol ethyl: 0.07(H)  Glucose by meter: 82    Interventions:  1859 NS 1L IV Bolus  1910 Benadryl, 25 mg, IV     Medications   0.9% sodium chloride BOLUS (1,000 mLs Intravenous New Bag 7/26/20 1859)     Followed by   sodium chloride 0.9% infusion (has no administration in time range)   diphenhydrAMINE (BENADRYL) injection 25 mg (25 mg Intravenous Given 7/26/20 1910)       Emergency Department Course:   Nursing notes and vitals reviewed.    1845 I performed an exam of the patient as documented above. EKG was performed, see results above.     1903 IV was inserted and blood was drawn for laboratory testing, results above.     1910 The patient provided a urine sample here in the emergency department. This was sent for laboratory testing, findings above.     1940 I personally reviewed the results with the patient and answered all related questions prior to discharge.    Impression & Plan      Medical Decision Making:  Liz Lieberman is a 70 year old female who presents to the emergency department today for evaluation of who came in by ambulance due to not feeling well and overall generalized weakness.  However she also has been drinking today.  EMS was somewhat concerned about her restless extremities, however those are not new and appears secondary to her psychotropic medications.  I felt it was reasonable to check the above blood work and urine to rule out any obvious abnormality such as renal failure, and electrolyte disturbance, anemia, or possibly a UTI or significant alcohol intoxication.  Her work-up here is essentially unremarkable although her sodium level is slightly low.  She also does have alcohol in her system but she does not appear significantly intoxicated.  At this point though I do not feel that anything further needs to be done, and she has an appointment in 2 days with her primary care  provider.  She should keep that appointment and certainly return though with any concerns or worsening symptoms.  I also instructed her to try to avoid drinking alcohol when she is not feeling well already.    Diagnosis:    ICD-10-CM    1. Generalized muscle weakness  M62.81    2. Alcohol use  Z72.89        Disposition:    The patient is discharged to home.     Discharge Medications:  New Prescriptions    No medications on file       Scribe Disclosure:  I, Marion Dia, am serving as a scribe at 6:50 PM on 7/26/2020 to document services personally performed by Alfonso Bermudez MD based on my observations and the provider's statements to me.  [facility] EMERGENCY DEPARTMENT         Alfonso Bermudez MD  07/26/20 2007

## 2020-07-26 NOTE — ED AVS SNAPSHOT
Emergency Department  [address] [facility] [address]  Phone:  [phone]  Fax:  [phone]                                    Liz Lieberman   MRN: [id number]    Department:   Emergency Department   Date of Visit:  7/26/2020           After Visit Summary Signature Page    I have received my discharge instructions, and my questions have been answered. I have discussed any challenges I see with this plan with the nurse or doctor.    ..........................................................................................................................................  Patient/Patient Representative Signature      ..........................................................................................................................................  Patient Representative Print Name and Relationship to Patient    ..................................................               ................................................  Date                                   Time    ..........................................................................................................................................  Reviewed by Signature/Title    ...................................................              ..............................................  Date                                               Time          22EPIC Rev 08/18

## 2020-07-26 NOTE — ED NOTES
Bed: ED25  Expected date:   Expected time:   Means of arrival:   Comments:  Brigitte 2 Weakness 70 f

## 2020-08-08 ENCOUNTER — HOSPITAL ENCOUNTER (EMERGENCY)
Facility: CLINIC | Age: 70
Discharge: HOME | End: 2020-08-08
Attending: PHYSICIAN ASSISTANT | Admitting: PHYSICIAN ASSISTANT
Payer: MEDICARE

## 2020-08-08 VITALS
HEART RATE: 91 BPM | DIASTOLIC BLOOD PRESSURE: 86 MMHG | BODY MASS INDEX: 24.16 KG/M2 | RESPIRATION RATE: 18 BRPM | WEIGHT: 145 LBS | SYSTOLIC BLOOD PRESSURE: 154 MMHG | TEMPERATURE: 97.5 F | OXYGEN SATURATION: 98 % | HEIGHT: 65 IN

## 2020-08-08 DIAGNOSIS — E87.1 HYPONATREMIA: ICD-10-CM

## 2020-08-08 DIAGNOSIS — R42 DIZZINESS: ICD-10-CM

## 2020-08-08 LAB
ALBUMIN SERPL-MCNC: 3.2 G/DL (ref 3.4–5)
ALBUMIN UR-MCNC: NEGATIVE MG/DL
ALP SERPL-CCNC: 136 U/L (ref 40–150)
ALT SERPL W P-5'-P-CCNC: 20 U/L (ref 0–50)
ANION GAP SERPL CALCULATED.3IONS-SCNC: 4 MMOL/L (ref 3–14)
APPEARANCE UR: CLEAR
AST SERPL W P-5'-P-CCNC: 15 U/L (ref 0–45)
BASOPHILS # BLD AUTO: 0 10E9/L (ref 0–0.2)
BASOPHILS NFR BLD AUTO: 0.2 %
BILIRUB SERPL-MCNC: 0.3 MG/DL (ref 0.2–1.3)
BILIRUB UR QL STRIP: NEGATIVE
BUN SERPL-MCNC: 5 MG/DL (ref 7–30)
CALCIUM SERPL-MCNC: 8.2 MG/DL (ref 8.5–10.1)
CHLORIDE SERPL-SCNC: 96 MMOL/L (ref 94–109)
CO2 SERPL-SCNC: 26 MMOL/L (ref 20–32)
COLOR UR AUTO: ABNORMAL
CREAT SERPL-MCNC: 0.5 MG/DL (ref 0.52–1.04)
DIFFERENTIAL METHOD BLD: ABNORMAL
EOSINOPHIL # BLD AUTO: 0 10E9/L (ref 0–0.7)
EOSINOPHIL NFR BLD AUTO: 0 %
ERYTHROCYTE [DISTWIDTH] IN BLOOD BY AUTOMATED COUNT: 14.1 % (ref 10–15)
ETHANOL SERPL-MCNC: <0.01 G/DL
GFR SERPL CREATININE-BSD FRML MDRD: >90 ML/MIN/{1.73_M2}
GLUCOSE SERPL-MCNC: 94 MG/DL (ref 70–99)
GLUCOSE UR STRIP-MCNC: NEGATIVE MG/DL
HCT VFR BLD AUTO: 34.5 % (ref 35–47)
HGB BLD-MCNC: 11.6 G/DL (ref 11.7–15.7)
HGB UR QL STRIP: NEGATIVE
IMM GRANULOCYTES # BLD: 0 10E9/L (ref 0–0.4)
IMM GRANULOCYTES NFR BLD: 0.3 %
INTERPRETATION ECG - MUSE: NORMAL
KETONES UR STRIP-MCNC: NEGATIVE MG/DL
LEUKOCYTE ESTERASE UR QL STRIP: NEGATIVE
LYMPHOCYTES # BLD AUTO: 1.7 10E9/L (ref 0.8–5.3)
LYMPHOCYTES NFR BLD AUTO: 26.6 %
MCH RBC QN AUTO: 28.6 PG (ref 26.5–33)
MCHC RBC AUTO-ENTMCNC: 33.6 G/DL (ref 31.5–36.5)
MCV RBC AUTO: 85 FL (ref 78–100)
MONOCYTES # BLD AUTO: 0.4 10E9/L (ref 0–1.3)
MONOCYTES NFR BLD AUTO: 6.3 %
NEUTROPHILS # BLD AUTO: 4.3 10E9/L (ref 1.6–8.3)
NEUTROPHILS NFR BLD AUTO: 66.6 %
NITRATE UR QL: NEGATIVE
NRBC # BLD AUTO: 0 10*3/UL
NRBC BLD AUTO-RTO: 0 /100
PH UR STRIP: 7 PH (ref 5–7)
PLATELET # BLD AUTO: 207 10E9/L (ref 150–450)
POTASSIUM SERPL-SCNC: 3.7 MMOL/L (ref 3.4–5.3)
PROT SERPL-MCNC: 6.9 G/DL (ref 6.8–8.8)
RBC # BLD AUTO: 4.06 10E12/L (ref 3.8–5.2)
RBC #/AREA URNS AUTO: 1 /HPF (ref 0–2)
SODIUM SERPL-SCNC: 126 MMOL/L (ref 133–144)
SOURCE: ABNORMAL
SP GR UR STRIP: 1 (ref 1–1.03)
UROBILINOGEN UR STRIP-MCNC: NORMAL MG/DL (ref 0–2)
WBC # BLD AUTO: 6.5 10E9/L (ref 4–11)
WBC #/AREA URNS AUTO: <1 /HPF (ref 0–5)

## 2020-08-08 PROCEDURE — 96360 HYDRATION IV INFUSION INIT: CPT

## 2020-08-08 PROCEDURE — 85025 COMPLETE CBC W/AUTO DIFF WBC: CPT | Performed by: PHYSICIAN ASSISTANT

## 2020-08-08 PROCEDURE — 25800030 ZZH RX IP 258 OP 636: Performed by: PHYSICIAN ASSISTANT

## 2020-08-08 PROCEDURE — 93005 ELECTROCARDIOGRAM TRACING: CPT

## 2020-08-08 PROCEDURE — 80053 COMPREHEN METABOLIC PANEL: CPT | Performed by: PHYSICIAN ASSISTANT

## 2020-08-08 PROCEDURE — 99284 EMERGENCY DEPT VISIT MOD MDM: CPT | Mod: 25

## 2020-08-08 PROCEDURE — 81001 URINALYSIS AUTO W/SCOPE: CPT | Mod: XU | Performed by: PHYSICIAN ASSISTANT

## 2020-08-08 PROCEDURE — 80320 DRUG SCREEN QUANTALCOHOLS: CPT | Performed by: PHYSICIAN ASSISTANT

## 2020-08-08 RX ADMIN — SODIUM CHLORIDE 1000 ML: 9 INJECTION, SOLUTION INTRAVENOUS at 14:54

## 2020-08-08 ASSESSMENT — ENCOUNTER SYMPTOMS
DIZZINESS: 1
NAUSEA: 0
FEVER: 0
VOMITING: 0
DIARRHEA: 0

## 2020-08-08 ASSESSMENT — MIFFLIN-ST. JEOR: SCORE: 1183.6

## 2020-08-08 NOTE — DISCHARGE INSTRUCTIONS
*Drink plenty of fluids.  *Take medication as prescribed.   *Follow-up with primary care provider on Monday for recheck.  Return to emergency department for worsening dizziness or any other new/concerning symptoms.

## 2020-08-08 NOTE — ED AVS SNAPSHOT
Emergency Department  [address] [facility] [address]  Phone:  [phone]  Fax:  [phone]                                    Liz Lieberman   MRN: [id number]    Department:   Emergency Department   Date of Visit:  8/8/2020           After Visit Summary Signature Page    I have received my discharge instructions, and my questions have been answered. I have discussed any challenges I see with this plan with the nurse or doctor.    ..........................................................................................................................................  Patient/Patient Representative Signature      ..........................................................................................................................................  Patient Representative Print Name and Relationship to Patient    ..................................................               ................................................  Date                                   Time    ..........................................................................................................................................  Reviewed by Signature/Title    ...................................................              ..............................................  Date                                               Time          22EPIC Rev 08/18

## 2020-08-08 NOTE — ED NOTES
Bed: ED27  Expected date:   Expected time:   Means of arrival:   Comments:  448  70 F dizzy with standing/neg covid  9421

## 2020-08-08 NOTE — ED PROVIDER NOTES
"  History     Chief Complaint:  Dizziness      The history is provided by the patient.      Liz Lieberman is a 70 year old female with a history of tardive dyskinesia who presents with dizziness. Today the patient experienced a few episodes of dizziness upon standing. She reports that she has been evaluated for similar symptoms in the past but they have not found the cause. Liz states that she has been adequately eating and drinking. She does admit to drinking alcohol on a daily basis but has not consumed any alcohol today. The patient denies any other complaints including fever, chest pain, nausea, vomiting, and diarrhea.     Allergies:  Latex  Formaldehyde     Medications:    Tylenol   Albuterol   Amlodipine  Aspirin  Cogentin  Calcium carbonate   Clozapine  Emollient   Flonase  Folvite  Omeprazole  Zoloft     Past Medical History:    Allergic rhinitis   Bronchiectasis  Chronic pain  Depression   Diverticulosis  Hearing loss  Hiatal hernia   Hyperlipidemia   Hypothyroidism  Leukocytosis   Lung nodule   Asthma   Schizophrenia in remission  Restless leg syndrome  Tardive dyskinesia  Urinary incontinence     Past Surgical History:    Knee arthoplasty   Tonsillectomy     Family History:    No past pertinent family history.     Social History:  Smoking Status: Former smoker   Smokeless Tobacco: Never Used  Alcohol Use: Yes  Drug Use: No  PCP: Era Livingston  Marital Status:   [4]      Review of Systems   Constitutional: Negative for fever.   Cardiovascular: Negative for chest pain.   Gastrointestinal: Negative for diarrhea, nausea and vomiting.   Neurological: Positive for dizziness.   All other systems reviewed and are negative.      Physical Exam     Patient Vitals for the past 24 hrs:   BP Temp Temp src Pulse Heart Rate Resp SpO2 Height Weight   08/08/20 1500 (!) 154/86 -- -- 91 -- -- 98 % -- --   08/08/20 1434 (!) 154/97 97.5  F (36.4  C) Temporal -- 80 18 96 % 1.651 m (5' 5\") 65.8 kg (145 lb)    "     Physical Exam  General: Alert, interactive. Involuntary movements of the head and extremities.   Head:  Scalp is atraumatic.  Eyes:  EOM intact. The pupils are equal, round, and reactive to light. No scleral icterus.   ENT:                                      Ears:  The external ears are normal.   Nose:  The external nose is normal.  Throat:  The oropharynx is normal. Mucus membranes are moist.                 Neck:  Normal range of motion. There is no rigidity.   CV:  Regular rate and rhythm. No murmur. 2+ radial pulses  Resp:  Breath sounds are clear bilaterally. Non-labored, no retractions or accessory muscle use.  GI:  Abdomen is soft, no distension, no tenderness.   MS:  Normal range of motion.   Skin:  Warm and dry.   Neuro: Chronic movement disorder. Cranial nerves 2-12 intact; 5/5 strength throughout the upper and lower extremities; sensation intact to light touch throughout the upper and lower extremities; somewhat shuffled gait, though stable (patient reports at her baseline)  Psych:  Awake. Alert.  Appropriate interactions.       Emergency Department Course   ECG:  ECG taken at 1510, ECG read at 1514  Normal sinus rhythm  Normal ECG  Rate 76 bpm. VA interval 158 ms. QRS duration 84 ms. QT/QTc 408/459 ms. P-R-T axes 59 12 34.    Laboratory:  Laboratory findings were communicated with the patient who voiced understanding of the findings.    CBC: WBC 6.5, HGB 11.6 (L),   CMP: na 126 (L), bun 5 (L), calcium 8.2 (L), albumin 3.2 (L) o/w WNL (Creatinine 0.50 (L))  Alcohol blood level: <0.01    UA with microscopic: gravity 1.001 (L) o/w WNL     Interventions:  1454 NS bolus 1,000 ml IV     Emergency Department Course:  Nursing notes and vitals reviewed.    2:30 PM I performed an exam of the patient as documented above.     IV was inserted and blood was drawn for laboratory testing, results above.    The patient provided a urine sample here in the emergency department. This was sent for laboratory  testing, findings above.     2:59 PM Lying Orthostatic BP     Lying Orthostatic BP: 159/89     Lying Orthostatic Pulse: 76 bpm     Standing Orthostatic BP     Standing Orthostatic BP: 154/86     Standing Orthostatic Pulse: 88 bpm     4:30 PM  I returned to update the patient about their plan for discharge.       Impression & Plan      Medical Decision Making:  Liz Lieberman is a 70 year old female with medical history including TIA, paranoid schizophrenia, tardive dyskinesia, gait instability who presents with dizziness with standing. Denies other symptoms.  I considered a broad differential including cardiac arrythmia, aortic stenosis, PE, orthostatic hypotension, anemia, electrolyte abnormality, alcohol intoxication.  There is no murmur on exam making aortic stenosis unlikely.  There are no malignant arrhthymias on EKG. The patient does not appear clinically dehydrated and I would not expect this based on history. The patient is not anemic. Mild hyponatremia- patient rehydrated with 1 liter of normal saline. No other electrolyte abnormalities.     There are no signs of a concerning etiology for near syncope at this point.  The patient has a history of similar emergency department visits. Unclear source at this time, though patient ambulated without difficulty and I believe she is appropriate for discharge home.  Patient agrees with the plan and all questions/concerns addressed prior to discharge home.  Plan for close follow up with PCP.     Diagnosis:    ICD-10-CM    1. Dizziness  R42    2. Hyponatremia  E87.1        Disposition:   The patient is discharged to home.     Discharge Medications:    Discharge Medication List as of 8/8/2020  4:35 PM          Scribe Disclosure:  I, Marge Mcdermott, am serving as a scribe at 2:31 PM on 8/8/2020 to document services personally performed by Dolores Conroy PA-C based on my observations and the provider's statements to me.      EMERGENCY DEPARTMENT       Rafiq  Dolores Vazquez PA-C  08/08/20 2998

## 2021-01-26 ENCOUNTER — HOSPITAL ENCOUNTER (EMERGENCY)
Facility: CLINIC | Age: 71
Discharge: HOME | End: 2021-01-26
Attending: EMERGENCY MEDICINE | Admitting: EMERGENCY MEDICINE
Payer: MEDICARE

## 2021-01-26 ENCOUNTER — APPOINTMENT (OUTPATIENT)
Dept: CT IMAGING | Facility: CLINIC | Age: 71
End: 2021-01-26
Attending: EMERGENCY MEDICINE
Payer: MEDICARE

## 2021-01-26 VITALS
OXYGEN SATURATION: 97 % | RESPIRATION RATE: 16 BRPM | TEMPERATURE: 97 F | HEART RATE: 78 BPM | SYSTOLIC BLOOD PRESSURE: 154 MMHG | DIASTOLIC BLOOD PRESSURE: 98 MMHG

## 2021-01-26 DIAGNOSIS — F10.920 ALCOHOLIC INTOXICATION WITHOUT COMPLICATION (H): ICD-10-CM
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
## 2021-01-26 LAB
ALBUMIN UR-MCNC: NEGATIVE MG/DL
ANION GAP SERPL CALCULATED.3IONS-SCNC: 9 MMOL/L (ref 3–14)
APPEARANCE UR: CLEAR
BASOPHILS # BLD AUTO: 0 10E9/L (ref 0–0.2)
BASOPHILS NFR BLD AUTO: 0.3 %
BILIRUB UR QL STRIP: NEGATIVE
BUN SERPL-MCNC: 8 MG/DL (ref 7–30)
CALCIUM SERPL-MCNC: 8.2 MG/DL (ref 8.5–10.1)
CHLORIDE SERPL-SCNC: 96 MMOL/L (ref 94–109)
CO2 SERPL-SCNC: 23 MMOL/L (ref 20–32)
COLOR UR AUTO: NORMAL
CREAT SERPL-MCNC: 0.4 MG/DL (ref 0.52–1.04)
DIFFERENTIAL METHOD BLD: NORMAL
EOSINOPHIL # BLD AUTO: 0 10E9/L (ref 0–0.7)
EOSINOPHIL NFR BLD AUTO: 0 %
ERYTHROCYTE [DISTWIDTH] IN BLOOD BY AUTOMATED COUNT: 13 % (ref 10–15)
ETHANOL SERPL-MCNC: 0.18 G/DL
GFR SERPL CREATININE-BSD FRML MDRD: >90 ML/MIN/{1.73_M2}
GLUCOSE SERPL-MCNC: 113 MG/DL (ref 70–99)
GLUCOSE UR STRIP-MCNC: NEGATIVE MG/DL
HCT VFR BLD AUTO: 36.3 % (ref 35–47)
HGB BLD-MCNC: 12.2 G/DL (ref 11.7–15.7)
HGB UR QL STRIP: NEGATIVE
HYALINE CASTS #/AREA URNS LPF: 1 /LPF (ref 0–2)
IMM GRANULOCYTES # BLD: 0 10E9/L (ref 0–0.4)
IMM GRANULOCYTES NFR BLD: 0.5 %
INTERPRETATION ECG - MUSE: NORMAL
KETONES UR STRIP-MCNC: NEGATIVE MG/DL
LEUKOCYTE ESTERASE UR QL STRIP: NEGATIVE
LYMPHOCYTES # BLD AUTO: 2.1 10E9/L (ref 0.8–5.3)
LYMPHOCYTES NFR BLD AUTO: 35.2 %
MCH RBC QN AUTO: 29.3 PG (ref 26.5–33)
MCHC RBC AUTO-ENTMCNC: 33.6 G/DL (ref 31.5–36.5)
MCV RBC AUTO: 87 FL (ref 78–100)
MONOCYTES # BLD AUTO: 0.4 10E9/L (ref 0–1.3)
MONOCYTES NFR BLD AUTO: 6.3 %
NEUTROPHILS # BLD AUTO: 3.4 10E9/L (ref 1.6–8.3)
NEUTROPHILS NFR BLD AUTO: 57.7 %
NITRATE UR QL: NEGATIVE
NRBC # BLD AUTO: 0 10*3/UL
NRBC BLD AUTO-RTO: 0 /100
PH UR STRIP: 6 PH (ref 5–7)
PLATELET # BLD AUTO: 173 10E9/L (ref 150–450)
POTASSIUM SERPL-SCNC: 3.5 MMOL/L (ref 3.4–5.3)
RBC # BLD AUTO: 4.17 10E12/L (ref 3.8–5.2)
RBC #/AREA URNS AUTO: 0 /HPF (ref 0–2)
SODIUM SERPL-SCNC: 128 MMOL/L (ref 133–144)
SOURCE: NORMAL
SP GR UR STRIP: 1 (ref 1–1.03)
UROBILINOGEN UR STRIP-MCNC: 0 MG/DL (ref 0–2)
WBC # BLD AUTO: 5.9 10E9/L (ref 4–11)
WBC #/AREA URNS AUTO: 0 /HPF (ref 0–5)

## 2021-01-26 PROCEDURE — 85025 COMPLETE CBC W/AUTO DIFF WBC: CPT | Performed by: EMERGENCY MEDICINE

## 2021-01-26 PROCEDURE — 96375 TX/PRO/DX INJ NEW DRUG ADDON: CPT

## 2021-01-26 PROCEDURE — 99285 EMERGENCY DEPT VISIT HI MDM: CPT | Mod: 25

## 2021-01-26 PROCEDURE — 80048 BASIC METABOLIC PNL TOTAL CA: CPT | Performed by: EMERGENCY MEDICINE

## 2021-01-26 PROCEDURE — 96374 THER/PROPH/DIAG INJ IV PUSH: CPT

## 2021-01-26 PROCEDURE — 70450 CT HEAD/BRAIN W/O DYE: CPT | Mod: MG

## 2021-01-26 PROCEDURE — 82077 ASSAY SPEC XCP UR&BREATH IA: CPT | Performed by: EMERGENCY MEDICINE

## 2021-01-26 PROCEDURE — 72125 CT NECK SPINE W/O DYE: CPT | Mod: ME

## 2021-01-26 PROCEDURE — 258N000003 HC RX IP 258 OP 636: Performed by: EMERGENCY MEDICINE

## 2021-01-26 PROCEDURE — 81001 URINALYSIS AUTO W/SCOPE: CPT | Performed by: EMERGENCY MEDICINE

## 2021-01-26 RX ORDER — SODIUM CHLORIDE 9 MG/ML
INJECTION, SOLUTION INTRAVENOUS CONTINUOUS
Status: DISCONTINUED | OUTPATIENT
Start: 2021-01-26 | End: 2021-01-27 | Stop reason: HOSPADM

## 2021-01-26 RX ADMIN — SODIUM CHLORIDE 1000 ML: 9 INJECTION, SOLUTION INTRAVENOUS at 19:28

## 2021-01-27 NOTE — ED TRIAGE NOTES
Pt arrives via EMS from [facility]. Pt drove self to restaurant, had 3 large beers and passed out at table, found by staff. Pt denies any other ETOH or drugs. Received 4mg Zofran IV en route

## 2021-01-27 NOTE — ED PROVIDER NOTES
History     Chief Complaint:  Loss of Consciousness and Alcohol Intoxication     History limited by: Alcohol intoxication.     Liz Lieberman is a 70 year old female with a history of tardive dyskinesia and alcohol use disorder who presents for evaluation of loss of consciousness and alcohol intoxication. Per EMS, the patient was found passed out at a table and found by staff at [facility] after having three large beers. The patient states that she is currently feeling fine.       Review of Systems   Neurological:        Loss of consciousness    All other systems reviewed and are negative.    ROS limited due to alcohol intoxication.     Allergies:  Formaldehyde  Latex    Medications:   Albuterol  Norvasc  Lipitor  Cogentin   Cleocin   Clozaril   Topicort  Emollient  Ferrous Gluconate  Flonase  Flovent   Folvite  Mucinex  Prilosec  Zoloft  Miralax     Medical History:   Paranoid schizophrenia in remission   Total knee arthroplasty  Pneumonia  Transient ischemic attack  Pruritic erythematous rash  Gait instability  Orthostatic hypotension  Restless legs syndrome  Hypothyroidism  Mild intermittent asthma  Tardive dyskinesia  Benign essential hypertension  Physical deconditioning  Tardive dyskinesia  Iron deficiency anemia  Alcohol use disorder    Past Surgical History:    Arthroplasty knee  ENT surgery   Orthopedic surgery     Social History:  The patient was accompanied to the ED by EMS.  PCP: Era Livingston     Physical Exam     Patient Vitals for the past 24 hrs:   BP Temp Temp src Pulse Resp SpO2   01/26/21 1900 (!) 171/113 97  F (36.1  C) Temporal 82 18 95 %     Physical Exam  Vitals: reviewed by me  General: Pt seen on [facility], pleasant, cooperative, and alert to conversation Parkinsonian movement noted.  Eyes: Tracking well, clear conjunctiva BL  ENT: MMM, midline trachea.  No C-spine tenderness.  Lungs:   No tachypnea, no accessory muscle use. No respiratory distress.   CV: Rate as above, regular  rhythm.    Abd: Soft, non tender, no guarding, no rebound. Non distended  MSK: no peripheral edema or joint effusion.  No evidence of trauma  Skin: No rash, normal turgor and temperature  Neuro: Clear speech and no facial droop.  Following all commands, ambulatory strong steady gait, able to dress herself.  Psych: Not RIS, no e/o AH/VH        Emergency Department Course   EKG:  ECG taken at 1922  Normal sinus rhythm  Normal ECG  Rate 77 bpm. HI interval 146 ms. QRS duration 80 ms. QT/QTc 430/486 ms. P-R-T axes 64 20 37.     Imaging:  CT Head w/o Contrast  HEAD CT:  1.  No acute abnormality.  2.  Mild generalized volume loss.  3.  No change.    CERVICAL SPINE CT:  1.  No CT evidence for acute fracture or post traumatic subluxation.  2.  Chronic degenerative change results in moderate C5-6 spinal canal  stenosis with severe right neural foraminal stenosis.    Radiation dose for this scan was reduced using automated exposure  control, adjustment of the mA and/or kV according to patient size, or  iterative reconstruction technique    NIKOLE DANIEL MD    Cervical spine CT w/o contrast  HEAD CT:  1.  No acute abnormality.  2.  Mild generalized volume loss.  3.  No change.    CERVICAL SPINE CT:  1.  No CT evidence for acute fracture or post traumatic subluxation.  2.  Chronic degenerative change results in moderate C5-6 spinal canal  stenosis with severe right neural foraminal stenosis.    Radiation dose for this scan was reduced using automated exposure  control, adjustment of the mA and/or kV according to patient size, or  iterative reconstruction technique    NIKOLE DANIEL MD  Reading per radiology     Laboratory:  CBC: WBC 5.9, HGB 12.2,   BMP:  (L), glucose 113 (H), creatinine 0.40 (L), calcium 8.2 (L) o/w WNL   Alcohol ethyl: 0.18 (H)  UA with micro: negative     Emergency Department Course:  Reviewed:  1910 I reviewed nursing notes, vitals, past medical history and care  everywhere    Assessments: Patient rechecked and updated.      Interventions:  1928 0.9% NS 1000 mL IV    Disposition:  Home  Impression & Plan   Medical Decision Making:  This is a very pleasant 70-year-old female with Parkinson's who presents the emergency room after ground-level fall.  She denies any pain, and her trauma work-up is negative.  She is ambulatory with a strong steady gait, has a negative CT scan of the head and neck.  She was slightly intoxicated when she came here, but now appears to be able to take care of her self, and is asking to go home.  No suicidality or homicidality.  We went over how alcohol and Parkinson's may not match well.  She is asking to go home, and I do think she is within her rights to go home.  She does not appear to be intoxicated clinically, and understands that she still may have a mild amount of alcohol left in her system.  Will discharge with very clear return ED precautions.    Diagnosis:     ICD-10-CM    1. Fall, initial encounter  W19.XXXA    2. Alcoholic intoxication without complication (H)  F10.920         Scribe Disclosure:  I, Nicol Blunt, am serving as a scribe at 7:10 PM on 1/26/2021 to document services personally performed by Anselmo Stlalworth MD based on my observations and the provider's statements to me.     [facility] Neosho HospitalAnselmo Serrano MD  01/26/21 5282

## 2021-05-09 ENCOUNTER — APPOINTMENT (OUTPATIENT)
Dept: CT IMAGING | Facility: CLINIC | Age: 71
End: 2021-05-09
Attending: EMERGENCY MEDICINE
Payer: MEDICARE

## 2021-05-09 ENCOUNTER — HOSPITAL ENCOUNTER (EMERGENCY)
Facility: CLINIC | Age: 71
Discharge: HOME | End: 2021-05-09
Attending: EMERGENCY MEDICINE | Admitting: EMERGENCY MEDICINE
Payer: MEDICARE

## 2021-05-09 VITALS
RESPIRATION RATE: 20 BRPM | SYSTOLIC BLOOD PRESSURE: 139 MMHG | TEMPERATURE: 98.1 F | HEART RATE: 80 BPM | DIASTOLIC BLOOD PRESSURE: 80 MMHG | OXYGEN SATURATION: 91 %

## 2021-05-09 DIAGNOSIS — F10.920 ALCOHOLIC INTOXICATION WITHOUT COMPLICATION (H): ICD-10-CM

## 2021-05-09 LAB
ALBUMIN SERPL-MCNC: 3.2 G/DL (ref 3.4–5)
ALBUMIN UR-MCNC: NEGATIVE MG/DL
ALCOHOL BREATH TEST: 0.13 (ref 0–0.01)
ALP SERPL-CCNC: 133 U/L (ref 40–150)
ALT SERPL W P-5'-P-CCNC: 25 U/L (ref 0–50)
ANION GAP SERPL CALCULATED.3IONS-SCNC: 7 MMOL/L (ref 3–14)
APPEARANCE UR: CLEAR
APTT PPP: 30 SEC (ref 22–37)
AST SERPL W P-5'-P-CCNC: 16 U/L (ref 0–45)
BASOPHILS # BLD AUTO: 0 10E9/L (ref 0–0.2)
BASOPHILS NFR BLD AUTO: 0.4 %
BILIRUB SERPL-MCNC: 0.2 MG/DL (ref 0.2–1.3)
BILIRUB UR QL STRIP: NEGATIVE
BUN SERPL-MCNC: 7 MG/DL (ref 7–30)
CALCIUM SERPL-MCNC: 8.4 MG/DL (ref 8.5–10.1)
CHLORIDE SERPL-SCNC: 108 MMOL/L (ref 94–109)
CO2 SERPL-SCNC: 24 MMOL/L (ref 20–32)
COLOR UR AUTO: NORMAL
CREAT SERPL-MCNC: 0.46 MG/DL (ref 0.52–1.04)
DIFFERENTIAL METHOD BLD: NORMAL
EOSINOPHIL # BLD AUTO: 0 10E9/L (ref 0–0.7)
EOSINOPHIL NFR BLD AUTO: 0 %
ERYTHROCYTE [DISTWIDTH] IN BLOOD BY AUTOMATED COUNT: 12.9 % (ref 10–15)
ETHANOL SERPL-MCNC: 0.1 G/DL
GFR SERPL CREATININE-BSD FRML MDRD: >90 ML/MIN/{1.73_M2}
GLUCOSE SERPL-MCNC: 94 MG/DL (ref 70–99)
GLUCOSE UR STRIP-MCNC: NEGATIVE MG/DL
HCT VFR BLD AUTO: 37.1 % (ref 35–47)
HGB BLD-MCNC: 12.2 G/DL (ref 11.7–15.7)
HGB UR QL STRIP: NEGATIVE
IMM GRANULOCYTES # BLD: 0.1 10E9/L (ref 0–0.4)
IMM GRANULOCYTES NFR BLD: 1.1 %
INR PPP: 0.98 (ref 0.86–1.14)
KETONES UR STRIP-MCNC: NEGATIVE MG/DL
LEUKOCYTE ESTERASE UR QL STRIP: NEGATIVE
LYMPHOCYTES # BLD AUTO: 1.1 10E9/L (ref 0.8–5.3)
LYMPHOCYTES NFR BLD AUTO: 15.6 %
MCH RBC QN AUTO: 28.6 PG (ref 26.5–33)
MCHC RBC AUTO-ENTMCNC: 32.9 G/DL (ref 31.5–36.5)
MCV RBC AUTO: 87 FL (ref 78–100)
MONOCYTES # BLD AUTO: 0.4 10E9/L (ref 0–1.3)
MONOCYTES NFR BLD AUTO: 4.9 %
NEUTROPHILS # BLD AUTO: 5.6 10E9/L (ref 1.6–8.3)
NEUTROPHILS NFR BLD AUTO: 78 %
NITRATE UR QL: NEGATIVE
NRBC # BLD AUTO: 0 10*3/UL
NRBC BLD AUTO-RTO: 0 /100
PH UR STRIP: 6 PH (ref 5–7)
PLATELET # BLD AUTO: 247 10E9/L (ref 150–450)
POTASSIUM SERPL-SCNC: 3.7 MMOL/L (ref 3.4–5.3)
PROT SERPL-MCNC: 6.8 G/DL (ref 6.8–8.8)
RBC # BLD AUTO: 4.26 10E12/L (ref 3.8–5.2)
RBC #/AREA URNS AUTO: 0 /HPF (ref 0–2)
SODIUM SERPL-SCNC: 139 MMOL/L (ref 133–144)
SOURCE: NORMAL
SP GR UR STRIP: 1 (ref 1–1.03)
SQUAMOUS #/AREA URNS AUTO: 0 /HPF (ref 0–1)
UROBILINOGEN UR STRIP-MCNC: 0 MG/DL (ref 0–2)
WBC # BLD AUTO: 7.2 10E9/L (ref 4–11)
WBC #/AREA URNS AUTO: <1 /HPF (ref 0–5)

## 2021-05-09 PROCEDURE — 80053 COMPREHEN METABOLIC PANEL: CPT | Performed by: EMERGENCY MEDICINE

## 2021-05-09 PROCEDURE — 82077 ASSAY SPEC XCP UR&BREATH IA: CPT | Performed by: EMERGENCY MEDICINE

## 2021-05-09 PROCEDURE — 70450 CT HEAD/BRAIN W/O DYE: CPT

## 2021-05-09 PROCEDURE — 99285 EMERGENCY DEPT VISIT HI MDM: CPT | Mod: 25

## 2021-05-09 PROCEDURE — 85025 COMPLETE CBC W/AUTO DIFF WBC: CPT | Performed by: EMERGENCY MEDICINE

## 2021-05-09 PROCEDURE — 96374 THER/PROPH/DIAG INJ IV PUSH: CPT

## 2021-05-09 PROCEDURE — 81001 URINALYSIS AUTO W/SCOPE: CPT | Performed by: EMERGENCY MEDICINE

## 2021-05-09 PROCEDURE — 96361 HYDRATE IV INFUSION ADD-ON: CPT

## 2021-05-09 PROCEDURE — 85730 THROMBOPLASTIN TIME PARTIAL: CPT | Mod: GZ | Performed by: EMERGENCY MEDICINE

## 2021-05-09 PROCEDURE — 82075 ASSAY OF BREATH ETHANOL: CPT

## 2021-05-09 PROCEDURE — 85610 PROTHROMBIN TIME: CPT | Performed by: EMERGENCY MEDICINE

## 2021-05-09 PROCEDURE — 258N000003 HC RX IP 258 OP 636: Performed by: EMERGENCY MEDICINE

## 2021-05-09 PROCEDURE — 250N000011 HC RX IP 250 OP 636

## 2021-05-09 RX ORDER — ONDANSETRON 2 MG/ML
4 INJECTION INTRAMUSCULAR; INTRAVENOUS ONCE
Status: COMPLETED | OUTPATIENT
Start: 2021-05-09 | End: 2021-05-09

## 2021-05-09 RX ORDER — ONDANSETRON 2 MG/ML
INJECTION INTRAMUSCULAR; INTRAVENOUS
Status: COMPLETED
Start: 2021-05-09 | End: 2021-05-09

## 2021-05-09 RX ADMIN — ONDANSETRON 4 MG: 2 INJECTION INTRAMUSCULAR; INTRAVENOUS at 14:46

## 2021-05-09 RX ADMIN — SODIUM CHLORIDE 1000 ML: 9 INJECTION, SOLUTION INTRAVENOUS at 15:36

## 2021-05-09 NOTE — ED NOTES
Phlebotomy performed left ac- sent for analysis and spoke to lab personally to ensure they saw the tubes and orders as they were from some time ago and had already been acknowledged and marked off

## 2021-05-09 NOTE — ED PROVIDER NOTES
"    History     Chief Complaint:  Altered Mental Status     HPI:   Liz Lieberman is a 70 year old female with a history of alcohol abuse, depression, hypothyroidism, TIA, tardive dyskinesia, and schizophrenia who presents with altered mental status. Per EMS, patient drove herself to [employer] today. At the restaurant, she slumped down out of the hobbs so staff called EMS. At the scene, EMS noted random, bilateral arm movements which regressed some after administering Benadryl. They also noted rightward gaze (but note that she could and would look past midline to the L) and abnormal gait, but no obvious unilateral weakness. Blood glucose 112. Blood pressure 150s. Patient states she has been drinking today and also vomited. States \"I need to learn to pace myself better.\"  She also mentions that she has had two falls within the last few days and thinks she may have hit her head. Patient denies headache, abdominal pain.    Review of Systems   Unable to perform ROS: Mental status change      Allergies:  Formaldehyde  Latex     Medications:    Albuterol inhaler  Norvasc  Aspirin 81 mg  Lipitor  Cogentin  Clozaril  Flovent inhaler  Levothyroxine  Prilosec  Zoloft  Miralax    Past Medical History:    Bronchiectasis   GERD  Diverticulosis  Depression  Hiatal hernia  Hyperlipidemia  Hypothyroidism  Asthma  Osteoarthritis  Schizophrenia  Restless legs syndrome  Tardive dyskinesia  TIA  Iron deficiency anemia  Alcohol use disorder     Past Surgical History:    Left TKA  Tosillectomy  Right TKA     Family History:    CAD  Hyperlipidemia  Dementia  Hypertension  Thyroid disorder  COPD  Cataract  Migraines     Social History:  Alcohol use: yes  Patient presents via EMS alone.    Physical Exam     Vitals:  Patient Vitals for the past 24 hrs:   BP Temp Temp src Pulse Resp SpO2   05/09/21 1935 -- 98.1  F (36.7  C) Temporal -- -- --   05/09/21 1700 139/80 -- -- 80 -- --   05/09/21 1630 137/78 -- -- 84 -- --   05/09/21 1600 (!) " 133/127 -- -- 95 -- --   05/09/21 1530 134/80 -- -- 80 -- --   05/09/21 1516 -- -- -- -- 20 91 %   05/09/21 1435 (!) 152/82 -- -- 85 -- --       Physical Exam:  General/Appearance: appears stated age, covered in emesis, odor of ETOH, slurred speech and slightly tired appearing, diffuse tardive dyskinesia movements  Eyes: EOMI, PERRL, no scleral injection, no icterus  ENT: MMM  Neck: supple, nl ROM, no stiffness  Cardiovascular: RRR, nl S1S2, no m/r/g, 2+ pulses in all 4 extremities, cap refill <2sec  Respiratory: CTAB, good air movement throughout, no wheezes/rhonchi/rales, no increased WOB, no retractions  GI: abd soft, non-distended, nttp,  no HSM, no rebound, no guarding, nl BS  MSK: MCKENZIE, good tone, no bony abnormality  Skin: warm and well-perfused, no rash, no edema, no ecchymosis, nl turgor  Neuro: GCS 14 (V4), alert and oriented, uncontrolled movements in all 4 ext but doing so equally  Psych: deferred  Heme: no petechia, no purpura, no active bleeding        Emergency Department Course     Imaging:  CT Head wo contrast  Diffuse cerebral volume loss and cerebral white matter  changes consistent with chronic small vessel ischemic disease. No  evidence for acute intracranial pathology.  Per radiology.    Laboratory:  CBC: WBC 7.2, HGB 12.2,      CMP: Creatinine 0.46 (L), Calcium 8.4 (L), Albumin 3.2 (L) o/w WNL     PTT: 30    INR: 0.98    Alcohol breath: 0.129 (A)    Alcohol blood: 0.10 (H)    Urinalysis: WNL    Emergency Department Course:  Reviewed:  Past medical records, Care Everywhere, nursing notes, and vitals reviewed.     Assessments:  1430 I performed an exam of the patient and obtained history, as documented above.  1836 Patient rechecked and updated. Patient alert and oriented.    Interventions:  1446 Zofran, 4 mg, IV  1536 0.9% NS bolus, 1000 mL, IV    Disposition:  The patient was discharged to home.     Impression & Plan     Medical Decision Making:  Liz NUZHAT Lieberman is a 70 year old female with  history of alcohol abuse, tardive dyskinesia, TIA, schizophrenia who presents today with alcohol intoxication as well as vomiting.  She is having significant tardive dyskinesia which limits detailed neurologic exam however she notes this is at her baseline.  Initially there was concern that there was a headache with the onset of vomiting so head CT was obtained to make sure there was no bleed.  This thankfully was unremarkable.  With several hours time her mental status cleared and she now is alert, oriented, appropriate.  She is able to converse as I would expect.  Clinically now is sober and I think is appropriate for discharge.    Diagnosis:    ICD-10-CM    1. Alcoholic intoxication without complication (H)  F10.920         Discharge Medications:  New Prescriptions    No medications on file       Scribe Disclosure:  I, Martha Mccormack, am serving as a scribe at 2:35 PM on 5/9/2021 to document services personally performed by Radha Taylor MD based on my observations and the provider's statements to me.       Martha Mccormack  5/9/2021     Carly Taylor MD  05/09/21 1946

## 2021-05-09 NOTE — ED NOTES
Pt arrived with large amount of emesis covering clothing and medic gurney. Pt vomited again upon arrival to ED.

## 2021-05-09 NOTE — ED TRIAGE NOTES
Pt was at [facility] having lunch and drinking alcohol.  Slumped to her side laying down in the hobbs, customer helped sit her up.  Pt started vomiting, 911 called. Slurred speech on arrival

## 2021-05-25 ENCOUNTER — HOSPITAL ENCOUNTER (INPATIENT)
Facility: CLINIC | Age: 71
LOS: 3 days | Discharge: NURSING, REHAB OR LONG-TERM CARE | DRG: 057 | End: 2021-05-29
Attending: EMERGENCY MEDICINE | Admitting: INTERNAL MEDICINE
Payer: MEDICARE

## 2021-05-25 DIAGNOSIS — R42 DIZZINESS: ICD-10-CM

## 2021-05-25 DIAGNOSIS — Z87.898 HISTORY OF ALCOHOL USE DISORDER: Primary | ICD-10-CM

## 2021-05-25 DIAGNOSIS — R26.81 UNSTEADY: ICD-10-CM

## 2021-05-25 LAB
ALBUMIN UR-MCNC: NEGATIVE MG/DL
ANION GAP SERPL CALCULATED.3IONS-SCNC: 5 MMOL/L (ref 3–14)
APPEARANCE UR: CLEAR
BASOPHILS # BLD AUTO: 0 10E9/L (ref 0–0.2)
BASOPHILS NFR BLD AUTO: 0.3 %
BILIRUB UR QL STRIP: NEGATIVE
BUN SERPL-MCNC: 9 MG/DL (ref 7–30)
CALCIUM SERPL-MCNC: 8.6 MG/DL (ref 8.5–10.1)
CHLORIDE SERPL-SCNC: 102 MMOL/L (ref 94–109)
CO2 SERPL-SCNC: 27 MMOL/L (ref 20–32)
COLOR UR AUTO: NORMAL
CREAT SERPL-MCNC: 0.59 MG/DL (ref 0.52–1.04)
DIFFERENTIAL METHOD BLD: ABNORMAL
EOSINOPHIL # BLD AUTO: 0 10E9/L (ref 0–0.7)
EOSINOPHIL NFR BLD AUTO: 0 %
ERYTHROCYTE [DISTWIDTH] IN BLOOD BY AUTOMATED COUNT: 13.4 % (ref 10–15)
ETHANOL SERPL-MCNC: <0.01 G/DL
GFR SERPL CREATININE-BSD FRML MDRD: >90 ML/MIN/{1.73_M2}
GLUCOSE SERPL-MCNC: 92 MG/DL (ref 70–99)
GLUCOSE UR STRIP-MCNC: NEGATIVE MG/DL
HCT VFR BLD AUTO: 34.6 % (ref 35–47)
HGB BLD-MCNC: 11.3 G/DL (ref 11.7–15.7)
HGB UR QL STRIP: NEGATIVE
IMM GRANULOCYTES # BLD: 0 10E9/L (ref 0–0.4)
IMM GRANULOCYTES NFR BLD: 0.6 %
INTERPRETATION ECG - MUSE: NORMAL
KETONES UR STRIP-MCNC: NEGATIVE MG/DL
LABORATORY COMMENT REPORT: NORMAL
LEUKOCYTE ESTERASE UR QL STRIP: NEGATIVE
LYMPHOCYTES # BLD AUTO: 1.7 10E9/L (ref 0.8–5.3)
LYMPHOCYTES NFR BLD AUTO: 26.1 %
MCH RBC QN AUTO: 29.2 PG (ref 26.5–33)
MCHC RBC AUTO-ENTMCNC: 32.7 G/DL (ref 31.5–36.5)
MCV RBC AUTO: 89 FL (ref 78–100)
MONOCYTES # BLD AUTO: 0.5 10E9/L (ref 0–1.3)
MONOCYTES NFR BLD AUTO: 6.8 %
NEUTROPHILS # BLD AUTO: 4.4 10E9/L (ref 1.6–8.3)
NEUTROPHILS NFR BLD AUTO: 66.2 %
NITRATE UR QL: NEGATIVE
NRBC # BLD AUTO: 0 10*3/UL
NRBC BLD AUTO-RTO: 0 /100
PH UR STRIP: 6.5 PH (ref 5–7)
PLATELET # BLD AUTO: 224 10E9/L (ref 150–450)
POTASSIUM SERPL-SCNC: 3.7 MMOL/L (ref 3.4–5.3)
RBC # BLD AUTO: 3.87 10E12/L (ref 3.8–5.2)
RBC #/AREA URNS AUTO: <1 /HPF (ref 0–2)
SARS-COV-2 RNA RESP QL NAA+PROBE: NEGATIVE
SODIUM SERPL-SCNC: 134 MMOL/L (ref 133–144)
SOURCE: NORMAL
SP GR UR STRIP: 1 (ref 1–1.03)
SPECIMEN SOURCE: NORMAL
SQUAMOUS #/AREA URNS AUTO: 0 /HPF (ref 0–1)
TROPONIN I SERPL-MCNC: <0.015 UG/L (ref 0–0.04)
UROBILINOGEN UR STRIP-MCNC: 0 MG/DL (ref 0–2)
WBC # BLD AUTO: 6.6 10E9/L (ref 4–11)
WBC #/AREA URNS AUTO: <1 /HPF (ref 0–5)

## 2021-05-25 PROCEDURE — 99220 PR INITIAL OBSERVATION CARE,LEVEL III: CPT | Performed by: INTERNAL MEDICINE

## 2021-05-25 PROCEDURE — 93005 ELECTROCARDIOGRAM TRACING: CPT | Performed by: INTERNAL MEDICINE

## 2021-05-25 PROCEDURE — 80048 BASIC METABOLIC PNL TOTAL CA: CPT | Performed by: EMERGENCY MEDICINE

## 2021-05-25 PROCEDURE — 258N000003 HC RX IP 258 OP 636: Performed by: EMERGENCY MEDICINE

## 2021-05-25 PROCEDURE — 96360 HYDRATION IV INFUSION INIT: CPT | Performed by: INTERNAL MEDICINE

## 2021-05-25 PROCEDURE — 99285 EMERGENCY DEPT VISIT HI MDM: CPT | Mod: 25 | Performed by: INTERNAL MEDICINE

## 2021-05-25 PROCEDURE — 84484 ASSAY OF TROPONIN QUANT: CPT | Performed by: EMERGENCY MEDICINE

## 2021-05-25 PROCEDURE — 85025 COMPLETE CBC W/AUTO DIFF WBC: CPT | Performed by: EMERGENCY MEDICINE

## 2021-05-25 PROCEDURE — 84443 ASSAY THYROID STIM HORMONE: CPT | Performed by: EMERGENCY MEDICINE

## 2021-05-25 PROCEDURE — 82077 ASSAY SPEC XCP UR&BREATH IA: CPT | Performed by: EMERGENCY MEDICINE

## 2021-05-25 PROCEDURE — C9803 HOPD COVID-19 SPEC COLLECT: HCPCS

## 2021-05-25 PROCEDURE — G0378 HOSPITAL OBSERVATION PER HR: HCPCS

## 2021-05-25 PROCEDURE — 87635 SARS-COV-2 COVID-19 AMP PRB: CPT | Performed by: EMERGENCY MEDICINE

## 2021-05-25 PROCEDURE — 81001 URINALYSIS AUTO W/SCOPE: CPT | Performed by: EMERGENCY MEDICINE

## 2021-05-25 PROCEDURE — 96361 HYDRATE IV INFUSION ADD-ON: CPT | Performed by: INTERNAL MEDICINE

## 2021-05-25 RX ORDER — SODIUM CHLORIDE 9 MG/ML
INJECTION, SOLUTION INTRAVENOUS CONTINUOUS
Status: DISCONTINUED | OUTPATIENT
Start: 2021-05-25 | End: 2021-05-26

## 2021-05-25 RX ORDER — SODIUM FLUORIDE 5 MG/G
GEL, DENTIFRICE DENTAL DAILY
COMMUNITY

## 2021-05-25 RX ORDER — MIRABEGRON 25 MG/1
25 TABLET, FILM COATED, EXTENDED RELEASE ORAL DAILY
COMMUNITY
Start: 2021-05-17

## 2021-05-25 RX ORDER — DIPHENHYDRAMINE HCL 25 MG
25 CAPSULE ORAL EVERY 6 HOURS PRN
COMMUNITY

## 2021-05-25 RX ORDER — AMLODIPINE BESYLATE 2.5 MG/1
5 TABLET ORAL AT BEDTIME
COMMUNITY
Start: 2021-05-11

## 2021-05-25 RX ADMIN — SODIUM CHLORIDE 1000 ML: 9 INJECTION, SOLUTION INTRAVENOUS at 20:32

## 2021-05-25 ASSESSMENT — ENCOUNTER SYMPTOMS
VOMITING: 0
DIARRHEA: 0
HEADACHES: 0
FEVER: 0
ABDOMINAL PAIN: 0
COUGH: 0
SHORTNESS OF BREATH: 0

## 2021-05-25 ASSESSMENT — MIFFLIN-ST. JEOR: SCORE: 1206.28

## 2021-05-25 NOTE — ED NOTES
Bed: ED23  Expected date:   Expected time:   Means of arrival:   Comments:  [facility] here now

## 2021-05-26 ENCOUNTER — APPOINTMENT (OUTPATIENT)
Dept: MRI IMAGING | Facility: CLINIC | Age: 71
DRG: 057 | End: 2021-05-26
Attending: INTERNAL MEDICINE
Payer: MEDICARE

## 2021-05-26 ENCOUNTER — APPOINTMENT (OUTPATIENT)
Dept: OCCUPATIONAL THERAPY | Facility: CLINIC | Age: 71
DRG: 057 | End: 2021-05-26
Attending: INTERNAL MEDICINE
Payer: MEDICARE

## 2021-05-26 ENCOUNTER — APPOINTMENT (OUTPATIENT)
Dept: PHYSICAL THERAPY | Facility: CLINIC | Age: 71
DRG: 057 | End: 2021-05-26
Attending: INTERNAL MEDICINE
Payer: MEDICARE

## 2021-05-26 LAB
FOLATE SERPL-MCNC: 76 NG/ML
TSH SERPL DL<=0.005 MIU/L-ACNC: 2.28 MU/L (ref 0.4–4)
VIT B12 SERPL-MCNC: 462 PG/ML (ref 193–986)

## 2021-05-26 PROCEDURE — 36415 COLL VENOUS BLD VENIPUNCTURE: CPT | Performed by: INTERNAL MEDICINE

## 2021-05-26 PROCEDURE — 70551 MRI BRAIN STEM W/O DYE: CPT | Mod: MG

## 2021-05-26 PROCEDURE — G0378 HOSPITAL OBSERVATION PER HR: HCPCS

## 2021-05-26 PROCEDURE — 99233 SBSQ HOSP IP/OBS HIGH 50: CPT | Performed by: INTERNAL MEDICINE

## 2021-05-26 PROCEDURE — 258N000003 HC RX IP 258 OP 636: Performed by: INTERNAL MEDICINE

## 2021-05-26 PROCEDURE — 120N000001 HC R&B MED SURG/OB

## 2021-05-26 PROCEDURE — 82746 ASSAY OF FOLIC ACID SERUM: CPT | Performed by: INTERNAL MEDICINE

## 2021-05-26 PROCEDURE — 97535 SELF CARE MNGMENT TRAINING: CPT | Mod: GO

## 2021-05-26 PROCEDURE — 97166 OT EVAL MOD COMPLEX 45 MIN: CPT | Mod: GO

## 2021-05-26 PROCEDURE — 82607 VITAMIN B-12: CPT | Performed by: INTERNAL MEDICINE

## 2021-05-26 PROCEDURE — 250N000013 HC RX MED GY IP 250 OP 250 PS 637: Performed by: INTERNAL MEDICINE

## 2021-05-26 PROCEDURE — 97116 GAIT TRAINING THERAPY: CPT | Mod: GP

## 2021-05-26 PROCEDURE — 36415 COLL VENOUS BLD VENIPUNCTURE: CPT | Performed by: PSYCHIATRY & NEUROLOGY

## 2021-05-26 PROCEDURE — HZ2ZZZZ DETOXIFICATION SERVICES FOR SUBSTANCE ABUSE TREATMENT: ICD-10-PCS | Performed by: INTERNAL MEDICINE

## 2021-05-26 PROCEDURE — 84425 ASSAY OF VITAMIN B-1: CPT | Performed by: PSYCHIATRY & NEUROLOGY

## 2021-05-26 PROCEDURE — 97161 PT EVAL LOW COMPLEX 20 MIN: CPT | Mod: GP

## 2021-05-26 RX ORDER — SODIUM CHLORIDE 9 MG/ML
INJECTION, SOLUTION INTRAVENOUS CONTINUOUS
Status: ACTIVE | OUTPATIENT
Start: 2021-05-26 | End: 2021-05-26

## 2021-05-26 RX ORDER — ONDANSETRON 4 MG/1
4 TABLET, ORALLY DISINTEGRATING ORAL EVERY 6 HOURS PRN
Status: DISCONTINUED | OUTPATIENT
Start: 2021-05-26 | End: 2021-05-29 | Stop reason: HOSPADM

## 2021-05-26 RX ORDER — AMOXICILLIN 250 MG
2 CAPSULE ORAL 2 TIMES DAILY PRN
Status: DISCONTINUED | OUTPATIENT
Start: 2021-05-26 | End: 2021-05-29 | Stop reason: HOSPADM

## 2021-05-26 RX ORDER — BENZTROPINE MESYLATE 1 MG/1
1 TABLET ORAL 3 TIMES DAILY
Status: DISCONTINUED | OUTPATIENT
Start: 2021-05-26 | End: 2021-05-29 | Stop reason: HOSPADM

## 2021-05-26 RX ORDER — AMOXICILLIN 250 MG
1 CAPSULE ORAL 2 TIMES DAILY PRN
Status: DISCONTINUED | OUTPATIENT
Start: 2021-05-26 | End: 2021-05-29 | Stop reason: HOSPADM

## 2021-05-26 RX ORDER — ALBUTEROL SULFATE 90 UG/1
1-2 AEROSOL, METERED RESPIRATORY (INHALATION) EVERY 6 HOURS PRN
Status: DISCONTINUED | OUTPATIENT
Start: 2021-05-26 | End: 2021-05-29 | Stop reason: HOSPADM

## 2021-05-26 RX ORDER — ONDANSETRON 2 MG/ML
4 INJECTION INTRAMUSCULAR; INTRAVENOUS EVERY 6 HOURS PRN
Status: DISCONTINUED | OUTPATIENT
Start: 2021-05-26 | End: 2021-05-29 | Stop reason: HOSPADM

## 2021-05-26 RX ORDER — ACETAMINOPHEN 650 MG/1
650 SUPPOSITORY RECTAL EVERY 4 HOURS PRN
Status: DISCONTINUED | OUTPATIENT
Start: 2021-05-26 | End: 2021-05-29 | Stop reason: HOSPADM

## 2021-05-26 RX ORDER — FOLIC ACID 1 MG/1
1 TABLET ORAL DAILY
Status: DISCONTINUED | OUTPATIENT
Start: 2021-05-26 | End: 2021-05-29 | Stop reason: HOSPADM

## 2021-05-26 RX ORDER — AMLODIPINE BESYLATE 5 MG/1
5 TABLET ORAL AT BEDTIME
Status: DISCONTINUED | OUTPATIENT
Start: 2021-05-26 | End: 2021-05-29 | Stop reason: HOSPADM

## 2021-05-26 RX ORDER — ACETAMINOPHEN 500 MG
1000 TABLET ORAL EVERY 6 HOURS PRN
Status: DISCONTINUED | OUTPATIENT
Start: 2021-05-26 | End: 2021-05-29 | Stop reason: HOSPADM

## 2021-05-26 RX ORDER — ACETAMINOPHEN 325 MG/1
650 TABLET ORAL EVERY 4 HOURS PRN
Status: DISCONTINUED | OUTPATIENT
Start: 2021-05-26 | End: 2021-05-26

## 2021-05-26 RX ADMIN — FOLIC ACID 1 MG: 1 TABLET ORAL at 08:46

## 2021-05-26 RX ADMIN — ACETAMINOPHEN 1000 MG: 500 TABLET, FILM COATED ORAL at 16:12

## 2021-05-26 RX ADMIN — OMEPRAZOLE 20 MG: 20 CAPSULE, DELAYED RELEASE ORAL at 08:46

## 2021-05-26 RX ADMIN — BENZTROPINE MESYLATE 1 MG: 1 TABLET ORAL at 08:46

## 2021-05-26 RX ADMIN — OMEPRAZOLE 20 MG: 20 CAPSULE, DELAYED RELEASE ORAL at 00:50

## 2021-05-26 RX ADMIN — ACETAMINOPHEN 1000 MG: 500 TABLET, FILM COATED ORAL at 07:13

## 2021-05-26 RX ADMIN — OMEPRAZOLE 20 MG: 20 CAPSULE, DELAYED RELEASE ORAL at 22:31

## 2021-05-26 RX ADMIN — BENZTROPINE MESYLATE 1 MG: 1 TABLET ORAL at 22:31

## 2021-05-26 RX ADMIN — CLOZAPINE 250 MG: 25 TABLET ORAL at 22:31

## 2021-05-26 RX ADMIN — SERTRALINE HYDROCHLORIDE 150 MG: 100 TABLET ORAL at 00:50

## 2021-05-26 RX ADMIN — AMLODIPINE BESYLATE 5 MG: 5 TABLET ORAL at 22:31

## 2021-05-26 RX ADMIN — Medication 50 MG: at 08:46

## 2021-05-26 RX ADMIN — ACETAMINOPHEN 1000 MG: 500 TABLET, FILM COATED ORAL at 23:37

## 2021-05-26 RX ADMIN — AMLODIPINE BESYLATE 5 MG: 5 TABLET ORAL at 00:50

## 2021-05-26 RX ADMIN — BENZTROPINE MESYLATE 1 MG: 1 TABLET ORAL at 16:12

## 2021-05-26 RX ADMIN — LEVOTHYROXINE SODIUM 175 MCG: 100 TABLET ORAL at 08:46

## 2021-05-26 RX ADMIN — SERTRALINE HYDROCHLORIDE 150 MG: 100 TABLET ORAL at 22:31

## 2021-05-26 RX ADMIN — SODIUM CHLORIDE: 9 INJECTION, SOLUTION INTRAVENOUS at 00:50

## 2021-05-26 RX ADMIN — CLOZAPINE 250 MG: 25 TABLET ORAL at 00:50

## 2021-05-26 ASSESSMENT — ACTIVITIES OF DAILY LIVING (ADL)
PREVIOUS_RESPONSIBILITIES: MEAL PREP;LAUNDRY;SHOPPING;MEDICATION MANAGEMENT;FINANCES;DRIVING
WALKING_OR_CLIMBING_STAIRS_DIFFICULTY: YES
ADLS_ACUITY_SCORE: 16
PATIENT_/_FAMILY_COMMUNICATION_STYLE: SPOKEN LANGUAGE (ENGLISH OR BILINGUAL)
WALKING_OR_CLIMBING_STAIRS: STAIR CLIMBING DIFFICULTY, REQUIRES EQUIPMENT
DIFFICULTY_COMMUNICATING: NO
ADLS_ACUITY_SCORE: 16
HEARING_DIFFICULTY_OR_DEAF: NO
TOILETING_ISSUES: NO
WEAR_GLASSES_OR_BLIND: YES
DOING_ERRANDS_INDEPENDENTLY_DIFFICULTY: NO
DRESSING/BATHING_DIFFICULTY: NO
DIFFICULTY_EATING/SWALLOWING: NO
CONCENTRATING,_REMEMBERING_OR_MAKING_DECISIONS_DIFFICULTY: YES
FALL_HISTORY_WITHIN_LAST_SIX_MONTHS: YES
NUMBER_OF_TIMES_PATIENT_HAS_FALLEN_WITHIN_LAST_SIX_MONTHS: 10

## 2021-05-26 NOTE — PROGRESS NOTES
"   05/26/21 1300   Quick Adds   Type of Visit Initial Occupational Therapy Evaluation   Living Environment   People in home alone   Current Living Arrangements condominium   Home Accessibility no concerns  (elevator access)   Number of Stairs, Main Entrance none   Transportation Anticipated car, drives self   Living Environment Comments pt reports she still drives, but mostly local and not sure if she should continue. Sister reports she does have a driving test coming up in the coming weeks. Chart indicates pt is not accurate historian   Self-Care   Usual Activity Tolerance moderate   Current Activity Tolerance fair   Equipment Currently Used at Home cane, straight   Activity/Exercise/Self-Care Comment recieves assist with cleaning her condo, otherwise independent with i/ADL's, mostly cooks microwave meals, has a commode chair in her living room   Disability/Function   Hearing Difficulty or Deaf yes   Patient's preferred means of communication English speaker with hearing loss, no speech problems.   Describe hearing loss bilateral hearing loss   Use of hearing assistive devices   (had hearing aids, but lost them)   Wear Glasses or Blind yes   Vision Management glasses   Concentrating, Remembering or Making Decisions Difficulty yes   Difficulty Communicating no   Walking or Climbing Stairs Difficulty yes   Walking or Climbing Stairs ambulation difficulty, requires equipment   Dressing/Bathing Difficulty no   Toileting issues no   Doing Errands Independently Difficulty (such as shopping) no   Fall history within last six months yes   Number of times patient has fallen within last six months   (pt states about 3, chart indicates 10\)   Change in Functional Status Since Onset of Current Illness/Injury yes   General Information   Onset of Illness/Injury or Date of Surgery 05/25/21   Referring Physician Robinson Nicholson MD   Patient/Family Therapy Goal Statement (OT) \"to be safe\"   Additional Occupational Profile " Info/Pertinent History of Current Problem 70 y.o. female admitted for increased weakness and impaired balance, hx of schizophrenia, tardive diskinesia, falls, ETOH, HTN, RLS, dementia   Existing Precautions/Restrictions fall;other (see comments)  (tele)   Limitations/Impairments hearing;visual;safety/cognitive   Left Upper Extremity (Weight-bearing Status) full weight-bearing (FWB)   Right Upper Extremity (Weight-bearing Status) full weight-bearing (FWB)   Left Lower Extremity (Weight-bearing Status) full weight-bearing (FWB)   Right Lower Extremity (Weight-bearing Status) full weight-bearing (FWB)   Cognitive Status Examination   Orientation Status orientation to person, place and time   Affect/Mental Status (Cognitive) WFL   Follows Commands increased processing time needed   Visual Perception   Visual Impairment/Limitations corrective lenses full-time   Sensory   Sensory Comments pt reports numbness in B  feet   Pain Assessment   Patient Currently in Pain Yes, see Vital Sign flowsheet  (R shoulder, reports diagnosis of frozen shoulder about 2 mo)   Range of Motion Comprehensive   General Range of Motion bilateral upper extremity ROM WFL   Strength Comprehensive (MMT)   Comment, General Manual Muscle Testing (MMT) Assessment generalized weakness in BUE's, weak  strength   Coordination   Gross Motor Coordination ataxic   Fine Motor Coordination intact with finger to thumb opposition,    Coordination Comments Chart indicates pill rolling   Bed Mobility   Bed Mobility supine-sit;sit-supine   Supine-Sit [place] (Bed Mobility) contact guard   Sit-Supine [place] (Bed Mobility) contact guard   Transfers   Transfers toilet transfer   Toilet Transfer   [place] Level (Toilet Transfer) contact guard   Assistive Device (Toilet Transfer) grab bars/safety frame;walker, front-wheeled   Activities of Daily Living   BADL Assessment/Intervention lower body dressing   Lower Body Dressing Assessment/Training    [place] Level (Lower Body Dressing) contact guard assist   Position (Lower Body Dressing) edge of bed sitting   Comment (Lower Body Dressing) CGA for donning/doff brief and donning socks at EOB   Instrumental Activities of Daily Living (IADL)   Previous Responsibilities meal prep;laundry;shopping;medication management;finances;driving   Clinical Impression   Criteria for Skilled Therapeutic Interventions Met (OT) yes;skilled treatment is necessary   OT Diagnosis impaired I/ADL   OT Problem List-Impairments impacting ADL problems related to;activity tolerance impaired;balance;coordination;hearing;mobility;motor control;sensation;strength;pain   ADL comments/analysis meal prep, household mamangement, driving, bathing, medication management   Assessment of Occupational Performance 1-3 Performance Deficits   Identified Performance Deficits impaired balance, decreased strength, cognition   Planned Therapy Interventions (OT) ADL retraining;IADL retraining;strengthening   Intervention Comments I/ADL retraining, AE education   Clinical Decision Making Complexity (OT) moderate complexity   Therapy Frequency (OT) 5x/week   Predicted Duration of Therapy 7   Anticipated Equipment Needs Upon Discharge (OT) shower chair   Risk & Benefits of therapy have been explained evaluation/treatment results reviewed;risks/benefits reviewed;care plan/treatment goals reviewed;current/potential barriers reviewed;participants voiced agreement with care plan;participants included;patient   OT Discharge Planning    OT Discharge Recommendation (DC Rec) Transitional Care Facility   OT Rationale for DC Rec pt unsafe to return home alone due to ataxia and impaired balance impacting I/ADL independence.    Therapy Certification   Start of Care Date 05/25/21   Certification date from 05/26/21   Medical Diagnosis Generalized weakness, impaired balance   Total Evaluation Time (Minutes)   Total Evaluation Time (Minutes) 12

## 2021-05-26 NOTE — ED NOTES
"[facility]  ED Nurse Handoff Report    ED Chief complaint: Dizziness      ED Diagnosis:   Final diagnoses:   Unsteady   Dizziness       Code Status: See H&P    Allergies:   Allergies   Allergen Reactions     Formaldehyde Rash     Latex Rash       Patient Story: Patient c/o dizziness and weakness with ambulation.  Focused Assessment:  AOx3. AVSS. Tardive dyskinesia. Up with SBA and walker, unsteady at times. Voiding large amounts via [place]URE [place]ICK. Tolerating po.    Treatments and/or interventions provided:   Medications   0.9% sodium chloride BOLUS (0 mLs Intravenous Stopped 5/25/21 2233)     Followed by   sodium chloride 0.9% infusion (has no administration in time range)       Patient's response to treatments and/or interventions: continue to monitor    To be done/followed up on inpatient unit:  continue to monitor    Does this patient have any cognitive concerns?: Forgetful    Activity level - Baseline/Home:  Stand with Assist  Activity Level - Current:   Stand with Assist    Patient's Preferred language: English   Needed?: No    Isolation: None  Infection: Not Applicable  Patient tested for COVID 19 prior to admission: YES  Bariatric?: No    Vital Signs:   Vitals:    05/25/21 1912 05/25/21 2000 05/25/21 2030 05/25/21 2200   BP: (!) 146/95 (!) 146/89  (!) 168/90   Pulse: 87 81 84 97   Resp: 14 30 23    Temp: 98.8  F (37.1  C)      TempSrc: Oral      SpO2: 98% 96% 96% 95%   Weight: 68 kg (150 lb)      Height: 1.651 m (5' 5\")          Cardiac Rhythm:     Was the PSS-3 completed:   Yes  What interventions are required if any?               Family Comments: at bedside in ED  OBS brochure/video discussed/provided to patient/family: yes              Name of person given brochure if not patient:               Relationship to patient:     For the majority of the shift this patient's behavior was Green.   Behavioral interventions performed were frequent rounding.    ED NURSE PHONE NUMBER: *[phone]   "   .

## 2021-05-26 NOTE — PROGRESS NOTES
[facility]      OUTPATIENT PHYSICAL THERAPY EVALUATION  PLAN OF TREATMENT FOR OUTPATIENT REHABILITATION  (COMPLETE FOR INITIAL CLAIMS ONLY)  Patient's Last Name, First Name, M.I.  YOB: 1950  Liz Lieberman                        Provider's Name  [facility] Medical Record No.  [id number]                               Onset Date:  05/25/21   Start of Care Date:  05/26/21      Type:     _X_PT   ___OT   ___SLP Medical Diagnosis:  Progressive weakness and imbalance, Schizophrenia with tardive dyskinesia, ETOH.                        PT Diagnosis:  Impaired mobiltiy   Visits from SOC:  1   _________________________________________________________________________________  Plan of Treatment/Functional Goals    Planned Interventions: balance training, bed mobility training, gait training, home exercise program, motor coordination training, neuromuscular re-education, patient/family education, postural re-education, stair training, strengthening, transfer training     Goals: See Physical Therapy Goals on Care Plan in [employer] electronic health record.    Therapy Frequency: Daily  Predicted Duration of Therapy Intervention: 1 week  _________________________________________________________________________________    I CERTIFY THE NEED FOR THESE SERVICES FURNISHED UNDER        THIS PLAN OF TREATMENT AND WHILE UNDER MY CARE     (Physician co-signature of this document indicates review and certification of the therapy plan).                Certification date from: 05/26/21, Certification date to: 06/02/21    Referring Physician: Robinson Webster MD            Initial Assessment        See Physical Therapy evaluation dated 05/26/21 in Epic electronic health record.

## 2021-05-26 NOTE — PLAN OF CARE
"A&O x4. Slow speech. Tardive dyskinesia at baseline. VSS on RA-baseline HTN. CIWA 2 for tremors-baseline. C/o pain to right \"frozen shoulder.\" PRN Tylenol to manage. Up A1 with gait belt and walker. Regular diet. Tolerating well. PIV SL. LS clear. BS active, + Flatus. BM this shift. Voiding adequately in BR. Progressing toward plan of care. MRI done this shift. Neurology consulted. SW consulted for placement.   " · Rest and increase fluids.   · Humidifier/prop up at night   · mucinex to thin mucous   · Take antibiotics exactly as prescribed. Make sure to complete the entire course of antibiotics even if you start feeling better. This will prevent recurrence of your infection and bacterial resistance.   · Return or follow-up with your primary care provider if not improved within a few days or sooner for any new or worsening symptoms.   · Go to the ER for any fever that does not improve with Tylenol/Ibuprofen, neck stiffness, rash, severe headache, vision changes, shortness of breath, chest pain, severe facial pain or swelling, or for any other new and concerning symptoms.

## 2021-05-26 NOTE — UTILIZATION REVIEW
Admission Status; Secondary Review Determination       Under the authority of the Utilization Management Committee, the utilization review process indicated a secondary review on the above patient. The review outcome is based on review of the medical records, discussions with staff, and applying clinical experience noted on the date of the review.     (x) Inpatient Status Appropriate - This patient's medical care is consistent with medical management for inpatient care and reasonable inpatient medical practice.     RATIONALE FOR DETERMINATION   71 yo female with complex psychiatric history including paranoid schizophrenia, tardive dyskinesia, dementia, depression, alcohol use disorder, h/o TIA, hypertension, h/o falls who presented to the ED with dizziness. Several prior ED visits at ours and other facilities for similar complaint of dizziness, sometimes associated with alcohol intoxication, deconditioning. However, this occasion was not associated with alcohol intoxication. Granted she is a difficult historian given her paranoid schizophrenia, she describes more of a possibly acute change in her gait and dizziness. Case discussed with attending physician Dr. Cerrato.   Recent MRI brain 5/9/20 showed no acute stroke but this was over a year ago and will be repeated today. There is concern that she may have a newer neurologic problem such as parkinsonism (masked facies and pill rolling tremors on exam documented). Consultations from neurology and psychiatry have both been requested to further assess for etiologies of her current symptoms. If a psychiatric problem or medication side effect is contributing, this could take several more days to sort out.  PT eval done and TCU stay recommended due to current gait instability and deconditioning. So far no evidence for infection or sepsis. At least 1 more inpatient night needed, possibly 2 03 3 depending on consultation recommendations.       At the time of admission with  the information available to the attending physician more than 2 nights hospital complex care was anticipated, based on patient risk of adverse outcome if treated as outpatient and complex care required. Inpatient admission is appropriate based on the Medicare guidelines.     This document was produced using voice recognition software.    The information on this document is developed by the utilization review team in order for the business office to ensure compliance. This only denotes the appropriateness of proper admission status and does not reflect the quality of care rendered.   The definitions of Inpatient Status and Observation Status used in making the determination above are those provided in the CMS Coverage Manual, Chapter 1 and Chapter 6, section 70.4.     Sincerely,   Elida Dumont MD  Utilization Review  Physician Advisor  [facility].

## 2021-05-26 NOTE — PHARMACY-ADMISSION MEDICATION HISTORY
Pharmacy Medication History  Admission medication history interview status for the 5/25/2021  admission is complete. See EPIC admission navigator for prior to admission medications     Location of Interview: Patient room  Medication history sources: Patient and Surescripts    Significant changes made to the medication list:  Added Mirabegron, diphenhydramine, prevident  Removed asa, cleocin t  Modified flonase, flovent, guaifenesin    In the past week, patient estimated taking medication this percent of the time: greater than 90%    Additional medication history information:   Has felt jittery since starting Mirabegron    Medication reconciliation completed by provider prior to medication history? No    Time spent in this activity: 20 minutes      Prior to Admission medications    Medication Sig Last Dose Taking? Auth Provider   acetaminophen (TYLENOL) 500 MG tablet Take 1,000 mg by mouth every 6 hours as needed for mild pain prn Yes Unknown, Entered By History   albuterol (PROAIR HFA/PROVENTIL HFA/VENTOLIN HFA) 108 (90 Base) MCG/ACT inhaler Inhale 1-2 puffs into the lungs every 6 hours as needed for wheezing  prn Yes Unknown, Entered By History   albuterol (PROVENTIL) (2.5 MG/3ML) 0.083% neb solution Take 2.5 mg by nebulization every 6 hours as needed for wheezing prn Yes Unknown, Entered By History   amLODIPine (NORVASC) 2.5 MG tablet Take 5 mg by mouth At Bedtime 5/24/2021 at hs Yes Unknown, Entered By History   atorvastatin (LIPITOR) 20 MG tablet Take 20 mg by mouth At Bedtime  5/24/2021 at hs Yes Unknown, Entered By History   benztropine (COGENTIN) 1 MG tablet Take 1 mg by mouth 3 times daily  5/25/2021 at x2 doses Yes Unknown, Entered By History   Calcium Carbonate-Vitamin D (CALCIUM + D PO) Take 1 tablet by mouth 2 times daily (with meals) Calcium 500 with Vit D 400mg BID. One before breakfast and one with supper  5/25/2021 at am Yes Reported, Patient   cloZAPine (CLOZARIL) 100 MG tablet Take 2.5 tablets (250  mg) by mouth At Bedtime 5/24/2021 at hs Yes Chuckie Silva PA-C   desoximetasone (TOPICORT) 0.25 % OINT Apply  topically as needed. prn Yes Reported, Patient   diphenhydrAMINE (BENADRYL) 25 MG capsule Take 25 mg by mouth every 6 hours as needed for itching prn Yes Unknown, Entered By History   emollient (VANICREAM) external cream Apply topically 3 times daily as needed for other (irritation) prn Yes Unknown, Entered By History   Ferrous Gluconate 324 (37.5 Fe) MG TABS Take 324 mg by mouth daily (with breakfast)  5/25/2021 at am Yes Unknown, Entered By History   fluticasone (FLONASE) 50 MCG/ACT nasal spray Spray 2 sprays into both nostrils daily  5/25/2021 at am Yes Unknown, Entered By History   fluticasone (FLOVENT HFA) 110 MCG/ACT inhaler Inhale 1 puff into the lungs 2 times daily  5/25/2021 at x2 Yes Unknown, Entered By History   folic acid (FOLVITE) 1 MG tablet Take 1 tablet (1 mg) by mouth daily 5/24/2021 at pm Yes Shannon Martinez PA-C   guaiFENesin (MUCINEX) 600 MG 12 hr tablet Take 600 mg by mouth 2 times daily  5/25/2021 at x2 doses Yes Unknown, Entered By History   levothyroxine (SYNTHROID/LEVOTHROID) 175 MCG tablet Take 175 mcg by mouth daily 5/25/2021 at am Yes Unknown, Entered By History   mirabegron (MYRBETRIQ) 25 MG 24 hr tablet Take 25 mg by mouth daily 5/25/2021 at am Yes Unknown, Entered By History   multivitamin w/minerals (THERA-VIT-M) tablet Take 1 tablet by mouth daily 5/25/2021 at am Yes Shannon Martinez PA-C   omeprazole (PRILOSEC) 20 MG DR capsule Take 20 mg by mouth 2 times daily 5/25/2021 at am Yes Unknown, Entered By History   polyethylene glycol (MIRALAX/GLYCOLAX) packet Take 17 g by mouth daily as needed for constipation  prn Yes Unknown, Entered By History   sertraline (ZOLOFT) 50 MG tablet Take 150 mg by mouth At Bedtime  5/24/2021 at hs Yes Unknown, Entered By History   sodium fluoride dental gel (PREVIDENT) 1.1 % GEL topical gel Apply to affected area  daily 5/25/2021 at am Yes Unknown, Entered By History       The information provided in this note is only as accurate as the sources available at the time of update(s)

## 2021-05-26 NOTE — ED PROVIDER NOTES
History   Chief Complaint:  Unsteady Gait    The history is provided by the patient.      Lzi Lieberman is a 70 year old female with history of hypothyroidism, TIA, tardive dyskinesia and iron deficiency anemia who presents for evaluation of an unsteady gait over the past couple of days. The patient states that she feels as though she may fall when she changes positions from seating to standing, but otherwise denies any other issues. She specifically denies fever, cough, vomiting, diarrhea, chest pain, shortness of breath, abdominal pain, headache or anticoagulant use. She notes that she has been drinking alcohol for the past couple of weeks. The patient notes that she has a walker and cane at home and is supposed to use the walker but does so occasionally. Patient reports going to the ER frequently for similar symptoms without clear answers to her symptoms.      Review of Systems   Constitutional: Negative for fever.   Respiratory: Negative for cough and shortness of breath.    Cardiovascular: Negative for chest pain.   Gastrointestinal: Negative for abdominal pain, diarrhea and vomiting.   Musculoskeletal: Positive for gait problem.   Neurological: Negative for headaches.   All other systems reviewed and are negative.    Allergies:  Formaldehyde  Latex    Medications:  Albuterol inhaler  Albuterol nebules  Amlodipine  Aspirin  Atorvastatin  Cogentin  Flonase  Flovent  Folvite  Levothyroxine  omeprazole  Sertraline    Past Medical History:    Bronchiectasis  Depression  Diverticulosis  GERD  Hiatal hernia  Hearing loss  Hyperlipidemia  Hypothyroidism  Asthma  Osteoarthritis  Paranoid schizophrenia  RLS  Tardive dyskinesia  TIA  Iron deficiency anemia     Past Surgical History:    Arthroplasty knee  Tonsillectomy  Total knee arthroplasty, right     Family History:    CAD  Hyperlipidemia  Dementia  Hypertension  Asthma    Social History:  The patient presents to the emergency department alone.  The patient lives  "independently in a condo.     Physical Exam     Patient Vitals for the past 24 hrs:   BP Temp Temp src Pulse Resp SpO2 Height Weight   05/26/21 0001 (!) 166/93 97.6  F (36.4  C) Oral 93 -- 96 % -- --   05/25/21 2300 (!) 162/88 -- -- 89 -- -- -- --   05/25/21 2200 (!) 168/90 -- -- 97 -- 95 % -- --   05/25/21 2030 -- -- -- 84 23 96 % -- --   05/25/21 2000 (!) 146/89 -- -- 81 30 96 % -- --   05/25/21 1912 (!) 146/95 98.8  F (37.1  C) Oral 87 14 98 % 1.651 m (5' 5\") 68 kg (150 lb)       Physical Exam  VS: Reviewed per above  HENT: Mucous membranes moist, no nuchal rigidity  EYES: sclera anicteric  CV: Rate as noted, regular rhythm.   RESP: Effort normal. Breath sounds are normal bilaterally.  GI: no tenderness/rebound/guarding, not distended.  NEURO: GCS 15, cranial nerves II through XII are intact, 5 out of 5 strength in all 4 extremities, sensation is intact light touch in all 4 extremities, tardive dyskinesia, apparently unsteady when ambulated with walker  MSK: No deformity of the extremities  SKIN: Warm and dry    Emergency Department Course     ECG:  ECG taken at 1949, ECG read at 2004  Normal sinus rhythm with sinus arrhythmia.  Normal ECG.  No change as compared to prior EKG dated 1/26/21   Rate 76 bpm. WY interval 150 ms. QRS duration 88 ms. QT/QTc 394/443 ms. P-R-T axis 70 28 58.       Laboratory:  CBC: WBC 6.6, HGB 11.3 (low),      BMP: WNL (Creatinine 0.59)      UA with Microscopic: Negative    1932 Troponin: <0.015     Alcohol ethyl: <0.01    Asymptomatic SARS-CoV-2 COVID-19 Virus (Coronavirus) by PCR: Negative    Emergency Department Course:    Reviewed:  I reviewed the patient's nursing notes, vitals, past medical records, Care Everywhere.     Assessments:  1942: I assessed the patient and performed a physical exam at this time.  2100: I reassessed the patient.   2200: I reassessed the patient and informed them of their workup thus far. I recommended admission and the patient consented. "     Consults:   2230 I consulted with Dr. Webster, hospitalist, regarding the patient's history and presentation here in the emergency department who accepted the patient for admission.    Interventions:  2032 NS 1L IV     Disposition:  The patient was admitted to the hospital under the care of Dr. Webster.     Impression & Plan     Medical Decision Making:  Patient presents to the ER for evaluation of dizziness with standing and unsteadiness. Symptoms have been present for a few days per patient although she has had intermittent bouts of this in the past with negative neuroimaging. On arrival vital signs are reassuring. On exam patient has evidence of tar dive dyskinesia but no other focal neurological deficits to suggest stroke. EKG is sinus rhythm. Other basic labs are reassuring. No evidence of UTI. Patient was given IV fluids. Unfortunately she was feeling too unsteady to discharge back to her residence where she lives alone. She was admitted to the hospital for further cares.    Covid-19  Liz Lieberman was evaluated during a global COVID-19 pandemic, which necessitated consideration that the patient might be at risk for infection with the SARS-CoV-2 virus that causes COVID-19.   Applicable protocols for evaluation were followed during the patient's care.   COVID-19 was considered as part of the patient's evaluation. The plan for testing is:  a test was obtained during this visit.    Diagnosis:    ICD-10-CM    1. Unsteady  R26.81 Asymptomatic SARS-CoV-2 COVID-19 Virus (Coronavirus) by PCR     TSH with free T4 reflex     TSH with free T4 reflex     CANCELED: TSH with free T4 reflex   2. Dizziness  R42        Scribe Disclosure:  I, Basilio Richardson, am serving as a scribe at 8:56 PM on 5/25/2021 to document services personally performed by Prosper Lowe MD based on my observations and the provider's statements to me.          Prosper Lowe MD  05/26/21 0101

## 2021-05-26 NOTE — PROGRESS NOTES
RECEIVING UNIT ED HANDOFF REVIEW    ED Nurse Handoff Report was reviewed by: Betzy Pendleton RN on May 25, 2021 at 11:13 PM

## 2021-05-26 NOTE — PROGRESS NOTES
"[facility]    Medicine Progress Note - Hospitalist Service       Date of Admission:  5/25/2021  Assessment & Plan       Liz Lieberman is a 70 year old female who presents with unsteady gait    Unsteady gait, concern for parkinsons vs other neurologic disease   H/o Tardive dyskinesia   H/o orthostatic hypotension  Has had evaluation for dizziness in the past. 3/2019 eval for CVA and was ruled out. Also found to have orthostatic hypotension. MRI in 5/2020 was consistent with chronic small vessel ischemic disease. Has h/o falls 2/2 Etoh intoxication.  Last couple of days feels as if she may fall with changes in position. Felt like may faint since ysterday. \"stumbling around\". Has had Etoh in last couple of weeks. Mildly hypertensive on presentation, afebrile. BMP normal, hgb 11.3, WBC normal. Troponin negative. Etoh negative. UA bland. EKG NSR. Neuro exam nonfocal except severe tardive dyskinesia. Did recently start mirabegron which uncommonly can be associated with dizziness.  Last CT head was over 2 weeks ago and showed no acute pathology   5/26:  Orthostatics unremarkable.  On examination today patient appears to have some word finding difficulty vs possible encephalopathy, masked facies, restless legs, issues with dexterity and a pill rolling tremor.  Patient also reporting a more shuffling gait on questioning.  Not all of these symptoms fit with the patient's known TD.  Some concern for acute stroke vs Parkinson's disease vs other neurologic etiology.   - Transition to inpatient.  Given the unclear etiology of her neuro deficits (psych vs neurologic vs ???) and continued evaluation with neurology consult, MRI of brain and possible psychiatry consultation dependent on MRI imaging and neuro recommendations anticipate the patient will be in the hospital at least until Friday at the earliest for continued evaluation and care.   - Continue to monitor on telemetry  - Folate/B12 ordered given   - " MRI brain ordered as last MRI was over a year ago and patient is reporting some acute worsening in her neurologic symptoms that do not completely fit with a TD picture   - If MRI is normal will consider psychiatry consult to help with possible worsening TD   - PT/OT assessment  - Consulted general neurology and appreciate their recommendations     Alcohol use disorder  H/o Etoh use disorder, presentations to hospital for this. Denies ETOH use for over 2 weeks and ETOH level <0.01 on admission   - CIWA  - Continue oral vitamins     Schizophrenia  RLS  Dementia  [cogentin 1 mg TID, clozapine 250 mg at HS, sertraline 150 mg at HS]  - Continue PTA medications for now     Hypertension  Hyperlipidemia  [amlodipine 5 mg at HS, atorvastatin 20 mg at HS]  - Continue amlodipine  - Holding statin while obs status      Anemia, iron deficiency  [FeSO4 324 mg daily]  Hgb at 11.3 on presentation. Baseline appears to be 11-12  - Monitor   - Holding iron while obs status     Hypothyroidism  [Levothyroxine 175 mcg daily]  * TSH is 2.28   - Continue    GERD  [omeprazole 20 mg BID]  - Continue     Asthma, mild intermittent  [prn albuterol inhaler/ nebs, flovent 1 puff BID]  - Continue albuterol prn  - Holding flovent while obs     Overactive bladder  [mirabegron 25 mg daily]  - Holding while in obs     COVID-19 negative on presentation         Diet: Regular Diet Adult    DVT Prophylaxis: Pneumatic Compression Devices  [place] Catheter: not present  Code Status: No CPR- Do NOT Intubate           Disposition Plan   Expected discharge: 2 - 3 days, recommended to transitional care unit once safe disposition plan/ TCU bed available and neurologic evaluation complete.  May also benefit from psychiatric evaluation   Entered: Tae Cerrato DO 05/26/2021, 10:06 AM       The patient's care was discussed with the Bedside Nurse, Care Coordinator/ and Patient.    Time Spent on this Encounter   Over 35 minutes was spent examining and  discussing the patient with greater than 50% time spent in counseling and/or coordination of care.       Tae Cerrato DO  Hospitalist Service  [facility]  Contact information available via [facility] Paging/Directory    ______________________________________________________________________    Interval History   Patient seen and examined. No acute events over night.  Additional history obtained from patient.  She describes an almost a shuffling gait recently.  No fevers or chills.  No pain currently.  No difficulty breathing.      Data reviewed today: I reviewed all medications, new labs and imaging results over the last 24 hours. I personally reviewed no images or EKG's today.    Physical Exam   Vital Signs: Temp: 97.6  F (36.4  C) Temp src: Oral BP: (!) 140/78 Pulse: 91   Resp: 18 SpO2: 97 % O2 Device: None (Room air)    Weight: 150 lbs 0 oz  General Appearance: Resting comfortably in bed. NAD   Respiratory: Clear to auscultation.  No respiratory distress  Cardiovascular: RRR.  No obvious murmurs  GI: Soft.  Non-distended  Skin: Some bruising present on the arms.  No obvious rashes or cyanosis   Other:  Word finding difficulty vs possible encephalopathy noted.  Masked facies, restless legs, issues with dexterity bilaterally and a pill rolling tremor on the right all noted as well.  No edema     Data   Recent Labs   Lab 05/25/21  1932   WBC 6.6   HGB 11.3*   MCV 89         POTASSIUM 3.7   CHLORIDE 102   CO2 27   BUN 9   CR 0.59   ANIONGAP 5   ADRIAN 8.6   GLC 92   TROPI <0.015     No results found for this or any previous visit (from the past 24 hour(s)).

## 2021-05-26 NOTE — H&P
"[facility]    History and Physical  Hospitalist       Date of Admission:  5/25/2021  Date of Service (when I saw the patient): 05/25/21    Assessment & Plan   Liz Lieberman is a 70 year old female who presents with unsteady gait. History is mildly limited from patient    Unsteady gait  H/o dizziness  H/o orthostatic hypotension  *Has had evaluation for dizziness in the past. 3/2019 eval for CVA and was ruled out. Also found to have orthostatic hypotension. MRI in 5/2020 was consistent with chronic small vessel ischemic disease. Has h/o falls 2/2 Etoh intoxication.  *Last couple of days feels as if she may fall with changes in position. Felt like may faint since ysterday. \"stumbling around\". Has had Etoh in last couple of weeks. Mildly hypertensive on presentation, afebrile. BMP normal, hgb 11.3, WBC normal. Troponin negative. Etoh negative. UA bland. EKG NSR. Neuro exam nonfocal except severe tardive dyskinesia. Did recently start mirabegron which uncommonly can be associated with dizziness.  - telemetry  - check TSH  - orthostatics  - pending orthostatics and labs, consider neurology consult  - defer repeat MRI  - PT/OT assessment  - likely SW for placement    Alcohol use disorder  H/o Etoh use disorder, presentations to hospital for this. States hasn't used in 2 weeks.  - CIWA  - prn lorazepam if indicated  - oral vitamins    Schizophrenia  Tardive dyskinesia  RLS  Dementia  [cogentin 1 mg TID, clozapine 250 mg at HS, sertraline 150 mg at HS]  - continue meds    Hypertension  Hyperlipidemia  [amlodipine 5 mg at HS, atorvastatin 20 mg at HS]  - continue amlodipine  - hold statin in obs    Anemia, iron deficiency  [FeSO4 324 mg daily]  Hgb at 11.3 on presentation. Baseline appears to be 11-12  - monitor for now  - hold iron in obs    Hypothyroidism  [levothyroxine 175 mcg daily]  - continue    GERD  [omeprazole 20 mg BID]  - continue    Asthma, mild intermittent  [prn albuterol inhaler/ " nebs, flovent 1 puff BID]  - continue albuterol prn  - hold flovent in obs    Overactive bladder  [mirabegron 25 mg daily]  - hold while in obs    COVID-19 negative on presentation    DVT Prophylaxis: Pneumatic Compression Devices  Code Status: DNR / DNI    Disposition: Expected discharge in 1-2 days    Robinson Webster MD  [phone] (P)  Text Page     Primary Care Physician   Era Livingston    Chief Complaint   Dizziness, weakness    History is obtained from the patient and medical records    History of Present Illness   Liz Lieberman is a 70 year old female who presents with weakness.  Please note the patient is a poor historian.  She currently lives alone and apparently has had increased dizziness.  She reports that she had a fall on her stairs.  Compared to a previous admissions she states this is worse.  She also states she has some difficulty walking.  Denies any chest pain or palpitations.  Denies any shortness of breath.  Is unclear if she is lightheaded.  She does have some nausea.  She had called the nurse line and told them that she was having difficulty with balance and walking which prompted her visit here.  Is unclear if she had focal weakness.  She does complain of frozen shoulder.  She also states she has been abstinent from alcohol for 2 weeks.  She has no diarrhea or vomiting.  States she has been eating okay.    Past Medical History    I have reviewed this patient's medical history and updated it with pertinent information if needed.   Past Medical History:   Diagnosis Date     Allergic rhinitis      Bronchiectasis (H)     mild RML     Chronic pain     ft, knee, shoulders     Depression      Diverticulosis     on colonoscopy     Gastro-oesophageal reflux disease      GERD (gastroesophageal reflux disease)      Hearing loss      Hiatal hernia      History of blood transfusion     after tonsillectomy     Hyperlipidemia      Hypothyroidism      Leukocytosis      Lung nodule      Mild  intermittent asthma      Numbness and tingling     hands ceasar numb R/T carpal tunnel     Osteoarthritis     knee, ft, shoulders     Paranoid schizophrenia, in remission      RLS (restless legs syndrome)      Tardive dyskinesia      Urinary incontinence        Past Surgical History   I have reviewed this patient's surgical history and updated it with pertinent information if needed.  Past Surgical History:   Procedure Laterality Date     ARTHROPLASTY KNEE  2/20/2013    Procedure: ARTHROPLASTY KNEE;  LEFT TOTAL KNEE ARTHROPLASTY (SMITH & NEPHEW)^;  Surgeon: Khanh Bee MD;  Location:  OR     ENT SURGERY      tonsillectomy     ORTHOPEDIC SURGERY  2011    (R) total knee       Prior to Admission Medications   Prior to Admission Medications   Prescriptions Last Dose Informant Patient Reported? Taking?   Calcium Carbonate-Vitamin D (CALCIUM + D PO) 5/25/2021 at am Self Yes Yes   Sig: Take 1 tablet by mouth 2 times daily (with meals) Calcium 500 with Vit D 400mg BID. One before breakfast and one with supper    Ferrous Gluconate 324 (37.5 Fe) MG TABS 5/25/2021 at am  Yes Yes   Sig: Take 324 mg by mouth daily (with breakfast)    acetaminophen (TYLENOL) 500 MG tablet prn  Yes Yes   Sig: Take 1,000 mg by mouth every 6 hours as needed for mild pain   albuterol (PROAIR HFA/PROVENTIL HFA/VENTOLIN HFA) 108 (90 Base) MCG/ACT inhaler prn Self Yes Yes   Sig: Inhale 1-2 puffs into the lungs every 6 hours as needed for wheezing    albuterol (PROVENTIL) (2.5 MG/3ML) 0.083% neb solution prn  Yes Yes   Sig: Take 2.5 mg by nebulization every 6 hours as needed for wheezing   amLODIPine (NORVASC) 2.5 MG tablet 5/24/2021 at hs  Yes Yes   Sig: Take 5 mg by mouth At Bedtime   atorvastatin (LIPITOR) 20 MG tablet 5/24/2021 at hs  Yes Yes   Sig: Take 20 mg by mouth At Bedtime    benztropine (COGENTIN) 1 MG tablet 5/25/2021 at x2 doses Self Yes Yes   Sig: Take 1 mg by mouth 3 times daily    cloZAPine (CLOZARIL) 100 MG tablet 5/24/2021 at hs   No Yes   Sig: Take 2.5 tablets (250 mg) by mouth At Bedtime   desoximetasone (TOPICORT) 0.25 % OINT prn Self Yes Yes   Sig: Apply  topically as needed.   diphenhydrAMINE (BENADRYL) 25 MG capsule prn  Yes Yes   Sig: Take 25 mg by mouth every 6 hours as needed for itching   emollient (VANICREAM) external cream prn  Yes Yes   Sig: Apply topically 3 times daily as needed for other (irritation)   fluticasone (FLONASE) 50 MCG/ACT nasal spray 5/25/2021 at am  Yes Yes   Sig: Spray 2 sprays into both nostrils daily    fluticasone (FLOVENT HFA) 110 MCG/ACT inhaler 5/25/2021 at x2 Self Yes Yes   Sig: Inhale 1 puff into the lungs 2 times daily    folic acid (FOLVITE) 1 MG tablet 5/24/2021 at pm  No Yes   Sig: Take 1 tablet (1 mg) by mouth daily   guaiFENesin (MUCINEX) 600 MG 12 hr tablet 5/25/2021 at x2 doses Self Yes Yes   Sig: Take 600 mg by mouth 2 times daily    levothyroxine (SYNTHROID/LEVOTHROID) 175 MCG tablet 5/25/2021 at am Self Yes Yes   Sig: Take 175 mcg by mouth daily   mirabegron (MYRBETRIQ) 25 MG 24 hr tablet 5/25/2021 at am  Yes Yes   Sig: Take 25 mg by mouth daily   multivitamin w/minerals (THERA-VIT-M) tablet 5/25/2021 at am  No Yes   Sig: Take 1 tablet by mouth daily   omeprazole (PRILOSEC) 20 MG DR capsule 5/25/2021 at am  Yes Yes   Sig: Take 20 mg by mouth 2 times daily   polyethylene glycol (MIRALAX/GLYCOLAX) packet prn Self Yes Yes   Sig: Take 17 g by mouth daily as needed for constipation    sertraline (ZOLOFT) 50 MG tablet 5/24/2021 at hs Self Yes Yes   Sig: Take 150 mg by mouth At Bedtime    sodium fluoride dental gel (PREVIDENT) 1.1 % GEL topical gel 5/25/2021 at am  Yes Yes   Sig: Apply to affected area daily      Facility-Administered Medications: None     Allergies   Allergies   Allergen Reactions     Formaldehyde Rash     Latex Rash       Social History   I have reviewed this patient's social history and updated it with pertinent information if needed. Liz Lieberman  reports that she quit smoking  about 27 years ago. She smoked 0.00 packs per day. She has never used smokeless tobacco. She reports current alcohol use of about 3.0 - 5.0 standard drinks of alcohol per week. She reports that she does not use drugs.    Family History   I have reviewed this patient's family history and updated it with pertinent information if needed.   Family History   Problem Relation Age of Onset     Cerebrovascular Disease No family hx of      Diabetes No family hx of        Review of Systems   The 10 point Review of Systems is negative other than noted in the HPI or here.     Physical Exam   Temp: 98.8  F (37.1  C) Temp src: Oral BP: (!) 168/90 Pulse: 97   Resp: 23 SpO2: 95 % O2 Device: None (Room air)    Vital Signs with Ranges  150 lbs 0 oz    Constitutional: alert, oriented and in no acute distress  Eyes: EOMI, PERRL  HEENT: OP clear  Respiratory: CTA B without w/c  Cardiovascular: RRR no murmur. no edema.  GI: soft, nontender, nondistended, BS present  Lymph/Hematologic: no cervical LAD  Genitourinary: deferred  Skin: no rashes or lesions grossly  Musculoskeletal: no deformities or arthritis  Neurologic: CN II-XII, strength is intact and symmetric. Severe tardive dyskinesia  Psychiatric: mood and affect wnl    Data   Data reviewed today:  I personally reviewed the EKG tracing showing NSR.  Recent Labs   Lab 05/25/21 1932   WBC 6.6   HGB 11.3*   MCV 89         POTASSIUM 3.7   CHLORIDE 102   CO2 27   BUN 9   CR 0.59   ANIONGAP 5   ADRIAN 8.6   GLC 92   TROPI <0.015       No results found for this or any previous visit (from the past 24 hour(s)).

## 2021-05-26 NOTE — CONSULTS
[facility]    Neurology Consultation     Date of Admission:  5/25/2021    Assessment & Plan   Liz Lieberman is a 70 year old female who was admitted on 5/25/2021. I was asked to see the patient for abnormal gait.    Antipsychotic associated  -Tardive dyskinesia ( most likely side effect of antipsychotics- Clozapine - low risk for TD)  -Pseudoparkinsonism - mild shuffling gait, Normal arm swing  -Akathisia    - On cogentin  - Can try Valbenazine 40mg every day if no contraindication for renal or hepatic disease  - MRI BRAIN pending    Vertigo  Orthostatic hypotension  Alcohol abuse- Less likely Wernicke encephalopathy ( no ophtalmoplegia, no confusion)  - B1 pending  - , Folate- wnl TSH- wnl  - PT - gait and balance and vestibular exercise  - Encourage Hydration  - Alcohol abuse      Judith Monroy MD  [facility] Neurology  Office Phone [phone]    History of Present Illness   From ER provider note    Liz Lieberman is a 70 year old female with history of Schizophrenia ( Clozapine) hypothyroidism, TIA, HTN, HLD,RLS, RAJANI, tardive dyskinesia and iron deficiency anemia who presents for evaluation of an unsteady gait over the past couple of days.     The patient states that she feels as though she may fall when she changes positions from seating to standing, but otherwise denies any other issues. She specifically denies fever, cough, vomiting, diarrhea, chest pain, shortness of breath, abdominal pain, headache or anticoagulant use. She notes that she has been drinking alcohol for the past couple of weeks. The patient notes that she has a walker and cane at home and is supposed to use the walker but does so occasionally. Patient reports going to the ER frequently for similar symptoms without clear answers to her symptoms    From patient- Patient is poor historian    REM sleep disturbance/disorder,?? Not clear  depression, mood change  +  Fall 3 /month- h/o fall from ETOH abuse    Last drink On 5/9-  5/10 - passed out after drinking  Last couple of days feels as if she may fall with changes in position. Felt like may faint since ysterday.    Denied Prior head trauma, surgery, stroke, alcohol/drug history, family history of dementia/Parkinson's disease,   lightheadedness,  visual hallucination, cognitive/memory decline, autonomic dysfunction, anosmia, constipation, lessfacial expression/smaller voice/small handwriting/slowness of movement, rigidity     Mood- derpressed  Sleep- wakes up sometimes at night   fall:  3 times/mo      Past Medical History    I have reviewed this patient's medical history and updated it with pertinent information if needed.   Past Medical History:   Diagnosis Date     Allergic rhinitis      Bronchiectasis (H)     mild RML     Chronic pain     ft, knee, shoulders     Depression      Diverticulosis     on colonoscopy     Gastro-oesophageal reflux disease      GERD (gastroesophageal reflux disease)      Hearing loss      Hiatal hernia      History of blood transfusion     after tonsillectomy     Hyperlipidemia      Hypothyroidism      Leukocytosis      Lung nodule      Mild intermittent asthma      Numbness and tingling     hands ceasar numb R/T carpal tunnel     Osteoarthritis     knee, ft, shoulders     Paranoid schizophrenia, in remission      RLS (restless legs syndrome)      Tardive dyskinesia      Urinary incontinence        Past Surgical History   I have reviewed this patient's surgical history and updated it with pertinent information if needed.  Past Surgical History:   Procedure Laterality Date     ARTHROPLASTY KNEE  2/20/2013    Procedure: ARTHROPLASTY KNEE;  LEFT TOTAL KNEE ARTHROPLASTY (SMITH & NEPHEW)^;  Surgeon: Khanh Bee MD;  Location:  OR     ENT SURGERY      tonsillectomy     ORTHOPEDIC SURGERY  2011    (R) total knee       Prior to Admission Medications   Prior to Admission Medications   Prescriptions Last Dose Informant Patient Reported? Taking?   Calcium  Carbonate-Vitamin D (CALCIUM + D PO) 5/25/2021 at am Self Yes Yes   Sig: Take 1 tablet by mouth 2 times daily (with meals) Calcium 500 with Vit D 400mg BID. One before breakfast and one with supper    Ferrous Gluconate 324 (37.5 Fe) MG TABS 5/25/2021 at am  Yes Yes   Sig: Take 324 mg by mouth daily (with breakfast)    acetaminophen (TYLENOL) 500 MG tablet prn  Yes Yes   Sig: Take 1,000 mg by mouth every 6 hours as needed for mild pain   albuterol (PROAIR HFA/PROVENTIL HFA/VENTOLIN HFA) 108 (90 Base) MCG/ACT inhaler prn Self Yes Yes   Sig: Inhale 1-2 puffs into the lungs every 6 hours as needed for wheezing    albuterol (PROVENTIL) (2.5 MG/3ML) 0.083% neb solution prn  Yes Yes   Sig: Take 2.5 mg by nebulization every 6 hours as needed for wheezing   amLODIPine (NORVASC) 2.5 MG tablet 5/24/2021 at hs  Yes Yes   Sig: Take 5 mg by mouth At Bedtime   atorvastatin (LIPITOR) 20 MG tablet 5/24/2021 at hs  Yes Yes   Sig: Take 20 mg by mouth At Bedtime    benztropine (COGENTIN) 1 MG tablet 5/25/2021 at x2 doses Self Yes Yes   Sig: Take 1 mg by mouth 3 times daily    cloZAPine (CLOZARIL) 100 MG tablet 5/24/2021 at hs  No Yes   Sig: Take 2.5 tablets (250 mg) by mouth At Bedtime   desoximetasone (TOPICORT) 0.25 % OINT prn Self Yes Yes   Sig: Apply  topically as needed.   diphenhydrAMINE (BENADRYL) 25 MG capsule prn  Yes Yes   Sig: Take 25 mg by mouth every 6 hours as needed for itching   emollient (VANICREAM) external cream prn  Yes Yes   Sig: Apply topically 3 times daily as needed for other (irritation)   fluticasone (FLONASE) 50 MCG/ACT nasal spray 5/25/2021 at am  Yes Yes   Sig: Spray 2 sprays into both nostrils daily    fluticasone (FLOVENT HFA) 110 MCG/ACT inhaler 5/25/2021 at x2 Self Yes Yes   Sig: Inhale 1 puff into the lungs 2 times daily    folic acid (FOLVITE) 1 MG tablet 5/24/2021 at pm  No Yes   Sig: Take 1 tablet (1 mg) by mouth daily   guaiFENesin (MUCINEX) 600 MG 12 hr tablet 5/25/2021 at x2 doses Self Yes Yes    Sig: Take 600 mg by mouth 2 times daily    levothyroxine (SYNTHROID/LEVOTHROID) 175 MCG tablet 5/25/2021 at am Self Yes Yes   Sig: Take 175 mcg by mouth daily   mirabegron (MYRBETRIQ) 25 MG 24 hr tablet 5/25/2021 at am  Yes Yes   Sig: Take 25 mg by mouth daily   multivitamin w/minerals (THERA-VIT-M) tablet 5/25/2021 at am  No Yes   Sig: Take 1 tablet by mouth daily   omeprazole (PRILOSEC) 20 MG DR capsule 5/25/2021 at am  Yes Yes   Sig: Take 20 mg by mouth 2 times daily   polyethylene glycol (MIRALAX/GLYCOLAX) packet prn Self Yes Yes   Sig: Take 17 g by mouth daily as needed for constipation    sertraline (ZOLOFT) 50 MG tablet 5/24/2021 at hs Self Yes Yes   Sig: Take 150 mg by mouth At Bedtime    sodium fluoride dental gel (PREVIDENT) 1.1 % GEL topical gel 5/25/2021 at am  Yes Yes   Sig: Apply to affected area daily      Facility-Administered Medications: None     Allergies   Allergies   Allergen Reactions     Formaldehyde Rash     Latex Rash       Social History   I have reviewed this patient's social history and updated it with pertinent information if needed. Liz Lieberman  reports that she quit smoking about 27 years ago. She smoked 0.00 packs per day. She has never used smokeless tobacco. She reports current alcohol use of about 3.0 - 5.0 standard drinks of alcohol per week. She reports that she does not use drugs.    Family History   I have reviewed this patient's family history and updated it with pertinent information if needed.   Family History   Problem Relation Age of Onset     Cerebrovascular Disease No family hx of      Diabetes No family hx of        Review of Systems   The 10 point Review of Systems is negative other than noted in the HPI or here.     Numbness feet    Patient denies any LOC, HA, vision change (double vision/blurry vision/ vision loss), dizziness, imbalance, nausea, vomiting, dysphagia, dysarthria, weakness, numbness, tingling, incontinence, saddle anesthesia, prior seizure, stroke,  trauma, brain surgery, weight change, recent illness, bleeding, bruising, fever, diarrhea, chest pain or shortness breath    Physical Exam   Temp: 97.7  F (36.5  C) Temp src: Oral BP: 135/80 Pulse: 89   Resp: 18 SpO2: 96 % O2 Device: None (Room air)    Vital Signs with Ranges  Temp:  [97.6  F (36.4  C)-98.8  F (37.1  C)] 97.7  F (36.5  C)  Pulse:  [81-97] 89  Resp:  [14-30] 18  BP: (135-168)/(78-95) 135/80  SpO2:  [95 %-98 %] 96 %  150 lbs 0 oz       Limited due to telemedicine      Comprehension issue- slow to respond    General appearance:No acute distress   Neuro/Psych:Awake, alert, oriented *3   2021 May, place hospital , person  Cranial nerves: Grossly II-XII intact   Fundi/Optic disc: Not available  Extraocular movements intact. No nystagmus, Face appears symmetric. Facial sensation intact(cannot assess). Hearing intact to finger rub (cannot assess).   Motor strength: Upper /Lower extremities at least 4/5 bilaterally,     Range of motion: Partially limited   Sensory: symmetric response to LT stimuli in both upper and lower extremities   Reflexes: Not available  Babinski/Clonus/Delgadillo: Not available  Coordination: cannot perform tandem gait  Romberg n/a   Gait: as below    Tardive dyskinesia ( neck shoulder oromandibular, trunk)  Akathisia  Myoclonic jerk     No Stoopped posture  No Masked face/no  hypomimia/  micrographia- n/a/ hypophonia  No Bradykinesia/ No resting Tremor on the hand during the exam, no postural tremor  Normal stance, mild shuffling gait, Normal arm swing, No En Bloc turn  Retropulsion test: Not available       This is a telemedicine visit that was performed with the originating site at [facility] and the distant site at provider s home in [place], [place]. Verbal consent was given to participate in video visit using [email or web address] real-time telemedicine video conference.  This visit occurred during the Coronavirus (COVID-19) Public Health Emergency and was requested by patient due to contact  concerns.     I discussed with the patient the nature of our telemedicine visits, that:      I would evaluate the patient and recommend diagnostics and treatments based on my assessment and the exam is limited due to the nature of telemedicine visit.    Our sessions are not being recorded and that personal health information is protected    Our team would provide followup care in person if/when the patient needs it.     Patient identification was verified before the start of the encounter.      total time : 70 minutes  More than 50% of the time was spent on discussing/coordinating care with hospital staffs.

## 2021-05-26 NOTE — PROGRESS NOTES
Observation goals:  -diagnostic tests and consults completed and resulted: not met, PT/OT and SW consult this a.m. MRI to be done to rule out stroke.   -vital signs normal or at patient baseline: met, baseline HTN   -returns to baseline functional status: not met, weak and unstable   -safe disposition plan has been identified: not met

## 2021-05-26 NOTE — PROGRESS NOTES
Observation goals:  -diagnostic tests and consults completed and resulted: not met, PT/OT and SW consult this a.m.   -vital signs normal or at patient baseline: met, baseline HTN   -returns to baseline functional status: not met, weak and unstable   -safe disposition plan has been identified: not met

## 2021-05-26 NOTE — PROGRESS NOTES
Observation goals:  -diagnostic tests and consults completed and resulted: not met, PT/OT and SW consult this a.m.   -vital signs normal or at patient baseline: met, baseline HTN   -returns to baseline functional status: not met, weak and unstable   -safe disposition plan has been identified: not met    Arrived from ED at 0000. A&O X4, slow to respond. CIWA 4. Tremors and hyperactive extremity movement. Up with A1 to BSC, voiding adequately. Awaiting consults.

## 2021-05-26 NOTE — ED NOTES
Pt road tested with walker, per MD. Pt able to stand and ambulate with walker. Pt moving erratically when walking but appeared stable on her feet. Pt did not respond to questions but stated that she didn't want to walk.

## 2021-05-26 NOTE — PROGRESS NOTES
05/26/21 1048   Quick Adds   Quick Adds Certification;Vestibular Eval   Type of Visit Initial PT Evaluation   Living Environment   People in home alone   Current Living Arrangements condominium   Home Accessibility   (ramp to enter, elevator)   Number of Stairs, Main Entrance none   Self-Care   Usual Activity Tolerance moderate   Current Activity Tolerance fair   Equipment Currently Used at Home cane, straight   Activity/Exercise/Self-Care Comment Tries to use cane as pt reports it's enough, then cites falls from drinking as well as not from drinking, states her doctor would rather her use her walker more.    Disability/Function   Walking or Climbing Stairs Difficulty yes   Walking or Climbing Stairs ambulation difficulty, requires equipment;transferring difficulty, requires equipment   Fall history within last six months yes   Number of times patient has fallen within last six months   (4 per pt, 10 per chart. 1/2 from drinking per pt. )   Change in Functional Status Since Onset of Current Illness/Injury yes   General Information   Onset of Illness/Injury or Date of Surgery 05/25/21   Referring Physician Robinson Webster MD   Patient/Family Therapy Goals Statement (PT) To get better.    Pertinent History of Current Problem (include personal factors and/or comorbidities that impact the POC) Increased weakness with amb, dizziness, falls, ETOH, Schizophrenia with tardive dyskinesia, RLS, dementia, HTN, HLD.    Existing Precautions/Restrictions fall   Weight-Bearing Status - LUE full weight-bearing  (all)   General Observations Pt denies lightheadedness, spins, diziness in the head. Reports her dizziness is imbalance, unsteadiness on her feet.    Cognition   Affect/Mental Status (Cognition) WFL   Follows Commands (Cognition) WFL   Posture    Posture Comments Impaired controlled mobility .   Range of Motion (ROM)   ROM Comment Pt reports R frozen shoulder dx but pt able to flx & abd shoulder to ~normal ranges  with some discomfort noted.    Strength   Strength Comments Impaired: UE dependence to power, limited postural stab, limited activity tolerance.    Bed Mobility   Comment (Bed Mobility) Supervisoin supine to/from sitting given ataxia with imbalance.    Transfers   Transfer Safety Comments Pushpa sit-stands, pivots ModA Iv pole and Pushpa RW.    Gait/Stairs (Locomotion)   Distance in Feet (Required for LE Total Joints) ModA IV pole 10' to simulate SPC - severe ataxia, imbalance - PT ceased for pt & staff safety.    Comment (Gait/Stairs) NA to return home.    Coordination   Coordination Comments Ataxic - impaired.    Oculomotor Exam   Smooth Pursuit Abnormal   Smooth Pursuit Comment Asx Signs of overshooting with gaze fixation loss   Saccades Abnormal   Saccades Comments Asx Undershoots L   VOR Abnormal   VOR Comments Asx Retinal slips at moderate speeds.    VOR Cancellation Abnormal   VOR Cancellation Comments Asx   Convergence Testing Abnormal   Convergence Testing Comments Asx L eye struggles from 1ft.    Clinical Impression   Criteria for Skilled Therapeutic Intervention yes, treatment indicated   PT Diagnosis (PT) Impaired mobiltiy   Influenced by the following impairments Impaired strength, balance, coordination, safety awarness, safe decision-making, activity tolerance   Functional limitations due to impairments Impaired independent mobility & living.    Clinical Presentation Evolving/Changing   Clinical Presentation Rationale Progressive ataxia, weakness, imbalance.   Clinical Decision Making (Complexity) low complexity   Therapy Frequency (PT) Daily   Predicted Duration of Therapy Intervention (days/wks) 1 week   Planned Therapy Interventions (PT) balance training;bed mobility training;gait training;home exercise program;motor coordination training;neuromuscular re-education;patient/family education;postural re-education;stair training;strengthening;transfer training   Anticipated Equipment Needs at Discharge  (PT) walker, rolling;wheelchair;shower chair;gait belt;raised toilet seat   Risk & Benefits of therapy have been explained evaluation/treatment results reviewed;risks/benefits reviewed;current/potential barriers reviewed;participants included;patient;participants voiced agreement with care plan;care plan/treatment goals reviewed   PT Discharge Planning    PT Discharge Recommendation (DC Rec) Transitional Care Facility   PT Rationale for DC Rec Pt unsafe to return home alone as she needs Ax1 & RW for limited distances only with severe ataxia and imbalance.    PT Brief overview of current status  Bed mob supervision, Pushpa tx, ModA gait IV pole and Pushpa with RW.    Therapy Certification   Start of care date 05/26/21   Certification date from 05/26/21   Certification date to 06/02/21   Medical Diagnosis Progressive weakness and imbalance, Schizophrenia with tardive dyskinesia, ETOH.   Total Evaluation Time   Total Evaluation Time (Minutes) 10

## 2021-05-27 ENCOUNTER — APPOINTMENT (OUTPATIENT)
Dept: OCCUPATIONAL THERAPY | Facility: CLINIC | Age: 71
DRG: 057 | End: 2021-05-27
Payer: MEDICARE

## 2021-05-27 ENCOUNTER — APPOINTMENT (OUTPATIENT)
Dept: PHYSICAL THERAPY | Facility: CLINIC | Age: 71
DRG: 057 | End: 2021-05-27
Payer: MEDICARE

## 2021-05-27 LAB — GLUCOSE BLDC GLUCOMTR-MCNC: 103 MG/DL (ref 70–99)

## 2021-05-27 PROCEDURE — 97530 THERAPEUTIC ACTIVITIES: CPT | Mod: GP | Performed by: PHYSICAL THERAPIST

## 2021-05-27 PROCEDURE — 97535 SELF CARE MNGMENT TRAINING: CPT | Mod: GO | Performed by: OCCUPATIONAL THERAPIST

## 2021-05-27 PROCEDURE — 999N001017 HC STATISTIC GLUCOSE BY METER IP

## 2021-05-27 PROCEDURE — 97116 GAIT TRAINING THERAPY: CPT | Mod: GP | Performed by: PHYSICAL THERAPIST

## 2021-05-27 PROCEDURE — 250N000013 HC RX MED GY IP 250 OP 250 PS 637: Performed by: INTERNAL MEDICINE

## 2021-05-27 PROCEDURE — 99233 SBSQ HOSP IP/OBS HIGH 50: CPT | Performed by: INTERNAL MEDICINE

## 2021-05-27 PROCEDURE — 120N000001 HC R&B MED SURG/OB

## 2021-05-27 RX ADMIN — SERTRALINE HYDROCHLORIDE 150 MG: 100 TABLET ORAL at 21:05

## 2021-05-27 RX ADMIN — ACETAMINOPHEN 1000 MG: 500 TABLET, FILM COATED ORAL at 09:22

## 2021-05-27 RX ADMIN — BENZTROPINE MESYLATE 1 MG: 1 TABLET ORAL at 16:13

## 2021-05-27 RX ADMIN — AMLODIPINE BESYLATE 5 MG: 5 TABLET ORAL at 21:05

## 2021-05-27 RX ADMIN — OMEPRAZOLE 20 MG: 20 CAPSULE, DELAYED RELEASE ORAL at 09:23

## 2021-05-27 RX ADMIN — FOLIC ACID 1 MG: 1 TABLET ORAL at 09:22

## 2021-05-27 RX ADMIN — CLOZAPINE 250 MG: 25 TABLET ORAL at 21:05

## 2021-05-27 RX ADMIN — ACETAMINOPHEN 1000 MG: 500 TABLET, FILM COATED ORAL at 16:12

## 2021-05-27 RX ADMIN — Medication 50 MG: at 09:22

## 2021-05-27 RX ADMIN — LEVOTHYROXINE SODIUM 175 MCG: 100 TABLET ORAL at 09:22

## 2021-05-27 RX ADMIN — OMEPRAZOLE 20 MG: 20 CAPSULE, DELAYED RELEASE ORAL at 21:05

## 2021-05-27 RX ADMIN — BENZTROPINE MESYLATE 1 MG: 1 TABLET ORAL at 21:05

## 2021-05-27 RX ADMIN — BENZTROPINE MESYLATE 1 MG: 1 TABLET ORAL at 09:22

## 2021-05-27 ASSESSMENT — ACTIVITIES OF DAILY LIVING (ADL)
ADLS_ACUITY_SCORE: 16

## 2021-05-27 ASSESSMENT — MIFFLIN-ST. JEOR: SCORE: 1198.88

## 2021-05-27 NOTE — PLAN OF CARE
Pt is A & O x 4. Lungs sound clear, bowel sounds active, cms intact. Up with 1 and walker. Unsteadily gait with pill rolling tremors. Had 1 BM. Receiving tylenol for pain. Psych consult done. Tele was SR. Will continue to monitor.

## 2021-05-27 NOTE — PLAN OF CARE
Pt is A & O x 4. [place]. Lungs sound clear, bowel sounds active, cms intact. Up with 1 and walker. Neuro checks intact except for pill rolling tremor and unsteadiness when ambulating. Received tylenol for pain. Will continue to monitor.

## 2021-05-27 NOTE — PROGRESS NOTES
"[facility]    Medicine Progress Note - Hospitalist Service       Date of Admission:  5/25/2021  Assessment & Plan       Liz Lieberman is a 70 year old female who presents with unsteady gait    Unsteady gait, concern for parkinsons vs other neurologic disease   H/o Tardive dyskinesia   Pseudoparkinsonism   H/o orthostatic hypotension  Has had evaluation for dizziness in the past. 3/2019 eval for CVA and was ruled out. Also found to have orthostatic hypotension. MRI in 5/2020 was consistent with chronic small vessel ischemic disease. Has h/o falls 2/2 Etoh intoxication.  Last couple of days feels as if she may fall with changes in position. Felt like may faint since ysterday. \"stumbling around\". Has had Etoh in last couple of weeks. Mildly hypertensive on presentation, afebrile. BMP normal, hgb 11.3, WBC normal. Troponin negative. Etoh negative. UA bland. EKG NSR. Neuro exam nonfocal except severe tardive dyskinesia. Did recently start mirabegron which uncommonly can be associated with dizziness.  Last CT head was over 2 weeks ago and showed no acute pathology   5/26:  Orthostatics unremarkable.  On examination today patient appears to have some word finding difficulty vs possible encephalopathy, masked facies, restless legs, issues with dexterity and a pill rolling tremor.  Patient also reporting a more shuffling gait on questioning.  Not all of these symptoms fit with the patient's known TD  * MRI Brain:  1. No evidence of acute ischemia or hemorrhage.  2. Volume loss and white matter T2 hyperintensities which likely represent chronic small vessel ischemic change.   - Folate and B12 WNL   - Continue to monitor on telemetry  - PT/OT assessment.  Recommending TCU but patient not agreeable at this time   - General neurology following and appreciate their recommendations  - Psychiatry consulted to help us as this may be worsening of her TD     Alcohol use disorder  H/o Etoh use disorder, " presentations to hospital for this. Denies ETOH use for over 2 weeks and ETOH level <0.01 on admission   - CIWA  - Continue oral vitamins     Schizophrenia  RLS  Dementia  [cogentin 1 mg TID, clozapine 250 mg at HS, sertraline 150 mg at HS]  - Continue PTA medications for now  - Psychiatry consult as above      Hypertension  Hyperlipidemia  [amlodipine 5 mg at HS, atorvastatin 20 mg at HS]  - Continue amlodipine  - Holding statin while obs status      Anemia, iron deficiency  [FeSO4 324 mg daily]  Hgb at 11.3 on presentation. Baseline appears to be 11-12  - Monitor   - Holding iron while obs status     Hypothyroidism  [Levothyroxine 175 mcg daily]  * TSH is 2.28   - Continue    GERD  [omeprazole 20 mg BID]  - Continue     Asthma, mild intermittent  [prn albuterol inhaler/ nebs, flovent 1 puff BID]  - Continue albuterol prn  - Holding flovent while obs     Overactive bladder  [mirabegron 25 mg daily]  - Holding while in obs     COVID-19 negative on presentation           Diet: Regular Diet Adult    DVT Prophylaxis: Pneumatic Compression Devices  [place] Catheter: not present  Code Status: No CPR- Do NOT Intubate           Disposition Plan   Expected discharge: 1-2 days, once psychiatry and neurology evaluation complete.  Recommending TCU but patient is declining at this time.  Entered: Tae Cerrato DO 05/27/2021, 11:15 AM       The patient's care was discussed with the Bedside Nurse, Care Coordinator/ and Patient.    Time Spent on this Encounter   Over 35 minutes was spent examining and discussing the patient with greater than 50% time spent in counseling and/or coordination of care.     Tae Cerrato DO  Hospitalist Service  [facility]  Contact information available via [facility] Paging/Directory    ______________________________________________________________________    Interval History   Patient seen and examined.  No acute events over night.  No fevers or chills.  No  new neurologic symptoms.  No pain currently.  Worked with PT this AM.  No nausea or vomiting and tolerating diet.  Patient declining TCU though it is recommended     Data reviewed today: I reviewed all medications, new labs and imaging results over the last 24 hours. I personally reviewed no images or EKG's today.    Physical Exam   Vital Signs: Temp: 97.4  F (36.3  C) Temp src: Axillary BP: (!) 149/80 Pulse: 90   Resp: 16 SpO2: 95 % O2 Device: None (Room air)    Weight: 148 lbs 5.91 oz  General Appearance: Resting comfortably. NAD   Respiratory: Clear to auscultation.  No respiratory distress  Cardiovascular: RRR.  No obvious murmurs  GI: Soft.  Non-distended  Skin: No obvious rashes or cyanosis  Other: No edema.  No calf tenderness.  Pill rolling tremor on the right is still present     Data   Recent Labs   Lab 05/25/21 1932   WBC 6.6   HGB 11.3*   MCV 89         POTASSIUM 3.7   CHLORIDE 102   CO2 27   BUN 9   CR 0.59   ANIONGAP 5   ADRIAN 8.6   GLC 92   TROPI <0.015     Recent Results (from the past 24 hour(s))   MR Brain w/o Contrast    Narrative    MRI BRAIN WITHOUT CONTRAST  5/26/2021 3:01 PM    HISTORY:  Dizziness, non-specific.    TECHNIQUE:  Multiplanar, multisequence MRI of the brain without  gadolinium IV contrast material.      COMPARISON:  Head CT 5/19/2021, head MRI 5/4/2020    FINDINGS:   Mild motion artifacts. Mild volume loss is present. A few white matter  T2 hyperintensities likely represent mild chronic small vessel  ischemic change. No evidence of acute ischemia, hemorrhage, mass, mass  effect, hydrocephalus. Major intracranial flow voids are maintained.    Marrow signal is within normal limits. Mild paranasal sinus mucosal  thickening. Trace opacification of the left mastoid cavity, likely  inflammatory.      Impression    IMPRESSION:    1. No evidence of acute ischemia or hemorrhage.  2. Volume loss and white matter T2 hyperintensities which likely  represent chronic small vessel  ischemic change.    MAHESH JORDAN MD

## 2021-05-27 NOTE — PLAN OF CARE
Pt is AOx4, CMS intact, neuro unchanged, mild tremors with occasional pill rolling, unsteady gait, paranoid at times, possible psych eval. Will cont to monitor.

## 2021-05-27 NOTE — PLAN OF CARE
Pt A&O x 4. VSS in RA.  CMS intact ex numbness in LE. Mild tremors with pill rolling, unsteady gait. Taking tylenol prn for shoulder pain.Possible psy eval. Will continue to monitor.

## 2021-05-27 NOTE — PROGRESS NOTES
[facility]    Neurology Progress Note     Assessment & Plan   Liz Lieberman is a 70 year old female who was admitted on 5/25/2021. I was asked to see the patient for abnormal gait.     Antipsychotic associated  -Tardive dyskinesia ( most likely side effect of antipsychotics- Clozapine - low risk for TD)  -Pseudoparkinsonism - mild shuffling gait, Normal arm swing  -Akathisia     - On cogentin  - Can try Valbenazine 40mg every day if no contraindication for renal or hepatic disease  - MRI BRAIN 5/26/21-no acute pathology     Vertigo  Orthostatic hypotension  Alcohol abuse- Less likely Wernicke encephalopathy ( no ophtalmoplegia, no confusion)  - B1 pending  - , Folate- wnl TSH- wnl  - PT - gait and balance and vestibular exercise  - Encourage Hydration  - Alcohol abuse    Neurology will sign off and please contact us if there is any question        Judith Monroy MD  [facility] Neurology  Office Phone [phone]      Interval History   Same involuntary movement      Physical Exam   Temp: 97.6  F (36.4  C) Temp src: Oral BP: 131/73 Pulse: 95   Resp: 16 SpO2: 95 % O2 Device: None (Room air)    Vitals:    05/25/21 1912 05/27/21 0640   Weight: 68 kg (150 lb) 67.3 kg (148 lb 5.9 oz)     Vital Signs with Ranges  Temp:  [97.3  F (36.3  C)-98.2  F (36.8  C)] 97.6  F (36.4  C)  Pulse:  [69-95] 95  Resp:  [15-18] 16  BP: (131-150)/(73-90) 131/73  SpO2:  [95 %-96 %] 95 %  I/O last 3 completed shifts:  In: 150 [P.O.:150]  Out: 350 [Urine:350]     Medications       amLODIPine  5 mg Oral At Bedtime     benztropine  1 mg Oral TID     cloZAPine  250 mg Oral At Bedtime     folic acid  1 mg Oral Daily     levothyroxine  175 mcg Oral Daily     omeprazole  20 mg Oral BID     sertraline  150 mg Oral At Bedtime     vitamin B1  50 mg Oral Daily       total time : 15minutes  More than 50% of the time was spent on discussing/coordinating care with hospital staffs.    This is a telemedicine visit that was performed  with the originating site at [facility] and the distant site at provider s home in [place], [place]. Verbal consent was given to participate in video visit using [employer] real-time telemedicine video conference.  This visit occurred during the Coronavirus (COVID-19) Public Health Emergency and was requested by patient due to contact concerns.     I discussed with the patient the nature of our telemedicine visits, that:      I would evaluate the patient and recommend diagnostics and treatments based on my assessment and the exam is limited due to the nature of telemedicine visit.    Our sessions are not being recorded and that personal health information is protected    Our team would provide followup care in person if/when the patient needs it.     Patient identification was verified before the start of the encounter.

## 2021-05-27 NOTE — PROGRESS NOTES
[facility] Extended Care, Consult & Liaison    Liz Lieberman  May 27, 2021    Session start: 1507  Session end: 1541  Session duration in minutes: 34  Patient was seen virtually ([employer] cart or other teleconferencing device).    Liz is followed related to request for pyschiatry consult relating to intensifying tardive dyskinesia potentially secondary to long-term antipsychotic medication use. There are historical diagnoses of F20.02 - Schizophrenia, paranoid type, in remission; F02.80 - Dementia in other diseases classified elsewhere without behavioral disturbance; F10.20 - Alcohol dependence, uncomplicated noted.    Therapeutic intervention and progress:  In individual therapeutic contact with patient today, patient presents with appropriate affect, euthymic mood, and reports some anxious sx relating to anticipated discharge. Safety concerns are not present today based upon pt presentation and chart review of baseline functioning per outpatient psychiatrist. Therapeutic intervention consisted of building therapeutic rapport, active listening, validation and conducting a brief mental health screening for current / historical fx as part of psychiatry consult.    Pt presents as a 69 y/o female whom lives alone in the community. She appears A/Ox4 at the time of contact, speaks English clearly with appropriate word choice, pace, volume, tone, and prosody, with some tangentiality and difficulty following narrative threads at times. She denies any significant mood related, psychotic spectrum, or safety related concerns at the time of this writing. Temporal and ordinal memory appears mostly intact on short and long-term bases, with some data loss regarding specific details of events. Akathisia and segmented dystonia observed during clinical interview. Onset of problematic mental health sx reported around age 22; pt unable to recall most recent psychiatric hospitalization at the time of this writing. Chart review from current  "through 2020 does not note inpatient exclusively psychiatric admissions.    Based upon presentation and chart review of outpatient psychiatry notes and screenings, pt baseline appears able to identify basic safety and healthcare related needs as well as natural support structures ([employer]/friends/sisters), expresses recognition of psychiatric concerns and consistency with medication compliance a majority of the time, and is unwilling to shift current patterns of ETOH use which have persisted over the long term. Pt appears to have difficulty recognizing the negative impact of chronic ETOH use as it relates to medication efficacy and contribution to other health factors, however she is evasive and minimizes these issues when polled during consult.    Relationally, pt notes some ongoing strain between herself and an older sister, Alexandria, particularly in relation to ETOH use; states that she feels neglected and ostracized by family due to the same. Pt also reports some sporadic interpersonal difficulties with neighbors in her condominium complex, however she declines to provide clarifying information at the time of this writing, stating \"oh I shouldn't talk about that,\" thus there are inadequate data to determine if this reflects sx of paranoia consistent with historical diagnoses.    Collateral information:   Reviewed chart and coordinated with Dr. Le for psychiatry consult.      Plan:     Continue care coordination with psychiatry     Maintain current transition plan.    SHERWIN CHAVEZ, [facility] Extended Care, Consult & Liaison Service  [phone]    "

## 2021-05-27 NOTE — CONSULTS
Care Management Initial Consult    General Information  Assessment completed with: Patient, VM-chart reviewLiz   Type of CM/SW Visit: Initial Assessment    Primary Care Provider verified and updated as needed:     Readmission within the last 30 days:        Reason for Consult: discharge planning  Advance Care Planning:            Communication Assessment  Patient's communication style: spoken language (English or Bilingual)    Hearing Difficulty or Deaf: yes   Wear Glasses or Blind: yes    Cognitive  Cognitive/Neuro/Behavioral: WDL  Level of Consciousness: alert  Arousal Level: opens eyes spontaneously  Orientation: oriented x 4  Mood/Behavior: calm, cooperative     Speech: slow    Living Environment:   People in home: alone     Current living Arrangements: condominium      Able to return to prior arrangements: yes       Family/Social Support:  Care provided by: self, other (see comments)( through [facility])  Provides care for: no one  Marital Status:   Neighbor, Other (specify), Sibling(s)(hurch)          Description of Support System: Supportive         Current Resources:   Patient receiving home care services:       Community Resources:    Equipment currently used at home: cane, straight  Supplies currently used at home:      Employment/Financial:  Employment Status: retired        Financial Concerns: No concerns identified           Lifestyle & Psychosocial Needs:        Socioeconomic History     Marital status:      Spouse name: Not on file     Number of children: Not on file     Years of education: Not on file     Highest education level: Not on file     Tobacco Use     Smoking status: Former Smoker     Packs/day: 0.00     Quit date: 2/15/1994     Years since quittin.2     Smokeless tobacco: Never Used   Substance and Sexual Activity     Alcohol use: Yes     Alcohol/week: 3.0 - 5.0 standard drinks     Types: 3 - 5 Cans of beer per week     Drug use: No        Functional Status:  Prior to admission patient needed assistance:              Mental Health Status:          Chemical Dependency Status:                Values/Beliefs:  Spiritual, Cultural Beliefs, Mandaeism Practices, Values that affect care:                 Additional Information:  Consult for discharge planning. Writer reviewed chart and recommendations for TCU at discharge. Writer met with patient who stated that she has been to a TCU in the past and wants to discharge home. She said that she lives in a multi-floor condominium and has people around to help. She also stated that she has [employer], friends and a sister for support. When discussing services she currently has she said that she has someone come to clean weekly but was unsure if she has services or not. Patient mentioned Amada Viera, [phone] through [employer] who has been her advocate. Per chart review, phone number found and call placed to see if she can provide any additional information. Message left for Amada. Will update CC RN regarding patient wishes to discharge home.    USHA Oconnell

## 2021-05-28 ENCOUNTER — APPOINTMENT (OUTPATIENT)
Dept: OCCUPATIONAL THERAPY | Facility: CLINIC | Age: 71
DRG: 057 | End: 2021-05-28
Payer: MEDICARE

## 2021-05-28 ENCOUNTER — APPOINTMENT (OUTPATIENT)
Dept: PHYSICAL THERAPY | Facility: CLINIC | Age: 71
DRG: 057 | End: 2021-05-28
Payer: MEDICARE

## 2021-05-28 ENCOUNTER — DOCUMENTATION ONLY (OUTPATIENT)
Dept: OTHER | Facility: CLINIC | Age: 71
End: 2021-05-28

## 2021-05-28 PROCEDURE — 250N000013 HC RX MED GY IP 250 OP 250 PS 637: Performed by: INTERNAL MEDICINE

## 2021-05-28 PROCEDURE — 99207 PR CONSULT E&M CHANGED TO INITIAL LEVEL: CPT | Performed by: PSYCHIATRY & NEUROLOGY

## 2021-05-28 PROCEDURE — 99223 1ST HOSP IP/OBS HIGH 75: CPT | Performed by: PSYCHIATRY & NEUROLOGY

## 2021-05-28 PROCEDURE — 97535 SELF CARE MNGMENT TRAINING: CPT | Mod: GO | Performed by: OCCUPATIONAL THERAPIST

## 2021-05-28 PROCEDURE — 120N000001 HC R&B MED SURG/OB

## 2021-05-28 PROCEDURE — 99233 SBSQ HOSP IP/OBS HIGH 50: CPT | Performed by: INTERNAL MEDICINE

## 2021-05-28 PROCEDURE — 97116 GAIT TRAINING THERAPY: CPT | Mod: GP

## 2021-05-28 PROCEDURE — 97112 NEUROMUSCULAR REEDUCATION: CPT | Mod: GP

## 2021-05-28 RX ORDER — GUAIFENESIN 600 MG/1
600 TABLET, EXTENDED RELEASE ORAL 2 TIMES DAILY
Status: DISCONTINUED | OUTPATIENT
Start: 2021-05-28 | End: 2021-05-29 | Stop reason: HOSPADM

## 2021-05-28 RX ADMIN — SERTRALINE HYDROCHLORIDE 150 MG: 100 TABLET ORAL at 21:08

## 2021-05-28 RX ADMIN — FOLIC ACID 1 MG: 1 TABLET ORAL at 08:53

## 2021-05-28 RX ADMIN — BENZTROPINE MESYLATE 1 MG: 1 TABLET ORAL at 16:19

## 2021-05-28 RX ADMIN — BENZTROPINE MESYLATE 1 MG: 1 TABLET ORAL at 08:53

## 2021-05-28 RX ADMIN — GUAIFENESIN 600 MG: 600 TABLET, EXTENDED RELEASE ORAL at 10:07

## 2021-05-28 RX ADMIN — OMEPRAZOLE 20 MG: 20 CAPSULE, DELAYED RELEASE ORAL at 08:53

## 2021-05-28 RX ADMIN — Medication 50 MG: at 08:53

## 2021-05-28 RX ADMIN — ACETAMINOPHEN 1000 MG: 500 TABLET, FILM COATED ORAL at 16:19

## 2021-05-28 RX ADMIN — OMEPRAZOLE 20 MG: 20 CAPSULE, DELAYED RELEASE ORAL at 21:08

## 2021-05-28 RX ADMIN — CLOZAPINE 250 MG: 25 TABLET ORAL at 21:07

## 2021-05-28 RX ADMIN — AMLODIPINE BESYLATE 5 MG: 5 TABLET ORAL at 21:08

## 2021-05-28 RX ADMIN — LEVOTHYROXINE SODIUM 175 MCG: 100 TABLET ORAL at 08:53

## 2021-05-28 RX ADMIN — BENZTROPINE MESYLATE 1 MG: 1 TABLET ORAL at 21:08

## 2021-05-28 RX ADMIN — GUAIFENESIN 600 MG: 600 TABLET, EXTENDED RELEASE ORAL at 21:08

## 2021-05-28 ASSESSMENT — ACTIVITIES OF DAILY LIVING (ADL)
ADLS_ACUITY_SCORE: 14
ADLS_ACUITY_SCORE: 16
ADLS_ACUITY_SCORE: 14

## 2021-05-28 NOTE — PROGRESS NOTES
"[facility]    Medicine Progress Note - Hospitalist Service       Date of Admission:  5/25/2021  Assessment & Plan       Liz Lieberman is a 70 year old female who presents with unsteady gait    Unsteady gait, concern for parkinsons vs other neurologic disease   H/o Tardive dyskinesia   Pseudoparkinsonism   H/o orthostatic hypotension  Has had evaluation for dizziness in the past. 3/2019 eval for CVA and was ruled out. Also found to have orthostatic hypotension. MRI in 5/2020 was consistent with chronic small vessel ischemic disease. Has h/o falls 2/2 Etoh intoxication.  Last couple of days feels as if she may fall with changes in position. Felt like may faint since ysterday. \"stumbling around\". Has had Etoh in last couple of weeks. Mildly hypertensive on presentation, afebrile. BMP normal, hgb 11.3, WBC normal. Troponin negative. Etoh negative. UA bland. EKG NSR. Neuro exam nonfocal except severe tardive dyskinesia. Did recently start mirabegron which uncommonly can be associated with dizziness.  Last CT head was over 2 weeks ago and showed no acute pathology   5/26:  Orthostatics unremarkable.  On examination today patient appears to have some word finding difficulty vs possible encephalopathy, masked facies, restless legs, issues with dexterity and a pill rolling tremor.  Patient also reporting a more shuffling gait on questioning.  Not all of these symptoms fit with the patient's known TD  * MRI Brain:  1. No evidence of acute ischemia or hemorrhage.  2. Volume loss and white matter T2 hyperintensities which likely represent chronic small vessel ischemic change.   - Folate and B12 WNL   - Continue to monitor on telemetry  - PT/OT assessment.  Recommending TCU. Patient initially not in agreement but no agreeable   - General neurology following and appreciate their recommendations  - Psychiatry re-consulted and await their reocmmendations    Alcohol use disorder  H/o Etoh use disorder, " presentations to hospital for this. Denies ETOH use for over 2 weeks and ETOH level <0.01 on admission   - CIWA stopped   - Continue oral vitamins     Schizophrenia  RLS  Dementia  [cogentin 1 mg TID, clozapine 250 mg at HS, sertraline 150 mg at HS]  - Continue PTA medications for now  - Psychiatry re-consulted as above      Hypertension  Hyperlipidemia  [amlodipine 5 mg at HS, atorvastatin 20 mg at HS]  - Continue amlodipine  - Can resume statin at discharge      Anemia, iron deficiency  [FeSO4 324 mg daily]  Hgb at 11.3 on presentation. Baseline appears to be 11-12  - Monitor   - Can resume iron at dischage     Hypothyroidism  [Levothyroxine 175 mcg daily]  * TSH is 2.28   - Continue synthroid    GERD  [omeprazole 20 mg BID]  - Continue PPO      Asthma, mild intermittent  [prn albuterol inhaler/ nebs, flovent 1 puff BID]  - Continue albuterol prn  - Can resume flovent at discharge     Overactive bladder  [mirabegron 25 mg daily]     COVID-19 negative on presentation             Diet: Regular Diet Adult    DVT Prophylaxis: Pneumatic Compression Devices  [place] Catheter: not present  Code Status: No CPR- Do NOT Intubate           Disposition Plan   Expected discharge: Tomorrow, recommended to transitional care unit once safe disposition plan/ TCU bed available.  Entered: Tae Cerrato DO 05/28/2021, 3:09 PM       The patient's care was discussed with the Bedside Nurse, Care Coordinator/ and Patient.    Time Spent on this Encounter   Over 35 minutes was spent examining and discussing the patient with greater than 50% time spent in counseling and/or coordination of care.     Tae Cerrato DO  Hospitalist Service  [facility]  Contact information available via [facility] Paging/Directory    ______________________________________________________________________    Interval History   Patient seen and examined.  No acute events overnight.  Feeling weak today.  No new pains.  No  chest pain or SOB.  Tolerating diet.  No fevers or chills.     Data reviewed today: I reviewed all medications, new labs and imaging results over the last 24 hours. I personally reviewed no images or EKG's today.    Physical Exam   Vital Signs: Temp: 97.9  F (36.6  C) Temp src: Oral BP: 126/70 Pulse: 69   Resp: 16 SpO2: 97 % O2 Device: None (Room air)    Weight: 148 lbs 5.91 oz  General Appearance: Resting comfortably.  NAD   Respiratory: Clear to auscultation.  No respiratory distress  Cardiovascular: RRR.  No obvious murmurs  GI: Soft.  Non-distended  Skin: No obvious rashes or cyanosis  Other: No edema.  Alert     Data   Recent Labs   Lab 05/25/21  1932   WBC 6.6   HGB 11.3*   MCV 89         POTASSIUM 3.7   CHLORIDE 102   CO2 27   BUN 9   CR 0.59   ANIONGAP 5   ADRIAN 8.6   GLC 92   TROPI <0.015     No results found for this or any previous visit (from the past 24 hour(s)).

## 2021-05-28 NOTE — PLAN OF CARE
Pt A&O x 4. VSS in RA.  CMS intact ex numbness in LE. Mild tremors with pill rolling, unsteady gait. Discharge TCU vs home. Will continue to monitor.

## 2021-05-28 NOTE — PROGRESS NOTES
Care Management Follow Up    Length of Stay (days): 2    Expected Discharge Date: 05/28/21     Concerns to be Addressed: discharge planning     Patient plan of care discussed at interdisciplinary rounds: Yes    Anticipated Discharge Disposition: Transitional Care     Anticipated Discharge Services:    Anticipated Discharge DME:      Patient/family educated on Medicare website which has current facility and service quality ratings:    Education Provided on the Discharge Plan:    Patient/Family in Agreement with the Plan:      Referrals Placed by CM/SW:    Private pay costs discussed: Not applicable    Additional Information:  Writer spoke to patient to gain permission to speak to her sister Alexandria who has called and left a message. Patient is in agreement to discuss patient status. Writer also discussed TCU and [facility] Referral sent via [employer].    Amada Viera, [facility]  returned writers call from yesterday. She reported that patient utilizes the [employer] services at her independent apartment including home delivered meals. Patient has been assessed for several services but due to income would be required to pay out of pocket for services and then declines services.    Call placed to sister Alexandria, [phone], who provided a copy of [facility] that was sent to [facility] to validate. Alexandria stated that patient had worked full time for more than 25 years and now has a sizable pension although unable to state amount. Patient is seen by Melinda Chiang from [facility] in [place]. There are concerns for dementia at this time and a decline in patient's ability to care for herself. Writer updated Alexandria that patient is agreeable to TCU and a referral was sent. Alexandria and her 2 sisters have been working together and coordinating with [facility], her psychiatrist and with patient to help coordinate a plan for long term plan.     Per sister Alexandria,  patient has been COVID vaccinated on 3/12/21 and 4/2/21 per chart.     Update: [facility] does not have any beds. Additional referrals sent to [facility], [facility] and [facility] via [facility].    USHA Oconnell

## 2021-05-28 NOTE — PLAN OF CARE
Pt is AOx4, unsteady gait, tremors with occasional pill rolling, neuro unchanged, paranoid at times, pending TCU vs home placement.

## 2021-05-29 ENCOUNTER — APPOINTMENT (OUTPATIENT)
Dept: PHYSICAL THERAPY | Facility: CLINIC | Age: 71
DRG: 057 | End: 2021-05-29
Payer: MEDICARE

## 2021-05-29 VITALS
RESPIRATION RATE: 16 BRPM | HEART RATE: 77 BPM | HEIGHT: 65 IN | OXYGEN SATURATION: 98 % | WEIGHT: 148.59 LBS | SYSTOLIC BLOOD PRESSURE: 117 MMHG | DIASTOLIC BLOOD PRESSURE: 87 MMHG | BODY MASS INDEX: 24.76 KG/M2 | TEMPERATURE: 97.1 F

## 2021-05-29 PROCEDURE — 97112 NEUROMUSCULAR REEDUCATION: CPT | Mod: GP

## 2021-05-29 PROCEDURE — 99239 HOSP IP/OBS DSCHRG MGMT >30: CPT | Performed by: INTERNAL MEDICINE

## 2021-05-29 PROCEDURE — 97116 GAIT TRAINING THERAPY: CPT | Mod: GP

## 2021-05-29 PROCEDURE — 250N000013 HC RX MED GY IP 250 OP 250 PS 637: Performed by: INTERNAL MEDICINE

## 2021-05-29 RX ADMIN — BENZTROPINE MESYLATE 1 MG: 1 TABLET ORAL at 09:35

## 2021-05-29 RX ADMIN — OMEPRAZOLE 20 MG: 20 CAPSULE, DELAYED RELEASE ORAL at 09:35

## 2021-05-29 RX ADMIN — BENZTROPINE MESYLATE 1 MG: 1 TABLET ORAL at 16:18

## 2021-05-29 RX ADMIN — Medication 50 MG: at 09:35

## 2021-05-29 RX ADMIN — GUAIFENESIN 600 MG: 600 TABLET, EXTENDED RELEASE ORAL at 09:35

## 2021-05-29 RX ADMIN — FOLIC ACID 1 MG: 1 TABLET ORAL at 09:35

## 2021-05-29 RX ADMIN — LEVOTHYROXINE SODIUM 175 MCG: 100 TABLET ORAL at 09:35

## 2021-05-29 ASSESSMENT — ACTIVITIES OF DAILY LIVING (ADL)
ADLS_ACUITY_SCORE: 14
ADLS_ACUITY_SCORE: 15
ADLS_ACUITY_SCORE: 17

## 2021-05-29 ASSESSMENT — MIFFLIN-ST. JEOR: SCORE: 1199.88

## 2021-05-29 NOTE — PROGRESS NOTES
Care Management Follow Up    Length of Stay (days): 3    Expected Discharge Date: 05/28/21     Concerns to be Addressed: discharge planning     Patient plan of care discussed at interdisciplinary rounds: Yes    Anticipated Discharge Disposition: Transitional Care     Anticipated Discharge Services:  therapy  Anticipated Discharge DME:  N/A    Patient/family educated on Medicare website which has current facility and service quality ratings:  N/A  Education Provided on the Discharge Plan:  N/A  Patient/Family in Agreement with the Plan:  N/A    Referrals Placed by CM/SW: TCU referrals    Private pay costs discussed: Not applicable    Additional Information:  Received messages back via discharge on the double stating that [facility], and [facility] have no available beds.  Call placed to [facility] to follow up on the referral from yesterday.  They have no available beds.  Call placed to [facility] to follow up on the referral from yesterday.   Per the nursing supervisor.  They have a private room available for today for an additional $49 per day amenity fee.    Will continue to follow.      FRANCISCO Lockhart, [facility]    [phone]  [facility]

## 2021-05-29 NOTE — DISCHARGE SUMMARY
"[facility]  Hospitalist Discharge Summary      Date of Admission:  5/25/2021  Date of Discharge:  5/29/2021  7:58 PM  Discharging Provider: Tae Cerrato,       Discharge Diagnoses   Unsteady gait, possibly related to her TD and chronic ETOH use   H/o Tardive dyskinesia   Pseudoparkinsonism   H/o orthostatic hypotension   Alcohol use disorder  Schizophrenia  RLS  Dementia  Hypertension  Hyperlipidemia  Anemia, iron deficiency  Hypothyroidism  GERD  Asthma, mild intermittent  Overactive bladder    Follow-ups Needed After Discharge   Follow-up Appointments     Follow Up and recommended labs and tests      Follow up with Nursing home physician.  No follow up labs or test are   needed.  Follow up with PCP 1-2 weeks after TCU discharge  Patient should follow up with her primary psychiatrist as soon as possible             Unresulted Labs Ordered in the Past 30 Days of this Admission     Date and Time Order Name Status Description    5/26/2021 1740 Vitamin B1 whole blood In process       These results will be followed up by PCP and psychiatry    Discharge Disposition   Discharged to short-term care facility  Condition at discharge: Stable    Hospital Course   Liz Lieberman is a 70 year old female who presents with unsteady gait     Unsteady gait, concern for parkinsons vs other neurologic disease   H/o Tardive dyskinesia   Pseudoparkinsonism   H/o orthostatic hypotension  Has had evaluation for dizziness in the past. 3/2019 eval for CVA and was ruled out. Also found to have orthostatic hypotension. MRI in 5/2020 was consistent with chronic small vessel ischemic disease. Has h/o falls 2/2 Etoh intoxication.  Last couple of days feels as if she may fall with changes in position. Felt like may faint since ysterday. \"stumbling around\". Has had Etoh in last couple of weeks. Mildly hypertensive on presentation, afebrile. BMP normal, hgb 11.3, WBC normal. Troponin negative. Etoh negative. UA bland. " EKG NSR. Neuro exam nonfocal except severe tardive dyskinesia. Did recently start mirabegron which uncommonly can be associated with dizziness.  Last CT head was over 2 weeks ago and showed no acute pathology   5/26:  Orthostatics unremarkable.  On examination today patient appears to have some word finding difficulty vs possible encephalopathy, masked facies, restless legs, issues with dexterity and a pill rolling tremor.  Patient also reporting a more shuffling gait on questioning.  Not all of these symptoms fit with the patient's known TD  * MRI Brain:  1. No evidence of acute ischemia or hemorrhage.  2. Volume loss and white matter T2 hyperintensities which likely represent chronic small vessel ischemic change.   - Folate and B12 WNL   - Continue to monitor on telemetry  - PT/OT assessment.  Recommending TCU. Patient initially not in agreement but now agreeable.  Bed found and patient discharged   - General neurology following and appreciate their recommendations  - Psychiatry evaluated patient and appreciate their recommendations.  Continue current regimen but will reach out to patient's primary psychiatrist.  Plan for close follow up      Alcohol use disorder  H/o Etoh use disorder, presentations to hospital for this. Denies ETOH use for over 2 weeks and ETOH level <0.01 on admission   - CIWA stopped   - Continue oral vitamins     Schizophrenia  RLS  Dementia  [cogentin 1 mg TID, clozapine 250 mg at HS, sertraline 150 mg at HS]  - Continue PTA medications for now  - Psychiatry re-consulted as above      Hypertension  Hyperlipidemia  [amlodipine 5 mg at HS, atorvastatin 20 mg at HS]  - Continue amlodipine  - Can resume statin at discharge      Anemia, iron deficiency  [FeSO4 324 mg daily]  Hgb at 11.3 on presentation. Baseline appears to be 11-12  - Monitor   - Can resume iron at dischage     Hypothyroidism  [Levothyroxine 175 mcg daily]  * TSH is 2.28   - Continue synthroid    GERD  [omeprazole 20 mg BID]  -  Continue PPO      Asthma, mild intermittent  [prn albuterol inhaler/ nebs, flovent 1 puff BID]  - Continue albuterol prn  - Can resume flovent at discharge     Overactive bladder  [mirabegron 25 mg daily]     COVID-19 negative on presentation    Consultations This Hospital Stay   PHYSICAL THERAPY ADULT IP CONSULT  OCCUPATIONAL THERAPY ADULT IP CONSULT  CARE MANAGEMENT / SOCIAL WORK IP CONSULT  NEUROLOGY IP CONSULT  PSYCHIATRY IP CONSULT  PSYCHIATRY IP CONSULT  PHYSICAL THERAPY ADULT IP CONSULT  OCCUPATIONAL THERAPY ADULT IP CONSULT    Code Status   No CPR- Do NOT Intubate    Time Spent on this Encounter   I, Tae Cerrato DO, personally saw the patient today and spent greater than 30 minutes discharging this patient.       Tae Cerrato DO  [place] [address] [facility]  [address] [place] [address]  Phone: [phone]  ______________________________________________________________________    Physical Exam   Vital Signs: Temp: 97.2  F (36.2  C) Temp src: Oral BP: 128/78 Pulse: 92   Resp: 16 SpO2: 97 % O2 Device: None (Room air)    Weight: 148 lbs 9.44 oz  General Appearance: Resting comfortably in chair.  NAD  Respiratory: Clear to auscultation.  No respiratory distress  Cardiovascular: RRR.  Appears well perfused  GI: Soft.  Non-distended  Skin: No obvious rashes or cyanosis  Other: No edema.  Pill rolling tremor still present        Primary Care Physician   Era Livingston    Discharge Orders      General info for SNF    Length of Stay Estimate: Short Term Care: Estimated # of Days <30  Condition at Discharge: Stable  Level of care:skilled   Rehabilitation Potential: Fair  Admission H&P remains valid and up-to-date: Yes  Recent Chemotherapy: N/A  Use Nursing Home Standing Orders: Yes     Mantoux instructions    Give two-step Mantoux (PPD) Per Facility Policy Yes     Reason for your hospital stay    Falls     Activity - Up with nursing assistance     Follow Up and  recommended labs and tests    Follow up with Nursing home physician.  No follow up labs or test are needed.  Follow up with PCP 1-2 weeks after TCU discharge  Patient should follow up with her primary psychiatrist as soon as possible     Physical Therapy Adult Consult    Evaluate and treat as clinically indicated.    Reason:  Deconditioning and falls     Occupational Therapy Adult Consult    Evaluate and treat as clinically indicated.    Reason:  Deconditioning and falls     Advance Diet as Tolerated    Follow this diet upon discharge: Orders Placed This Encounter      Regular Diet Adult       Significant Results and Procedures   Most Recent 3 CBC's:  Recent Labs   Lab Test 05/25/21 1932 05/09/21  1722 01/26/21  1908   WBC 6.6 7.2 5.9   HGB 11.3* 12.2 12.2   MCV 89 87 87    247 173     Most Recent 3 BMP's:  Recent Labs   Lab Test 05/25/21 1932 05/09/21  1722 01/26/21  1908    139 128*   POTASSIUM 3.7 3.7 3.5   CHLORIDE 102 108 96   CO2 27 24 23   BUN 9 7 8   CR 0.59 0.46* 0.40*   ANIONGAP 5 7 9   ADRIAN 8.6 8.4* 8.2*   GLC 92 94 113*   ,   Results for orders placed or performed during the hospital encounter of 05/25/21   MR Brain w/o Contrast    Narrative    MRI BRAIN WITHOUT CONTRAST  5/26/2021 3:01 PM    HISTORY:  Dizziness, non-specific.    TECHNIQUE:  Multiplanar, multisequence MRI of the brain without  gadolinium IV contrast material.      COMPARISON:  Head CT 5/19/2021, head MRI 5/4/2020    FINDINGS:   Mild motion artifacts. Mild volume loss is present. A few white matter  T2 hyperintensities likely represent mild chronic small vessel  ischemic change. No evidence of acute ischemia, hemorrhage, mass, mass  effect, hydrocephalus. Major intracranial flow voids are maintained.    Marrow signal is within normal limits. Mild paranasal sinus mucosal  thickening. Trace opacification of the left mastoid cavity, likely  inflammatory.      Impression    IMPRESSION:    1. No evidence of acute ischemia or  hemorrhage.  2. Volume loss and white matter T2 hyperintensities which likely  represent chronic small vessel ischemic change.    MAHESH JORDAN MD       Discharge Medications   Current Discharge Medication List      START taking these medications    Details   thiamine 50 MG TABS Take 1 tablet (50 mg) by mouth daily  Qty:      Associated Diagnoses: History of alcohol use disorder         CONTINUE these medications which have NOT CHANGED    Details   acetaminophen (TYLENOL) 500 MG tablet Take 1,000 mg by mouth every 6 hours as needed for mild pain      albuterol (PROAIR HFA/PROVENTIL HFA/VENTOLIN HFA) 108 (90 Base) MCG/ACT inhaler Inhale 1-2 puffs into the lungs every 6 hours as needed for wheezing       albuterol (PROVENTIL) (2.5 MG/3ML) 0.083% neb solution Take 2.5 mg by nebulization every 6 hours as needed for wheezing      amLODIPine (NORVASC) 2.5 MG tablet Take 5 mg by mouth At Bedtime      atorvastatin (LIPITOR) 20 MG tablet Take 20 mg by mouth At Bedtime       benztropine (COGENTIN) 1 MG tablet Take 1 mg by mouth 3 times daily       Calcium Carbonate-Vitamin D (CALCIUM + D PO) Take 1 tablet by mouth 2 times daily (with meals) Calcium 500 with Vit D 400mg BID. One before breakfast and one with supper       cloZAPine (CLOZARIL) 100 MG tablet Take 2.5 tablets (250 mg) by mouth At Bedtime  Qty: 60 tablet, Refills: 0    Comments: Future refills by PCP Dr. Era Livingston with phone number [phone].  Associated Diagnoses: Paranoid schizophrenia in remission (H)      desoximetasone (TOPICORT) 0.25 % OINT Apply  topically as needed.      diphenhydrAMINE (BENADRYL) 25 MG capsule Take 25 mg by mouth every 6 hours as needed for itching      emollient (VANICREAM) external cream Apply topically 3 times daily as needed for other (irritation)      Ferrous Gluconate 324 (37.5 Fe) MG TABS Take 324 mg by mouth daily (with breakfast)       fluticasone (FLONASE) 50 MCG/ACT nasal spray Spray 2 sprays into both nostrils  daily       fluticasone (FLOVENT HFA) 110 MCG/ACT inhaler Inhale 1 puff into the lungs 2 times daily       folic acid (FOLVITE) 1 MG tablet Take 1 tablet (1 mg) by mouth daily  Qty: 30 tablet, Refills: 0    Associated Diagnoses: Alcoholic intoxication without complication (H)      guaiFENesin (MUCINEX) 600 MG 12 hr tablet Take 600 mg by mouth 2 times daily       levothyroxine (SYNTHROID/LEVOTHROID) 175 MCG tablet Take 175 mcg by mouth daily      mirabegron (MYRBETRIQ) 25 MG 24 hr tablet Take 25 mg by mouth daily      multivitamin w/minerals (THERA-VIT-M) tablet Take 1 tablet by mouth daily  Qty: 30 tablet, Refills: 0    Associated Diagnoses: Alcoholic intoxication without complication (H)      omeprazole (PRILOSEC) 20 MG DR capsule Take 20 mg by mouth 2 times daily      polyethylene glycol (MIRALAX/GLYCOLAX) packet Take 17 g by mouth daily as needed for constipation       sertraline (ZOLOFT) 50 MG tablet Take 150 mg by mouth At Bedtime       sodium fluoride dental gel (PREVIDENT) 1.1 % GEL topical gel Apply to affected area daily           Allergies   Allergies   Allergen Reactions     Formaldehyde Rash     Latex Rash

## 2021-05-29 NOTE — PROGRESS NOTES
"[facility]    Medicine Progress Note - Hospitalist Service       Date of Admission:  5/25/2021  Assessment & Plan       Liz Lieberman is a 70 year old female who presents with unsteady gait    Unsteady gait, concern for parkinsons vs other neurologic disease   H/o Tardive dyskinesia   Pseudoparkinsonism   H/o orthostatic hypotension  Has had evaluation for dizziness in the past. 3/2019 eval for CVA and was ruled out. Also found to have orthostatic hypotension. MRI in 5/2020 was consistent with chronic small vessel ischemic disease. Has h/o falls 2/2 Etoh intoxication.  Last couple of days feels as if she may fall with changes in position. Felt like may faint since ysterday. \"stumbling around\". Has had Etoh in last couple of weeks. Mildly hypertensive on presentation, afebrile. BMP normal, hgb 11.3, WBC normal. Troponin negative. Etoh negative. UA bland. EKG NSR. Neuro exam nonfocal except severe tardive dyskinesia. Did recently start mirabegron which uncommonly can be associated with dizziness.  Last CT head was over 2 weeks ago and showed no acute pathology   5/26:  Orthostatics unremarkable.  On examination today patient appears to have some word finding difficulty vs possible encephalopathy, masked facies, restless legs, issues with dexterity and a pill rolling tremor.  Patient also reporting a more shuffling gait on questioning.  Not all of these symptoms fit with the patient's known TD  * MRI Brain:  1. No evidence of acute ischemia or hemorrhage.  2. Volume loss and white matter T2 hyperintensities which likely represent chronic small vessel ischemic change.   - Folate and B12 WNL   - Continue to monitor on telemetry  - PT/OT assessment.  Recommending TCU. Patient initially not in agreement but now agreeable.  Awaiting bed   - General neurology following and appreciate their recommendations  - Psychiatry evaluated patient and appreciate their recommendations.  Continue current regimen " but will reach out to patient's primary psychiatrist.  Plan for close follow up     Alcohol use disorder  H/o Etoh use disorder, presentations to hospital for this. Denies ETOH use for over 2 weeks and ETOH level <0.01 on admission   - CIWA stopped   - Continue oral vitamins     Schizophrenia  RLS  Dementia  [cogentin 1 mg TID, clozapine 250 mg at HS, sertraline 150 mg at HS]  - Continue PTA medications for now  - Psychiatry re-consulted as above      Hypertension  Hyperlipidemia  [amlodipine 5 mg at HS, atorvastatin 20 mg at HS]  - Continue amlodipine  - Can resume statin at discharge      Anemia, iron deficiency  [FeSO4 324 mg daily]  Hgb at 11.3 on presentation. Baseline appears to be 11-12  - Monitor   - Can resume iron at dischage     Hypothyroidism  [Levothyroxine 175 mcg daily]  * TSH is 2.28   - Continue synthroid    GERD  [omeprazole 20 mg BID]  - Continue PPO      Asthma, mild intermittent  [prn albuterol inhaler/ nebs, flovent 1 puff BID]  - Continue albuterol prn  - Can resume flovent at discharge     Overactive bladder  [mirabegron 25 mg daily]     COVID-19 negative on presentation         Diet: Regular Diet Adult    DVT Prophylaxis: Pneumatic Compression Devices  [place] Catheter: not present  Code Status: No CPR- Do NOT Intubate           Disposition Plan   Expected discharge: TBD, recommended to transitional care unit once safe disposition plan/ TCU bed available.  Entered: Tae Cerrato DO 05/29/2021, 2:51 PM       The patient's care was discussed with the Bedside Nurse, Care Coordinator/ and Patient.    Tae Cerrato DO  Hospitalist Service  [facility]  Contact information available via [facility] Paging/Directory    ______________________________________________________________________    Interval History   Patient seen and examined.  No acute events over night.  No fevers or chills.  No pain currently.  Tolerating diet.      Data reviewed today: I  reviewed all medications, new labs and imaging results over the last 24 hours. I personally reviewed no images or EKG's today.    Physical Exam   Vital Signs: Temp: 97.2  F (36.2  C) Temp src: Oral BP: 128/78 Pulse: 92   Resp: 16 SpO2: 97 % O2 Device: None (Room air)    Weight: 148 lbs 9.44 oz  General Appearance: Resting comfortably. NAD   Respiratory: Clear to auscultation.  No respiratory distress  Cardiovascular: RRR.  No obvious murmurs  GI: Soft.  Non-distended  Skin: No obvious rashes or cyanosis  Other: No edema.  No calf      Data   Recent Labs   Lab 05/25/21  1932   WBC 6.6   HGB 11.3*   MCV 89         POTASSIUM 3.7   CHLORIDE 102   CO2 27   BUN 9   CR 0.59   ANIONGAP 5   ADRIAN 8.6   GLC 92   TROPI <0.015     No results found for this or any previous visit (from the past 24 hour(s)).

## 2021-05-29 NOTE — PLAN OF CARE
Pt A/Ox4, VSS on RA, denies pain, assist of 1 to the bathroom, awaiting TCU placement, will continue to monitor

## 2021-05-29 NOTE — PLAN OF CARE
A&OX4, VSS on RA except for little forgetfulness. Also appears restless and fidgety. Up SBA to the bathroom. Tele has been Sr with 1st degree AV block. Did c/o little lightheadedness with walking.No IV acess. Discharge pending TCU placement. Regular diet. Continue to monitor.

## 2021-05-29 NOTE — CONSULTS
Consult Date: 05/28/2021    REASON FOR CONSULTATION:  Worsening tardive dyskinesia.    REFERRING PHYSICIAN:  Hospitalist service.    HISTORY OF PRESENT ILLNESS:  Ms. Liz Lieberman, is a 70-year-old woman with history of schizoaffective disorder who was admitted with psychiatric and medical history of tardive dyskinesia, restless leg syndrome, osteoarthritis, asthma, hypothyroidism, hyperlipidemia, chronic pain.  She presented to [facility] for admission on 05/25/2021 with an unsteady gait.  The patient is convalescing on station 55.  Anticipate possible discharge in TCU for further recovery for her gait instability.  There was no evidence noted concerns about tardive dyskinesia and psychiatry was asked to consult about setting.    Ms. Lieberman reported that she has had schizoaffective disorder for many years.  She describes that she has had some intermittent depression recently.  She rates it at a moderate intensity.  He denies hopelessness.  Denies any suicidal ideation, intent or planning.  She does report some intermittent sleep and appetite disturbance as well as reduced motivation and energy at times.  She does acknowledge that she has been having trouble with managing her alcohol consumption.  She describes that she has had recent complications including several falls from her medical issues and ongoing alcohol intake.  She describes perhaps that she was hospitalized around Mother's Day after collapsing when she was out at a restaurant appears somewhat unclear if this was a true recollection.  She was mildly disorganized at times.  New prescriptions can be tangential at a certain point and other times simply continue the conversation normally.  She reports that she has been a bit hyperactive per her description since starting a new medication for urinary incontinence, Mirabegron.  She denies overt chalino; however, impulsive behavior.  She does acknowledge chronic auditory hallucinations, but denied  command harming self or others.  Denies any suicidal or homicidal ideation.  Denies any overt paranoia.  She does feel safe here in the hospital.  She has had intermittent desires to go home and initially was a bit opposed to going to a TCU, although this morning it was reported that the patient has been willing to allow referrals to TCU.  On my interview this morning, the patient does report it very briefly, but does not want to go to a TCU after that to continue rehabilitation.  The patient is not aware of tardive dyskinesia, but does acknowledge her chronic symptoms of abnormal movements of her arms, hands, feet, arms, legs as well as other bodily dyskinesia movements.  She does not seem particularly concerned about this.  She reports that she takes Cogentin for this and is not sure that is helpful.  She denies any other Parkinson symptoms at this time.  Denies that she has ever told previously.  She does follow with an outpatient psychiatrist through [facility], Dr. Keyla Terrell.  She also has a therapist.  She reports that she has had several prior psychiatric admissions, although a bit inconsistent recall that psychiatric admission in 1989.  She reports that was also a significant suicide attempt via overdose.  She reports about 4 or 5 prior overdoses historically.  She is not certain how long she has been on clonazepam, but it sounds like it has been for many years, perhaps in 1989 or so after her father passed away.  She cannot tell me when her tardive dyskinesia symptoms started, although it seems like it has been chronic the best I could gather from.    PAST MEDICAL HISTORY:  Complex medical history:  Reviewed from the EMR in the hospital admission note.    MEDICATIONS:  Prior to admission medications:      1. Calcium carbonate.      2. Ferrous gluconate.      3. Acetaminophen.      4. Albuterol.      5. Amlodipine.      6. Atorvastatin.      7. Benztropine 1 mg  t.i.d.      8. Olanzapine 250 mg daily.      9. Benadryl as needed.     10. Foliant.     11. Fluticasone.     12. Folic acid.     13. Guaifenesin.     14. Levothyroxine.     15. Mirabegron.     16. Multivitamin.     17. Omeprazole.     18. Polyethylene glycol.     19. Sertraline 50 mg at bedtime.    ALLERGIES:  HALDOL AND LATEX.    SOCIAL HISTORY:  The patient resides alone.  She is .  She does not have any children.  She has some sisters in the area that are supportive and another sister I believe in the [place] area.  He has been retired since 2017.  She used to do office work as an . Reports alcohol use intermittently, 3-5 drinks per week, does not use any drugs, does not use any nicotine or tobacco.    FAMILY HISTORY:  The patient does not recall any significant.    REVIEW OF SYSTEMS:  A 10-point review was completed negative other than described in HPI.    PHYSICAL EXAMINATION:    VITAL SIGNS:  Temperature 98.8, blood pressure 168/99, pulse 87, SpO2 95% on room air.  MENTAL STATUS EXAMINATION:  She is dressed in hospital gown, lying down in bed.  She has noted dyskinesia movements in the upper torso area and upper extremities.  Occurs in hands and feet.  She does have occasional band like movements, somewhat right movements at this time.  There is a tremulousness.  So I do not think it a true Parkinson tremor.  No agitation or aggressive behavior noted.  No diaphoresis.  The patient maintains fair eye contact.  Speech is nonpressured, normal rate and volume a bit flattened tone.  Language is without overt aphasia.  Thought form is linear, somewhat brief at times, a bit tangential with some slight disorganization at times, but also able to recover and have a linear conversation for much of the evaluation.  No fluctuation in cognition or deficits in cognitive processing.  Short and long-term memory slightly impaired based on chronic mental illness.  Overall impression just from  bedside conversation.  Insight and judgment fair.  She does acknowledge having medical and psychiatric symptoms, need for ongoing medical and psychiatric care.  She is future oriented and help seeking.  At this point, she is denying any suicidal or homicidal ideation.    IMPRESSION AND PLAN:     1.      Gait instability.     2.      Cardiac dyskinesia.     3.      Schizoaffective disorder, bipolar type, current episode depressed.     4.      Rule out alcohol use disorder.    FORMULATION:  Ms. Liz Lieberman is a 70-year-old  woman residing alone in [place] with a history of schizoaffective disorder, bipolar type, complex medical history, as well as tardive dyskinesia and restless leg syndrome, some underlying major neurocognitive disorder is noted as well, presenting with an unsteady gait and worsening tardive dyskinesia.  Tardive dyskinesia is quite apparent.  She is lying down.  The patient is a convalescing here in the hospital, likely is going to be going to TCU for further physical therapy rehabilitation.  The patient does acknowledge that alcohol, perhaps has been contributing to recent falls, gait instability.  The patient has undergone a substantial medical workup.  Neurology has been involved.  At this time, patient does have some moderate depression has had a subjective mood changes since going to a new medicine for her urinary symptoms, mirabegron.  The patient does have outpatient followup established for psychiatry with Dr. Keyla Terrell through [facility].    RISK LEVEL:  Risk of intentional harm to self or others is assessed to be low, as the patient is without suicidal thoughts or thoughts of harming others, no agitation or aggressive behavior complications.  Future oriented, has been help seeking willing to crisis plan, in structured setting, outpatient supports in place, no hospitalizations or suicide attempts for psychiatric reasons since 1989 per patient  report today.    RESULTS REVIEW:  Admission labs were notable for a WBC normal at 6.6, hemoglobin 11.3, MCV is 89, platelets of 224.  Sodium is 134, potassium 3.7, creatinine was 0.59.    PLAN:   1.  Continue clozapine 250 mg at bedtime.  She reports she has been on clonazepam for decades.  Given severity of the underlying schizoaffective disorder based on patient's description as well as relative stability since 1989 on clozapine as well as given the unlikelihood that clozapine is contributing to the underlying tardive dyskinesia we think it is more prudent to go ahead and continue the clozapine 250 mg at bedtime.  She also is on sertraline 150 mg daily, which can be continued.  Cogentin is equivocal in terms of effectiveness for tardive dyskinesia.  Attempt to taper over time could be considered, but I think we could defer that to the outpatient setting.  A trial of a potential treatment for tardive dyskinesia could be considered such as bowel benzodiazepine.  I am unsure if that is available in the inpatient setting at [facility].  Her primary team could check with pharmacy to determine if all bending or alternative.  Goals alternatives are available for inpatient setting.  These medications can be difficult to obtain in the outpatient setting, likely would require working with the insurance and I believe that typically require specialty pharmacies.  This might be best to defer to the outpatient psychiatrist.  Especially if we do not have available here in the hospital setting.  The patient at this point is the patient at this point lacks an opinion and if she would want to start a treatment for her tardive dyskinesia symptoms.  2.  The patient does not meet criteria for involuntary psychiatric treatment at this time.  She can be discharged once medically cleared to the TCU as planned.  The patient did provide me permission to collaborate with her outpatient psychiatrist, Dr. Keyla Terrell and so I  did leave a message with Dr. Torrey Terrell's office updating her about the patient's hospitalization and the circumstances and my contact information if she would like to collaborate regarding the patient's ongoing care or care after discharge.  3.  Please reconsult psychiatry as needed.  4.  Vitamin E could be considered for tardive dyskinesia.  5.  The patient should follow up with outpatient psychiatrist, Dr. Torrey Terrell within 2 weeks of discharge, preferably.  The patient is also follow up with therapist.  Please obtain an CAITLIN for Dr. Torrey Terrell to sent the release of  the information.  6.  The patient would benefit from social work intervention.  Given the ongoing intermittent alcohol use, residing alone, recent repeated falls, etc.  There is an outside  already involved would be beneficial to collaborate with outpatient [facility]  to inform him about the hospitalization and to help optimize outpatient followup and treatment planning.    Ricky Chiang MD        D: 2021   T: 2021   MT: [email or web address]    Name:     KATT DILL  MRN:      [id number]        Account:      [id number]   :      1950           Consult Date: 2021     Document: [id number]    cc:  Ricky Chiang MD

## 2021-05-29 NOTE — PROGRESS NOTES
Care Management Discharge Note    Discharge Date: 05/28/21       Discharge Disposition: Transitional Care    Discharge Services: None    Discharge DME: None    Discharge Transportation: public transportation    Private pay costs discussed: private room/amenity fees and transportation costs  Private room fee is $49 a day. Pt agrees with this fee.     PAS Confirmation Code: [id number]  Patient/family educated on Medicare website which has current facility and service quality ratings:      Education Provided on the Discharge Plan:  yes  Persons Notified of Discharge Plans: pt  Patient/Family in Agreement with the Plan:  yes    Handoff Referral Completed: No    Additional Information:  Pt stable for discharge today, [facility] is able to take the pt today. SW spoke with the pt who agrees with the plan, she does request  transport and is aware this is private pay. Transport by [facility] at 19:30. All orders were sent to [facility] for admission and provided transport time.     FRANCISCO Haskins, Down East Community HospitalSW  [facility]  Phone [phone]

## 2021-05-30 LAB — VIT B1 BLD-MCNC: 139 NMOL/L (ref 70–180)

## 2021-05-30 NOTE — PLAN OF CARE
"Okay to discharge per provider. TCU available tonight. Pt was escorted by [facility] wheelchair ride. Pt given a copy of AVS with no questions. \" She states she already has some appointment set up\" Pt left with all belongings.   "

## 2021-05-30 NOTE — PLAN OF CARE
Physical Therapy Discharge Summary    Reason for therapy discharge:    Discharged to transitional care facility.    Progress towards therapy goal(s). See goals on Care Plan in [facility] electronic health record for goal details.  Goals not met.  Barriers to achieving goals:   discharge from facility.    Therapy recommendation(s):    Continued therapy is recommended.  Rationale/Recommendations:  TCU to progress safety and independence with mobility.

## 2021-06-01 ENCOUNTER — RECORDS - HEALTHEAST (OUTPATIENT)
Dept: LAB | Facility: CLINIC | Age: 71
End: 2021-06-01

## 2021-06-01 NOTE — PLAN OF CARE
Occupational Therapy Discharge Summary    Reason for therapy discharge:    Discharged to transitional care facility.    Progress towards therapy goal(s). See goals on Care Plan in [facility] electronic health record for goal details.  Goals not met.  Barriers to achieving goals:   discharge from facility.    Therapy recommendation(s):    Continued therapy is recommended.  Rationale/Recommendations:  at TCU to improve ind and safety.

## 2021-06-03 LAB
ANION GAP SERPL CALCULATED.3IONS-SCNC: 11 MMOL/L (ref 5–18)
AST SERPL W P-5'-P-CCNC: 17 U/L (ref 0–40)
BUN SERPL-MCNC: 10 MG/DL (ref 8–28)
CALCIUM SERPL-MCNC: 8.5 MG/DL (ref 8.5–10.5)
CHLORIDE BLD-SCNC: 100 MMOL/L (ref 98–107)
CO2 SERPL-SCNC: 21 MMOL/L (ref 22–31)
CREAT SERPL-MCNC: 0.62 MG/DL (ref 0.6–1.1)
ERYTHROCYTE [DISTWIDTH] IN BLOOD BY AUTOMATED COUNT: 13.3 % (ref 11–14.5)
GFR SERPL CREATININE-BSD FRML MDRD: >60 ML/MIN/1.73M2
GLUCOSE BLD-MCNC: 81 MG/DL (ref 70–125)
HCT VFR BLD AUTO: 36.7 % (ref 35–47)
HGB BLD-MCNC: 12.2 G/DL (ref 12–16)
MCH RBC QN AUTO: 29.4 PG (ref 27–34)
MCHC RBC AUTO-ENTMCNC: 33.2 G/DL (ref 32–36)
MCV RBC AUTO: 88 FL (ref 80–100)
PLATELET # BLD AUTO: 250 THOU/UL (ref 140–440)
PMV BLD AUTO: 10.3 FL (ref 8.5–12.5)
POTASSIUM BLD-SCNC: 4.6 MMOL/L (ref 3.5–5)
RBC # BLD AUTO: 4.15 MILL/UL (ref 3.8–5.4)
SODIUM SERPL-SCNC: 132 MMOL/L (ref 136–145)
WBC: 6.4 THOU/UL (ref 4–11)

## 2021-06-04 ENCOUNTER — RECORDS - HEALTHEAST (OUTPATIENT)
Dept: LAB | Facility: CLINIC | Age: 71
End: 2021-06-04

## 2021-06-04 LAB
BASOPHILS # BLD AUTO: 0 THOU/UL (ref 0–0.2)
BASOPHILS NFR BLD AUTO: 0 % (ref 0–2)
EOSINOPHIL # BLD AUTO: 0 THOU/UL (ref 0–0.4)
EOSINOPHIL NFR BLD AUTO: 0 % (ref 0–6)
ERYTHROCYTE [DISTWIDTH] IN BLOOD BY AUTOMATED COUNT: 13.3 % (ref 11–14.5)
HCT VFR BLD AUTO: 37.1 % (ref 35–47)
HGB BLD-MCNC: 12.2 G/DL (ref 12–16)
IMM GRANULOCYTES # BLD: 0 THOU/UL
IMM GRANULOCYTES NFR BLD: 1 %
LYMPHOCYTES # BLD AUTO: 1.3 THOU/UL (ref 0.8–4.4)
LYMPHOCYTES NFR BLD AUTO: 21 % (ref 20–40)
MCH RBC QN AUTO: 29.3 PG (ref 27–34)
MCHC RBC AUTO-ENTMCNC: 32.9 G/DL (ref 32–36)
MCV RBC AUTO: 89 FL (ref 80–100)
MONOCYTES # BLD AUTO: 0.4 THOU/UL (ref 0–0.9)
MONOCYTES NFR BLD AUTO: 6 % (ref 2–10)
NEUTROPHILS # BLD AUTO: 4.4 THOU/UL (ref 2–7.7)
NEUTROPHILS NFR BLD AUTO: 72 % (ref 50–70)
PLATELET # BLD AUTO: 151 THOU/UL (ref 140–440)
PMV BLD AUTO: 10.9 FL (ref 8.5–12.5)
RBC # BLD AUTO: 4.17 MILL/UL (ref 3.8–5.4)
WBC: 6.2 THOU/UL (ref 4–11)

## 2021-06-07 ENCOUNTER — RECORDS - HEALTHEAST (OUTPATIENT)
Dept: LAB | Facility: CLINIC | Age: 71
End: 2021-06-07

## 2021-06-08 LAB
ANION GAP SERPL CALCULATED.3IONS-SCNC: 11 MMOL/L (ref 5–18)
AST SERPL W P-5'-P-CCNC: 18 U/L (ref 0–40)
BASOPHILS # BLD AUTO: 0 THOU/UL (ref 0–0.2)
BASOPHILS NFR BLD AUTO: 0 % (ref 0–2)
BUN SERPL-MCNC: 12 MG/DL (ref 8–28)
CALCIUM SERPL-MCNC: 9.3 MG/DL (ref 8.5–10.5)
CHLORIDE BLD-SCNC: 102 MMOL/L (ref 98–107)
CO2 SERPL-SCNC: 24 MMOL/L (ref 22–31)
CREAT SERPL-MCNC: 0.62 MG/DL (ref 0.6–1.1)
EOSINOPHIL # BLD AUTO: 0 THOU/UL (ref 0–0.4)
EOSINOPHIL NFR BLD AUTO: 0 % (ref 0–6)
ERYTHROCYTE [DISTWIDTH] IN BLOOD BY AUTOMATED COUNT: 13.7 % (ref 11–14.5)
GFR SERPL CREATININE-BSD FRML MDRD: >60 ML/MIN/1.73M2
GLUCOSE BLD-MCNC: 90 MG/DL (ref 70–125)
HCT VFR BLD AUTO: 41.2 % (ref 35–47)
HGB BLD-MCNC: 13.1 G/DL (ref 12–16)
IMM GRANULOCYTES # BLD: 0 THOU/UL
IMM GRANULOCYTES NFR BLD: 1 %
LYMPHOCYTES # BLD AUTO: 1.3 THOU/UL (ref 0.8–4.4)
LYMPHOCYTES NFR BLD AUTO: 25 % (ref 20–40)
MCH RBC QN AUTO: 28.4 PG (ref 27–34)
MCHC RBC AUTO-ENTMCNC: 31.8 G/DL (ref 32–36)
MCV RBC AUTO: 89 FL (ref 80–100)
MONOCYTES # BLD AUTO: 0.4 THOU/UL (ref 0–0.9)
MONOCYTES NFR BLD AUTO: 7 % (ref 2–10)
NEUTROPHILS # BLD AUTO: 3.6 THOU/UL (ref 2–7.7)
NEUTROPHILS NFR BLD AUTO: 67 % (ref 50–70)
PLATELET # BLD AUTO: 190 THOU/UL (ref 140–440)
PMV BLD AUTO: 11.3 FL (ref 8.5–12.5)
POTASSIUM BLD-SCNC: 4.2 MMOL/L (ref 3.5–5)
RBC # BLD AUTO: 4.62 MILL/UL (ref 3.8–5.4)
SODIUM SERPL-SCNC: 137 MMOL/L (ref 136–145)
WBC: 5.4 THOU/UL (ref 4–11)

## 2021-06-14 ENCOUNTER — RECORDS - HEALTHEAST (OUTPATIENT)
Dept: LAB | Facility: CLINIC | Age: 71
End: 2021-06-14

## 2021-06-15 LAB
ANION GAP SERPL CALCULATED.3IONS-SCNC: 10 MMOL/L (ref 5–18)
AST SERPL W P-5'-P-CCNC: 15 U/L (ref 0–40)
BASOPHILS # BLD AUTO: 0 THOU/UL (ref 0–0.2)
BASOPHILS NFR BLD AUTO: 0 % (ref 0–2)
BUN SERPL-MCNC: 8 MG/DL (ref 8–28)
CALCIUM SERPL-MCNC: 9.2 MG/DL (ref 8.5–10.5)
CHLORIDE BLD-SCNC: 104 MMOL/L (ref 98–107)
CO2 SERPL-SCNC: 25 MMOL/L (ref 22–31)
CREAT SERPL-MCNC: 0.62 MG/DL (ref 0.6–1.1)
EOSINOPHIL # BLD AUTO: 0 THOU/UL (ref 0–0.4)
EOSINOPHIL NFR BLD AUTO: 0 % (ref 0–6)
ERYTHROCYTE [DISTWIDTH] IN BLOOD BY AUTOMATED COUNT: 13.5 % (ref 11–14.5)
GFR SERPL CREATININE-BSD FRML MDRD: >60 ML/MIN/1.73M2
GLUCOSE BLD-MCNC: 85 MG/DL (ref 70–125)
HCT VFR BLD AUTO: 40.9 % (ref 35–47)
HGB BLD-MCNC: 13 G/DL (ref 12–16)
IMM GRANULOCYTES # BLD: 0 THOU/UL
IMM GRANULOCYTES NFR BLD: 1 %
LYMPHOCYTES # BLD AUTO: 1.2 THOU/UL (ref 0.8–4.4)
LYMPHOCYTES NFR BLD AUTO: 24 % (ref 20–40)
MCH RBC QN AUTO: 28.7 PG (ref 27–34)
MCHC RBC AUTO-ENTMCNC: 31.8 G/DL (ref 32–36)
MCV RBC AUTO: 90 FL (ref 80–100)
MONOCYTES # BLD AUTO: 0.3 THOU/UL (ref 0–0.9)
MONOCYTES NFR BLD AUTO: 7 % (ref 2–10)
NEUTROPHILS # BLD AUTO: 3.2 THOU/UL (ref 2–7.7)
NEUTROPHILS NFR BLD AUTO: 68 % (ref 50–70)
PLATELET # BLD AUTO: 199 THOU/UL (ref 140–440)
PMV BLD AUTO: 11.2 FL (ref 8.5–12.5)
POTASSIUM BLD-SCNC: 3.9 MMOL/L (ref 3.5–5)
RBC # BLD AUTO: 4.53 MILL/UL (ref 3.8–5.4)
SODIUM SERPL-SCNC: 139 MMOL/L (ref 136–145)
WBC: 4.8 THOU/UL (ref 4–11)

## 2021-06-29 ENCOUNTER — RECORDS - HEALTHEAST (OUTPATIENT)
Dept: LAB | Facility: CLINIC | Age: 71
End: 2021-06-29

## 2021-07-01 LAB
ANION GAP SERPL CALCULATED.3IONS-SCNC: 11 MMOL/L (ref 5–18)
AST SERPL W P-5'-P-CCNC: 14 U/L (ref 0–40)
BASOPHILS # BLD AUTO: 0 THOU/UL (ref 0–0.2)
BASOPHILS NFR BLD AUTO: 0 % (ref 0–2)
BUN SERPL-MCNC: 7 MG/DL (ref 8–28)
CALCIUM SERPL-MCNC: 9.2 MG/DL (ref 8.5–10.5)
CHLORIDE BLD-SCNC: 104 MMOL/L (ref 98–107)
CO2 SERPL-SCNC: 23 MMOL/L (ref 22–31)
CREAT SERPL-MCNC: 0.67 MG/DL (ref 0.6–1.1)
EOSINOPHIL # BLD AUTO: 0 THOU/UL (ref 0–0.4)
EOSINOPHIL NFR BLD AUTO: 0 % (ref 0–6)
ERYTHROCYTE [DISTWIDTH] IN BLOOD BY AUTOMATED COUNT: 13.2 % (ref 11–14.5)
GFR SERPL CREATININE-BSD FRML MDRD: >60 ML/MIN/1.73M2
GLUCOSE BLD-MCNC: 177 MG/DL (ref 70–125)
HCT VFR BLD AUTO: 38.3 % (ref 35–47)
HGB BLD-MCNC: 12.3 G/DL (ref 12–16)
IMM GRANULOCYTES # BLD: 0 THOU/UL
IMM GRANULOCYTES NFR BLD: 1 %
LYMPHOCYTES # BLD AUTO: 0.8 THOU/UL (ref 0.8–4.4)
LYMPHOCYTES NFR BLD AUTO: 14 % (ref 20–40)
MCH RBC QN AUTO: 28.7 PG (ref 27–34)
MCHC RBC AUTO-ENTMCNC: 32.1 G/DL (ref 32–36)
MCV RBC AUTO: 89 FL (ref 80–100)
MONOCYTES # BLD AUTO: 0.2 THOU/UL (ref 0–0.9)
MONOCYTES NFR BLD AUTO: 3 % (ref 2–10)
NEUTROPHILS # BLD AUTO: 4.7 THOU/UL (ref 2–7.7)
NEUTROPHILS NFR BLD AUTO: 83 % (ref 50–70)
PLATELET # BLD AUTO: 216 THOU/UL (ref 140–440)
PMV BLD AUTO: 10.2 FL (ref 8.5–12.5)
POTASSIUM BLD-SCNC: 4.4 MMOL/L (ref 3.5–5)
RBC # BLD AUTO: 4.29 MILL/UL (ref 3.8–5.4)
SODIUM SERPL-SCNC: 138 MMOL/L (ref 136–145)
WBC: 5.6 THOU/UL (ref 4–11)

## 2021-07-05 ENCOUNTER — RECORDS - HEALTHEAST (OUTPATIENT)
Dept: LAB | Facility: CLINIC | Age: 71
End: 2021-07-05

## 2021-07-07 LAB — HBA1C MFR BLD: 5.6 %

## 2021-07-14 ENCOUNTER — LAB REQUISITION (OUTPATIENT)
Dept: LAB | Facility: CLINIC | Age: 71
End: 2021-07-14
Payer: COMMERCIAL

## 2021-07-19 ENCOUNTER — LAB REQUISITION (OUTPATIENT)
Dept: LAB | Facility: CLINIC | Age: 71
End: 2021-07-19
Payer: MEDICARE

## 2021-07-19 LAB
BASOPHILS # BLD AUTO: 0 10E3/UL (ref 0–0.2)
BASOPHILS NFR BLD AUTO: 0 %
EOSINOPHIL # BLD AUTO: 0 10E3/UL (ref 0–0.7)
EOSINOPHIL NFR BLD AUTO: 0 %
ERYTHROCYTE [DISTWIDTH] IN BLOOD BY AUTOMATED COUNT: 13.2 % (ref 10–15)
HCT VFR BLD AUTO: 38.6 % (ref 35–47)
HGB BLD-MCNC: 12.4 G/DL (ref 11.7–15.7)
IMM GRANULOCYTES # BLD: 0 10E3/UL
IMM GRANULOCYTES NFR BLD: 0 %
LYMPHOCYTES # BLD AUTO: 1 10E3/UL (ref 0.8–5.3)
LYMPHOCYTES NFR BLD AUTO: 21 %
MCH RBC QN AUTO: 28.7 PG (ref 26.5–33)
MCHC RBC AUTO-ENTMCNC: 32.1 G/DL (ref 31.5–36.5)
MCV RBC AUTO: 89 FL (ref 78–100)
MONOCYTES # BLD AUTO: 0.4 10E3/UL (ref 0–1.3)
MONOCYTES NFR BLD AUTO: 7 %
NEUTROPHILS # BLD AUTO: 3.4 10E3/UL (ref 1.6–8.3)
NEUTROPHILS NFR BLD AUTO: 72 %
NRBC # BLD AUTO: 0 10E3/UL
NRBC BLD AUTO-RTO: 0 /100
PLATELET # BLD AUTO: 234 10E3/UL (ref 150–450)
RBC # BLD AUTO: 4.32 10E6/UL (ref 3.8–5.2)
WBC # BLD AUTO: 4.8 10E3/UL (ref 4–11)

## 2021-07-19 PROCEDURE — 85025 COMPLETE CBC W/AUTO DIFF WBC: CPT | Mod: ORL | Performed by: NURSE PRACTITIONER

## 2021-07-25 ENCOUNTER — LAB REQUISITION (OUTPATIENT)
Dept: LAB | Facility: CLINIC | Age: 71
End: 2021-07-25
Payer: MEDICARE

## 2021-07-26 LAB
ERYTHROCYTE [DISTWIDTH] IN BLOOD BY AUTOMATED COUNT: 13.4 % (ref 10–15)
HCT VFR BLD AUTO: 38.4 % (ref 35–47)
HGB BLD-MCNC: 12.5 G/DL (ref 11.7–15.7)
MCH RBC QN AUTO: 30 PG (ref 26.5–33)
MCHC RBC AUTO-ENTMCNC: 32.6 G/DL (ref 31.5–36.5)
MCV RBC AUTO: 92 FL (ref 78–100)
PLATELET # BLD AUTO: 163 10E3/UL (ref 150–450)
RBC # BLD AUTO: 4.17 10E6/UL (ref 3.8–5.2)
WBC # BLD AUTO: 6.6 10E3/UL (ref 4–11)

## 2021-07-26 PROCEDURE — 85027 COMPLETE CBC AUTOMATED: CPT | Mod: ORL | Performed by: INTERNAL MEDICINE

## 2021-07-26 PROCEDURE — 36415 COLL VENOUS BLD VENIPUNCTURE: CPT | Mod: ORL | Performed by: INTERNAL MEDICINE

## 2021-07-26 PROCEDURE — P9604 ONE-WAY ALLOW PRORATED TRIP: HCPCS | Mod: ORL | Performed by: INTERNAL MEDICINE

## 2021-07-27 ENCOUNTER — LAB REQUISITION (OUTPATIENT)
Dept: LAB | Facility: CLINIC | Age: 71
End: 2021-07-27
Payer: MEDICARE

## 2021-07-28 LAB
BASOPHILS # BLD AUTO: 0 10E3/UL (ref 0–0.2)
BASOPHILS NFR BLD AUTO: 0 %
EOSINOPHIL # BLD AUTO: 0 10E3/UL (ref 0–0.7)
EOSINOPHIL NFR BLD AUTO: 0 %
ERYTHROCYTE [DISTWIDTH] IN BLOOD BY AUTOMATED COUNT: 13.2 % (ref 10–15)
HCT VFR BLD AUTO: 36.7 % (ref 35–47)
HGB BLD-MCNC: 11.8 G/DL (ref 11.7–15.7)
IMM GRANULOCYTES # BLD: 0 10E3/UL
IMM GRANULOCYTES NFR BLD: 1 %
LYMPHOCYTES # BLD AUTO: 1.5 10E3/UL (ref 0.8–5.3)
LYMPHOCYTES NFR BLD AUTO: 28 %
MCH RBC QN AUTO: 28.7 PG (ref 26.5–33)
MCHC RBC AUTO-ENTMCNC: 32.2 G/DL (ref 31.5–36.5)
MCV RBC AUTO: 89 FL (ref 78–100)
MONOCYTES # BLD AUTO: 0.3 10E3/UL (ref 0–1.3)
MONOCYTES NFR BLD AUTO: 7 %
NEUTROPHILS # BLD AUTO: 3.4 10E3/UL (ref 1.6–8.3)
NEUTROPHILS NFR BLD AUTO: 64 %
NRBC # BLD AUTO: 0 10E3/UL
NRBC BLD AUTO-RTO: 0 /100
PLATELET # BLD AUTO: 184 10E3/UL (ref 150–450)
RBC # BLD AUTO: 4.11 10E6/UL (ref 3.8–5.2)
WBC # BLD AUTO: 5.2 10E3/UL (ref 4–11)

## 2021-07-28 PROCEDURE — 85025 COMPLETE CBC W/AUTO DIFF WBC: CPT | Mod: ORL | Performed by: NURSE PRACTITIONER

## 2021-07-28 PROCEDURE — 36415 COLL VENOUS BLD VENIPUNCTURE: CPT | Mod: ORL | Performed by: NURSE PRACTITIONER

## 2021-07-28 PROCEDURE — P9604 ONE-WAY ALLOW PRORATED TRIP: HCPCS | Mod: ORL | Performed by: NURSE PRACTITIONER

## 2021-08-01 ENCOUNTER — LAB REQUISITION (OUTPATIENT)
Dept: LAB | Facility: CLINIC | Age: 71
End: 2021-08-01
Payer: MEDICARE

## 2021-08-01 DIAGNOSIS — E88.09 OTHER DISORDERS OF PLASMA-PROTEIN METABOLISM, NOT ELSEWHERE CLASSIFIED: ICD-10-CM

## 2021-08-02 LAB
ERYTHROCYTE [DISTWIDTH] IN BLOOD BY AUTOMATED COUNT: 13.2 % (ref 10–15)
HCT VFR BLD AUTO: 39.9 % (ref 35–47)
HGB BLD-MCNC: 12.8 G/DL (ref 11.7–15.7)
MCH RBC QN AUTO: 28.8 PG (ref 26.5–33)
MCHC RBC AUTO-ENTMCNC: 32.1 G/DL (ref 31.5–36.5)
MCV RBC AUTO: 90 FL (ref 78–100)
PLATELET # BLD AUTO: 237 10E3/UL (ref 150–450)
RBC # BLD AUTO: 4.44 10E6/UL (ref 3.8–5.2)
WBC # BLD AUTO: 6.5 10E3/UL (ref 4–11)

## 2021-08-02 PROCEDURE — 36415 COLL VENOUS BLD VENIPUNCTURE: CPT | Mod: ORL | Performed by: NURSE PRACTITIONER

## 2021-08-02 PROCEDURE — P9604 ONE-WAY ALLOW PRORATED TRIP: HCPCS | Mod: ORL | Performed by: NURSE PRACTITIONER

## 2021-08-02 PROCEDURE — 85027 COMPLETE CBC AUTOMATED: CPT | Mod: ORL | Performed by: NURSE PRACTITIONER

## 2021-08-04 ENCOUNTER — LAB REQUISITION (OUTPATIENT)
Dept: LAB | Facility: CLINIC | Age: 71
End: 2021-08-04
Payer: MEDICARE

## 2021-08-05 ENCOUNTER — LAB REQUISITION (OUTPATIENT)
Dept: LAB | Facility: CLINIC | Age: 71
End: 2021-08-05
Payer: MEDICARE

## 2021-08-05 LAB
BASOPHILS # BLD AUTO: 0 10E3/UL (ref 0–0.2)
BASOPHILS NFR BLD AUTO: 0 %
EOSINOPHIL # BLD AUTO: 0 10E3/UL (ref 0–0.7)
EOSINOPHIL NFR BLD AUTO: 0 %
ERYTHROCYTE [DISTWIDTH] IN BLOOD BY AUTOMATED COUNT: 13.1 % (ref 10–15)
HCT VFR BLD AUTO: 34.2 % (ref 35–47)
HGB BLD-MCNC: 11.4 G/DL (ref 11.7–15.7)
IMM GRANULOCYTES # BLD: 0 10E3/UL
IMM GRANULOCYTES NFR BLD: 1 %
LYMPHOCYTES # BLD AUTO: 1 10E3/UL (ref 0.8–5.3)
LYMPHOCYTES NFR BLD AUTO: 14 %
MCH RBC QN AUTO: 29.3 PG (ref 26.5–33)
MCHC RBC AUTO-ENTMCNC: 33.3 G/DL (ref 31.5–36.5)
MCV RBC AUTO: 88 FL (ref 78–100)
MONOCYTES # BLD AUTO: 0.5 10E3/UL (ref 0–1.3)
MONOCYTES NFR BLD AUTO: 7 %
NEUTROPHILS # BLD AUTO: 5.8 10E3/UL (ref 1.6–8.3)
NEUTROPHILS NFR BLD AUTO: 78 %
NRBC # BLD AUTO: 0 10E3/UL
NRBC BLD AUTO-RTO: 0 /100
PLATELET # BLD AUTO: 224 10E3/UL (ref 150–450)
RBC # BLD AUTO: 3.89 10E6/UL (ref 3.8–5.2)
WBC # BLD AUTO: 7.4 10E3/UL (ref 4–11)

## 2021-08-05 PROCEDURE — 85025 COMPLETE CBC W/AUTO DIFF WBC: CPT | Mod: ORL | Performed by: INTERNAL MEDICINE

## 2021-08-05 PROCEDURE — 36415 COLL VENOUS BLD VENIPUNCTURE: CPT | Mod: ORL | Performed by: INTERNAL MEDICINE

## 2021-08-05 PROCEDURE — P9604 ONE-WAY ALLOW PRORATED TRIP: HCPCS | Mod: ORL | Performed by: INTERNAL MEDICINE

## 2021-08-10 LAB
BASOPHILS # BLD AUTO: 0 10E3/UL (ref 0–0.2)
BASOPHILS NFR BLD AUTO: 0 %
EOSINOPHIL # BLD AUTO: 0 10E3/UL (ref 0–0.7)
EOSINOPHIL NFR BLD AUTO: 0 %
ERYTHROCYTE [DISTWIDTH] IN BLOOD BY AUTOMATED COUNT: 13.2 % (ref 10–15)
HCT VFR BLD AUTO: 38.8 % (ref 35–47)
HGB BLD-MCNC: 12.8 G/DL (ref 11.7–15.7)
IMM GRANULOCYTES # BLD: 0.1 10E3/UL
IMM GRANULOCYTES NFR BLD: 1 %
LYMPHOCYTES # BLD AUTO: 0.9 10E3/UL (ref 0.8–5.3)
LYMPHOCYTES NFR BLD AUTO: 18 %
MCH RBC QN AUTO: 29.5 PG (ref 26.5–33)
MCHC RBC AUTO-ENTMCNC: 33 G/DL (ref 31.5–36.5)
MCV RBC AUTO: 89 FL (ref 78–100)
MONOCYTES # BLD AUTO: 0.5 10E3/UL (ref 0–1.3)
MONOCYTES NFR BLD AUTO: 9 %
NEUTROPHILS # BLD AUTO: 3.7 10E3/UL (ref 1.6–8.3)
NEUTROPHILS NFR BLD AUTO: 72 %
NRBC # BLD AUTO: 0 10E3/UL
NRBC BLD AUTO-RTO: 0 /100
PLATELET # BLD AUTO: 256 10E3/UL (ref 150–450)
RBC # BLD AUTO: 4.34 10E6/UL (ref 3.8–5.2)
WBC # BLD AUTO: 5.1 10E3/UL (ref 4–11)

## 2021-08-20 ENCOUNTER — LAB REQUISITION (OUTPATIENT)
Dept: LAB | Facility: CLINIC | Age: 71
End: 2021-08-20
Payer: MEDICARE

## 2021-08-20 ENCOUNTER — LAB REQUISITION (OUTPATIENT)
Dept: LAB | Facility: CLINIC | Age: 71
End: 2021-08-20
Payer: COMMERCIAL

## 2021-08-20 LAB
BASOPHILS # BLD AUTO: 0 10E3/UL (ref 0–0.2)
BASOPHILS NFR BLD AUTO: 0 %
EOSINOPHIL # BLD AUTO: 0 10E3/UL (ref 0–0.7)
EOSINOPHIL NFR BLD AUTO: 0 %
ERYTHROCYTE [DISTWIDTH] IN BLOOD BY AUTOMATED COUNT: 13.2 % (ref 10–15)
HCT VFR BLD AUTO: 35.1 % (ref 35–47)
HGB BLD-MCNC: 11.5 G/DL (ref 11.7–15.7)
IMM GRANULOCYTES # BLD: 0 10E3/UL
IMM GRANULOCYTES NFR BLD: 1 %
LYMPHOCYTES # BLD AUTO: 0.8 10E3/UL (ref 0.8–5.3)
LYMPHOCYTES NFR BLD AUTO: 12 %
MCH RBC QN AUTO: 29.3 PG (ref 26.5–33)
MCHC RBC AUTO-ENTMCNC: 32.8 G/DL (ref 31.5–36.5)
MCV RBC AUTO: 90 FL (ref 78–100)
MONOCYTES # BLD AUTO: 0.4 10E3/UL (ref 0–1.3)
MONOCYTES NFR BLD AUTO: 7 %
NEUTROPHILS # BLD AUTO: 5.1 10E3/UL (ref 1.6–8.3)
NEUTROPHILS NFR BLD AUTO: 80 %
NRBC # BLD AUTO: 0 10E3/UL
NRBC BLD AUTO-RTO: 0 /100
PLATELET # BLD AUTO: 246 10E3/UL (ref 150–450)
RBC # BLD AUTO: 3.92 10E6/UL (ref 3.8–5.2)
WBC # BLD AUTO: 6.4 10E3/UL (ref 4–11)

## 2021-08-20 PROCEDURE — 85025 COMPLETE CBC W/AUTO DIFF WBC: CPT | Mod: ORL | Performed by: NURSE PRACTITIONER

## 2021-08-26 ENCOUNTER — LAB REQUISITION (OUTPATIENT)
Dept: LAB | Facility: CLINIC | Age: 71
End: 2021-08-26
Payer: COMMERCIAL

## 2021-08-27 ENCOUNTER — LAB REQUISITION (OUTPATIENT)
Dept: LAB | Facility: CLINIC | Age: 71
End: 2021-08-27
Payer: MEDICARE

## 2021-08-27 ENCOUNTER — LAB REQUISITION (OUTPATIENT)
Dept: LAB | Facility: CLINIC | Age: 71
End: 2021-08-27
Payer: COMMERCIAL

## 2021-08-27 LAB
BASOPHILS # BLD AUTO: 0 10E3/UL (ref 0–0.2)
BASOPHILS NFR BLD AUTO: 0 %
EOSINOPHIL # BLD AUTO: 0 10E3/UL (ref 0–0.7)
EOSINOPHIL NFR BLD AUTO: 0 %
ERYTHROCYTE [DISTWIDTH] IN BLOOD BY AUTOMATED COUNT: 13.2 % (ref 10–15)
HCT VFR BLD AUTO: 38.6 % (ref 35–47)
HGB BLD-MCNC: 12.7 G/DL (ref 11.7–15.7)
IMM GRANULOCYTES # BLD: 0 10E3/UL
IMM GRANULOCYTES NFR BLD: 1 %
LYMPHOCYTES # BLD AUTO: 1 10E3/UL (ref 0.8–5.3)
LYMPHOCYTES NFR BLD AUTO: 15 %
MCH RBC QN AUTO: 28.7 PG (ref 26.5–33)
MCHC RBC AUTO-ENTMCNC: 32.9 G/DL (ref 31.5–36.5)
MCV RBC AUTO: 87 FL (ref 78–100)
MONOCYTES # BLD AUTO: 0.5 10E3/UL (ref 0–1.3)
MONOCYTES NFR BLD AUTO: 7 %
NEUTROPHILS # BLD AUTO: 4.9 10E3/UL (ref 1.6–8.3)
NEUTROPHILS NFR BLD AUTO: 77 %
NRBC # BLD AUTO: 0 10E3/UL
NRBC BLD AUTO-RTO: 0 /100
PLATELET # BLD AUTO: 271 10E3/UL (ref 150–450)
RBC # BLD AUTO: 4.43 10E6/UL (ref 3.8–5.2)
WBC # BLD AUTO: 6.4 10E3/UL (ref 4–11)

## 2021-08-27 PROCEDURE — 85025 COMPLETE CBC W/AUTO DIFF WBC: CPT | Mod: ORL | Performed by: INTERNAL MEDICINE

## 2021-09-03 ENCOUNTER — LAB REQUISITION (OUTPATIENT)
Dept: LAB | Facility: CLINIC | Age: 71
End: 2021-09-03
Payer: MEDICARE

## 2021-09-03 ENCOUNTER — LAB REQUISITION (OUTPATIENT)
Dept: LAB | Facility: CLINIC | Age: 71
End: 2021-09-03
Payer: COMMERCIAL

## 2021-09-03 LAB
BASOPHILS # BLD AUTO: 0 10E3/UL (ref 0–0.2)
BASOPHILS NFR BLD AUTO: 0 %
EOSINOPHIL # BLD AUTO: 0 10E3/UL (ref 0–0.7)
EOSINOPHIL NFR BLD AUTO: 0 %
ERYTHROCYTE [DISTWIDTH] IN BLOOD BY AUTOMATED COUNT: 13.2 % (ref 10–15)
HCT VFR BLD AUTO: 35.3 % (ref 35–47)
HGB BLD-MCNC: 11.8 G/DL (ref 11.7–15.7)
IMM GRANULOCYTES # BLD: 0 10E3/UL
IMM GRANULOCYTES NFR BLD: 1 %
LYMPHOCYTES # BLD AUTO: 1.3 10E3/UL (ref 0.8–5.3)
LYMPHOCYTES NFR BLD AUTO: 21 %
MCH RBC QN AUTO: 29.4 PG (ref 26.5–33)
MCHC RBC AUTO-ENTMCNC: 33.4 G/DL (ref 31.5–36.5)
MCV RBC AUTO: 88 FL (ref 78–100)
MONOCYTES # BLD AUTO: 0.5 10E3/UL (ref 0–1.3)
MONOCYTES NFR BLD AUTO: 8 %
NEUTROPHILS # BLD AUTO: 4.4 10E3/UL (ref 1.6–8.3)
NEUTROPHILS NFR BLD AUTO: 70 %
NRBC # BLD AUTO: 0 10E3/UL
NRBC BLD AUTO-RTO: 0 /100
PLATELET # BLD AUTO: 249 10E3/UL (ref 150–450)
RBC # BLD AUTO: 4.01 10E6/UL (ref 3.8–5.2)
WBC # BLD AUTO: 6.2 10E3/UL (ref 4–11)

## 2021-09-03 PROCEDURE — 85025 COMPLETE CBC W/AUTO DIFF WBC: CPT | Mod: ORL | Performed by: INTERNAL MEDICINE

## 2021-09-15 ENCOUNTER — LAB REQUISITION (OUTPATIENT)
Dept: LAB | Facility: CLINIC | Age: 71
End: 2021-09-15
Payer: MEDICARE

## 2021-09-15 ENCOUNTER — LAB REQUISITION (OUTPATIENT)
Dept: LAB | Facility: CLINIC | Age: 71
End: 2021-09-15
Payer: COMMERCIAL

## 2021-09-15 LAB
BASOPHILS # BLD AUTO: 0 10E3/UL (ref 0–0.2)
BASOPHILS NFR BLD AUTO: 0 %
EOSINOPHIL # BLD AUTO: 0 10E3/UL (ref 0–0.7)
EOSINOPHIL NFR BLD AUTO: 0 %
ERYTHROCYTE [DISTWIDTH] IN BLOOD BY AUTOMATED COUNT: 13.2 % (ref 10–15)
HCT VFR BLD AUTO: 35.7 % (ref 35–47)
HGB BLD-MCNC: 11.7 G/DL (ref 11.7–15.7)
IMM GRANULOCYTES # BLD: 0 10E3/UL
IMM GRANULOCYTES NFR BLD: 1 %
LYMPHOCYTES # BLD AUTO: 1.1 10E3/UL (ref 0.8–5.3)
LYMPHOCYTES NFR BLD AUTO: 19 %
MCH RBC QN AUTO: 29 PG (ref 26.5–33)
MCHC RBC AUTO-ENTMCNC: 32.8 G/DL (ref 31.5–36.5)
MCV RBC AUTO: 89 FL (ref 78–100)
MONOCYTES # BLD AUTO: 0.5 10E3/UL (ref 0–1.3)
MONOCYTES NFR BLD AUTO: 8 %
NEUTROPHILS # BLD AUTO: 4.2 10E3/UL (ref 1.6–8.3)
NEUTROPHILS NFR BLD AUTO: 72 %
NRBC # BLD AUTO: 0 10E3/UL
NRBC BLD AUTO-RTO: 0 /100
PLATELET # BLD AUTO: 280 10E3/UL (ref 150–450)
RBC # BLD AUTO: 4.03 10E6/UL (ref 3.8–5.2)
WBC # BLD AUTO: 5.9 10E3/UL (ref 4–11)

## 2021-09-15 PROCEDURE — 85025 COMPLETE CBC W/AUTO DIFF WBC: CPT | Mod: ORL | Performed by: NURSE PRACTITIONER

## 2021-09-15 PROCEDURE — 85025 COMPLETE CBC W/AUTO DIFF WBC: CPT | Mod: ORL

## 2021-09-17 ENCOUNTER — LAB REQUISITION (OUTPATIENT)
Dept: LAB | Facility: CLINIC | Age: 71
End: 2021-09-17
Payer: MEDICARE

## 2021-09-17 ENCOUNTER — LAB REQUISITION (OUTPATIENT)
Dept: LAB | Facility: CLINIC | Age: 71
End: 2021-09-17
Payer: COMMERCIAL

## 2021-09-20 LAB
ERYTHROCYTE [DISTWIDTH] IN BLOOD BY AUTOMATED COUNT: 13.4 % (ref 10–15)
HCT VFR BLD AUTO: 38.5 % (ref 35–47)
HGB BLD-MCNC: 12.5 G/DL (ref 11.7–15.7)
MCH RBC QN AUTO: 28.6 PG (ref 26.5–33)
MCHC RBC AUTO-ENTMCNC: 32.5 G/DL (ref 31.5–36.5)
MCV RBC AUTO: 88 FL (ref 78–100)
PLATELET # BLD AUTO: 269 10E3/UL (ref 150–450)
RBC # BLD AUTO: 4.37 10E6/UL (ref 3.8–5.2)
WBC # BLD AUTO: 5.8 10E3/UL (ref 4–11)

## 2021-09-20 PROCEDURE — 36415 COLL VENOUS BLD VENIPUNCTURE: CPT | Mod: ORL | Performed by: INTERNAL MEDICINE

## 2021-09-20 PROCEDURE — 85027 COMPLETE CBC AUTOMATED: CPT | Mod: ORL | Performed by: INTERNAL MEDICINE

## 2021-09-20 PROCEDURE — P9604 ONE-WAY ALLOW PRORATED TRIP: HCPCS | Mod: ORL | Performed by: INTERNAL MEDICINE

## 2021-09-22 ENCOUNTER — LAB REQUISITION (OUTPATIENT)
Dept: LAB | Facility: CLINIC | Age: 71
End: 2021-09-22
Payer: COMMERCIAL

## 2021-09-23 ENCOUNTER — LAB REQUISITION (OUTPATIENT)
Dept: LAB | Facility: CLINIC | Age: 71
End: 2021-09-23
Payer: MEDICARE

## 2021-09-23 LAB
BASOPHILS # BLD AUTO: 0 10E3/UL (ref 0–0.2)
BASOPHILS NFR BLD AUTO: 0 %
EOSINOPHIL # BLD AUTO: 0 10E3/UL (ref 0–0.7)
EOSINOPHIL NFR BLD AUTO: 0 %
ERYTHROCYTE [DISTWIDTH] IN BLOOD BY AUTOMATED COUNT: 13.2 % (ref 10–15)
HCT VFR BLD AUTO: 36.8 % (ref 35–47)
HGB BLD-MCNC: 11.9 G/DL (ref 11.7–15.7)
IMM GRANULOCYTES # BLD: 0 10E3/UL
IMM GRANULOCYTES NFR BLD: 1 %
LYMPHOCYTES # BLD AUTO: 1.2 10E3/UL (ref 0.8–5.3)
LYMPHOCYTES NFR BLD AUTO: 15 %
MCH RBC QN AUTO: 28.4 PG (ref 26.5–33)
MCHC RBC AUTO-ENTMCNC: 32.3 G/DL (ref 31.5–36.5)
MCV RBC AUTO: 88 FL (ref 78–100)
MONOCYTES # BLD AUTO: 0.5 10E3/UL (ref 0–1.3)
MONOCYTES NFR BLD AUTO: 7 %
NEUTROPHILS # BLD AUTO: 5.8 10E3/UL (ref 1.6–8.3)
NEUTROPHILS NFR BLD AUTO: 77 %
NRBC # BLD AUTO: 0 10E3/UL
NRBC BLD AUTO-RTO: 0 /100
PLATELET # BLD AUTO: 269 10E3/UL (ref 150–450)
RBC # BLD AUTO: 4.19 10E6/UL (ref 3.8–5.2)
WBC # BLD AUTO: 7.5 10E3/UL (ref 4–11)

## 2021-09-23 PROCEDURE — 85025 COMPLETE CBC W/AUTO DIFF WBC: CPT | Mod: ORL | Performed by: NURSE PRACTITIONER

## 2021-09-23 PROCEDURE — 36415 COLL VENOUS BLD VENIPUNCTURE: CPT | Mod: ORL | Performed by: NURSE PRACTITIONER

## 2021-09-28 ENCOUNTER — LAB REQUISITION (OUTPATIENT)
Dept: LAB | Facility: CLINIC | Age: 71
End: 2021-09-28
Payer: MEDICARE

## 2021-09-29 LAB
BASOPHILS # BLD AUTO: 0 10E3/UL (ref 0–0.2)
BASOPHILS NFR BLD AUTO: 0 %
EOSINOPHIL # BLD AUTO: 0.2 10E3/UL (ref 0–0.7)
EOSINOPHIL NFR BLD AUTO: 3 %
ERYTHROCYTE [DISTWIDTH] IN BLOOD BY AUTOMATED COUNT: 13.6 % (ref 10–15)
HCT VFR BLD AUTO: 35.8 % (ref 35–47)
HGB BLD-MCNC: 11.6 G/DL (ref 11.7–15.7)
IMM GRANULOCYTES # BLD: 0 10E3/UL
IMM GRANULOCYTES NFR BLD: 0 %
LYMPHOCYTES # BLD AUTO: 1.2 10E3/UL (ref 0.8–5.3)
LYMPHOCYTES NFR BLD AUTO: 17 %
MCH RBC QN AUTO: 28.9 PG (ref 26.5–33)
MCHC RBC AUTO-ENTMCNC: 32.4 G/DL (ref 31.5–36.5)
MCV RBC AUTO: 89 FL (ref 78–100)
MONOCYTES # BLD AUTO: 0.5 10E3/UL (ref 0–1.3)
MONOCYTES NFR BLD AUTO: 7 %
NEUTROPHILS # BLD AUTO: 5.1 10E3/UL (ref 1.6–8.3)
NEUTROPHILS NFR BLD AUTO: 73 %
NRBC # BLD AUTO: 0 10E3/UL
NRBC BLD AUTO-RTO: 0 /100
PLATELET # BLD AUTO: 261 10E3/UL (ref 150–450)
RBC # BLD AUTO: 4.02 10E6/UL (ref 3.8–5.2)
WBC # BLD AUTO: 7.1 10E3/UL (ref 4–11)

## 2021-09-29 PROCEDURE — 36415 COLL VENOUS BLD VENIPUNCTURE: CPT | Mod: ORL | Performed by: NURSE PRACTITIONER

## 2021-09-29 PROCEDURE — 85025 COMPLETE CBC W/AUTO DIFF WBC: CPT | Mod: ORL | Performed by: NURSE PRACTITIONER

## 2021-09-30 ENCOUNTER — LAB REQUISITION (OUTPATIENT)
Dept: LAB | Facility: CLINIC | Age: 71
End: 2021-09-30
Payer: MEDICARE

## 2021-10-04 LAB
BASOPHILS # BLD AUTO: 0 10E3/UL (ref 0–0.2)
BASOPHILS NFR BLD AUTO: 0 %
EOSINOPHIL # BLD AUTO: 0 10E3/UL (ref 0–0.7)
EOSINOPHIL NFR BLD AUTO: 0 %
ERYTHROCYTE [DISTWIDTH] IN BLOOD BY AUTOMATED COUNT: 13.3 % (ref 10–15)
HCT VFR BLD AUTO: 35.9 % (ref 35–47)
HGB BLD-MCNC: 11.6 G/DL (ref 11.7–15.7)
IMM GRANULOCYTES # BLD: 0.1 10E3/UL
IMM GRANULOCYTES NFR BLD: 1 %
LYMPHOCYTES # BLD AUTO: 1.3 10E3/UL (ref 0.8–5.3)
LYMPHOCYTES NFR BLD AUTO: 19 %
MCH RBC QN AUTO: 28.9 PG (ref 26.5–33)
MCHC RBC AUTO-ENTMCNC: 32.3 G/DL (ref 31.5–36.5)
MCV RBC AUTO: 90 FL (ref 78–100)
MONOCYTES # BLD AUTO: 0.6 10E3/UL (ref 0–1.3)
MONOCYTES NFR BLD AUTO: 9 %
NEUTROPHILS # BLD AUTO: 4.7 10E3/UL (ref 1.6–8.3)
NEUTROPHILS NFR BLD AUTO: 71 %
NRBC # BLD AUTO: 0 10E3/UL
NRBC BLD AUTO-RTO: 0 /100
PLATELET # BLD AUTO: 269 10E3/UL (ref 150–450)
RBC # BLD AUTO: 4.01 10E6/UL (ref 3.8–5.2)
WBC # BLD AUTO: 6.7 10E3/UL (ref 4–11)

## 2021-10-04 PROCEDURE — 36415 COLL VENOUS BLD VENIPUNCTURE: CPT | Mod: ORL | Performed by: NURSE PRACTITIONER

## 2021-10-04 PROCEDURE — 85025 COMPLETE CBC W/AUTO DIFF WBC: CPT | Mod: ORL | Performed by: NURSE PRACTITIONER

## 2021-10-04 PROCEDURE — P9604 ONE-WAY ALLOW PRORATED TRIP: HCPCS | Mod: ORL | Performed by: NURSE PRACTITIONER

## 2021-10-06 ENCOUNTER — LAB REQUISITION (OUTPATIENT)
Dept: LAB | Facility: CLINIC | Age: 71
End: 2021-10-06
Payer: MEDICARE

## 2021-10-11 PROCEDURE — P9603 ONE-WAY ALLOW PRORATED MILES: HCPCS | Mod: ORL | Performed by: NURSE PRACTITIONER

## 2021-10-11 PROCEDURE — 36415 COLL VENOUS BLD VENIPUNCTURE: CPT | Mod: ORL | Performed by: NURSE PRACTITIONER

## 2021-10-11 PROCEDURE — 85025 COMPLETE CBC W/AUTO DIFF WBC: CPT | Mod: ORL | Performed by: NURSE PRACTITIONER

## 2021-10-12 ENCOUNTER — LAB REQUISITION (OUTPATIENT)
Dept: LAB | Facility: CLINIC | Age: 71
End: 2021-10-12
Payer: MEDICARE

## 2021-10-13 LAB
BASOPHILS # BLD AUTO: 0 10E3/UL (ref 0–0.2)
BASOPHILS NFR BLD AUTO: 0 %
EOSINOPHIL # BLD AUTO: 0 10E3/UL (ref 0–0.7)
EOSINOPHIL NFR BLD AUTO: 0 %
ERYTHROCYTE [DISTWIDTH] IN BLOOD BY AUTOMATED COUNT: 13.5 % (ref 10–15)
HCT VFR BLD AUTO: 38.6 % (ref 35–47)
HGB BLD-MCNC: 12.4 G/DL (ref 11.7–15.7)
IMM GRANULOCYTES # BLD: 0.1 10E3/UL
IMM GRANULOCYTES NFR BLD: 1 %
LYMPHOCYTES # BLD AUTO: 0.5 10E3/UL (ref 0.8–5.3)
LYMPHOCYTES NFR BLD AUTO: 5 %
MCH RBC QN AUTO: 28.9 PG (ref 26.5–33)
MCHC RBC AUTO-ENTMCNC: 32.1 G/DL (ref 31.5–36.5)
MCV RBC AUTO: 90 FL (ref 78–100)
MONOCYTES # BLD AUTO: 0.4 10E3/UL (ref 0–1.3)
MONOCYTES NFR BLD AUTO: 4 %
NEUTROPHILS # BLD AUTO: 9.9 10E3/UL (ref 1.6–8.3)
NEUTROPHILS NFR BLD AUTO: 90 %
NRBC # BLD AUTO: 0 10E3/UL
NRBC BLD AUTO-RTO: 0 /100
PLATELET # BLD AUTO: 238 10E3/UL (ref 150–450)
RBC # BLD AUTO: 4.29 10E6/UL (ref 3.8–5.2)
WBC # BLD AUTO: 10.8 10E3/UL (ref 4–11)

## 2021-10-13 PROCEDURE — 36415 COLL VENOUS BLD VENIPUNCTURE: CPT | Mod: ORL | Performed by: NURSE PRACTITIONER

## 2021-10-13 PROCEDURE — P9604 ONE-WAY ALLOW PRORATED TRIP: HCPCS | Mod: ORL | Performed by: NURSE PRACTITIONER

## 2021-10-13 PROCEDURE — 85025 COMPLETE CBC W/AUTO DIFF WBC: CPT | Mod: ORL | Performed by: NURSE PRACTITIONER

## 2021-10-14 LAB
BASOPHILS # BLD AUTO: 0 10E3/UL (ref 0–0.2)
BASOPHILS NFR BLD AUTO: 0 %
EOSINOPHIL # BLD AUTO: 0 10E3/UL (ref 0–0.7)
EOSINOPHIL NFR BLD AUTO: 0 %
ERYTHROCYTE [DISTWIDTH] IN BLOOD BY AUTOMATED COUNT: 13.6 % (ref 10–15)
HCT VFR BLD AUTO: 37.9 % (ref 35–47)
HGB BLD-MCNC: 12.2 G/DL (ref 11.7–15.7)
IMM GRANULOCYTES # BLD: 0 10E3/UL
IMM GRANULOCYTES NFR BLD: 0 %
LYMPHOCYTES # BLD AUTO: 1 10E3/UL (ref 0.8–5.3)
LYMPHOCYTES NFR BLD AUTO: 13 %
MCH RBC QN AUTO: 28.4 PG (ref 26.5–33)
MCHC RBC AUTO-ENTMCNC: 32.2 G/DL (ref 31.5–36.5)
MCV RBC AUTO: 88 FL (ref 78–100)
MONOCYTES # BLD AUTO: 0.4 10E3/UL (ref 0–1.3)
MONOCYTES NFR BLD AUTO: 5 %
NEUTROPHILS # BLD AUTO: 6.5 10E3/UL (ref 1.6–8.3)
NEUTROPHILS NFR BLD AUTO: 82 %
NRBC # BLD AUTO: 0 10E3/UL
NRBC BLD AUTO-RTO: 0 /100
PLATELET # BLD AUTO: 256 10E3/UL (ref 150–450)
RBC # BLD AUTO: 4.29 10E6/UL (ref 3.8–5.2)
WBC # BLD AUTO: 8 10E3/UL (ref 4–11)

## 2021-10-15 ENCOUNTER — LAB REQUISITION (OUTPATIENT)
Dept: LAB | Facility: CLINIC | Age: 71
End: 2021-10-15
Payer: MEDICARE

## 2021-10-18 LAB
BASOPHILS # BLD AUTO: 0 10E3/UL (ref 0–0.2)
BASOPHILS NFR BLD AUTO: 0 %
EOSINOPHIL # BLD AUTO: 0 10E3/UL (ref 0–0.7)
EOSINOPHIL NFR BLD AUTO: 0 %
ERYTHROCYTE [DISTWIDTH] IN BLOOD BY AUTOMATED COUNT: 13.3 % (ref 10–15)
HCT VFR BLD AUTO: 39.3 % (ref 35–47)
HGB BLD-MCNC: 12.3 G/DL (ref 11.7–15.7)
IMM GRANULOCYTES # BLD: 0 10E3/UL
IMM GRANULOCYTES NFR BLD: 1 %
LYMPHOCYTES # BLD AUTO: 1.2 10E3/UL (ref 0.8–5.3)
LYMPHOCYTES NFR BLD AUTO: 22 %
MCH RBC QN AUTO: 28.9 PG (ref 26.5–33)
MCHC RBC AUTO-ENTMCNC: 31.3 G/DL (ref 31.5–36.5)
MCV RBC AUTO: 92 FL (ref 78–100)
MONOCYTES # BLD AUTO: 0.3 10E3/UL (ref 0–1.3)
MONOCYTES NFR BLD AUTO: 7 %
NEUTROPHILS # BLD AUTO: 3.7 10E3/UL (ref 1.6–8.3)
NEUTROPHILS NFR BLD AUTO: 70 %
NRBC # BLD AUTO: 0 10E3/UL
NRBC BLD AUTO-RTO: 0 /100
PLATELET # BLD AUTO: 292 10E3/UL (ref 150–450)
RBC # BLD AUTO: 4.26 10E6/UL (ref 3.8–5.2)
WBC # BLD AUTO: 5.3 10E3/UL (ref 4–11)

## 2021-10-18 PROCEDURE — 85025 COMPLETE CBC W/AUTO DIFF WBC: CPT | Mod: ORL | Performed by: INTERNAL MEDICINE

## 2021-10-18 PROCEDURE — 36415 COLL VENOUS BLD VENIPUNCTURE: CPT | Mod: ORL | Performed by: INTERNAL MEDICINE

## 2021-10-18 PROCEDURE — P9604 ONE-WAY ALLOW PRORATED TRIP: HCPCS | Mod: ORL | Performed by: INTERNAL MEDICINE

## 2021-10-21 ENCOUNTER — LAB REQUISITION (OUTPATIENT)
Dept: LAB | Facility: CLINIC | Age: 71
End: 2021-10-21
Payer: COMMERCIAL

## 2021-10-22 ENCOUNTER — LAB REQUISITION (OUTPATIENT)
Dept: LAB | Facility: CLINIC | Age: 71
End: 2021-10-22
Payer: MEDICARE

## 2021-10-25 LAB
BASOPHILS # BLD AUTO: 0 10E3/UL (ref 0–0.2)
BASOPHILS NFR BLD AUTO: 0 %
EOSINOPHIL # BLD AUTO: 0 10E3/UL (ref 0–0.7)
EOSINOPHIL NFR BLD AUTO: 0 %
ERYTHROCYTE [DISTWIDTH] IN BLOOD BY AUTOMATED COUNT: 13.4 % (ref 10–15)
HCT VFR BLD AUTO: 38.2 % (ref 35–47)
HGB BLD-MCNC: 12.4 G/DL (ref 11.7–15.7)
IMM GRANULOCYTES # BLD: 0 10E3/UL
IMM GRANULOCYTES NFR BLD: 1 %
LYMPHOCYTES # BLD AUTO: 1.2 10E3/UL (ref 0.8–5.3)
LYMPHOCYTES NFR BLD AUTO: 20 %
MCH RBC QN AUTO: 29 PG (ref 26.5–33)
MCHC RBC AUTO-ENTMCNC: 32.5 G/DL (ref 31.5–36.5)
MCV RBC AUTO: 89 FL (ref 78–100)
MONOCYTES # BLD AUTO: 0.3 10E3/UL (ref 0–1.3)
MONOCYTES NFR BLD AUTO: 5 %
NEUTROPHILS # BLD AUTO: 4.6 10E3/UL (ref 1.6–8.3)
NEUTROPHILS NFR BLD AUTO: 74 %
NRBC # BLD AUTO: 0 10E3/UL
NRBC BLD AUTO-RTO: 0 /100
PLATELET # BLD AUTO: 237 10E3/UL (ref 150–450)
RBC # BLD AUTO: 4.28 10E6/UL (ref 3.8–5.2)
WBC # BLD AUTO: 6.2 10E3/UL (ref 4–11)

## 2021-10-25 PROCEDURE — 85025 COMPLETE CBC W/AUTO DIFF WBC: CPT | Mod: ORL | Performed by: NURSE PRACTITIONER

## 2021-10-25 PROCEDURE — P9604 ONE-WAY ALLOW PRORATED TRIP: HCPCS | Mod: ORL | Performed by: NURSE PRACTITIONER

## 2021-10-25 PROCEDURE — 36415 COLL VENOUS BLD VENIPUNCTURE: CPT | Mod: ORL | Performed by: NURSE PRACTITIONER

## 2021-10-29 ENCOUNTER — LAB REQUISITION (OUTPATIENT)
Dept: LAB | Facility: CLINIC | Age: 71
End: 2021-10-29
Payer: COMMERCIAL

## 2021-11-01 ENCOUNTER — LAB REQUISITION (OUTPATIENT)
Dept: LAB | Facility: CLINIC | Age: 71
End: 2021-11-01
Payer: MEDICARE

## 2021-11-01 LAB
ERYTHROCYTE [DISTWIDTH] IN BLOOD BY AUTOMATED COUNT: 13.2 % (ref 10–15)
HCT VFR BLD AUTO: 36.5 % (ref 35–47)
HGB BLD-MCNC: 11.8 G/DL (ref 11.7–15.7)
MCH RBC QN AUTO: 28.7 PG (ref 26.5–33)
MCHC RBC AUTO-ENTMCNC: 32.3 G/DL (ref 31.5–36.5)
MCV RBC AUTO: 89 FL (ref 78–100)
PLATELET # BLD AUTO: 243 10E3/UL (ref 150–450)
RBC # BLD AUTO: 4.11 10E6/UL (ref 3.8–5.2)
WBC # BLD AUTO: 5.7 10E3/UL (ref 4–11)

## 2021-11-01 PROCEDURE — 85027 COMPLETE CBC AUTOMATED: CPT | Mod: ORL | Performed by: INTERNAL MEDICINE

## 2021-11-01 PROCEDURE — 36415 COLL VENOUS BLD VENIPUNCTURE: CPT | Mod: ORL | Performed by: INTERNAL MEDICINE

## 2021-11-01 PROCEDURE — P9604 ONE-WAY ALLOW PRORATED TRIP: HCPCS | Mod: ORL | Performed by: INTERNAL MEDICINE

## 2021-11-10 ENCOUNTER — LAB REQUISITION (OUTPATIENT)
Dept: LAB | Facility: CLINIC | Age: 71
End: 2021-11-10
Payer: MEDICARE

## 2021-11-10 LAB
BASOPHILS # BLD AUTO: 0 10E3/UL (ref 0–0.2)
BASOPHILS NFR BLD AUTO: 0 %
EOSINOPHIL # BLD AUTO: 0 10E3/UL (ref 0–0.7)
EOSINOPHIL NFR BLD AUTO: 0 %
ERYTHROCYTE [DISTWIDTH] IN BLOOD BY AUTOMATED COUNT: 13.2 % (ref 10–15)
HCT VFR BLD AUTO: 37.2 % (ref 35–47)
HGB BLD-MCNC: 11.8 G/DL (ref 11.7–15.7)
IMM GRANULOCYTES # BLD: 0 10E3/UL
IMM GRANULOCYTES NFR BLD: 1 %
LYMPHOCYTES # BLD AUTO: 1.3 10E3/UL (ref 0.8–5.3)
LYMPHOCYTES NFR BLD AUTO: 21 %
MCH RBC QN AUTO: 28.7 PG (ref 26.5–33)
MCHC RBC AUTO-ENTMCNC: 31.7 G/DL (ref 31.5–36.5)
MCV RBC AUTO: 91 FL (ref 78–100)
MONOCYTES # BLD AUTO: 0.5 10E3/UL (ref 0–1.3)
MONOCYTES NFR BLD AUTO: 7 %
NEUTROPHILS # BLD AUTO: 4.5 10E3/UL (ref 1.6–8.3)
NEUTROPHILS NFR BLD AUTO: 71 %
NRBC # BLD AUTO: 0 10E3/UL
NRBC BLD AUTO-RTO: 0 /100
PLATELET # BLD AUTO: 238 10E3/UL (ref 150–450)
RBC # BLD AUTO: 4.11 10E6/UL (ref 3.8–5.2)
WBC # BLD AUTO: 6.4 10E3/UL (ref 4–11)

## 2021-11-10 PROCEDURE — P9604 ONE-WAY ALLOW PRORATED TRIP: HCPCS | Mod: ORL | Performed by: NURSE PRACTITIONER

## 2021-11-10 PROCEDURE — 36415 COLL VENOUS BLD VENIPUNCTURE: CPT | Mod: ORL | Performed by: NURSE PRACTITIONER

## 2021-11-10 PROCEDURE — 85025 COMPLETE CBC W/AUTO DIFF WBC: CPT | Mod: ORL | Performed by: NURSE PRACTITIONER

## 2021-11-19 ENCOUNTER — LAB REQUISITION (OUTPATIENT)
Dept: LAB | Facility: CLINIC | Age: 71
End: 2021-11-19
Payer: MEDICARE

## 2021-11-22 LAB
BASOPHILS # BLD AUTO: 0 10E3/UL (ref 0–0.2)
BASOPHILS NFR BLD AUTO: 0 %
EOSINOPHIL # BLD AUTO: 0 10E3/UL (ref 0–0.7)
EOSINOPHIL NFR BLD AUTO: 0 %
ERYTHROCYTE [DISTWIDTH] IN BLOOD BY AUTOMATED COUNT: 13.6 % (ref 10–15)
HCT VFR BLD AUTO: 35.4 % (ref 35–47)
HGB BLD-MCNC: 11.5 G/DL (ref 11.7–15.7)
IMM GRANULOCYTES # BLD: 0.1 10E3/UL
IMM GRANULOCYTES NFR BLD: 1 %
LYMPHOCYTES # BLD AUTO: 1.4 10E3/UL (ref 0.8–5.3)
LYMPHOCYTES NFR BLD AUTO: 20 %
MCH RBC QN AUTO: 28.5 PG (ref 26.5–33)
MCHC RBC AUTO-ENTMCNC: 32.5 G/DL (ref 31.5–36.5)
MCV RBC AUTO: 88 FL (ref 78–100)
MONOCYTES # BLD AUTO: 0.5 10E3/UL (ref 0–1.3)
MONOCYTES NFR BLD AUTO: 7 %
NEUTROPHILS # BLD AUTO: 4.9 10E3/UL (ref 1.6–8.3)
NEUTROPHILS NFR BLD AUTO: 72 %
NRBC # BLD AUTO: 0 10E3/UL
NRBC BLD AUTO-RTO: 0 /100
PLATELET # BLD AUTO: 261 10E3/UL (ref 150–450)
RBC # BLD AUTO: 4.04 10E6/UL (ref 3.8–5.2)
WBC # BLD AUTO: 6.7 10E3/UL (ref 4–11)

## 2021-11-22 PROCEDURE — P9604 ONE-WAY ALLOW PRORATED TRIP: HCPCS | Mod: ORL | Performed by: NURSE PRACTITIONER

## 2021-11-22 PROCEDURE — 36415 COLL VENOUS BLD VENIPUNCTURE: CPT | Mod: ORL | Performed by: NURSE PRACTITIONER

## 2021-11-22 PROCEDURE — 85025 COMPLETE CBC W/AUTO DIFF WBC: CPT | Mod: ORL | Performed by: NURSE PRACTITIONER

## 2021-12-01 ENCOUNTER — LAB REQUISITION (OUTPATIENT)
Dept: LAB | Facility: CLINIC | Age: 71
End: 2021-12-01
Payer: MEDICARE

## 2021-12-06 LAB
BASOPHILS # BLD AUTO: 0 10E3/UL (ref 0–0.2)
BASOPHILS NFR BLD AUTO: 0 %
EOSINOPHIL # BLD AUTO: 0 10E3/UL (ref 0–0.7)
EOSINOPHIL NFR BLD AUTO: 0 %
ERYTHROCYTE [DISTWIDTH] IN BLOOD BY AUTOMATED COUNT: 13.8 % (ref 10–15)
HCT VFR BLD AUTO: 34.3 % (ref 35–47)
HGB BLD-MCNC: 11.2 G/DL (ref 11.7–15.7)
IMM GRANULOCYTES # BLD: 0 10E3/UL
IMM GRANULOCYTES NFR BLD: 1 %
LYMPHOCYTES # BLD AUTO: 1.4 10E3/UL (ref 0.8–5.3)
LYMPHOCYTES NFR BLD AUTO: 23 %
MCH RBC QN AUTO: 28.9 PG (ref 26.5–33)
MCHC RBC AUTO-ENTMCNC: 32.7 G/DL (ref 31.5–36.5)
MCV RBC AUTO: 88 FL (ref 78–100)
MONOCYTES # BLD AUTO: 0.4 10E3/UL (ref 0–1.3)
MONOCYTES NFR BLD AUTO: 7 %
NEUTROPHILS # BLD AUTO: 4.2 10E3/UL (ref 1.6–8.3)
NEUTROPHILS NFR BLD AUTO: 69 %
NRBC # BLD AUTO: 0 10E3/UL
NRBC BLD AUTO-RTO: 0 /100
PLATELET # BLD AUTO: 220 10E3/UL (ref 150–450)
RBC # BLD AUTO: 3.88 10E6/UL (ref 3.8–5.2)
WBC # BLD AUTO: 6 10E3/UL (ref 4–11)

## 2021-12-06 PROCEDURE — 36415 COLL VENOUS BLD VENIPUNCTURE: CPT | Mod: ORL | Performed by: NURSE PRACTITIONER

## 2021-12-06 PROCEDURE — 85025 COMPLETE CBC W/AUTO DIFF WBC: CPT | Mod: ORL | Performed by: NURSE PRACTITIONER

## 2021-12-16 ENCOUNTER — LAB REQUISITION (OUTPATIENT)
Dept: LAB | Facility: CLINIC | Age: 71
End: 2021-12-16
Payer: MEDICARE

## 2021-12-20 LAB
BASOPHILS # BLD AUTO: 0 10E3/UL (ref 0–0.2)
BASOPHILS NFR BLD AUTO: 0 %
EOSINOPHIL # BLD AUTO: 0 10E3/UL (ref 0–0.7)
EOSINOPHIL NFR BLD AUTO: 0 %
ERYTHROCYTE [DISTWIDTH] IN BLOOD BY AUTOMATED COUNT: 13.7 % (ref 10–15)
HCT VFR BLD AUTO: 36.7 % (ref 35–47)
HGB BLD-MCNC: 11.7 G/DL (ref 11.7–15.7)
IMM GRANULOCYTES # BLD: 0.1 10E3/UL
IMM GRANULOCYTES NFR BLD: 1 %
LYMPHOCYTES # BLD AUTO: 1.7 10E3/UL (ref 0.8–5.3)
LYMPHOCYTES NFR BLD AUTO: 24 %
MCH RBC QN AUTO: 28.3 PG (ref 26.5–33)
MCHC RBC AUTO-ENTMCNC: 31.9 G/DL (ref 31.5–36.5)
MCV RBC AUTO: 89 FL (ref 78–100)
MONOCYTES # BLD AUTO: 0.4 10E3/UL (ref 0–1.3)
MONOCYTES NFR BLD AUTO: 6 %
NEUTROPHILS # BLD AUTO: 5 10E3/UL (ref 1.6–8.3)
NEUTROPHILS NFR BLD AUTO: 69 %
NRBC # BLD AUTO: 0 10E3/UL
NRBC BLD AUTO-RTO: 0 /100
PLATELET # BLD AUTO: 274 10E3/UL (ref 150–450)
RBC # BLD AUTO: 4.14 10E6/UL (ref 3.8–5.2)
WBC # BLD AUTO: 7.2 10E3/UL (ref 4–11)

## 2021-12-20 PROCEDURE — 85025 COMPLETE CBC W/AUTO DIFF WBC: CPT | Mod: ORL | Performed by: INTERNAL MEDICINE

## 2021-12-30 ENCOUNTER — LAB REQUISITION (OUTPATIENT)
Dept: LAB | Facility: CLINIC | Age: 71
End: 2021-12-30
Payer: MEDICARE

## 2022-01-03 LAB
BASOPHILS # BLD AUTO: 0 10E3/UL (ref 0–0.2)
BASOPHILS NFR BLD AUTO: 0 %
EOSINOPHIL # BLD AUTO: 0 10E3/UL (ref 0–0.7)
EOSINOPHIL NFR BLD AUTO: 0 %
ERYTHROCYTE [DISTWIDTH] IN BLOOD BY AUTOMATED COUNT: 14 % (ref 10–15)
HCT VFR BLD AUTO: 39.5 % (ref 35–47)
HGB BLD-MCNC: 12.8 G/DL (ref 11.7–15.7)
IMM GRANULOCYTES # BLD: 0.1 10E3/UL
IMM GRANULOCYTES NFR BLD: 1 %
LYMPHOCYTES # BLD AUTO: 1.1 10E3/UL (ref 0.8–5.3)
LYMPHOCYTES NFR BLD AUTO: 20 %
MCH RBC QN AUTO: 28.9 PG (ref 26.5–33)
MCHC RBC AUTO-ENTMCNC: 32.4 G/DL (ref 31.5–36.5)
MCV RBC AUTO: 89 FL (ref 78–100)
MONOCYTES # BLD AUTO: 0.4 10E3/UL (ref 0–1.3)
MONOCYTES NFR BLD AUTO: 8 %
NEUTROPHILS # BLD AUTO: 3.9 10E3/UL (ref 1.6–8.3)
NEUTROPHILS NFR BLD AUTO: 71 %
NRBC # BLD AUTO: 0 10E3/UL
NRBC BLD AUTO-RTO: 0 /100
PLATELET # BLD AUTO: 278 10E3/UL (ref 150–450)
RBC # BLD AUTO: 4.43 10E6/UL (ref 3.8–5.2)
WBC # BLD AUTO: 5.5 10E3/UL (ref 4–11)

## 2022-01-03 PROCEDURE — 85025 COMPLETE CBC W/AUTO DIFF WBC: CPT | Mod: ORL | Performed by: NURSE PRACTITIONER

## 2022-01-03 PROCEDURE — P9604 ONE-WAY ALLOW PRORATED TRIP: HCPCS | Mod: ORL | Performed by: NURSE PRACTITIONER

## 2022-01-03 PROCEDURE — 36415 COLL VENOUS BLD VENIPUNCTURE: CPT | Mod: ORL | Performed by: NURSE PRACTITIONER

## 2022-01-13 ENCOUNTER — LAB REQUISITION (OUTPATIENT)
Dept: LAB | Facility: CLINIC | Age: 72
End: 2022-01-13
Payer: MEDICARE

## 2022-01-17 LAB
BASOPHILS # BLD AUTO: 0 10E3/UL (ref 0–0.2)
BASOPHILS NFR BLD AUTO: 0 %
EOSINOPHIL # BLD AUTO: 0 10E3/UL (ref 0–0.7)
EOSINOPHIL NFR BLD AUTO: 0 %
ERYTHROCYTE [DISTWIDTH] IN BLOOD BY AUTOMATED COUNT: 14 % (ref 10–15)
HCT VFR BLD AUTO: 35.9 % (ref 35–47)
HGB BLD-MCNC: 11.5 G/DL (ref 11.7–15.7)
IMM GRANULOCYTES # BLD: 0 10E3/UL
IMM GRANULOCYTES NFR BLD: 0 %
LYMPHOCYTES # BLD AUTO: 1.1 10E3/UL (ref 0.8–5.3)
LYMPHOCYTES NFR BLD AUTO: 20 %
MCH RBC QN AUTO: 28.5 PG (ref 26.5–33)
MCHC RBC AUTO-ENTMCNC: 32 G/DL (ref 31.5–36.5)
MCV RBC AUTO: 89 FL (ref 78–100)
MONOCYTES # BLD AUTO: 0.5 10E3/UL (ref 0–1.3)
MONOCYTES NFR BLD AUTO: 9 %
NEUTROPHILS # BLD AUTO: 3.7 10E3/UL (ref 1.6–8.3)
NEUTROPHILS NFR BLD AUTO: 71 %
NRBC # BLD AUTO: 0 10E3/UL
NRBC BLD AUTO-RTO: 0 /100
PLATELET # BLD AUTO: 266 10E3/UL (ref 150–450)
RBC # BLD AUTO: 4.03 10E6/UL (ref 3.8–5.2)
WBC # BLD AUTO: 5.3 10E3/UL (ref 4–11)

## 2022-01-17 PROCEDURE — 85025 COMPLETE CBC W/AUTO DIFF WBC: CPT | Mod: ORL | Performed by: NURSE PRACTITIONER

## 2022-01-17 PROCEDURE — 36415 COLL VENOUS BLD VENIPUNCTURE: CPT | Mod: ORL | Performed by: NURSE PRACTITIONER

## 2022-01-28 ENCOUNTER — LAB REQUISITION (OUTPATIENT)
Dept: LAB | Facility: CLINIC | Age: 72
End: 2022-01-28
Payer: MEDICARE

## 2022-02-01 LAB
BASOPHILS # BLD AUTO: 0 10E3/UL (ref 0–0.2)
BASOPHILS NFR BLD AUTO: 0 %
EOSINOPHIL # BLD AUTO: 0 10E3/UL (ref 0–0.7)
EOSINOPHIL NFR BLD AUTO: 0 %
ERYTHROCYTE [DISTWIDTH] IN BLOOD BY AUTOMATED COUNT: 14 % (ref 10–15)
HCT VFR BLD AUTO: 38.3 % (ref 35–47)
HGB BLD-MCNC: 12.1 G/DL (ref 11.7–15.7)
IMM GRANULOCYTES # BLD: 0 10E3/UL
IMM GRANULOCYTES NFR BLD: 1 %
LYMPHOCYTES # BLD AUTO: 1 10E3/UL (ref 0.8–5.3)
LYMPHOCYTES NFR BLD AUTO: 14 %
MCH RBC QN AUTO: 29 PG (ref 26.5–33)
MCHC RBC AUTO-ENTMCNC: 31.6 G/DL (ref 31.5–36.5)
MCV RBC AUTO: 92 FL (ref 78–100)
MONOCYTES # BLD AUTO: 0.6 10E3/UL (ref 0–1.3)
MONOCYTES NFR BLD AUTO: 7 %
NEUTROPHILS # BLD AUTO: 5.9 10E3/UL (ref 1.6–8.3)
NEUTROPHILS NFR BLD AUTO: 78 %
NRBC # BLD AUTO: 0 10E3/UL
NRBC BLD AUTO-RTO: 0 /100
PLATELET # BLD AUTO: 288 10E3/UL (ref 150–450)
RBC # BLD AUTO: 4.17 10E6/UL (ref 3.8–5.2)
WBC # BLD AUTO: 7.6 10E3/UL (ref 4–11)

## 2022-02-01 PROCEDURE — 85025 COMPLETE CBC W/AUTO DIFF WBC: CPT | Mod: ORL | Performed by: NURSE PRACTITIONER

## 2022-02-01 PROCEDURE — 36415 COLL VENOUS BLD VENIPUNCTURE: CPT | Mod: ORL | Performed by: NURSE PRACTITIONER

## 2022-02-01 PROCEDURE — P9604 ONE-WAY ALLOW PRORATED TRIP: HCPCS | Mod: ORL | Performed by: NURSE PRACTITIONER

## 2022-02-11 ENCOUNTER — LAB REQUISITION (OUTPATIENT)
Dept: LAB | Facility: CLINIC | Age: 72
End: 2022-02-11
Payer: MEDICARE

## 2022-02-14 LAB
BASOPHILS # BLD AUTO: 0 10E3/UL (ref 0–0.2)
BASOPHILS NFR BLD AUTO: 0 %
EOSINOPHIL # BLD AUTO: 0 10E3/UL (ref 0–0.7)
EOSINOPHIL NFR BLD AUTO: 0 %
ERYTHROCYTE [DISTWIDTH] IN BLOOD BY AUTOMATED COUNT: 13.5 % (ref 10–15)
HCT VFR BLD AUTO: 33.6 % (ref 35–47)
HGB BLD-MCNC: 11.1 G/DL (ref 11.7–15.7)
IMM GRANULOCYTES # BLD: 0 10E3/UL
IMM GRANULOCYTES NFR BLD: 0 %
LYMPHOCYTES # BLD AUTO: 1 10E3/UL (ref 0.8–5.3)
LYMPHOCYTES NFR BLD AUTO: 17 %
MCH RBC QN AUTO: 29.1 PG (ref 26.5–33)
MCHC RBC AUTO-ENTMCNC: 33 G/DL (ref 31.5–36.5)
MCV RBC AUTO: 88 FL (ref 78–100)
MONOCYTES # BLD AUTO: 0.5 10E3/UL (ref 0–1.3)
MONOCYTES NFR BLD AUTO: 8 %
NEUTROPHILS # BLD AUTO: 4.3 10E3/UL (ref 1.6–8.3)
NEUTROPHILS NFR BLD AUTO: 75 %
NRBC # BLD AUTO: 0 10E3/UL
NRBC BLD AUTO-RTO: 0 /100
PLATELET # BLD AUTO: 286 10E3/UL (ref 150–450)
RBC # BLD AUTO: 3.81 10E6/UL (ref 3.8–5.2)
WBC # BLD AUTO: 5.8 10E3/UL (ref 4–11)

## 2022-02-14 PROCEDURE — 85025 COMPLETE CBC W/AUTO DIFF WBC: CPT | Mod: ORL | Performed by: NURSE PRACTITIONER

## 2022-02-14 PROCEDURE — P9604 ONE-WAY ALLOW PRORATED TRIP: HCPCS | Mod: ORL | Performed by: NURSE PRACTITIONER

## 2022-02-14 PROCEDURE — 36415 COLL VENOUS BLD VENIPUNCTURE: CPT | Mod: ORL | Performed by: NURSE PRACTITIONER

## 2022-02-24 ENCOUNTER — LAB REQUISITION (OUTPATIENT)
Dept: LAB | Facility: CLINIC | Age: 72
End: 2022-02-24
Payer: MEDICARE

## 2022-02-28 LAB
BASOPHILS # BLD AUTO: 0 10E3/UL (ref 0–0.2)
BASOPHILS NFR BLD AUTO: 0 %
EOSINOPHIL # BLD AUTO: 0 10E3/UL (ref 0–0.7)
EOSINOPHIL NFR BLD AUTO: 0 %
ERYTHROCYTE [DISTWIDTH] IN BLOOD BY AUTOMATED COUNT: 13.3 % (ref 10–15)
HCT VFR BLD AUTO: 33.8 % (ref 35–47)
HGB BLD-MCNC: 11 G/DL (ref 11.7–15.7)
IMM GRANULOCYTES # BLD: 0 10E3/UL
IMM GRANULOCYTES NFR BLD: 0 %
LYMPHOCYTES # BLD AUTO: 1.2 10E3/UL (ref 0.8–5.3)
LYMPHOCYTES NFR BLD AUTO: 18 %
MCH RBC QN AUTO: 29.2 PG (ref 26.5–33)
MCHC RBC AUTO-ENTMCNC: 32.5 G/DL (ref 31.5–36.5)
MCV RBC AUTO: 90 FL (ref 78–100)
MONOCYTES # BLD AUTO: 0.4 10E3/UL (ref 0–1.3)
MONOCYTES NFR BLD AUTO: 6 %
NEUTROPHILS # BLD AUTO: 5.1 10E3/UL (ref 1.6–8.3)
NEUTROPHILS NFR BLD AUTO: 76 %
NRBC # BLD AUTO: 0 10E3/UL
NRBC BLD AUTO-RTO: 0 /100
PLATELET # BLD AUTO: 226 10E3/UL (ref 150–450)
RBC # BLD AUTO: 3.77 10E6/UL (ref 3.8–5.2)
WBC # BLD AUTO: 6.7 10E3/UL (ref 4–11)

## 2022-02-28 PROCEDURE — 36415 COLL VENOUS BLD VENIPUNCTURE: CPT | Mod: ORL | Performed by: NURSE PRACTITIONER

## 2022-02-28 PROCEDURE — 85025 COMPLETE CBC W/AUTO DIFF WBC: CPT | Mod: ORL | Performed by: NURSE PRACTITIONER

## 2022-02-28 PROCEDURE — P9604 ONE-WAY ALLOW PRORATED TRIP: HCPCS | Mod: ORL | Performed by: NURSE PRACTITIONER

## 2022-03-09 ENCOUNTER — LAB REQUISITION (OUTPATIENT)
Dept: LAB | Facility: CLINIC | Age: 72
End: 2022-03-09
Payer: MEDICARE

## 2022-03-11 ENCOUNTER — LAB REQUISITION (OUTPATIENT)
Dept: LAB | Facility: CLINIC | Age: 72
End: 2022-03-11
Payer: MEDICARE

## 2022-03-14 LAB
BASOPHILS # BLD AUTO: 0 10E3/UL (ref 0–0.2)
BASOPHILS NFR BLD AUTO: 0 %
EOSINOPHIL # BLD AUTO: 0 10E3/UL (ref 0–0.7)
EOSINOPHIL NFR BLD AUTO: 0 %
ERYTHROCYTE [DISTWIDTH] IN BLOOD BY AUTOMATED COUNT: 13.5 % (ref 10–15)
HCT VFR BLD AUTO: 34.6 % (ref 35–47)
HGB BLD-MCNC: 11.1 G/DL (ref 11.7–15.7)
IMM GRANULOCYTES # BLD: 0 10E3/UL
IMM GRANULOCYTES NFR BLD: 1 %
LYMPHOCYTES # BLD AUTO: 1.5 10E3/UL (ref 0.8–5.3)
LYMPHOCYTES NFR BLD AUTO: 18 %
MCH RBC QN AUTO: 28.8 PG (ref 26.5–33)
MCHC RBC AUTO-ENTMCNC: 32.1 G/DL (ref 31.5–36.5)
MCV RBC AUTO: 90 FL (ref 78–100)
MONOCYTES # BLD AUTO: 0.6 10E3/UL (ref 0–1.3)
MONOCYTES NFR BLD AUTO: 8 %
NEUTROPHILS # BLD AUTO: 6.1 10E3/UL (ref 1.6–8.3)
NEUTROPHILS NFR BLD AUTO: 73 %
NRBC # BLD AUTO: 0 10E3/UL
NRBC BLD AUTO-RTO: 0 /100
PLATELET # BLD AUTO: 266 10E3/UL (ref 150–450)
RBC # BLD AUTO: 3.85 10E6/UL (ref 3.8–5.2)
WBC # BLD AUTO: 8.3 10E3/UL (ref 4–11)

## 2022-03-14 PROCEDURE — 36415 COLL VENOUS BLD VENIPUNCTURE: CPT | Mod: ORL | Performed by: NURSE PRACTITIONER

## 2022-03-14 PROCEDURE — P9603 ONE-WAY ALLOW PRORATED MILES: HCPCS | Mod: ORL | Performed by: NURSE PRACTITIONER

## 2022-03-14 PROCEDURE — 85025 COMPLETE CBC W/AUTO DIFF WBC: CPT | Mod: ORL | Performed by: NURSE PRACTITIONER

## 2022-03-25 ENCOUNTER — LAB REQUISITION (OUTPATIENT)
Dept: LAB | Facility: CLINIC | Age: 72
End: 2022-03-25
Payer: MEDICARE

## 2022-03-28 LAB
BASOPHILS # BLD AUTO: 0 10E3/UL (ref 0–0.2)
BASOPHILS NFR BLD AUTO: 0 %
EOSINOPHIL # BLD AUTO: 0 10E3/UL (ref 0–0.7)
EOSINOPHIL NFR BLD AUTO: 0 %
ERYTHROCYTE [DISTWIDTH] IN BLOOD BY AUTOMATED COUNT: 13.1 % (ref 10–15)
HCT VFR BLD AUTO: 39.8 % (ref 35–47)
HGB BLD-MCNC: 12.8 G/DL (ref 11.7–15.7)
IMM GRANULOCYTES # BLD: 0 10E3/UL
IMM GRANULOCYTES NFR BLD: 1 %
LYMPHOCYTES # BLD AUTO: 1.2 10E3/UL (ref 0.8–5.3)
LYMPHOCYTES NFR BLD AUTO: 18 %
MCH RBC QN AUTO: 28.7 PG (ref 26.5–33)
MCHC RBC AUTO-ENTMCNC: 32.2 G/DL (ref 31.5–36.5)
MCV RBC AUTO: 89 FL (ref 78–100)
MONOCYTES # BLD AUTO: 0.5 10E3/UL (ref 0–1.3)
MONOCYTES NFR BLD AUTO: 8 %
NEUTROPHILS # BLD AUTO: 4.8 10E3/UL (ref 1.6–8.3)
NEUTROPHILS NFR BLD AUTO: 73 %
NRBC # BLD AUTO: 0 10E3/UL
NRBC BLD AUTO-RTO: 0 /100
PLATELET # BLD AUTO: 267 10E3/UL (ref 150–450)
RBC # BLD AUTO: 4.46 10E6/UL (ref 3.8–5.2)
WBC # BLD AUTO: 6.5 10E3/UL (ref 4–11)

## 2022-03-28 PROCEDURE — 36415 COLL VENOUS BLD VENIPUNCTURE: CPT | Mod: ORL | Performed by: NURSE PRACTITIONER

## 2022-03-28 PROCEDURE — 85025 COMPLETE CBC W/AUTO DIFF WBC: CPT | Mod: ORL | Performed by: NURSE PRACTITIONER

## 2022-03-28 PROCEDURE — P9604 ONE-WAY ALLOW PRORATED TRIP: HCPCS | Mod: ORL | Performed by: NURSE PRACTITIONER

## 2022-04-08 ENCOUNTER — LAB REQUISITION (OUTPATIENT)
Dept: LAB | Facility: CLINIC | Age: 72
End: 2022-04-08
Payer: MEDICARE

## 2022-04-11 LAB
BASOPHILS # BLD AUTO: 0 10E3/UL (ref 0–0.2)
BASOPHILS NFR BLD AUTO: 0 %
CHOLEST SERPL-MCNC: 137 MG/DL
EOSINOPHIL # BLD AUTO: 0 10E3/UL (ref 0–0.7)
EOSINOPHIL NFR BLD AUTO: 0 %
ERYTHROCYTE [DISTWIDTH] IN BLOOD BY AUTOMATED COUNT: 13.2 % (ref 10–15)
FASTING STATUS PATIENT QL REPORTED: NORMAL
HBA1C MFR BLD: 5.8 %
HCT VFR BLD AUTO: 35.7 % (ref 35–47)
HDLC SERPL-MCNC: 55 MG/DL
HGB BLD-MCNC: 11.4 G/DL (ref 11.7–15.7)
IMM GRANULOCYTES # BLD: 0 10E3/UL
IMM GRANULOCYTES NFR BLD: 0 %
LDLC SERPL CALC-MCNC: 65 MG/DL
LYMPHOCYTES # BLD AUTO: 0.9 10E3/UL (ref 0.8–5.3)
LYMPHOCYTES NFR BLD AUTO: 14 %
MCH RBC QN AUTO: 28.1 PG (ref 26.5–33)
MCHC RBC AUTO-ENTMCNC: 31.9 G/DL (ref 31.5–36.5)
MCV RBC AUTO: 88 FL (ref 78–100)
MONOCYTES # BLD AUTO: 0.5 10E3/UL (ref 0–1.3)
MONOCYTES NFR BLD AUTO: 8 %
NEUTROPHILS # BLD AUTO: 5.3 10E3/UL (ref 1.6–8.3)
NEUTROPHILS NFR BLD AUTO: 78 %
NRBC # BLD AUTO: 0 10E3/UL
NRBC BLD AUTO-RTO: 0 /100
PLATELET # BLD AUTO: 220 10E3/UL (ref 150–450)
RBC # BLD AUTO: 4.06 10E6/UL (ref 3.8–5.2)
TRIGL SERPL-MCNC: 84 MG/DL
WBC # BLD AUTO: 6.8 10E3/UL (ref 4–11)

## 2022-04-11 PROCEDURE — 36415 COLL VENOUS BLD VENIPUNCTURE: CPT | Mod: ORL | Performed by: NURSE PRACTITIONER

## 2022-04-11 PROCEDURE — P9604 ONE-WAY ALLOW PRORATED TRIP: HCPCS | Mod: ORL | Performed by: NURSE PRACTITIONER

## 2022-04-11 PROCEDURE — 83036 HEMOGLOBIN GLYCOSYLATED A1C: CPT | Mod: ORL | Performed by: NURSE PRACTITIONER

## 2022-04-11 PROCEDURE — 85025 COMPLETE CBC W/AUTO DIFF WBC: CPT | Mod: ORL | Performed by: NURSE PRACTITIONER

## 2022-04-11 PROCEDURE — 80061 LIPID PANEL: CPT | Mod: ORL | Performed by: NURSE PRACTITIONER

## 2022-04-12 ENCOUNTER — HOSPITAL ENCOUNTER (EMERGENCY)
Facility: CLINIC | Age: 72
Discharge: HOME | End: 2022-04-12
Attending: EMERGENCY MEDICINE | Admitting: EMERGENCY MEDICINE
Payer: MEDICARE

## 2022-04-12 ENCOUNTER — APPOINTMENT (OUTPATIENT)
Dept: CT IMAGING | Facility: CLINIC | Age: 72
End: 2022-04-12
Attending: EMERGENCY MEDICINE
Payer: MEDICARE

## 2022-04-12 VITALS
DIASTOLIC BLOOD PRESSURE: 92 MMHG | BODY MASS INDEX: 24.63 KG/M2 | HEART RATE: 91 BPM | TEMPERATURE: 98 F | RESPIRATION RATE: 20 BRPM | WEIGHT: 148 LBS | OXYGEN SATURATION: 97 % | SYSTOLIC BLOOD PRESSURE: 170 MMHG

## 2022-04-12 DIAGNOSIS — F10.920 ALCOHOLIC INTOXICATION WITHOUT COMPLICATION (H): ICD-10-CM

## 2022-04-12 DIAGNOSIS — S09.90XA INJURY OF HEAD, INITIAL ENCOUNTER: ICD-10-CM

## 2022-04-12 DIAGNOSIS — S00.03XA HEMATOMA OF SCALP, INITIAL ENCOUNTER: ICD-10-CM

## 2022-04-12 LAB
ALBUMIN SERPL-MCNC: 3.6 G/DL (ref 3.5–5)
ALP SERPL-CCNC: 128 U/L (ref 45–120)
ALT SERPL W P-5'-P-CCNC: 16 U/L (ref 0–45)
AMPHETAMINES UR QL SCN: NORMAL
ANION GAP SERPL CALCULATED.3IONS-SCNC: 14 MMOL/L (ref 5–18)
APTT PPP: 33 SECONDS (ref 22–38)
AST SERPL W P-5'-P-CCNC: 20 U/L (ref 0–40)
ATRIAL RATE - MUSE: 94 BPM
BARBITURATES UR QL: NORMAL
BASOPHILS # BLD AUTO: 0 10E3/UL (ref 0–0.2)
BASOPHILS NFR BLD AUTO: 0 %
BENZODIAZ UR QL: NORMAL
BILIRUB DIRECT SERPL-MCNC: <0.1 MG/DL
BILIRUB SERPL-MCNC: 0.2 MG/DL (ref 0–1)
BUN SERPL-MCNC: 6 MG/DL (ref 8–28)
CALCIUM SERPL-MCNC: 9 MG/DL (ref 8.5–10.5)
CANNABINOIDS UR QL SCN: NORMAL
CHLORIDE BLD-SCNC: 96 MMOL/L (ref 98–107)
CO2 SERPL-SCNC: 18 MMOL/L (ref 22–31)
COCAINE UR QL: NORMAL
CREAT SERPL-MCNC: 0.62 MG/DL (ref 0.6–1.1)
CREAT UR-MCNC: 8 MG/DL
DIASTOLIC BLOOD PRESSURE - MUSE: NORMAL MMHG
EOSINOPHIL # BLD AUTO: 0 10E3/UL (ref 0–0.7)
EOSINOPHIL NFR BLD AUTO: 0 %
ERYTHROCYTE [DISTWIDTH] IN BLOOD BY AUTOMATED COUNT: 13.2 % (ref 10–15)
ETHANOL SERPL-MCNC: 81 MG/DL
GFR SERPL CREATININE-BSD FRML MDRD: >90 ML/MIN/1.73M2
GLUCOSE BLD-MCNC: 87 MG/DL (ref 70–125)
HCT VFR BLD AUTO: 35.7 % (ref 35–47)
HGB BLD-MCNC: 11.7 G/DL (ref 11.7–15.7)
HOLD SPECIMEN: NORMAL
IMM GRANULOCYTES # BLD: 0 10E3/UL
IMM GRANULOCYTES NFR BLD: 1 %
INR PPP: 1.1 (ref 0.85–1.15)
INTERPRETATION ECG - MUSE: NORMAL
LIPASE SERPL-CCNC: 11 U/L (ref 0–52)
LYMPHOCYTES # BLD AUTO: 1.2 10E3/UL (ref 0.8–5.3)
LYMPHOCYTES NFR BLD AUTO: 20 %
MCH RBC QN AUTO: 28.3 PG (ref 26.5–33)
MCHC RBC AUTO-ENTMCNC: 32.8 G/DL (ref 31.5–36.5)
MCV RBC AUTO: 86 FL (ref 78–100)
METHADONE UR QL SCN: NORMAL
MONOCYTES # BLD AUTO: 0.4 10E3/UL (ref 0–1.3)
MONOCYTES NFR BLD AUTO: 6 %
NEUTROPHILS # BLD AUTO: 4.3 10E3/UL (ref 1.6–8.3)
NEUTROPHILS NFR BLD AUTO: 73 %
NRBC # BLD AUTO: 0 10E3/UL
NRBC BLD AUTO-RTO: 0 /100
OPIATES UR QL SCN: NORMAL
OXYCODONE UR QL: NORMAL
P AXIS - MUSE: 31 DEGREES
PCP UR QL SCN: NORMAL
PLATELET # BLD AUTO: 220 10E3/UL (ref 150–450)
POTASSIUM BLD-SCNC: 3.7 MMOL/L (ref 3.5–5)
PR INTERVAL - MUSE: 172 MS
PROT SERPL-MCNC: 7.5 G/DL (ref 6–8)
QRS DURATION - MUSE: 66 MS
QT - MUSE: 336 MS
QTC - MUSE: 420 MS
R AXIS - MUSE: 9 DEGREES
RBC # BLD AUTO: 4.14 10E6/UL (ref 3.8–5.2)
SODIUM SERPL-SCNC: 128 MMOL/L (ref 136–145)
SYSTOLIC BLOOD PRESSURE - MUSE: NORMAL MMHG
T AXIS - MUSE: 9 DEGREES
VENTRICULAR RATE- MUSE: 94 BPM
WBC # BLD AUTO: 5.9 10E3/UL (ref 4–11)

## 2022-04-12 PROCEDURE — 258N000003 HC RX IP 258 OP 636: Performed by: EMERGENCY MEDICINE

## 2022-04-12 PROCEDURE — 80307 DRUG TEST PRSMV CHEM ANLYZR: CPT | Performed by: EMERGENCY MEDICINE

## 2022-04-12 PROCEDURE — 96374 THER/PROPH/DIAG INJ IV PUSH: CPT

## 2022-04-12 PROCEDURE — 72125 CT NECK SPINE W/O DYE: CPT

## 2022-04-12 PROCEDURE — 250N000011 HC RX IP 250 OP 636: Performed by: EMERGENCY MEDICINE

## 2022-04-12 PROCEDURE — 36415 COLL VENOUS BLD VENIPUNCTURE: CPT | Performed by: EMERGENCY MEDICINE

## 2022-04-12 PROCEDURE — 82077 ASSAY SPEC XCP UR&BREATH IA: CPT | Performed by: EMERGENCY MEDICINE

## 2022-04-12 PROCEDURE — 85025 COMPLETE CBC W/AUTO DIFF WBC: CPT | Performed by: EMERGENCY MEDICINE

## 2022-04-12 PROCEDURE — 99285 EMERGENCY DEPT VISIT HI MDM: CPT | Mod: 25

## 2022-04-12 PROCEDURE — 82248 BILIRUBIN DIRECT: CPT | Performed by: EMERGENCY MEDICINE

## 2022-04-12 PROCEDURE — 96361 HYDRATE IV INFUSION ADD-ON: CPT

## 2022-04-12 PROCEDURE — 85610 PROTHROMBIN TIME: CPT | Performed by: EMERGENCY MEDICINE

## 2022-04-12 PROCEDURE — 83690 ASSAY OF LIPASE: CPT | Performed by: EMERGENCY MEDICINE

## 2022-04-12 PROCEDURE — 93005 ELECTROCARDIOGRAM TRACING: CPT | Performed by: EMERGENCY MEDICINE

## 2022-04-12 PROCEDURE — 70450 CT HEAD/BRAIN W/O DYE: CPT

## 2022-04-12 PROCEDURE — 85730 THROMBOPLASTIN TIME PARTIAL: CPT | Performed by: EMERGENCY MEDICINE

## 2022-04-12 RX ORDER — LORAZEPAM 2 MG/ML
0.5 INJECTION INTRAMUSCULAR
Status: DISCONTINUED | OUTPATIENT
Start: 2022-04-12 | End: 2022-04-12 | Stop reason: HOSPADM

## 2022-04-12 RX ORDER — LORAZEPAM 2 MG/ML
0.5 INJECTION INTRAMUSCULAR ONCE
Status: COMPLETED | OUTPATIENT
Start: 2022-04-12 | End: 2022-04-12

## 2022-04-12 RX ADMIN — SODIUM CHLORIDE 1000 ML: 9 INJECTION, SOLUTION INTRAVENOUS at 16:19

## 2022-04-12 RX ADMIN — LORAZEPAM 0.5 MG: 2 INJECTION INTRAMUSCULAR; INTRAVENOUS at 15:59

## 2022-04-12 ASSESSMENT — ENCOUNTER SYMPTOMS
FEVER: 0
COUGH: 0
HEADACHES: 1
NAUSEA: 0
DIAPHORESIS: 0
ABDOMINAL PAIN: 0
CHILLS: 0

## 2022-04-12 NOTE — ED PROVIDER NOTES
EMERGENCY DEPARTMENT ENCOUNTER      NAME: Liz Lieberman  AGE: 71 year old female  YOB: 1950  MRN: [id number]  EVALUATION DATE & TIME: 2022  2:51 PM    PCP: Era Livingston    ED PROVIDER: Mia Mercedes MD      Chief Complaint   Patient presents with     Fall     Alcohol Intoxication         FINAL IMPRESSION:  1. Alcoholic intoxication without complication (H)    2. Injury of head, initial encounter          ED COURSE & MEDICAL DECISION MAKIN:58 PM I introduced myself to the patient, performed my physical exam, and discussed plan for ED workup.    Pertinent Labs & Imaging studies reviewed. (See chart for details)  71 year old female presents to the Emergency Department for evaluation of headache after a fall.  The patient admits to 4 alcoholic beverages prior to arrival.  She denies any nausea, vomiting, abdominal pain.  She does have a hematoma of her right occiput.  Otherwise no other signs of trauma.  The patient appears intoxicated. There is a hematoma of her right occiput.  There are no other signs of trauma.  Plan for CT scan of her head, cervical spine, laboratory studies.  If work-up is unremarkable, patient will have the option to go to detox versus home with a sober ride.    Patient signed out to oncoming provider with pending work-up.    At the conclusion of the encounter I discussed the results of all of the tests and the disposition. The questions were answered. The patient or family acknowledged understanding and was agreeable with the care plan.     MEDICATIONS GIVEN IN THE EMERGENCY:  Medications - No data to display    NEW PRESCRIPTIONS STARTED AT TODAY'S ER VISIT  New Prescriptions    No medications on file          =================================================================    HPI    Patient information was obtained from: Patient, EMS    Use of : N/A       Liz Lieberman is a 71 year old female with a pertinent history of paranoid schizophrenia, TIA,  orthostatic hypotension, hypothyroidism, asthma, HTN, iron deficiency anemia, and alcohol use disorder who presents to this ED via EMS for evaluation after a fall.    EMS reports the patient was at [employer] where she had 4 beers. She then had a fall from a standing position and hit her head.    The patient reports she also had some sliders. She endorses pain to the right side of her head. She denies nausea, chest pain, abdominal pain, and back pain. She other wise denies any new fever, chills, diaphoresis, cough, congestion, and any other complaints at this time.    REVIEW OF SYSTEMS   Review of Systems   Constitutional: Negative for chills, diaphoresis and fever.   HENT: Negative for congestion.    Respiratory: Negative for cough.    Cardiovascular: Negative for chest pain.   Gastrointestinal: Negative for abdominal pain and nausea.   Neurological: Positive for headaches.   All other systems reviewed and are negative.       PAST MEDICAL HISTORY:  Past Medical History:   Diagnosis Date     Allergic rhinitis      Bronchiectasis (H)     mild RML     Chronic pain     ft, knee, shoulders     Depression      Diverticulosis     on colonoscopy     Gastro-oesophageal reflux disease      GERD (gastroesophageal reflux disease)      Hearing loss      Hiatal hernia      History of blood transfusion     after tonsillectomy     Hyperlipidemia      Hypothyroidism      Leukocytosis      Lung nodule      Mild intermittent asthma      Numbness and tingling     hands ceasar numb R/T carpal tunnel     Osteoarthritis     knee, ft, shoulders     Paranoid schizophrenia, in remission      RLS (restless legs syndrome)      Tardive dyskinesia      Urinary incontinence        PAST SURGICAL HISTORY:  Past Surgical History:   Procedure Laterality Date     ARTHROPLASTY KNEE  2/20/2013    Procedure: ARTHROPLASTY KNEE;  LEFT TOTAL KNEE ARTHROPLASTY (SM[employer] NEPHEW)^;  Surgeon: Khanh Bee MD;  Location:  OR     ENT SURGERY       tonsillectomy     ORTHOPEDIC SURGERY  2011    (R) total knee           CURRENT MEDICATIONS:    acetaminophen (TYLENOL) 500 MG tablet  albuterol (PROAIR HFA/PROVENTIL HFA/VENTOLIN HFA) 108 (90 Base) MCG/ACT inhaler  albuterol (PROVENTIL) (2.5 MG/3ML) 0.083% neb solution  amLODIPine (NORVASC) 2.5 MG tablet  atorvastatin (LIPITOR) 20 MG tablet  benztropine (COGENTIN) 1 MG tablet  Calcium Carbonate-Vitamin D (CALCIUM + D PO)  cloZAPine (CLOZARIL) 100 MG tablet  desoximetasone (TOPICORT) 0.25 % OINT  diphenhydrAMINE (BENADRYL) 25 MG capsule  emollient (VANICREAM) external cream  Ferrous Gluconate 324 (37.5 Fe) MG TABS  fluticasone (FLONASE) 50 MCG/ACT nasal spray  fluticasone (FLOVENT HFA) 110 MCG/ACT inhaler  folic acid (FOLVITE) 1 MG tablet  guaiFENesin (MUCINEX) 600 MG 12 hr tablet  levothyroxine (SYNTHROID/LEVOTHROID) 175 MCG tablet  mirabegron (MYRBETRIQ) 25 MG 24 hr tablet  multivitamin w/minerals (THERA-VIT-M) tablet  omeprazole (PRILOSEC) 20 MG DR capsule  polyethylene glycol (MIRALAX/GLYCOLAX) packet  sertraline (ZOLOFT) 50 MG tablet  sodium fluoride dental gel (PREVIDENT) 1.1 % GEL topical gel  thiamine 50 MG TABS        ALLERGIES:  Allergies   Allergen Reactions     Formaldehyde Rash     Latex Rash       FAMILY HISTORY:  Family History   Problem Relation Age of Onset     Cerebrovascular Disease No family hx of      Diabetes No family hx of        SOCIAL HISTORY:   Social History     Socioeconomic History     Marital status:    Tobacco Use     Smoking status: Former Smoker     Packs/day: 0.00     Quit date: 2/15/1994     Years since quittin.1     Smokeless tobacco: Never Used   Substance and Sexual Activity     Alcohol use: Yes     Alcohol/week: 3.0 - 5.0 standard drinks     Types: 3 - 5 Cans of beer per week     Drug use: No       VITALS:  BP (!) 170/92   Pulse 91   Temp 98  F (36.7  C) (Temporal)   Resp 20   Wt 67.1 kg (148 lb)   SpO2 97%   BMI 24.63 kg/m      PHYSICAL EXAM    Gen:  Alert,  awake, NAD, appears intoxicated  HENT:  Hematoma of the right upper occiput, normocephalic.  PERRL.  EOMI.  No periorbital step-offs, depression, tenderness.  No tenderness along the zygomatic arch bilaterally.  Ears atraumatic with no external bleeding or signs of trauma.  No epistaxis.  Clear oropharynx.  Dentition intact.   Respiratory:  Normal respiratory rate.  Lungs CTA.  Chest wall stable to compression.  Nontender chest wall.   Trachea midline.  Cardiovascular:  Regular rate and rhythm.  Palpable radial and DP pulses bilaterally.  Abdomen:  Soft, nontender, distended, normoactive bowel sounds.    Musculoskeletal:  No midline C-spine, T-spine, L-spine tenderness.  No midline spinal step-offs noted.  FROM in all extremities.  5/5 strength in all extremities.  No gross deformities noted.  Pelvis stable.    Integument:  No ecchymosis, abrasions, hematomas, lacerations noted.    Neuro:  GCS 15, akathisia of upper and lower extremities     LAB:  All pertinent labs reviewed and interpreted.  Results for orders placed or performed during the hospital encounter of 04/12/22   CBC with platelets and differential   Result Value Ref Range    WBC Count 5.9 4.0 - 11.0 10e3/uL    RBC Count 4.14 3.80 - 5.20 10e6/uL    Hemoglobin 11.7 11.7 - 15.7 g/dL    Hematocrit 35.7 35.0 - 47.0 %    MCV 86 78 - 100 fL    MCH 28.3 26.5 - 33.0 pg    MCHC 32.8 31.5 - 36.5 g/dL    RDW 13.2 10.0 - 15.0 %    Platelet Count 220 150 - 450 10e3/uL    % Neutrophils 73 %    % Lymphocytes 20 %    % Monocytes 6 %    % Eosinophils 0 %    % Basophils 0 %    % Immature Granulocytes 1 %    NRBCs per 100 WBC 0 <1 /100    Absolute Neutrophils 4.3 1.6 - 8.3 10e3/uL    Absolute Lymphocytes 1.2 0.8 - 5.3 10e3/uL    Absolute Monocytes 0.4 0.0 - 1.3 10e3/uL    Absolute Eosinophils 0.0 0.0 - 0.7 10e3/uL    Absolute Basophils 0.0 0.0 - 0.2 10e3/uL    Absolute Immature Granulocytes 0.0 <=0.4 10e3/uL    Absolute NRBCs 0.0 10e3/uL       RADIOLOGY:  Reviewed all  pertinent imaging. Please see official radiology report.  Head CT w/o contrast    (Results Pending)   Cervical spine CT w/o contrast    (Results Pending)       EKG:    Performed at: 14:51    Impression: Sinus rhythm. Normal ECG.    Rate: 94 bpm  Rhythm: Sinus  Axis: 9  AZ Interval: 172 ms  QRS Interval: 66 ms  QTc Interval: 420 ms  ST Changes: None  Comparison: when compared with ECG of 5/25/21, nonspecific T wave abnormality now evident in anterior leads    I have independently reviewed and interpreted the EKG(s) documented above.      I, Jorge Renny, am serving as a scribe to document services personally performed by Mia Mercdees, based on my observation and the provider's statements to me. I, Mia Mercedes MD, attest that Jorge Lawson is acting in a scribe capacity, has observed my performance of the services and has documented them in accordance with my direction.    Mia Mercedes MD  Emergency Medicine  [facility] EMERGENCY ROOM  [facility] [facility] [id number] [phone]     Mia Mercedes MD  04/12/22 [id number]

## 2022-04-12 NOTE — ED NOTES
Patient was discharged to [facility]. Candis NIETO received report. Patient verbalized plan of care. Sister Alexandria called message left to call back for more information.

## 2022-04-12 NOTE — ED PROVIDER NOTES
ED SIGNOUT  Date/Time:4/12/2022 4:32 PM    ED Course/MDM:    4:15 PM Signout accepted from Dr. Mercedes.  Prior records were reviewed.  Labs, x-ray, CT, EKG from this visit are reviewed.  Patient presents with alcohol intoxication, had a fall yesterday.  Right scalp hematoma.  Head and neck CT are pending.  4:33 PM CT scan of the head and neck are negative.  Patient stable for discharge.    At the conclusion of the encounter I discussed  the results of all of the tests and the disposition with patient.   All questions were answered.  The patient acknowledged understanding and was involved in the decision making regarding the overall care plan.     I discussed with patient the utility, limitations and findings of the exam/interventions/studies done during this visit as well as the list of differential diagnosis and symptoms to monitor/return to ER for.  Additional verbal discharge instructions were provided.     DIAGNOSIS  1. Alcoholic intoxication without complication (H)    2. Injury of head, initial encounter    3. Hematoma of scalp, initial encounter        New Prescriptions    No medications on file       HPI    Briefly, this is a 8-year-old female who fell at a local bar today, fell from standing position.  Complains of headache now.    Physical Exam:  BP (!) 170/92   Pulse 91   Temp 98  F (36.7  C) (Temporal)   Resp 20   Wt 67.1 kg (148 lb)   SpO2 97%   BMI 24.63 kg/m    Physical Exam  Vitals and nursing note reviewed.   Constitutional:       Appearance: Normal appearance.   HENT:      Head:      Comments: Right occipital hematoma     Right Ear: External ear normal.      Left Ear: External ear normal.      Nose: Nose normal.   Eyes:      Extraocular Movements: Extraocular movements intact.      Conjunctiva/sclera: Conjunctivae normal.   Pulmonary:      Effort: Pulmonary effort is normal.   Musculoskeletal:         General: Normal range of motion.      Cervical back: Normal range of motion.      Right  lower leg: No edema.      Left lower leg: No edema.   Skin:     General: Skin is warm and dry.   Neurological:      General: No focal deficit present.      Mental Status: She is alert and oriented to person, place, and time. Mental status is at baseline.   Psychiatric:         Mood and Affect: Mood normal.         Behavior: Behavior normal.         Thought Content: Thought content normal.          Results for orders placed or performed during the hospital encounter of 04/12/22   Head CT w/o contrast    Impression    IMPRESSION:  1.  No evidence of acute intracranial hemorrhage.  2.  No evidence of acute calvarial fracture.  3.  Right parietal scalp hematoma.   Cervical spine CT w/o contrast    Impression    IMPRESSION:  1.  Motion artifact reduces sensitivity for fracture.   2.  Allowing for this limitation, there is no definite evidence of acute displaced fracture.  3.  No evidence of traumatic subluxation.  4.  Multilevel spondylosis has progressed since 03/01/2019.   Basic metabolic panel   Result Value Ref Range    Sodium 128 (L) 136 - 145 mmol/L    Potassium 3.7 3.5 - 5.0 mmol/L    Chloride 96 (L) 98 - 107 mmol/L    Carbon Dioxide (CO2) 18 (L) 22 - 31 mmol/L    Anion Gap 14 5 - 18 mmol/L    Urea Nitrogen 6 (L) 8 - 28 mg/dL    Creatinine 0.62 0.60 - 1.10 mg/dL    Calcium 9.0 8.5 - 10.5 mg/dL    Glucose 87 70 - 125 mg/dL    GFR Estimate >90 >60 mL/min/1.73m2   Result Value Ref Range    Lipase 11 0 - 52 U/L   Hepatic function panel   Result Value Ref Range    Bilirubin Total 0.2 0.0 - 1.0 mg/dL    Bilirubin Direct <0.1 <=0.5 mg/dL    Protein Total 7.5 6.0 - 8.0 g/dL    Albumin 3.6 3.5 - 5.0 g/dL    Alkaline Phosphatase 128 (H) 45 - 120 U/L    AST 20 0 - 40 U/L    ALT 16 0 - 45 U/L   Result Value Ref Range    INR 1.10 0.85 - 1.15   Result Value Ref Range    aPTT 33 22 - 38 Seconds   Alcohol level blood   Result Value Ref Range    Alcohol, Blood 81 (H) None detected mg/dL   Drugs of Abuse 1+ Panel, Urine ( [place]  Only)   Result Value Ref Range    Amphetamines Urine Screen Negative Screen Negative    Benzodiazepines Urine Screen Negative Screen Negative    Opiates Urine Screen Negative Screen Negative    PCP Urine Screen Negative Screen Negative    Cannabinoids Urine Screen Negative Screen Negative    Barbiturates Urine Screen Negative Screen Negative    Cocaine Urine Screen Negative Screen Negative    Methadone Urine Screen Negative Screen Negative    Oxycodone Urine Screen Negative Screen Negative    Creatinine Urine mg/dL 8 mg/dL   CBC with platelets and differential   Result Value Ref Range    WBC Count 5.9 4.0 - 11.0 10e3/uL    RBC Count 4.14 3.80 - 5.20 10e6/uL    Hemoglobin 11.7 11.7 - 15.7 g/dL    Hematocrit 35.7 35.0 - 47.0 %    MCV 86 78 - 100 fL    MCH 28.3 26.5 - 33.0 pg    MCHC 32.8 31.5 - 36.5 g/dL    RDW 13.2 10.0 - 15.0 %    Platelet Count 220 150 - 450 10e3/uL    % Neutrophils 73 %    % Lymphocytes 20 %    % Monocytes 6 %    % Eosinophils 0 %    % Basophils 0 %    % Immature Granulocytes 1 %    NRBCs per 100 WBC 0 <1 /100    Absolute Neutrophils 4.3 1.6 - 8.3 10e3/uL    Absolute Lymphocytes 1.2 0.8 - 5.3 10e3/uL    Absolute Monocytes 0.4 0.0 - 1.3 10e3/uL    Absolute Eosinophils 0.0 0.0 - 0.7 10e3/uL    Absolute Basophils 0.0 0.0 - 0.2 10e3/uL    Absolute Immature Granulocytes 0.0 <=0.4 10e3/uL    Absolute NRBCs 0.0 10e3/uL       Cervical spine CT w/o contrast    Result Date: 4/12/2022  EXAM: CT CERVICAL SPINE W/O CONTRAST LOCATION: [facility] DATE/TIME: 4/12/2022 4:05 PM INDICATION: Fall, trauma, pain. COMPARISON: Radiograph 03/01/2019. CTA 03/01/2019. Brain MRI 05/26/2020. TECHNIQUE: Routine CT Cervical Spine without IV contrast. Multiplanar reformats. Dose reduction techniques were used. FINDINGS: Motion artifact degrades image quality. Alignment: Mild C3-C4 and C5-C6 retrolisthesis. Mild C7-T1 anterolisthesis. Alignment is similar to prior. Bones: Motion artifact reduces  sensitivity for acute fracture. Allowing for this, there is no specific evidence of acute displaced fracture. The occipital condyles appear intact. Vertebral body heights are unremarkable. Mild C3 inferior endplate contour irregularity 05/26/2021. There is no evidence of acute displaced fractures. Ankylosis of the left C4-C5 facet joint. Mild to moderate multilevel facet arthropathy. Discs: Moderate C4-C5 and C5-C6 intervertebral disc height loss with broad-based disc-osteophyte complexes. Shallow C3-C4 and C6-C7 disc bulging. Spondylosis has progressed since 03/01/2019. Spinal Canal: No evidence for epidural hematoma. Moderate C4-C5 and severe C5-C6 spinal canal stenosis. Neural foramina: Moderate to severe right C3-C4, moderate bilateral C4-C5, and severe right C5-C6 neural foraminal stenosis. Soft tissues: Unremarkable prevertebral soft tissues. Unremarkable paraspinal soft tissues. Upper thorax: The visualized lung apices are well aerated.     IMPRESSION: 1.  Motion artifact reduces sensitivity for fracture. 2.  Allowing for this limitation, there is no definite evidence of acute displaced fracture. 3.  No evidence of traumatic subluxation. 4.  Multilevel spondylosis has progressed since 03/01/2019.    Head CT w/o contrast    Result Date: 4/12/2022  EXAM: CT HEAD W/O CONTRAST LOCATION: [facility] DATE/TIME: 4/12/2022 4:04 PM INDICATION: Fall, trauma, pain. COMPARISON: MRI 05/26/2021. CT 05/09/2021. TECHNIQUE: Routine CT Head without IV contrast. Multiplanar reformats. Dose reduction techniques were used. FINDINGS: INTRACRANIAL CONTENTS: No intracranial hemorrhage, extraaxial collection, or mass effect.  No CT evidence of acute infarct. Unremarkable parenchymal attenuation. Normal ventricles and sulci. The sella is unremarkable for technique. The cerebellar tonsils  are appropriately positioned. VISUALIZED ORBITS/SINUSES/MASTOIDS: No intraorbital abnormality. No paranasal sinus mucosal  disease. No middle ear or mastoid effusion. BONES/SOFT TISSUES: No evidence of acute calvarial fracture. Right parietal scalp hematoma.     IMPRESSION: 1.  No evidence of acute intracranial hemorrhage. 2.  No evidence of acute calvarial fracture. 3.  Right parietal scalp hematoma.      Mark Levy M.D.  [facility] Emergency Department       Mark Levy MD  04/12/22 [id number]

## 2022-04-12 NOTE — ED TRIAGE NOTES
Patient is here after she was at [employer] and had 4 beers. She then stood up and fell. She has a lump to the back of her head. She denies being on a blood thinner.

## 2022-04-21 ENCOUNTER — LAB REQUISITION (OUTPATIENT)
Dept: LAB | Facility: CLINIC | Age: 72
End: 2022-04-21
Payer: MEDICARE

## 2022-04-25 ENCOUNTER — LAB REQUISITION (OUTPATIENT)
Dept: LAB | Facility: CLINIC | Age: 72
End: 2022-04-25
Payer: MEDICARE

## 2022-04-26 PROCEDURE — 36415 COLL VENOUS BLD VENIPUNCTURE: CPT | Mod: ORL | Performed by: NURSE PRACTITIONER

## 2022-04-26 PROCEDURE — P9604 ONE-WAY ALLOW PRORATED TRIP: HCPCS | Mod: ORL | Performed by: NURSE PRACTITIONER

## 2022-04-26 PROCEDURE — 85025 COMPLETE CBC W/AUTO DIFF WBC: CPT | Mod: ORL | Performed by: NURSE PRACTITIONER

## 2022-04-27 LAB
BASOPHILS # BLD AUTO: 0 10E3/UL (ref 0–0.2)
BASOPHILS NFR BLD AUTO: 0 %
EOSINOPHIL # BLD AUTO: 0 10E3/UL (ref 0–0.7)
EOSINOPHIL NFR BLD AUTO: 0 %
ERYTHROCYTE [DISTWIDTH] IN BLOOD BY AUTOMATED COUNT: 13.2 % (ref 10–15)
HCT VFR BLD AUTO: 36.6 % (ref 35–47)
HGB BLD-MCNC: 11.5 G/DL (ref 11.7–15.7)
IMM GRANULOCYTES # BLD: 0 10E3/UL
IMM GRANULOCYTES NFR BLD: 1 %
LYMPHOCYTES # BLD AUTO: 1.1 10E3/UL (ref 0.8–5.3)
LYMPHOCYTES NFR BLD AUTO: 18 %
MCH RBC QN AUTO: 27.5 PG (ref 26.5–33)
MCHC RBC AUTO-ENTMCNC: 31.4 G/DL (ref 31.5–36.5)
MCV RBC AUTO: 88 FL (ref 78–100)
MONOCYTES # BLD AUTO: 0.5 10E3/UL (ref 0–1.3)
MONOCYTES NFR BLD AUTO: 8 %
NEUTROPHILS # BLD AUTO: 4.2 10E3/UL (ref 1.6–8.3)
NEUTROPHILS NFR BLD AUTO: 73 %
NRBC # BLD AUTO: 0 10E3/UL
NRBC BLD AUTO-RTO: 0 /100
PLATELET # BLD AUTO: 293 10E3/UL (ref 150–450)
RBC # BLD AUTO: 4.18 10E6/UL (ref 3.8–5.2)
WBC # BLD AUTO: 5.8 10E3/UL (ref 4–11)

## 2022-05-05 ENCOUNTER — LAB REQUISITION (OUTPATIENT)
Dept: LAB | Facility: CLINIC | Age: 72
End: 2022-05-05
Payer: COMMERCIAL

## 2022-05-09 LAB
BASOPHILS # BLD AUTO: 0 10E3/UL (ref 0–0.2)
BASOPHILS NFR BLD AUTO: 0 %
EOSINOPHIL # BLD AUTO: 0 10E3/UL (ref 0–0.7)
EOSINOPHIL NFR BLD AUTO: 0 %
ERYTHROCYTE [DISTWIDTH] IN BLOOD BY AUTOMATED COUNT: 13.5 % (ref 10–15)
HCT VFR BLD AUTO: 37.7 % (ref 35–47)
HGB BLD-MCNC: 12.1 G/DL (ref 11.7–15.7)
IMM GRANULOCYTES # BLD: 0 10E3/UL
IMM GRANULOCYTES NFR BLD: 1 %
LYMPHOCYTES # BLD AUTO: 1.4 10E3/UL (ref 0.8–5.3)
LYMPHOCYTES NFR BLD AUTO: 20 %
MCH RBC QN AUTO: 28 PG (ref 26.5–33)
MCHC RBC AUTO-ENTMCNC: 32.1 G/DL (ref 31.5–36.5)
MCV RBC AUTO: 87 FL (ref 78–100)
MONOCYTES # BLD AUTO: 0.5 10E3/UL (ref 0–1.3)
MONOCYTES NFR BLD AUTO: 8 %
NEUTROPHILS # BLD AUTO: 5 10E3/UL (ref 1.6–8.3)
NEUTROPHILS NFR BLD AUTO: 71 %
NRBC # BLD AUTO: 0 10E3/UL
NRBC BLD AUTO-RTO: 0 /100
PLATELET # BLD AUTO: 277 10E3/UL (ref 150–450)
RBC # BLD AUTO: 4.32 10E6/UL (ref 3.8–5.2)
WBC # BLD AUTO: 7 10E3/UL (ref 4–11)

## 2022-05-09 PROCEDURE — P9604 ONE-WAY ALLOW PRORATED TRIP: HCPCS | Mod: ORL | Performed by: NURSE PRACTITIONER

## 2022-05-09 PROCEDURE — 85025 COMPLETE CBC W/AUTO DIFF WBC: CPT | Mod: ORL | Performed by: NURSE PRACTITIONER

## 2022-05-09 PROCEDURE — 36415 COLL VENOUS BLD VENIPUNCTURE: CPT | Mod: ORL | Performed by: NURSE PRACTITIONER

## 2022-05-19 ENCOUNTER — LAB REQUISITION (OUTPATIENT)
Dept: LAB | Facility: CLINIC | Age: 72
End: 2022-05-19
Payer: MEDICARE

## 2022-05-23 LAB
BASOPHILS # BLD AUTO: 0 10E3/UL (ref 0–0.2)
BASOPHILS NFR BLD AUTO: 0 %
EOSINOPHIL # BLD AUTO: 0 10E3/UL (ref 0–0.7)
EOSINOPHIL NFR BLD AUTO: 0 %
ERYTHROCYTE [DISTWIDTH] IN BLOOD BY AUTOMATED COUNT: 14.2 % (ref 10–15)
HCT VFR BLD AUTO: 41.1 % (ref 35–47)
HGB BLD-MCNC: 12.8 G/DL (ref 11.7–15.7)
IMM GRANULOCYTES # BLD: 0 10E3/UL
IMM GRANULOCYTES NFR BLD: 1 %
LYMPHOCYTES # BLD AUTO: 1.3 10E3/UL (ref 0.8–5.3)
LYMPHOCYTES NFR BLD AUTO: 21 %
MCH RBC QN AUTO: 27.6 PG (ref 26.5–33)
MCHC RBC AUTO-ENTMCNC: 31.1 G/DL (ref 31.5–36.5)
MCV RBC AUTO: 89 FL (ref 78–100)
MONOCYTES # BLD AUTO: 0.4 10E3/UL (ref 0–1.3)
MONOCYTES NFR BLD AUTO: 7 %
NEUTROPHILS # BLD AUTO: 4.4 10E3/UL (ref 1.6–8.3)
NEUTROPHILS NFR BLD AUTO: 71 %
NRBC # BLD AUTO: 0 10E3/UL
NRBC BLD AUTO-RTO: 0 /100
PLATELET # BLD AUTO: 224 10E3/UL (ref 150–450)
RBC # BLD AUTO: 4.63 10E6/UL (ref 3.8–5.2)
WBC # BLD AUTO: 6.2 10E3/UL (ref 4–11)

## 2022-05-23 PROCEDURE — 85025 COMPLETE CBC W/AUTO DIFF WBC: CPT | Mod: ORL | Performed by: NURSE PRACTITIONER

## 2022-05-23 PROCEDURE — 36415 COLL VENOUS BLD VENIPUNCTURE: CPT | Mod: ORL | Performed by: NURSE PRACTITIONER

## 2022-05-24 ENCOUNTER — LAB REQUISITION (OUTPATIENT)
Dept: LAB | Facility: CLINIC | Age: 72
End: 2022-05-24
Payer: MEDICARE

## 2022-05-25 LAB
CHOLEST SERPL-MCNC: 133 MG/DL
FASTING STATUS PATIENT QL REPORTED: ABNORMAL
HBA1C MFR BLD: 5.9 %
HDLC SERPL-MCNC: 51 MG/DL
LDLC SERPL CALC-MCNC: 51 MG/DL
TRIGL SERPL-MCNC: 155 MG/DL

## 2022-05-25 PROCEDURE — 36415 COLL VENOUS BLD VENIPUNCTURE: CPT | Mod: ORL | Performed by: NURSE PRACTITIONER

## 2022-05-25 PROCEDURE — 80061 LIPID PANEL: CPT | Mod: ORL | Performed by: NURSE PRACTITIONER

## 2022-05-25 PROCEDURE — 83036 HEMOGLOBIN GLYCOSYLATED A1C: CPT | Mod: ORL | Performed by: NURSE PRACTITIONER

## 2022-05-25 PROCEDURE — P9604 ONE-WAY ALLOW PRORATED TRIP: HCPCS | Mod: ORL | Performed by: NURSE PRACTITIONER

## 2022-06-02 ENCOUNTER — LAB REQUISITION (OUTPATIENT)
Dept: LAB | Facility: CLINIC | Age: 72
End: 2022-06-02
Payer: COMMERCIAL

## 2022-06-06 LAB
BASOPHILS # BLD AUTO: 0 10E3/UL (ref 0–0.2)
BASOPHILS NFR BLD AUTO: 0 %
EOSINOPHIL # BLD AUTO: 0 10E3/UL (ref 0–0.7)
EOSINOPHIL NFR BLD AUTO: 0 %
ERYTHROCYTE [DISTWIDTH] IN BLOOD BY AUTOMATED COUNT: 14.3 % (ref 10–15)
HCT VFR BLD AUTO: 39.2 % (ref 35–47)
HGB BLD-MCNC: 12.4 G/DL (ref 11.7–15.7)
IMM GRANULOCYTES # BLD: 0.1 10E3/UL
IMM GRANULOCYTES NFR BLD: 1 %
LYMPHOCYTES # BLD AUTO: 1.2 10E3/UL (ref 0.8–5.3)
LYMPHOCYTES NFR BLD AUTO: 13 %
MCH RBC QN AUTO: 27.9 PG (ref 26.5–33)
MCHC RBC AUTO-ENTMCNC: 31.6 G/DL (ref 31.5–36.5)
MCV RBC AUTO: 88 FL (ref 78–100)
MONOCYTES # BLD AUTO: 0.4 10E3/UL (ref 0–1.3)
MONOCYTES NFR BLD AUTO: 5 %
NEUTROPHILS # BLD AUTO: 7.1 10E3/UL (ref 1.6–8.3)
NEUTROPHILS NFR BLD AUTO: 81 %
NRBC # BLD AUTO: 0 10E3/UL
NRBC BLD AUTO-RTO: 0 /100
PLATELET # BLD AUTO: 228 10E3/UL (ref 150–450)
RBC # BLD AUTO: 4.45 10E6/UL (ref 3.8–5.2)
WBC # BLD AUTO: 8.7 10E3/UL (ref 4–11)

## 2022-06-06 PROCEDURE — P9604 ONE-WAY ALLOW PRORATED TRIP: HCPCS | Mod: ORL | Performed by: INTERNAL MEDICINE

## 2022-06-06 PROCEDURE — 85025 COMPLETE CBC W/AUTO DIFF WBC: CPT | Mod: ORL | Performed by: INTERNAL MEDICINE

## 2022-06-06 PROCEDURE — 36415 COLL VENOUS BLD VENIPUNCTURE: CPT | Mod: ORL | Performed by: INTERNAL MEDICINE

## 2022-06-15 ENCOUNTER — LAB REQUISITION (OUTPATIENT)
Dept: LAB | Facility: CLINIC | Age: 72
End: 2022-06-15
Payer: MEDICARE

## 2022-06-20 LAB
BASOPHILS # BLD AUTO: 0 10E3/UL (ref 0–0.2)
BASOPHILS NFR BLD AUTO: 0 %
EOSINOPHIL # BLD AUTO: 0 10E3/UL (ref 0–0.7)
EOSINOPHIL NFR BLD AUTO: 0 %
ERYTHROCYTE [DISTWIDTH] IN BLOOD BY AUTOMATED COUNT: 14.2 % (ref 10–15)
HCT VFR BLD AUTO: 42.1 % (ref 35–47)
HGB BLD-MCNC: 13.2 G/DL (ref 11.7–15.7)
IMM GRANULOCYTES # BLD: 0 10E3/UL
IMM GRANULOCYTES NFR BLD: 1 %
LYMPHOCYTES # BLD AUTO: 1.1 10E3/UL (ref 0.8–5.3)
LYMPHOCYTES NFR BLD AUTO: 19 %
MCH RBC QN AUTO: 28 PG (ref 26.5–33)
MCHC RBC AUTO-ENTMCNC: 31.4 G/DL (ref 31.5–36.5)
MCV RBC AUTO: 89 FL (ref 78–100)
MONOCYTES # BLD AUTO: 0.4 10E3/UL (ref 0–1.3)
MONOCYTES NFR BLD AUTO: 6 %
NEUTROPHILS # BLD AUTO: 4.3 10E3/UL (ref 1.6–8.3)
NEUTROPHILS NFR BLD AUTO: 74 %
NRBC # BLD AUTO: 0 10E3/UL
NRBC BLD AUTO-RTO: 0 /100
PLATELET # BLD AUTO: 225 10E3/UL (ref 150–450)
RBC # BLD AUTO: 4.72 10E6/UL (ref 3.8–5.2)
WBC # BLD AUTO: 5.8 10E3/UL (ref 4–11)

## 2022-06-20 PROCEDURE — 36415 COLL VENOUS BLD VENIPUNCTURE: CPT | Mod: ORL | Performed by: INTERNAL MEDICINE

## 2022-06-20 PROCEDURE — P9604 ONE-WAY ALLOW PRORATED TRIP: HCPCS | Mod: ORL | Performed by: INTERNAL MEDICINE

## 2022-06-20 PROCEDURE — 85025 COMPLETE CBC W/AUTO DIFF WBC: CPT | Mod: ORL | Performed by: INTERNAL MEDICINE

## 2022-06-29 ENCOUNTER — LAB REQUISITION (OUTPATIENT)
Dept: LAB | Facility: CLINIC | Age: 72
End: 2022-06-29
Payer: MEDICARE

## 2022-07-04 LAB
BASOPHILS # BLD AUTO: 0 10E3/UL (ref 0–0.2)
BASOPHILS NFR BLD AUTO: 0 %
EOSINOPHIL # BLD AUTO: 0 10E3/UL (ref 0–0.7)
EOSINOPHIL NFR BLD AUTO: 0 %
ERYTHROCYTE [DISTWIDTH] IN BLOOD BY AUTOMATED COUNT: 13.9 % (ref 10–15)
HCT VFR BLD AUTO: 37.3 % (ref 35–47)
HGB BLD-MCNC: 12.1 G/DL (ref 11.7–15.7)
IMM GRANULOCYTES # BLD: 0.1 10E3/UL
IMM GRANULOCYTES NFR BLD: 1 %
LYMPHOCYTES # BLD AUTO: 1.4 10E3/UL (ref 0.8–5.3)
LYMPHOCYTES NFR BLD AUTO: 26 %
MCH RBC QN AUTO: 28.4 PG (ref 26.5–33)
MCHC RBC AUTO-ENTMCNC: 32.4 G/DL (ref 31.5–36.5)
MCV RBC AUTO: 88 FL (ref 78–100)
MONOCYTES # BLD AUTO: 0.2 10E3/UL (ref 0–1.3)
MONOCYTES NFR BLD AUTO: 5 %
NEUTROPHILS # BLD AUTO: 3.5 10E3/UL (ref 1.6–8.3)
NEUTROPHILS NFR BLD AUTO: 68 %
NRBC # BLD AUTO: 0 10E3/UL
NRBC BLD AUTO-RTO: 0 /100
PLATELET # BLD AUTO: 235 10E3/UL (ref 150–450)
RBC # BLD AUTO: 4.26 10E6/UL (ref 3.8–5.2)
WBC # BLD AUTO: 5.1 10E3/UL (ref 4–11)

## 2022-07-04 PROCEDURE — P9604 ONE-WAY ALLOW PRORATED TRIP: HCPCS | Mod: ORL | Performed by: INTERNAL MEDICINE

## 2022-07-04 PROCEDURE — 36415 COLL VENOUS BLD VENIPUNCTURE: CPT | Mod: ORL | Performed by: INTERNAL MEDICINE

## 2022-07-04 PROCEDURE — 85025 COMPLETE CBC W/AUTO DIFF WBC: CPT | Mod: ORL | Performed by: INTERNAL MEDICINE

## 2022-07-14 ENCOUNTER — LAB REQUISITION (OUTPATIENT)
Dept: LAB | Facility: CLINIC | Age: 72
End: 2022-07-14
Payer: MEDICARE

## 2022-07-18 LAB
ERYTHROCYTE [DISTWIDTH] IN BLOOD BY AUTOMATED COUNT: 14.6 % (ref 10–15)
HCT VFR BLD AUTO: 36.5 % (ref 35–47)
HGB BLD-MCNC: 11.5 G/DL (ref 11.7–15.7)
MCH RBC QN AUTO: 28.3 PG (ref 26.5–33)
MCHC RBC AUTO-ENTMCNC: 31.5 G/DL (ref 31.5–36.5)
MCV RBC AUTO: 90 FL (ref 78–100)
PLATELET # BLD AUTO: 193 10E3/UL (ref 150–450)
RBC # BLD AUTO: 4.06 10E6/UL (ref 3.8–5.2)
WBC # BLD AUTO: 8.5 10E3/UL (ref 4–11)

## 2022-07-18 PROCEDURE — 85027 COMPLETE CBC AUTOMATED: CPT | Mod: ORL

## 2022-07-18 PROCEDURE — 85007 BL SMEAR W/DIFF WBC COUNT: CPT | Mod: ORL

## 2022-07-18 PROCEDURE — 36415 COLL VENOUS BLD VENIPUNCTURE: CPT | Mod: ORL | Performed by: NURSE PRACTITIONER

## 2022-07-18 PROCEDURE — 36415 COLL VENOUS BLD VENIPUNCTURE: CPT | Mod: ORL

## 2022-07-18 PROCEDURE — 85027 COMPLETE CBC AUTOMATED: CPT | Mod: ORL | Performed by: NURSE PRACTITIONER

## 2022-07-18 PROCEDURE — P9603 ONE-WAY ALLOW PRORATED MILES: HCPCS | Mod: ORL

## 2022-07-18 PROCEDURE — P9603 ONE-WAY ALLOW PRORATED MILES: HCPCS | Mod: ORL | Performed by: NURSE PRACTITIONER

## 2022-07-18 PROCEDURE — 85007 BL SMEAR W/DIFF WBC COUNT: CPT | Mod: ORL | Performed by: NURSE PRACTITIONER

## 2022-07-19 ENCOUNTER — LAB REQUISITION (OUTPATIENT)
Dept: LAB | Facility: CLINIC | Age: 72
End: 2022-07-19
Payer: MEDICARE

## 2022-07-19 LAB
ALBUMIN UR-MCNC: NEGATIVE MG/DL
APPEARANCE UR: CLEAR
BASOPHILS # BLD MANUAL: 0 10E3/UL (ref 0–0.2)
BASOPHILS NFR BLD MANUAL: 0 %
BILIRUB UR QL STRIP: NEGATIVE
BURR CELLS BLD QL SMEAR: ABNORMAL
COLOR UR AUTO: NORMAL
EOSINOPHIL # BLD MANUAL: 0 10E3/UL (ref 0–0.7)
EOSINOPHIL NFR BLD MANUAL: 0 %
GLUCOSE UR STRIP-MCNC: NEGATIVE MG/DL
HGB UR QL STRIP: NEGATIVE
KETONES UR STRIP-MCNC: NEGATIVE MG/DL
LEUKOCYTE ESTERASE UR QL STRIP: NEGATIVE
LYMPHOCYTES # BLD MANUAL: 2.1 10E3/UL (ref 0.8–5.3)
LYMPHOCYTES NFR BLD MANUAL: 25 %
MONOCYTES # BLD MANUAL: 0.5 10E3/UL (ref 0–1.3)
MONOCYTES NFR BLD MANUAL: 6 %
NEUTROPHILS # BLD MANUAL: 5.9 10E3/UL (ref 1.6–8.3)
NEUTROPHILS NFR BLD MANUAL: 69 %
NITRATE UR QL: NEGATIVE
PH UR STRIP: 6.5 [PH] (ref 5–7)
PLAT MORPH BLD: ABNORMAL
POLYCHROMASIA BLD QL SMEAR: SLIGHT
RBC MORPH BLD: ABNORMAL
RBC URINE: <1 /HPF
SP GR UR STRIP: 1 (ref 1–1.03)
UROBILINOGEN UR STRIP-MCNC: NORMAL MG/DL
WBC URINE: <1 /HPF

## 2022-07-19 PROCEDURE — 81001 URINALYSIS AUTO W/SCOPE: CPT | Mod: ORL | Performed by: NURSE PRACTITIONER

## 2022-07-19 PROCEDURE — 81001 URINALYSIS AUTO W/SCOPE: CPT | Mod: ORL

## 2022-07-19 PROCEDURE — 87086 URINE CULTURE/COLONY COUNT: CPT | Mod: ORL | Performed by: NURSE PRACTITIONER

## 2022-07-19 PROCEDURE — 87086 URINE CULTURE/COLONY COUNT: CPT | Mod: ORL

## 2022-07-21 LAB — BACTERIA UR CULT: NORMAL

## 2022-07-28 ENCOUNTER — LAB REQUISITION (OUTPATIENT)
Dept: LAB | Facility: CLINIC | Age: 72
End: 2022-07-28
Payer: MEDICARE

## 2022-08-01 LAB
BASOPHILS # BLD AUTO: 0 10E3/UL (ref 0–0.2)
BASOPHILS NFR BLD AUTO: 0 %
EOSINOPHIL # BLD AUTO: 0 10E3/UL (ref 0–0.7)
EOSINOPHIL NFR BLD AUTO: 0 %
ERYTHROCYTE [DISTWIDTH] IN BLOOD BY AUTOMATED COUNT: 14.2 % (ref 10–15)
HCT VFR BLD AUTO: 40.2 % (ref 35–47)
HGB BLD-MCNC: 12.7 G/DL (ref 11.7–15.7)
IMM GRANULOCYTES # BLD: 0 10E3/UL
IMM GRANULOCYTES NFR BLD: 0 %
LYMPHOCYTES # BLD AUTO: 1.1 10E3/UL (ref 0.8–5.3)
LYMPHOCYTES NFR BLD AUTO: 20 %
MCH RBC QN AUTO: 28.4 PG (ref 26.5–33)
MCHC RBC AUTO-ENTMCNC: 31.6 G/DL (ref 31.5–36.5)
MCV RBC AUTO: 90 FL (ref 78–100)
MONOCYTES # BLD AUTO: 0.3 10E3/UL (ref 0–1.3)
MONOCYTES NFR BLD AUTO: 6 %
NEUTROPHILS # BLD AUTO: 4.1 10E3/UL (ref 1.6–8.3)
NEUTROPHILS NFR BLD AUTO: 74 %
NRBC # BLD AUTO: 0 10E3/UL
NRBC BLD AUTO-RTO: 0 /100
PLATELET # BLD AUTO: 242 10E3/UL (ref 150–450)
RBC # BLD AUTO: 4.47 10E6/UL (ref 3.8–5.2)
WBC # BLD AUTO: 5.5 10E3/UL (ref 4–11)

## 2022-08-01 PROCEDURE — P9604 ONE-WAY ALLOW PRORATED TRIP: HCPCS | Mod: ORL | Performed by: INTERNAL MEDICINE

## 2022-08-01 PROCEDURE — 36415 COLL VENOUS BLD VENIPUNCTURE: CPT | Mod: ORL | Performed by: INTERNAL MEDICINE

## 2022-08-01 PROCEDURE — 85025 COMPLETE CBC W/AUTO DIFF WBC: CPT | Mod: ORL | Performed by: INTERNAL MEDICINE

## 2022-08-09 ENCOUNTER — LAB REQUISITION (OUTPATIENT)
Dept: LAB | Facility: CLINIC | Age: 72
End: 2022-08-09
Payer: MEDICARE

## 2022-08-15 LAB
BASOPHILS # BLD AUTO: 0 10E3/UL (ref 0–0.2)
BASOPHILS NFR BLD AUTO: 0 %
EOSINOPHIL # BLD AUTO: 0 10E3/UL (ref 0–0.7)
EOSINOPHIL NFR BLD AUTO: 0 %
ERYTHROCYTE [DISTWIDTH] IN BLOOD BY AUTOMATED COUNT: 14.3 % (ref 10–15)
HCT VFR BLD AUTO: 36.5 % (ref 35–47)
HGB BLD-MCNC: 11.5 G/DL (ref 11.7–15.7)
IMM GRANULOCYTES # BLD: 0 10E3/UL
IMM GRANULOCYTES NFR BLD: 0 %
LYMPHOCYTES # BLD AUTO: 0.9 10E3/UL (ref 0.8–5.3)
LYMPHOCYTES NFR BLD AUTO: 12 %
MCH RBC QN AUTO: 28.5 PG (ref 26.5–33)
MCHC RBC AUTO-ENTMCNC: 31.5 G/DL (ref 31.5–36.5)
MCV RBC AUTO: 90 FL (ref 78–100)
MONOCYTES # BLD AUTO: 0.4 10E3/UL (ref 0–1.3)
MONOCYTES NFR BLD AUTO: 6 %
NEUTROPHILS # BLD AUTO: 5.8 10E3/UL (ref 1.6–8.3)
NEUTROPHILS NFR BLD AUTO: 82 %
NRBC # BLD AUTO: 0 10E3/UL
NRBC BLD AUTO-RTO: 0 /100
PLATELET # BLD AUTO: 198 10E3/UL (ref 150–450)
RBC # BLD AUTO: 4.04 10E6/UL (ref 3.8–5.2)
WBC # BLD AUTO: 7.2 10E3/UL (ref 4–11)

## 2022-08-15 PROCEDURE — 36415 COLL VENOUS BLD VENIPUNCTURE: CPT | Mod: ORL | Performed by: INTERNAL MEDICINE

## 2022-08-15 PROCEDURE — 85025 COMPLETE CBC W/AUTO DIFF WBC: CPT | Mod: ORL | Performed by: INTERNAL MEDICINE

## 2022-08-15 PROCEDURE — P9604 ONE-WAY ALLOW PRORATED TRIP: HCPCS | Mod: ORL | Performed by: INTERNAL MEDICINE

## 2022-08-24 ENCOUNTER — LAB REQUISITION (OUTPATIENT)
Dept: LAB | Facility: CLINIC | Age: 72
End: 2022-08-24
Payer: MEDICARE

## 2022-08-29 ENCOUNTER — LAB REQUISITION (OUTPATIENT)
Dept: LAB | Facility: CLINIC | Age: 72
End: 2022-08-29
Payer: MEDICARE

## 2022-08-31 LAB — HOLD SPECIMEN: NORMAL

## 2022-08-31 PROCEDURE — 36415 COLL VENOUS BLD VENIPUNCTURE: CPT | Mod: ORL

## 2022-08-31 PROCEDURE — P9604 ONE-WAY ALLOW PRORATED TRIP: HCPCS | Mod: ORL | Performed by: NURSE PRACTITIONER

## 2022-08-31 PROCEDURE — P9604 ONE-WAY ALLOW PRORATED TRIP: HCPCS | Mod: ORL

## 2022-09-06 ENCOUNTER — LAB REQUISITION (OUTPATIENT)
Dept: LAB | Facility: CLINIC | Age: 72
End: 2022-09-06
Payer: MEDICARE

## 2022-09-07 LAB
BASOPHILS # BLD AUTO: 0 10E3/UL (ref 0–0.2)
BASOPHILS NFR BLD AUTO: 0 %
EOSINOPHIL # BLD AUTO: 0 10E3/UL (ref 0–0.7)
EOSINOPHIL NFR BLD AUTO: 0 %
ERYTHROCYTE [DISTWIDTH] IN BLOOD BY AUTOMATED COUNT: 14.1 % (ref 10–15)
HCT VFR BLD AUTO: 36.3 % (ref 35–47)
HGB BLD-MCNC: 11.3 G/DL (ref 11.7–15.7)
IMM GRANULOCYTES # BLD: 0 10E3/UL
IMM GRANULOCYTES NFR BLD: 0 %
LYMPHOCYTES # BLD AUTO: 1.1 10E3/UL (ref 0.8–5.3)
LYMPHOCYTES NFR BLD AUTO: 15 %
MCH RBC QN AUTO: 28.4 PG (ref 26.5–33)
MCHC RBC AUTO-ENTMCNC: 31.1 G/DL (ref 31.5–36.5)
MCV RBC AUTO: 91 FL (ref 78–100)
MONOCYTES # BLD AUTO: 0.5 10E3/UL (ref 0–1.3)
MONOCYTES NFR BLD AUTO: 6 %
NEUTROPHILS # BLD AUTO: 5.8 10E3/UL (ref 1.6–8.3)
NEUTROPHILS NFR BLD AUTO: 79 %
NRBC # BLD AUTO: 0 10E3/UL
NRBC BLD AUTO-RTO: 0 /100
PLATELET # BLD AUTO: 209 10E3/UL (ref 150–450)
RBC # BLD AUTO: 3.98 10E6/UL (ref 3.8–5.2)
WBC # BLD AUTO: 7.4 10E3/UL (ref 4–11)

## 2022-09-07 PROCEDURE — P9604 ONE-WAY ALLOW PRORATED TRIP: HCPCS | Mod: ORL

## 2022-09-07 PROCEDURE — 85025 COMPLETE CBC W/AUTO DIFF WBC: CPT | Mod: ORL

## 2022-09-07 PROCEDURE — 85014 HEMATOCRIT: CPT | Mod: ORL | Performed by: NURSE PRACTITIONER

## 2022-09-07 PROCEDURE — P9604 ONE-WAY ALLOW PRORATED TRIP: HCPCS | Mod: ORL | Performed by: NURSE PRACTITIONER

## 2022-09-07 PROCEDURE — 36415 COLL VENOUS BLD VENIPUNCTURE: CPT | Mod: ORL

## 2022-09-07 PROCEDURE — 36415 COLL VENOUS BLD VENIPUNCTURE: CPT | Mod: ORL | Performed by: NURSE PRACTITIONER

## 2022-09-22 ENCOUNTER — LAB REQUISITION (OUTPATIENT)
Dept: LAB | Facility: CLINIC | Age: 72
End: 2022-09-22
Payer: MEDICARE

## 2022-09-26 LAB
BASOPHILS # BLD AUTO: 0 10E3/UL (ref 0–0.2)
BASOPHILS NFR BLD AUTO: 0 %
EOSINOPHIL # BLD AUTO: 0 10E3/UL (ref 0–0.7)
EOSINOPHIL NFR BLD AUTO: 0 %
ERYTHROCYTE [DISTWIDTH] IN BLOOD BY AUTOMATED COUNT: 13.8 % (ref 10–15)
HCT VFR BLD AUTO: 35.7 % (ref 35–47)
HGB BLD-MCNC: 11.5 G/DL (ref 11.7–15.7)
IMM GRANULOCYTES # BLD: 0 10E3/UL
IMM GRANULOCYTES NFR BLD: 0 %
LYMPHOCYTES # BLD AUTO: 1.1 10E3/UL (ref 0.8–5.3)
LYMPHOCYTES NFR BLD AUTO: 16 %
MCH RBC QN AUTO: 28.7 PG (ref 26.5–33)
MCHC RBC AUTO-ENTMCNC: 32.2 G/DL (ref 31.5–36.5)
MCV RBC AUTO: 89 FL (ref 78–100)
MONOCYTES # BLD AUTO: 0.5 10E3/UL (ref 0–1.3)
MONOCYTES NFR BLD AUTO: 7 %
NEUTROPHILS # BLD AUTO: 5.2 10E3/UL (ref 1.6–8.3)
NEUTROPHILS NFR BLD AUTO: 77 %
NRBC # BLD AUTO: 0 10E3/UL
NRBC BLD AUTO-RTO: 0 /100
PLATELET # BLD AUTO: 265 10E3/UL (ref 150–450)
RBC # BLD AUTO: 4.01 10E6/UL (ref 3.8–5.2)
WBC # BLD AUTO: 6.8 10E3/UL (ref 4–11)

## 2022-09-26 PROCEDURE — 85025 COMPLETE CBC W/AUTO DIFF WBC: CPT | Mod: ORL | Performed by: INTERNAL MEDICINE

## 2022-09-26 PROCEDURE — P9604 ONE-WAY ALLOW PRORATED TRIP: HCPCS | Mod: ORL | Performed by: INTERNAL MEDICINE

## 2022-09-26 PROCEDURE — 36415 COLL VENOUS BLD VENIPUNCTURE: CPT | Mod: ORL | Performed by: INTERNAL MEDICINE

## 2022-10-11 ENCOUNTER — LAB REQUISITION (OUTPATIENT)
Dept: LAB | Facility: CLINIC | Age: 72
End: 2022-10-11
Payer: MEDICARE

## 2022-10-12 LAB
BASOPHILS # BLD AUTO: 0 10E3/UL (ref 0–0.2)
BASOPHILS NFR BLD AUTO: 0 %
EOSINOPHIL # BLD AUTO: 0 10E3/UL (ref 0–0.7)
EOSINOPHIL NFR BLD AUTO: 0 %
ERYTHROCYTE [DISTWIDTH] IN BLOOD BY AUTOMATED COUNT: 13.5 % (ref 10–15)
HCT VFR BLD AUTO: 39.5 % (ref 35–47)
HGB BLD-MCNC: 12.6 G/DL (ref 11.7–15.7)
IMM GRANULOCYTES # BLD: 0 10E3/UL
IMM GRANULOCYTES NFR BLD: 1 %
LYMPHOCYTES # BLD AUTO: 1.3 10E3/UL (ref 0.8–5.3)
LYMPHOCYTES NFR BLD AUTO: 19 %
MCH RBC QN AUTO: 28.8 PG (ref 26.5–33)
MCHC RBC AUTO-ENTMCNC: 31.9 G/DL (ref 31.5–36.5)
MCV RBC AUTO: 90 FL (ref 78–100)
MONOCYTES # BLD AUTO: 0.4 10E3/UL (ref 0–1.3)
MONOCYTES NFR BLD AUTO: 6 %
NEUTROPHILS # BLD AUTO: 5 10E3/UL (ref 1.6–8.3)
NEUTROPHILS NFR BLD AUTO: 74 %
NRBC # BLD AUTO: 0 10E3/UL
NRBC BLD AUTO-RTO: 0 /100
PLATELET # BLD AUTO: 252 10E3/UL (ref 150–450)
RBC # BLD AUTO: 4.37 10E6/UL (ref 3.8–5.2)
WBC # BLD AUTO: 6.7 10E3/UL (ref 4–11)

## 2022-10-12 PROCEDURE — P9604 ONE-WAY ALLOW PRORATED TRIP: HCPCS | Mod: ORL | Performed by: NURSE PRACTITIONER

## 2022-10-12 PROCEDURE — 36415 COLL VENOUS BLD VENIPUNCTURE: CPT | Mod: ORL | Performed by: NURSE PRACTITIONER

## 2022-10-12 PROCEDURE — 85025 COMPLETE CBC W/AUTO DIFF WBC: CPT | Mod: ORL | Performed by: NURSE PRACTITIONER

## 2022-10-20 ENCOUNTER — LAB REQUISITION (OUTPATIENT)
Dept: LAB | Facility: CLINIC | Age: 72
End: 2022-10-20
Payer: MEDICARE

## 2022-10-24 ENCOUNTER — LAB REQUISITION (OUTPATIENT)
Dept: LAB | Facility: CLINIC | Age: 72
End: 2022-10-24
Payer: MEDICARE

## 2022-10-26 LAB
BASOPHILS # BLD AUTO: 0 10E3/UL (ref 0–0.2)
BASOPHILS NFR BLD AUTO: 0 %
EOSINOPHIL # BLD AUTO: 0 10E3/UL (ref 0–0.7)
EOSINOPHIL NFR BLD AUTO: 0 %
ERYTHROCYTE [DISTWIDTH] IN BLOOD BY AUTOMATED COUNT: 13.5 % (ref 10–15)
HCT VFR BLD AUTO: 37.2 % (ref 35–47)
HGB BLD-MCNC: 11.6 G/DL (ref 11.7–15.7)
IMM GRANULOCYTES # BLD: 0.1 10E3/UL
IMM GRANULOCYTES NFR BLD: 1 %
LYMPHOCYTES # BLD AUTO: 1.4 10E3/UL (ref 0.8–5.3)
LYMPHOCYTES NFR BLD AUTO: 22 %
MCH RBC QN AUTO: 28.7 PG (ref 26.5–33)
MCHC RBC AUTO-ENTMCNC: 31.2 G/DL (ref 31.5–36.5)
MCV RBC AUTO: 92 FL (ref 78–100)
MONOCYTES # BLD AUTO: 0.4 10E3/UL (ref 0–1.3)
MONOCYTES NFR BLD AUTO: 7 %
NEUTROPHILS # BLD AUTO: 4.5 10E3/UL (ref 1.6–8.3)
NEUTROPHILS NFR BLD AUTO: 70 %
NRBC # BLD AUTO: 0 10E3/UL
NRBC BLD AUTO-RTO: 0 /100
PLATELET # BLD AUTO: 234 10E3/UL (ref 150–450)
RBC # BLD AUTO: 4.04 10E6/UL (ref 3.8–5.2)
WBC # BLD AUTO: 6.4 10E3/UL (ref 4–11)

## 2022-10-26 PROCEDURE — 85025 COMPLETE CBC W/AUTO DIFF WBC: CPT | Mod: ORL | Performed by: NURSE PRACTITIONER

## 2022-10-26 PROCEDURE — 36415 COLL VENOUS BLD VENIPUNCTURE: CPT | Mod: ORL

## 2022-10-26 PROCEDURE — P9604 ONE-WAY ALLOW PRORATED TRIP: HCPCS | Mod: ORL | Performed by: NURSE PRACTITIONER

## 2022-10-26 PROCEDURE — 85004 AUTOMATED DIFF WBC COUNT: CPT | Mod: ORL | Performed by: NURSE PRACTITIONER

## 2022-10-26 PROCEDURE — 36415 COLL VENOUS BLD VENIPUNCTURE: CPT | Mod: ORL | Performed by: NURSE PRACTITIONER

## 2022-10-26 PROCEDURE — P9604 ONE-WAY ALLOW PRORATED TRIP: HCPCS | Mod: ORL

## 2022-10-26 PROCEDURE — 85025 COMPLETE CBC W/AUTO DIFF WBC: CPT | Mod: ORL

## 2022-11-03 ENCOUNTER — LAB REQUISITION (OUTPATIENT)
Dept: LAB | Facility: CLINIC | Age: 72
End: 2022-11-03
Payer: MEDICARE

## 2022-11-03 LAB
ALBUMIN UR-MCNC: NEGATIVE MG/DL
APPEARANCE UR: CLEAR
BILIRUB UR QL STRIP: NEGATIVE
COLOR UR AUTO: ABNORMAL
GLUCOSE UR STRIP-MCNC: NEGATIVE MG/DL
HGB UR QL STRIP: NEGATIVE
KETONES UR STRIP-MCNC: NEGATIVE MG/DL
LEUKOCYTE ESTERASE UR QL STRIP: NEGATIVE
NITRATE UR QL: NEGATIVE
PH UR STRIP: 6 [PH] (ref 5–7)
RBC URINE: 3 /HPF
SP GR UR STRIP: 1 (ref 1–1.03)
UROBILINOGEN UR STRIP-MCNC: NORMAL MG/DL
WBC URINE: 1 /HPF

## 2022-11-03 PROCEDURE — 87086 URINE CULTURE/COLONY COUNT: CPT | Mod: ORL | Performed by: NURSE PRACTITIONER

## 2022-11-03 PROCEDURE — 87086 URINE CULTURE/COLONY COUNT: CPT | Mod: ORL

## 2022-11-03 PROCEDURE — 81001 URINALYSIS AUTO W/SCOPE: CPT | Mod: ORL | Performed by: NURSE PRACTITIONER

## 2022-11-03 PROCEDURE — 81001 URINALYSIS AUTO W/SCOPE: CPT | Mod: ORL

## 2022-11-05 LAB — BACTERIA UR CULT: NORMAL

## 2022-11-07 LAB
BASOPHILS # BLD AUTO: 0 10E3/UL (ref 0–0.2)
BASOPHILS NFR BLD AUTO: 0 %
EOSINOPHIL # BLD AUTO: 0 10E3/UL (ref 0–0.7)
EOSINOPHIL NFR BLD AUTO: 0 %
ERYTHROCYTE [DISTWIDTH] IN BLOOD BY AUTOMATED COUNT: 13.3 % (ref 10–15)
HCT VFR BLD AUTO: 38.4 % (ref 35–47)
HGB BLD-MCNC: 12.2 G/DL (ref 11.7–15.7)
IMM GRANULOCYTES # BLD: 0 10E3/UL
IMM GRANULOCYTES NFR BLD: 0 %
LYMPHOCYTES # BLD AUTO: 1.8 10E3/UL (ref 0.8–5.3)
LYMPHOCYTES NFR BLD AUTO: 26 %
MCH RBC QN AUTO: 29.5 PG (ref 26.5–33)
MCHC RBC AUTO-ENTMCNC: 31.8 G/DL (ref 31.5–36.5)
MCV RBC AUTO: 93 FL (ref 78–100)
MONOCYTES # BLD AUTO: 0.4 10E3/UL (ref 0–1.3)
MONOCYTES NFR BLD AUTO: 7 %
NEUTROPHILS # BLD AUTO: 4.6 10E3/UL (ref 1.6–8.3)
NEUTROPHILS NFR BLD AUTO: 67 %
NRBC # BLD AUTO: 0 10E3/UL
NRBC BLD AUTO-RTO: 0 /100
PLATELET # BLD AUTO: 259 10E3/UL (ref 150–450)
RBC # BLD AUTO: 4.13 10E6/UL (ref 3.8–5.2)
WBC # BLD AUTO: 6.8 10E3/UL (ref 4–11)

## 2022-11-07 PROCEDURE — 85025 COMPLETE CBC W/AUTO DIFF WBC: CPT | Mod: ORL | Performed by: INTERNAL MEDICINE

## 2022-11-07 PROCEDURE — 36415 COLL VENOUS BLD VENIPUNCTURE: CPT | Mod: ORL | Performed by: INTERNAL MEDICINE

## 2022-11-07 PROCEDURE — P9604 ONE-WAY ALLOW PRORATED TRIP: HCPCS | Mod: ORL | Performed by: INTERNAL MEDICINE

## 2022-11-17 ENCOUNTER — LAB REQUISITION (OUTPATIENT)
Dept: LAB | Facility: CLINIC | Age: 72
End: 2022-11-17
Payer: MEDICARE

## 2022-11-21 LAB
BASOPHILS # BLD AUTO: 0 10E3/UL (ref 0–0.2)
BASOPHILS NFR BLD AUTO: 0 %
EOSINOPHIL # BLD AUTO: 0 10E3/UL (ref 0–0.7)
EOSINOPHIL NFR BLD AUTO: 0 %
ERYTHROCYTE [DISTWIDTH] IN BLOOD BY AUTOMATED COUNT: 13.5 % (ref 10–15)
HCT VFR BLD AUTO: 39.4 % (ref 35–47)
HGB BLD-MCNC: 12.4 G/DL (ref 11.7–15.7)
IMM GRANULOCYTES # BLD: 0 10E3/UL
IMM GRANULOCYTES NFR BLD: 0 %
LYMPHOCYTES # BLD AUTO: 1.2 10E3/UL (ref 0.8–5.3)
LYMPHOCYTES NFR BLD AUTO: 13 %
MCH RBC QN AUTO: 29.1 PG (ref 26.5–33)
MCHC RBC AUTO-ENTMCNC: 31.5 G/DL (ref 31.5–36.5)
MCV RBC AUTO: 93 FL (ref 78–100)
MONOCYTES # BLD AUTO: 0.5 10E3/UL (ref 0–1.3)
MONOCYTES NFR BLD AUTO: 5 %
NEUTROPHILS # BLD AUTO: 7.6 10E3/UL (ref 1.6–8.3)
NEUTROPHILS NFR BLD AUTO: 82 %
NRBC # BLD AUTO: 0 10E3/UL
NRBC BLD AUTO-RTO: 0 /100
PLATELET # BLD AUTO: 227 10E3/UL (ref 150–450)
RBC # BLD AUTO: 4.26 10E6/UL (ref 3.8–5.2)
WBC # BLD AUTO: 9.3 10E3/UL (ref 4–11)

## 2022-11-21 PROCEDURE — P9604 ONE-WAY ALLOW PRORATED TRIP: HCPCS | Mod: ORL | Performed by: NURSE PRACTITIONER

## 2022-11-21 PROCEDURE — 85025 COMPLETE CBC W/AUTO DIFF WBC: CPT | Mod: ORL | Performed by: NURSE PRACTITIONER

## 2022-11-21 PROCEDURE — 36415 COLL VENOUS BLD VENIPUNCTURE: CPT | Mod: ORL | Performed by: NURSE PRACTITIONER

## 2022-12-01 ENCOUNTER — LAB REQUISITION (OUTPATIENT)
Dept: LAB | Facility: CLINIC | Age: 72
End: 2022-12-01
Payer: MEDICARE

## 2022-12-05 LAB
BASOPHILS # BLD AUTO: 0 10E3/UL (ref 0–0.2)
BASOPHILS NFR BLD AUTO: 0 %
EOSINOPHIL # BLD AUTO: 0 10E3/UL (ref 0–0.7)
EOSINOPHIL NFR BLD AUTO: 0 %
ERYTHROCYTE [DISTWIDTH] IN BLOOD BY AUTOMATED COUNT: 13.3 % (ref 10–15)
HCT VFR BLD AUTO: 35.7 % (ref 35–47)
HGB BLD-MCNC: 11.2 G/DL (ref 11.7–15.7)
IMM GRANULOCYTES # BLD: 0 10E3/UL
IMM GRANULOCYTES NFR BLD: 1 %
LYMPHOCYTES # BLD AUTO: 0.9 10E3/UL (ref 0.8–5.3)
LYMPHOCYTES NFR BLD AUTO: 14 %
MCH RBC QN AUTO: 28.4 PG (ref 26.5–33)
MCHC RBC AUTO-ENTMCNC: 31.4 G/DL (ref 31.5–36.5)
MCV RBC AUTO: 90 FL (ref 78–100)
MONOCYTES # BLD AUTO: 0.4 10E3/UL (ref 0–1.3)
MONOCYTES NFR BLD AUTO: 6 %
NEUTROPHILS # BLD AUTO: 5 10E3/UL (ref 1.6–8.3)
NEUTROPHILS NFR BLD AUTO: 79 %
NRBC # BLD AUTO: 0 10E3/UL
NRBC BLD AUTO-RTO: 0 /100
PLATELET # BLD AUTO: 228 10E3/UL (ref 150–450)
RBC # BLD AUTO: 3.95 10E6/UL (ref 3.8–5.2)
WBC # BLD AUTO: 6.4 10E3/UL (ref 4–11)

## 2022-12-05 PROCEDURE — P9604 ONE-WAY ALLOW PRORATED TRIP: HCPCS | Mod: ORL | Performed by: INTERNAL MEDICINE

## 2022-12-05 PROCEDURE — 36415 COLL VENOUS BLD VENIPUNCTURE: CPT | Mod: ORL | Performed by: INTERNAL MEDICINE

## 2022-12-05 PROCEDURE — 85025 COMPLETE CBC W/AUTO DIFF WBC: CPT | Mod: ORL | Performed by: INTERNAL MEDICINE

## 2022-12-07 ENCOUNTER — LAB REQUISITION (OUTPATIENT)
Dept: LAB | Facility: CLINIC | Age: 72
End: 2022-12-07
Payer: MEDICARE

## 2022-12-07 LAB
FLUAV AG SPEC QL IA: NEGATIVE
FLUBV AG SPEC QL IA: NEGATIVE

## 2022-12-07 PROCEDURE — 87804 INFLUENZA ASSAY W/OPTIC: CPT | Mod: ORL | Performed by: INTERNAL MEDICINE

## 2022-12-15 ENCOUNTER — LAB REQUISITION (OUTPATIENT)
Dept: LAB | Facility: CLINIC | Age: 72
End: 2022-12-15
Payer: MEDICARE

## 2022-12-19 LAB
BASOPHILS # BLD AUTO: 0 10E3/UL (ref 0–0.2)
BASOPHILS NFR BLD AUTO: 0 %
EOSINOPHIL # BLD AUTO: 0 10E3/UL (ref 0–0.7)
EOSINOPHIL NFR BLD AUTO: 0 %
ERYTHROCYTE [DISTWIDTH] IN BLOOD BY AUTOMATED COUNT: 13.3 % (ref 10–15)
HCT VFR BLD AUTO: 37.2 % (ref 35–47)
HGB BLD-MCNC: 11.6 G/DL (ref 11.7–15.7)
IMM GRANULOCYTES # BLD: 0 10E3/UL
IMM GRANULOCYTES NFR BLD: 0 %
LYMPHOCYTES # BLD AUTO: 1.7 10E3/UL (ref 0.8–5.3)
LYMPHOCYTES NFR BLD AUTO: 23 %
MCH RBC QN AUTO: 28.9 PG (ref 26.5–33)
MCHC RBC AUTO-ENTMCNC: 31.2 G/DL (ref 31.5–36.5)
MCV RBC AUTO: 93 FL (ref 78–100)
MONOCYTES # BLD AUTO: 0.5 10E3/UL (ref 0–1.3)
MONOCYTES NFR BLD AUTO: 7 %
NEUTROPHILS # BLD AUTO: 5.4 10E3/UL (ref 1.6–8.3)
NEUTROPHILS NFR BLD AUTO: 70 %
NRBC # BLD AUTO: 0 10E3/UL
NRBC BLD AUTO-RTO: 0 /100
PLATELET # BLD AUTO: 243 10E3/UL (ref 150–450)
RBC # BLD AUTO: 4.02 10E6/UL (ref 3.8–5.2)
WBC # BLD AUTO: 7.6 10E3/UL (ref 4–11)

## 2022-12-19 PROCEDURE — P9604 ONE-WAY ALLOW PRORATED TRIP: HCPCS | Mod: ORL | Performed by: INTERNAL MEDICINE

## 2022-12-19 PROCEDURE — 85025 COMPLETE CBC W/AUTO DIFF WBC: CPT | Mod: ORL | Performed by: INTERNAL MEDICINE

## 2022-12-27 ENCOUNTER — LAB REQUISITION (OUTPATIENT)
Dept: LAB | Facility: CLINIC | Age: 72
End: 2022-12-27
Payer: MEDICARE

## 2023-01-02 ENCOUNTER — HOSPITAL ENCOUNTER (EMERGENCY)
Facility: CLINIC | Age: 73
Discharge: NURSING, REHAB OR LONG-TERM CARE | End: 2023-01-02
Attending: EMERGENCY MEDICINE | Admitting: EMERGENCY MEDICINE
Payer: MEDICARE

## 2023-01-02 VITALS
OXYGEN SATURATION: 94 % | BODY MASS INDEX: 24.99 KG/M2 | RESPIRATION RATE: 20 BRPM | DIASTOLIC BLOOD PRESSURE: 76 MMHG | SYSTOLIC BLOOD PRESSURE: 136 MMHG | HEIGHT: 65 IN | TEMPERATURE: 98.5 F | WEIGHT: 150 LBS | HEART RATE: 94 BPM

## 2023-01-02 DIAGNOSIS — R42 LIGHTHEADEDNESS: ICD-10-CM

## 2023-01-02 LAB
ANION GAP SERPL CALCULATED.3IONS-SCNC: 8 MMOL/L (ref 3–14)
ATRIAL RATE - MUSE: 90 BPM
BASOPHILS # BLD AUTO: 0 10E3/UL (ref 0–0.2)
BASOPHILS NFR BLD AUTO: 0 %
BUN SERPL-MCNC: 11 MG/DL (ref 7–30)
CALCIUM SERPL-MCNC: 8.7 MG/DL (ref 8.5–10.1)
CHLORIDE BLD-SCNC: 98 MMOL/L (ref 94–109)
CO2 SERPL-SCNC: 24 MMOL/L (ref 20–32)
CREAT SERPL-MCNC: 0.54 MG/DL (ref 0.52–1.04)
DIASTOLIC BLOOD PRESSURE - MUSE: NORMAL MMHG
EOSINOPHIL # BLD AUTO: 0 10E3/UL (ref 0–0.7)
EOSINOPHIL NFR BLD AUTO: 0 %
ERYTHROCYTE [DISTWIDTH] IN BLOOD BY AUTOMATED COUNT: 13.1 % (ref 10–15)
GFR SERPL CREATININE-BSD FRML MDRD: >90 ML/MIN/1.73M2
GLUCOSE BLD-MCNC: 95 MG/DL (ref 70–99)
HCT VFR BLD AUTO: 34.8 % (ref 35–47)
HGB BLD-MCNC: 11.3 G/DL (ref 11.7–15.7)
IMM GRANULOCYTES # BLD: 0.1 10E3/UL
IMM GRANULOCYTES NFR BLD: 1 %
INTERPRETATION ECG - MUSE: NORMAL
LYMPHOCYTES # BLD AUTO: 0.8 10E3/UL (ref 0.8–5.3)
LYMPHOCYTES NFR BLD AUTO: 7 %
MCH RBC QN AUTO: 29 PG (ref 26.5–33)
MCHC RBC AUTO-ENTMCNC: 32.5 G/DL (ref 31.5–36.5)
MCV RBC AUTO: 90 FL (ref 78–100)
MONOCYTES # BLD AUTO: 0.6 10E3/UL (ref 0–1.3)
MONOCYTES NFR BLD AUTO: 6 %
NEUTROPHILS # BLD AUTO: 9.6 10E3/UL (ref 1.6–8.3)
NEUTROPHILS NFR BLD AUTO: 86 %
NRBC # BLD AUTO: 0 10E3/UL
NRBC BLD AUTO-RTO: 0 /100
P AXIS - MUSE: 38 DEGREES
PLATELET # BLD AUTO: 221 10E3/UL (ref 150–450)
POTASSIUM BLD-SCNC: 3.8 MMOL/L (ref 3.4–5.3)
PR INTERVAL - MUSE: 156 MS
QRS DURATION - MUSE: 70 MS
QT - MUSE: 352 MS
QTC - MUSE: 430 MS
R AXIS - MUSE: 20 DEGREES
RBC # BLD AUTO: 3.89 10E6/UL (ref 3.8–5.2)
SODIUM SERPL-SCNC: 130 MMOL/L (ref 133–144)
SYSTOLIC BLOOD PRESSURE - MUSE: NORMAL MMHG
T AXIS - MUSE: 33 DEGREES
VENTRICULAR RATE- MUSE: 90 BPM
WBC # BLD AUTO: 11.1 10E3/UL (ref 4–11)

## 2023-01-02 PROCEDURE — 96361 HYDRATE IV INFUSION ADD-ON: CPT

## 2023-01-02 PROCEDURE — 93005 ELECTROCARDIOGRAM TRACING: CPT | Mod: RTG

## 2023-01-02 PROCEDURE — 85025 COMPLETE CBC W/AUTO DIFF WBC: CPT | Performed by: EMERGENCY MEDICINE

## 2023-01-02 PROCEDURE — 96360 HYDRATION IV INFUSION INIT: CPT

## 2023-01-02 PROCEDURE — 80048 BASIC METABOLIC PNL TOTAL CA: CPT | Performed by: EMERGENCY MEDICINE

## 2023-01-02 PROCEDURE — 99284 EMERGENCY DEPT VISIT MOD MDM: CPT | Mod: 25

## 2023-01-02 PROCEDURE — 258N000003 HC RX IP 258 OP 636: Performed by: EMERGENCY MEDICINE

## 2023-01-02 PROCEDURE — 36415 COLL VENOUS BLD VENIPUNCTURE: CPT | Performed by: EMERGENCY MEDICINE

## 2023-01-02 RX ADMIN — SODIUM CHLORIDE 500 ML: 9 INJECTION, SOLUTION INTRAVENOUS at 13:34

## 2023-01-02 ASSESSMENT — ACTIVITIES OF DAILY LIVING (ADL)
ADLS_ACUITY_SCORE: 35
ADLS_ACUITY_SCORE: 35

## 2023-01-02 ASSESSMENT — ENCOUNTER SYMPTOMS
NAUSEA: 1
SHORTNESS OF BREATH: 0
VOMITING: 0
DIZZINESS: 1

## 2023-01-02 NOTE — ED TRIAGE NOTES
Pt comes in by EMS after nearly falling at a [employer]. Pt became dizzy and began to fall, but staff was there to assist. Pt has history of ETOH, dementia, and psych issues. Pt has tardive dyskinesias from psych meds. VSS en route. No pain.

## 2023-01-02 NOTE — ED NOTES
Bed: ED11  Expected date:   Expected time:   Means of arrival:   Comments:  Joseph bedolla from triage sats 70s on room air.

## 2023-01-02 NOTE — ED PROVIDER NOTES
"    History     Chief Complaint:  Fall and Dizziness     HPI   Liz Lieberman is a 72 year old female with a history of dementia, TIA, ETOH abuse, Tardive Dyskinesia, hyperlipidemia, hypothyroidism, and schizophrenia, who presents to the ED with a near syncopal episode today. The patient shares that she was out shopping at [employer] when she became dizzy and almost fell to the ground, describing the sensation as \"stumbly.\" Staff was able to assist her so she did not injure himself. She states that she feels extremely lightheaded and nauseous/sweaty at the time, but denies having any trouble breathing, vision alterations, or chest pain/pressure. Patient says she has not had any alcohol recently.     Independent Historian: The patient    Review of External Notes: Reviewed provider note from 12/15/2022.     ROS: 10 point ROS neg other than the symptoms noted above in the HPI.    ROS:  Review of Systems   Eyes: Negative for visual disturbance.   Respiratory: Negative for shortness of breath.    Gastrointestinal: Positive for nausea. Negative for vomiting.   Neurological: Positive for dizziness.   All other systems reviewed and are negative.    Allergies:  Formaldehyde  Latex     Medications:    Ditropan  Pro-air  Flovent HFA  Lipitor  Norvasc  Fergon  Cogentin  Clozaril  Zoloft  Lidex  Symmetrel  Proventil  Synthroid  Myrbetriq  Prilosec    Past Medical History:    Sensorineural hearing loss (Bilateral)  Orthostatic Hypotension  Paranoid Schizophrenia  Alcohol abuse  Dementia  Hypertension  Anemia  Asthma  Dermatitis  Restless leg syndrome  Hyperlipidemia  Transient ischemic attack  Cerebral infarction  Hypothyroidism  Gastro-esophageal reflux disease without esophagitis  Nicotine dependence  Solitary pulmonary nodule  Ataxia  Tardive Dyskinesia  Depression  Overactive bladder  Osteoarthritis (Right shoulder)  Bronchiectasis  Ovarian cyst (Left)  Dysphagia  Hiatal hernia  Obesity  Talipes cavus  Pneumonia  Allergic rhinitis " "  Diverticulosis  Pre-diabetes   Vitamin D deficiency     Past Surgic al History:    Arthroplasty knee (R/L)  Tonsillectomy     Family History:    Father: Hyperlipidemia, Coronary artery disease  Mother: Dementia, Hypertension, Thyroid disorder  Sister(s): Asthma, Hypertension, Migraines, Anesthesia reaction    Social History:  The patient reports that she quit smoking about 28 years ago. She has never used smokeless tobacco. She reports current alcohol use of about 3.0 - 5.0 standard drinks per week. She reports that she does not use drugs.  Lives in [place]     Physical Exam     Patient Vitals for the past 24 hrs:   BP Temp Temp src Pulse Resp SpO2 Height Weight   01/02/23 1247 -- 98.5  F (36.9  C) Oral -- 20 98 % 1.651 m (5' 5\") 68 kg (150 lb)   01/02/23 1246 136/76 -- -- 94 -- 96 % -- --   01/02/23 1245 136/76 -- -- 94 -- 96 % -- --      Physical Exam  Constitutional: Alert, attentive, GCS 15  HENT:    Nose: Nose normal.    Mouth/Throat: Oropharynx is clear, mucous membranes are moist  Eyes: EOM are normal, anicteric, conjugate gaze  CV: regular rate and rhythm; no murmurs  Chest: Effort normal and breath sounds clear without wheezing or rales, symmetric bilaterally   GI:  non tender. No distension. No guarding or rebound.    MSK: No LE edema, no tenderness to palpation of BLE.  Neurological: Alert, attentive, moving all extremities equally. Rhythmic moving of face and upper extremities.   Skin: Skin is warm and dry.    Emergency Department Course   ECG:  ECG results from 01/02/23   EKG 12 lead     Value    Systolic Blood Pressure     Diastolic Blood Pressure     Ventricular Rate 90    Atrial Rate 90    RI Interval 156    QRS Duration 70        QTc 430    P Axis 38    R AXIS 20    T Axis 33    Interpretation ECG      Sinus rhythm with sinus arrhythmia  Normal ECG  When compared with ECG of 12-APR-2022 14:51,  No significant change was found       Laboratory:   Labs Ordered and Resulted from Time of ED " Arrival to Time of ED Departure   BASIC METABOLIC PANEL - Abnormal       Result Value    Sodium 130 (*)     Potassium 3.8      Chloride 98      Carbon Dioxide (CO2) 24      Anion Gap 8      Urea Nitrogen 11      Creatinine 0.54      Calcium 8.7      Glucose 95      GFR Estimate >90     CBC WITH PLATELETS AND DIFFERENTIAL - Abnormal    WBC Count 11.1 (*)     RBC Count 3.89      Hemoglobin 11.3 (*)     Hematocrit 34.8 (*)     MCV 90      MCH 29.0      MCHC 32.5      RDW 13.1      Platelet Count 221      % Neutrophils 86      % Lymphocytes 7      % Monocytes 6      % Eosinophils 0      % Basophils 0      % Immature Granulocytes 1      NRBCs per 100 WBC 0      Absolute Neutrophils 9.6 (*)     Absolute Lymphocytes 0.8      Absolute Monocytes 0.6      Absolute Eosinophils 0.0      Absolute Basophils 0.0      Absolute Immature Granulocytes 0.1      Absolute NRBCs 0.0       Emergency Department Course & Assessments:   Interventions:  1334 NS  500 mL  IV     Independent Interpretation (X-rays, CTs, rhythm strip):  No scans were given at this time.      Social Determinants of Health affecting care:  Alcohol abuse. Former tobacco smoker.      Disposition:  The patient was discharged to home.     Impression & Plan    Medical Decision Makin-year-old woman past medical history significant for schizophrenia, tardive dyskinesia, dementia NOS, alcohol abuse presenting for what sounds like a near [place]opal event [place] [employer].  She did not actually fall, no reported trauma.  She does endorse feeling mildly nauseous and sweaty prior to this event, denies any chest pain, chest pressure or shortness of breath.  Her vitals here are within normal limits.  She has no ongoing complaints and was readily able to stand pivot and transfer here in the emergency department.  Screening EKG shows no ischemic changes with normal intervals.  Given absence of pulmonary complaints, low suspicion for PE.  Given prodromal symptoms, higher suspicion for  high vagal tone given she did not actually syncopized, I do feel she is safe for discharge home.  I recommended continuation of her medications and PCP follow-up.  Screening labs are only notable for mild hyponatremia which is chronic appearing.  Most recent sodium was 127.  I recommended adequate hydration, slow transitions from sitting to standing to walking, return precautions reviewed and she was discharged back to her assisted living facility.    Rogerio Gonzalez MD  [employer]  3:09 PM 01/02/23       Scribe Disclosure:  I, RADHA SAMANO, am serving as a scribe at 1:02 PM on 1/2/2023 to document services personally performed by Rogerio Gonzalez MD based on my observations and the provider's statements to me.     I, Katie De La O, am serving as a scribe  at 2:57 PM on 1/2/2023 to document services personally performed by Rogerio Gonzalez MD based on my observations and the provider's statements to me.     1/2/2023   Rogerio Gonzalez MD Dunbar, John Forrest, MD  01/02/23 8665

## 2023-01-05 LAB
BASOPHILS # BLD AUTO: 0 10E3/UL (ref 0–0.2)
BASOPHILS NFR BLD AUTO: 0 %
EOSINOPHIL # BLD AUTO: 0 10E3/UL (ref 0–0.7)
EOSINOPHIL NFR BLD AUTO: 0 %
ERYTHROCYTE [DISTWIDTH] IN BLOOD BY AUTOMATED COUNT: 13.4 % (ref 10–15)
HCT VFR BLD AUTO: 38 % (ref 35–47)
HGB BLD-MCNC: 11.8 G/DL (ref 11.7–15.7)
IMM GRANULOCYTES # BLD: 0 10E3/UL
IMM GRANULOCYTES NFR BLD: 1 %
LYMPHOCYTES # BLD AUTO: 1.2 10E3/UL (ref 0.8–5.3)
LYMPHOCYTES NFR BLD AUTO: 16 %
MCH RBC QN AUTO: 28.9 PG (ref 26.5–33)
MCHC RBC AUTO-ENTMCNC: 31.1 G/DL (ref 31.5–36.5)
MCV RBC AUTO: 93 FL (ref 78–100)
MONOCYTES # BLD AUTO: 0.4 10E3/UL (ref 0–1.3)
MONOCYTES NFR BLD AUTO: 5 %
NEUTROPHILS # BLD AUTO: 5.9 10E3/UL (ref 1.6–8.3)
NEUTROPHILS NFR BLD AUTO: 78 %
NRBC # BLD AUTO: 0 10E3/UL
NRBC BLD AUTO-RTO: 0 /100
PLATELET # BLD AUTO: 268 10E3/UL (ref 150–450)
RBC # BLD AUTO: 4.09 10E6/UL (ref 3.8–5.2)
WBC # BLD AUTO: 7.5 10E3/UL (ref 4–11)

## 2023-01-05 PROCEDURE — P9604 ONE-WAY ALLOW PRORATED TRIP: HCPCS | Mod: ORL | Performed by: INTERNAL MEDICINE

## 2023-01-05 PROCEDURE — 85025 COMPLETE CBC W/AUTO DIFF WBC: CPT | Mod: ORL | Performed by: INTERNAL MEDICINE

## 2023-01-05 PROCEDURE — 36415 COLL VENOUS BLD VENIPUNCTURE: CPT | Mod: ORL | Performed by: INTERNAL MEDICINE

## 2023-01-12 ENCOUNTER — LAB REQUISITION (OUTPATIENT)
Dept: LAB | Facility: CLINIC | Age: 73
End: 2023-01-12
Payer: MEDICARE

## 2023-01-18 LAB
BASOPHILS # BLD AUTO: 0 10E3/UL (ref 0–0.2)
BASOPHILS NFR BLD AUTO: 0 %
EOSINOPHIL # BLD AUTO: 0 10E3/UL (ref 0–0.7)
EOSINOPHIL NFR BLD AUTO: 0 %
ERYTHROCYTE [DISTWIDTH] IN BLOOD BY AUTOMATED COUNT: 13.4 % (ref 10–15)
HCT VFR BLD AUTO: 39.8 % (ref 35–47)
HGB BLD-MCNC: 12 G/DL (ref 11.7–15.7)
IMM GRANULOCYTES # BLD: 0 10E3/UL
IMM GRANULOCYTES NFR BLD: 1 %
LYMPHOCYTES # BLD AUTO: 1.3 10E3/UL (ref 0.8–5.3)
LYMPHOCYTES NFR BLD AUTO: 21 %
MCH RBC QN AUTO: 28.3 PG (ref 26.5–33)
MCHC RBC AUTO-ENTMCNC: 30.2 G/DL (ref 31.5–36.5)
MCV RBC AUTO: 94 FL (ref 78–100)
MONOCYTES # BLD AUTO: 0.4 10E3/UL (ref 0–1.3)
MONOCYTES NFR BLD AUTO: 6 %
NEUTROPHILS # BLD AUTO: 4.2 10E3/UL (ref 1.6–8.3)
NEUTROPHILS NFR BLD AUTO: 72 %
NRBC # BLD AUTO: 0 10E3/UL
NRBC BLD AUTO-RTO: 0 /100
PLATELET # BLD AUTO: 274 10E3/UL (ref 150–450)
RBC # BLD AUTO: 4.24 10E6/UL (ref 3.8–5.2)
WBC # BLD AUTO: 5.8 10E3/UL (ref 4–11)

## 2023-01-18 PROCEDURE — 85025 COMPLETE CBC W/AUTO DIFF WBC: CPT | Mod: ORL | Performed by: NURSE PRACTITIONER

## 2023-01-18 PROCEDURE — P9604 ONE-WAY ALLOW PRORATED TRIP: HCPCS | Mod: ORL | Performed by: NURSE PRACTITIONER

## 2023-01-18 PROCEDURE — 36415 COLL VENOUS BLD VENIPUNCTURE: CPT | Mod: ORL | Performed by: NURSE PRACTITIONER

## 2023-01-26 ENCOUNTER — LAB REQUISITION (OUTPATIENT)
Dept: LAB | Facility: CLINIC | Age: 73
End: 2023-01-26
Payer: MEDICARE

## 2023-01-30 LAB
BASOPHILS # BLD AUTO: 0 10E3/UL (ref 0–0.2)
BASOPHILS NFR BLD AUTO: 0 %
EOSINOPHIL # BLD AUTO: 0 10E3/UL (ref 0–0.7)
EOSINOPHIL NFR BLD AUTO: 0 %
ERYTHROCYTE [DISTWIDTH] IN BLOOD BY AUTOMATED COUNT: 13.9 % (ref 10–15)
HCT VFR BLD AUTO: 39.8 % (ref 35–47)
HGB BLD-MCNC: 12.5 G/DL (ref 11.7–15.7)
IMM GRANULOCYTES # BLD: 0 10E3/UL
IMM GRANULOCYTES NFR BLD: 1 %
LYMPHOCYTES # BLD AUTO: 1.4 10E3/UL (ref 0.8–5.3)
LYMPHOCYTES NFR BLD AUTO: 26 %
MCH RBC QN AUTO: 28.4 PG (ref 26.5–33)
MCHC RBC AUTO-ENTMCNC: 31.4 G/DL (ref 31.5–36.5)
MCV RBC AUTO: 91 FL (ref 78–100)
MONOCYTES # BLD AUTO: 0.4 10E3/UL (ref 0–1.3)
MONOCYTES NFR BLD AUTO: 8 %
NEUTROPHILS # BLD AUTO: 3.4 10E3/UL (ref 1.6–8.3)
NEUTROPHILS NFR BLD AUTO: 65 %
NRBC # BLD AUTO: 0 10E3/UL
NRBC BLD AUTO-RTO: 0 /100
PLATELET # BLD AUTO: 167 10E3/UL (ref 150–450)
RBC # BLD AUTO: 4.4 10E6/UL (ref 3.8–5.2)
WBC # BLD AUTO: 5.3 10E3/UL (ref 4–11)

## 2023-01-30 PROCEDURE — 85025 COMPLETE CBC W/AUTO DIFF WBC: CPT | Mod: ORL | Performed by: INTERNAL MEDICINE

## 2023-01-30 PROCEDURE — 36415 COLL VENOUS BLD VENIPUNCTURE: CPT | Mod: ORL | Performed by: INTERNAL MEDICINE

## 2023-01-30 PROCEDURE — P9604 ONE-WAY ALLOW PRORATED TRIP: HCPCS | Mod: ORL | Performed by: INTERNAL MEDICINE

## 2023-02-09 ENCOUNTER — LAB REQUISITION (OUTPATIENT)
Dept: LAB | Facility: CLINIC | Age: 73
End: 2023-02-09
Payer: MEDICARE

## 2023-02-13 LAB
BASOPHILS # BLD AUTO: 0 10E3/UL (ref 0–0.2)
BASOPHILS NFR BLD AUTO: 0 %
EOSINOPHIL # BLD AUTO: 0 10E3/UL (ref 0–0.7)
EOSINOPHIL NFR BLD AUTO: 0 %
ERYTHROCYTE [DISTWIDTH] IN BLOOD BY AUTOMATED COUNT: 13.9 % (ref 10–15)
HCT VFR BLD AUTO: 38.3 % (ref 35–47)
HGB BLD-MCNC: 12.1 G/DL (ref 11.7–15.7)
IMM GRANULOCYTES # BLD: 0 10E3/UL
IMM GRANULOCYTES NFR BLD: 0 %
LYMPHOCYTES # BLD AUTO: 1.1 10E3/UL (ref 0.8–5.3)
LYMPHOCYTES NFR BLD AUTO: 12 %
MCH RBC QN AUTO: 28.7 PG (ref 26.5–33)
MCHC RBC AUTO-ENTMCNC: 31.6 G/DL (ref 31.5–36.5)
MCV RBC AUTO: 91 FL (ref 78–100)
MONOCYTES # BLD AUTO: 0.4 10E3/UL (ref 0–1.3)
MONOCYTES NFR BLD AUTO: 5 %
NEUTROPHILS # BLD AUTO: 7.9 10E3/UL (ref 1.6–8.3)
NEUTROPHILS NFR BLD AUTO: 83 %
NRBC # BLD AUTO: 0 10E3/UL
NRBC BLD AUTO-RTO: 0 /100
PLATELET # BLD AUTO: 170 10E3/UL (ref 150–450)
RBC # BLD AUTO: 4.21 10E6/UL (ref 3.8–5.2)
WBC # BLD AUTO: 9.6 10E3/UL (ref 4–11)

## 2023-02-13 PROCEDURE — 36415 COLL VENOUS BLD VENIPUNCTURE: CPT | Mod: ORL | Performed by: NURSE PRACTITIONER

## 2023-02-13 PROCEDURE — 85025 COMPLETE CBC W/AUTO DIFF WBC: CPT | Mod: ORL | Performed by: NURSE PRACTITIONER

## 2023-02-13 PROCEDURE — P9604 ONE-WAY ALLOW PRORATED TRIP: HCPCS | Mod: ORL | Performed by: NURSE PRACTITIONER

## 2023-02-23 ENCOUNTER — LAB REQUISITION (OUTPATIENT)
Dept: LAB | Facility: CLINIC | Age: 73
End: 2023-02-23
Payer: MEDICARE

## 2023-02-27 LAB
BASOPHILS # BLD AUTO: 0 10E3/UL (ref 0–0.2)
BASOPHILS NFR BLD AUTO: 0 %
EOSINOPHIL # BLD AUTO: 0 10E3/UL (ref 0–0.7)
EOSINOPHIL NFR BLD AUTO: 0 %
ERYTHROCYTE [DISTWIDTH] IN BLOOD BY AUTOMATED COUNT: 13.7 % (ref 10–15)
HCT VFR BLD AUTO: 36.9 % (ref 35–47)
HGB BLD-MCNC: 11.5 G/DL (ref 11.7–15.7)
IMM GRANULOCYTES # BLD: 0 10E3/UL
IMM GRANULOCYTES NFR BLD: 0 %
LYMPHOCYTES # BLD AUTO: 1.1 10E3/UL (ref 0.8–5.3)
LYMPHOCYTES NFR BLD AUTO: 20 %
MCH RBC QN AUTO: 28.2 PG (ref 26.5–33)
MCHC RBC AUTO-ENTMCNC: 31.2 G/DL (ref 31.5–36.5)
MCV RBC AUTO: 90 FL (ref 78–100)
MONOCYTES # BLD AUTO: 0.5 10E3/UL (ref 0–1.3)
MONOCYTES NFR BLD AUTO: 10 %
NEUTROPHILS # BLD AUTO: 3.9 10E3/UL (ref 1.6–8.3)
NEUTROPHILS NFR BLD AUTO: 70 %
NRBC # BLD AUTO: 0 10E3/UL
NRBC BLD AUTO-RTO: 0 /100
PLATELET # BLD AUTO: 172 10E3/UL (ref 150–450)
RBC # BLD AUTO: 4.08 10E6/UL (ref 3.8–5.2)
WBC # BLD AUTO: 5.6 10E3/UL (ref 4–11)

## 2023-02-27 PROCEDURE — 85025 COMPLETE CBC W/AUTO DIFF WBC: CPT | Mod: ORL | Performed by: INTERNAL MEDICINE

## 2023-02-27 PROCEDURE — 36415 COLL VENOUS BLD VENIPUNCTURE: CPT | Mod: ORL | Performed by: INTERNAL MEDICINE

## 2023-02-27 PROCEDURE — P9604 ONE-WAY ALLOW PRORATED TRIP: HCPCS | Mod: ORL | Performed by: INTERNAL MEDICINE

## 2023-03-08 ENCOUNTER — LAB REQUISITION (OUTPATIENT)
Dept: LAB | Facility: CLINIC | Age: 73
End: 2023-03-08
Payer: MEDICARE

## 2023-03-13 LAB
BASOPHILS # BLD AUTO: 0 10E3/UL (ref 0–0.2)
BASOPHILS NFR BLD AUTO: 0 %
EOSINOPHIL # BLD AUTO: 0 10E3/UL (ref 0–0.7)
EOSINOPHIL NFR BLD AUTO: 0 %
ERYTHROCYTE [DISTWIDTH] IN BLOOD BY AUTOMATED COUNT: 13.8 % (ref 10–15)
HCT VFR BLD AUTO: 39 % (ref 35–47)
HGB BLD-MCNC: 12.1 G/DL (ref 11.7–15.7)
IMM GRANULOCYTES # BLD: 0 10E3/UL
IMM GRANULOCYTES NFR BLD: 1 %
LYMPHOCYTES # BLD AUTO: 1.2 10E3/UL (ref 0.8–5.3)
LYMPHOCYTES NFR BLD AUTO: 18 %
MCH RBC QN AUTO: 28.1 PG (ref 26.5–33)
MCHC RBC AUTO-ENTMCNC: 31 G/DL (ref 31.5–36.5)
MCV RBC AUTO: 91 FL (ref 78–100)
MONOCYTES # BLD AUTO: 0.4 10E3/UL (ref 0–1.3)
MONOCYTES NFR BLD AUTO: 7 %
NEUTROPHILS # BLD AUTO: 4.9 10E3/UL (ref 1.6–8.3)
NEUTROPHILS NFR BLD AUTO: 74 %
NRBC # BLD AUTO: 0 10E3/UL
NRBC BLD AUTO-RTO: 0 /100
PLATELET # BLD AUTO: 205 10E3/UL (ref 150–450)
RBC # BLD AUTO: 4.31 10E6/UL (ref 3.8–5.2)
WBC # BLD AUTO: 6.6 10E3/UL (ref 4–11)

## 2023-03-13 PROCEDURE — 85025 COMPLETE CBC W/AUTO DIFF WBC: CPT | Mod: ORL | Performed by: INTERNAL MEDICINE

## 2023-03-13 PROCEDURE — 36415 COLL VENOUS BLD VENIPUNCTURE: CPT | Mod: ORL | Performed by: INTERNAL MEDICINE

## 2023-03-13 PROCEDURE — P9603 ONE-WAY ALLOW PRORATED MILES: HCPCS | Mod: ORL | Performed by: INTERNAL MEDICINE

## 2023-03-21 ENCOUNTER — LAB REQUISITION (OUTPATIENT)
Dept: LAB | Facility: CLINIC | Age: 73
End: 2023-03-21
Payer: MEDICARE

## 2023-03-27 LAB
BASOPHILS # BLD AUTO: 0 10E3/UL (ref 0–0.2)
BASOPHILS NFR BLD AUTO: 0 %
EOSINOPHIL # BLD AUTO: 0 10E3/UL (ref 0–0.7)
EOSINOPHIL NFR BLD AUTO: 0 %
ERYTHROCYTE [DISTWIDTH] IN BLOOD BY AUTOMATED COUNT: 13.9 % (ref 10–15)
HCT VFR BLD AUTO: 38.2 % (ref 35–47)
HGB BLD-MCNC: 11.8 G/DL (ref 11.7–15.7)
IMM GRANULOCYTES # BLD: 0 10E3/UL
IMM GRANULOCYTES NFR BLD: 0 %
LYMPHOCYTES # BLD AUTO: 1.5 10E3/UL (ref 0.8–5.3)
LYMPHOCYTES NFR BLD AUTO: 18 %
MCH RBC QN AUTO: 28.3 PG (ref 26.5–33)
MCHC RBC AUTO-ENTMCNC: 30.9 G/DL (ref 31.5–36.5)
MCV RBC AUTO: 92 FL (ref 78–100)
MONOCYTES # BLD AUTO: 0.6 10E3/UL (ref 0–1.3)
MONOCYTES NFR BLD AUTO: 8 %
NEUTROPHILS # BLD AUTO: 6 10E3/UL (ref 1.6–8.3)
NEUTROPHILS NFR BLD AUTO: 74 %
NRBC # BLD AUTO: 0 10E3/UL
NRBC BLD AUTO-RTO: 0 /100
PLATELET # BLD AUTO: 194 10E3/UL (ref 150–450)
RBC # BLD AUTO: 4.17 10E6/UL (ref 3.8–5.2)
WBC # BLD AUTO: 8.1 10E3/UL (ref 4–11)

## 2023-03-27 PROCEDURE — 85025 COMPLETE CBC W/AUTO DIFF WBC: CPT | Mod: ORL | Performed by: NURSE PRACTITIONER

## 2023-03-27 PROCEDURE — P9604 ONE-WAY ALLOW PRORATED TRIP: HCPCS | Mod: ORL | Performed by: NURSE PRACTITIONER

## 2023-03-27 PROCEDURE — 36415 COLL VENOUS BLD VENIPUNCTURE: CPT | Mod: ORL | Performed by: NURSE PRACTITIONER

## 2023-04-06 ENCOUNTER — LAB REQUISITION (OUTPATIENT)
Dept: LAB | Facility: CLINIC | Age: 73
End: 2023-04-06
Payer: MEDICARE

## 2023-04-10 LAB
ERYTHROCYTE [DISTWIDTH] IN BLOOD BY AUTOMATED COUNT: NORMAL %
HCT VFR BLD AUTO: NORMAL %
HGB BLD-MCNC: NORMAL G/DL
MCH RBC QN AUTO: NORMAL PG
MCHC RBC AUTO-ENTMCNC: NORMAL G/DL
MCV RBC AUTO: NORMAL FL
PLATELET # BLD AUTO: NORMAL 10*3/UL
RBC # BLD AUTO: NORMAL 10*6/UL
WBC # BLD AUTO: NORMAL 10*3/UL

## 2023-04-10 PROCEDURE — 36415 COLL VENOUS BLD VENIPUNCTURE: CPT | Mod: ORL | Performed by: INTERNAL MEDICINE

## 2023-04-10 PROCEDURE — P9604 ONE-WAY ALLOW PRORATED TRIP: HCPCS | Mod: ORL | Performed by: INTERNAL MEDICINE

## 2023-04-12 ENCOUNTER — LAB REQUISITION (OUTPATIENT)
Dept: LAB | Facility: CLINIC | Age: 73
End: 2023-04-12
Payer: MEDICARE

## 2023-04-13 LAB
BASOPHILS # BLD AUTO: 0 10E3/UL (ref 0–0.2)
BASOPHILS NFR BLD AUTO: 0 %
EOSINOPHIL # BLD AUTO: 0 10E3/UL (ref 0–0.7)
EOSINOPHIL NFR BLD AUTO: 0 %
ERYTHROCYTE [DISTWIDTH] IN BLOOD BY AUTOMATED COUNT: 13.9 % (ref 10–15)
HCT VFR BLD AUTO: 41.9 % (ref 35–47)
HGB BLD-MCNC: 12.8 G/DL (ref 11.7–15.7)
IMM GRANULOCYTES # BLD: 0 10E3/UL
IMM GRANULOCYTES NFR BLD: 1 %
LYMPHOCYTES # BLD AUTO: 1.6 10E3/UL (ref 0.8–5.3)
LYMPHOCYTES NFR BLD AUTO: 30 %
MCH RBC QN AUTO: 27.9 PG (ref 26.5–33)
MCHC RBC AUTO-ENTMCNC: 30.5 G/DL (ref 31.5–36.5)
MCV RBC AUTO: 92 FL (ref 78–100)
MONOCYTES # BLD AUTO: 0.4 10E3/UL (ref 0–1.3)
MONOCYTES NFR BLD AUTO: 8 %
NEUTROPHILS # BLD AUTO: 3.3 10E3/UL (ref 1.6–8.3)
NEUTROPHILS NFR BLD AUTO: 61 %
NRBC # BLD AUTO: 0 10E3/UL
NRBC BLD AUTO-RTO: 0 /100
PLATELET # BLD AUTO: 193 10E3/UL (ref 150–450)
RBC # BLD AUTO: 4.58 10E6/UL (ref 3.8–5.2)
WBC # BLD AUTO: 5.4 10E3/UL (ref 4–11)

## 2023-04-13 PROCEDURE — 85025 COMPLETE CBC W/AUTO DIFF WBC: CPT | Mod: ORL | Performed by: INTERNAL MEDICINE

## 2023-04-13 PROCEDURE — 36415 COLL VENOUS BLD VENIPUNCTURE: CPT | Mod: ORL | Performed by: INTERNAL MEDICINE

## 2023-04-13 PROCEDURE — P9603 ONE-WAY ALLOW PRORATED MILES: HCPCS | Mod: ORL | Performed by: INTERNAL MEDICINE

## 2023-04-19 ENCOUNTER — LAB REQUISITION (OUTPATIENT)
Dept: LAB | Facility: CLINIC | Age: 73
End: 2023-04-19
Payer: MEDICARE

## 2023-04-24 LAB
BASOPHILS # BLD AUTO: 0 10E3/UL (ref 0–0.2)
BASOPHILS NFR BLD AUTO: 0 %
EOSINOPHIL # BLD AUTO: 0 10E3/UL (ref 0–0.7)
EOSINOPHIL NFR BLD AUTO: 0 %
ERYTHROCYTE [DISTWIDTH] IN BLOOD BY AUTOMATED COUNT: 13.8 % (ref 10–15)
HCT VFR BLD AUTO: 42.8 % (ref 35–47)
HGB BLD-MCNC: 13 G/DL (ref 11.7–15.7)
IMM GRANULOCYTES # BLD: 0 10E3/UL
IMM GRANULOCYTES NFR BLD: 1 %
LYMPHOCYTES # BLD AUTO: 1.2 10E3/UL (ref 0.8–5.3)
LYMPHOCYTES NFR BLD AUTO: 29 %
MCH RBC QN AUTO: 28 PG (ref 26.5–33)
MCHC RBC AUTO-ENTMCNC: 30.4 G/DL (ref 31.5–36.5)
MCV RBC AUTO: 92 FL (ref 78–100)
MONOCYTES # BLD AUTO: 0.3 10E3/UL (ref 0–1.3)
MONOCYTES NFR BLD AUTO: 7 %
NEUTROPHILS # BLD AUTO: 2.6 10E3/UL (ref 1.6–8.3)
NEUTROPHILS NFR BLD AUTO: 63 %
NRBC # BLD AUTO: 0 10E3/UL
NRBC BLD AUTO-RTO: 0 /100
PLATELET # BLD AUTO: 163 10E3/UL (ref 150–450)
RBC # BLD AUTO: 4.64 10E6/UL (ref 3.8–5.2)
WBC # BLD AUTO: 4.2 10E3/UL (ref 4–11)

## 2023-04-24 PROCEDURE — P9604 ONE-WAY ALLOW PRORATED TRIP: HCPCS | Mod: ORL | Performed by: NURSE PRACTITIONER

## 2023-04-24 PROCEDURE — 36415 COLL VENOUS BLD VENIPUNCTURE: CPT | Mod: ORL | Performed by: NURSE PRACTITIONER

## 2023-04-24 PROCEDURE — 85025 COMPLETE CBC W/AUTO DIFF WBC: CPT | Mod: ORL | Performed by: NURSE PRACTITIONER

## 2023-05-04 ENCOUNTER — LAB REQUISITION (OUTPATIENT)
Dept: LAB | Facility: CLINIC | Age: 73
End: 2023-05-04
Payer: MEDICARE

## 2023-05-08 LAB
BASOPHILS # BLD AUTO: 0 10E3/UL (ref 0–0.2)
BASOPHILS NFR BLD AUTO: 0 %
EOSINOPHIL # BLD AUTO: 0 10E3/UL (ref 0–0.7)
EOSINOPHIL NFR BLD AUTO: 0 %
ERYTHROCYTE [DISTWIDTH] IN BLOOD BY AUTOMATED COUNT: 13.6 % (ref 10–15)
HCT VFR BLD AUTO: 41.5 % (ref 35–47)
HGB BLD-MCNC: 12.8 G/DL (ref 11.7–15.7)
IMM GRANULOCYTES # BLD: 0 10E3/UL
IMM GRANULOCYTES NFR BLD: 1 %
LYMPHOCYTES # BLD AUTO: 1.3 10E3/UL (ref 0.8–5.3)
LYMPHOCYTES NFR BLD AUTO: 23 %
MCH RBC QN AUTO: 28 PG (ref 26.5–33)
MCHC RBC AUTO-ENTMCNC: 30.8 G/DL (ref 31.5–36.5)
MCV RBC AUTO: 91 FL (ref 78–100)
MONOCYTES # BLD AUTO: 0.4 10E3/UL (ref 0–1.3)
MONOCYTES NFR BLD AUTO: 7 %
NEUTROPHILS # BLD AUTO: 3.9 10E3/UL (ref 1.6–8.3)
NEUTROPHILS NFR BLD AUTO: 69 %
NRBC # BLD AUTO: 0 10E3/UL
NRBC BLD AUTO-RTO: 0 /100
PLATELET # BLD AUTO: 200 10E3/UL (ref 150–450)
RBC # BLD AUTO: 4.57 10E6/UL (ref 3.8–5.2)
WBC # BLD AUTO: 5.7 10E3/UL (ref 4–11)

## 2023-05-08 PROCEDURE — 85025 COMPLETE CBC W/AUTO DIFF WBC: CPT | Mod: ORL | Performed by: INTERNAL MEDICINE

## 2023-05-08 PROCEDURE — P9604 ONE-WAY ALLOW PRORATED TRIP: HCPCS | Mod: ORL | Performed by: INTERNAL MEDICINE

## 2023-05-08 PROCEDURE — 36415 COLL VENOUS BLD VENIPUNCTURE: CPT | Mod: ORL | Performed by: INTERNAL MEDICINE

## 2023-05-17 ENCOUNTER — LAB REQUISITION (OUTPATIENT)
Dept: LAB | Facility: CLINIC | Age: 73
End: 2023-05-17
Payer: MEDICARE

## 2023-05-22 LAB
BASOPHILS # BLD AUTO: 0 10E3/UL (ref 0–0.2)
BASOPHILS NFR BLD AUTO: 0 %
EOSINOPHIL # BLD AUTO: 0 10E3/UL (ref 0–0.7)
EOSINOPHIL NFR BLD AUTO: 0 %
ERYTHROCYTE [DISTWIDTH] IN BLOOD BY AUTOMATED COUNT: 13.9 % (ref 10–15)
HCT VFR BLD AUTO: 39.4 % (ref 35–47)
HGB BLD-MCNC: 12.2 G/DL (ref 11.7–15.7)
IMM GRANULOCYTES # BLD: 0 10E3/UL
IMM GRANULOCYTES NFR BLD: 0 %
LYMPHOCYTES # BLD AUTO: 1.4 10E3/UL (ref 0.8–5.3)
LYMPHOCYTES NFR BLD AUTO: 29 %
MCH RBC QN AUTO: 28.2 PG (ref 26.5–33)
MCHC RBC AUTO-ENTMCNC: 31 G/DL (ref 31.5–36.5)
MCV RBC AUTO: 91 FL (ref 78–100)
MONOCYTES # BLD AUTO: 0.4 10E3/UL (ref 0–1.3)
MONOCYTES NFR BLD AUTO: 8 %
NEUTROPHILS # BLD AUTO: 3 10E3/UL (ref 1.6–8.3)
NEUTROPHILS NFR BLD AUTO: 63 %
NRBC # BLD AUTO: 0 10E3/UL
NRBC BLD AUTO-RTO: 0 /100
PLATELET # BLD AUTO: 154 10E3/UL (ref 150–450)
RBC # BLD AUTO: 4.32 10E6/UL (ref 3.8–5.2)
WBC # BLD AUTO: 4.9 10E3/UL (ref 4–11)

## 2023-05-22 PROCEDURE — P9604 ONE-WAY ALLOW PRORATED TRIP: HCPCS | Mod: ORL | Performed by: INTERNAL MEDICINE

## 2023-05-22 PROCEDURE — 85025 COMPLETE CBC W/AUTO DIFF WBC: CPT | Mod: ORL | Performed by: INTERNAL MEDICINE

## 2023-05-22 PROCEDURE — 36415 COLL VENOUS BLD VENIPUNCTURE: CPT | Mod: ORL | Performed by: INTERNAL MEDICINE

## 2023-05-31 ENCOUNTER — LAB REQUISITION (OUTPATIENT)
Dept: LAB | Facility: CLINIC | Age: 73
End: 2023-05-31
Payer: MEDICARE

## 2023-06-05 LAB
BASOPHILS # BLD AUTO: 0 10E3/UL (ref 0–0.2)
BASOPHILS NFR BLD AUTO: 0 %
EOSINOPHIL # BLD AUTO: 0 10E3/UL (ref 0–0.7)
EOSINOPHIL NFR BLD AUTO: 0 %
ERYTHROCYTE [DISTWIDTH] IN BLOOD BY AUTOMATED COUNT: 14.6 % (ref 10–15)
HCT VFR BLD AUTO: 40.1 % (ref 35–47)
HGB BLD-MCNC: 12.1 G/DL (ref 11.7–15.7)
IMM GRANULOCYTES # BLD: 0 10E3/UL
IMM GRANULOCYTES NFR BLD: 1 %
LYMPHOCYTES # BLD AUTO: 1.3 10E3/UL (ref 0.8–5.3)
LYMPHOCYTES NFR BLD AUTO: 22 %
MCH RBC QN AUTO: 28.2 PG (ref 26.5–33)
MCHC RBC AUTO-ENTMCNC: 30.2 G/DL (ref 31.5–36.5)
MCV RBC AUTO: 94 FL (ref 78–100)
MONOCYTES # BLD AUTO: 0.4 10E3/UL (ref 0–1.3)
MONOCYTES NFR BLD AUTO: 7 %
NEUTROPHILS # BLD AUTO: 4.2 10E3/UL (ref 1.6–8.3)
NEUTROPHILS NFR BLD AUTO: 70 %
NRBC # BLD AUTO: 0 10E3/UL
NRBC BLD AUTO-RTO: 0 /100
PLATELET # BLD AUTO: 199 10E3/UL (ref 150–450)
RBC # BLD AUTO: 4.29 10E6/UL (ref 3.8–5.2)
WBC # BLD AUTO: 5.9 10E3/UL (ref 4–11)

## 2023-06-05 PROCEDURE — P9604 ONE-WAY ALLOW PRORATED TRIP: HCPCS | Mod: ORL | Performed by: INTERNAL MEDICINE

## 2023-06-05 PROCEDURE — 36415 COLL VENOUS BLD VENIPUNCTURE: CPT | Mod: ORL | Performed by: INTERNAL MEDICINE

## 2023-06-05 PROCEDURE — 85025 COMPLETE CBC W/AUTO DIFF WBC: CPT | Mod: ORL | Performed by: INTERNAL MEDICINE

## 2023-06-13 ENCOUNTER — LAB REQUISITION (OUTPATIENT)
Dept: LAB | Facility: CLINIC | Age: 73
End: 2023-06-13
Payer: MEDICARE

## 2023-06-19 LAB
BASOPHILS # BLD AUTO: 0 10E3/UL (ref 0–0.2)
BASOPHILS NFR BLD AUTO: 0 %
EOSINOPHIL # BLD AUTO: 0 10E3/UL (ref 0–0.7)
EOSINOPHIL NFR BLD AUTO: 0 %
ERYTHROCYTE [DISTWIDTH] IN BLOOD BY AUTOMATED COUNT: 14.6 % (ref 10–15)
HCT VFR BLD AUTO: 37.9 % (ref 35–47)
HGB BLD-MCNC: 11.8 G/DL (ref 11.7–15.7)
IMM GRANULOCYTES # BLD: 0 10E3/UL
IMM GRANULOCYTES NFR BLD: 0 %
LYMPHOCYTES # BLD AUTO: 1.1 10E3/UL (ref 0.8–5.3)
LYMPHOCYTES NFR BLD AUTO: 27 %
MCH RBC QN AUTO: 28.3 PG (ref 26.5–33)
MCHC RBC AUTO-ENTMCNC: 31.1 G/DL (ref 31.5–36.5)
MCV RBC AUTO: 91 FL (ref 78–100)
MONOCYTES # BLD AUTO: 0.4 10E3/UL (ref 0–1.3)
MONOCYTES NFR BLD AUTO: 9 %
NEUTROPHILS # BLD AUTO: 2.7 10E3/UL (ref 1.6–8.3)
NEUTROPHILS NFR BLD AUTO: 64 %
NRBC # BLD AUTO: 0 10E3/UL
NRBC BLD AUTO-RTO: 0 /100
PLATELET # BLD AUTO: 158 10E3/UL (ref 150–450)
RBC # BLD AUTO: 4.17 10E6/UL (ref 3.8–5.2)
WBC # BLD AUTO: 4.2 10E3/UL (ref 4–11)

## 2023-06-19 PROCEDURE — 85025 COMPLETE CBC W/AUTO DIFF WBC: CPT | Mod: ORL | Performed by: NURSE PRACTITIONER

## 2023-06-19 PROCEDURE — P9604 ONE-WAY ALLOW PRORATED TRIP: HCPCS | Mod: ORL | Performed by: NURSE PRACTITIONER

## 2023-06-19 PROCEDURE — 36415 COLL VENOUS BLD VENIPUNCTURE: CPT | Mod: ORL | Performed by: NURSE PRACTITIONER

## 2023-06-28 ENCOUNTER — LAB REQUISITION (OUTPATIENT)
Dept: LAB | Facility: CLINIC | Age: 73
End: 2023-06-28
Payer: MEDICARE

## 2023-07-03 LAB
BASOPHILS # BLD AUTO: 0 10E3/UL (ref 0–0.2)
BASOPHILS NFR BLD AUTO: 0 %
EOSINOPHIL # BLD AUTO: 0 10E3/UL (ref 0–0.7)
EOSINOPHIL NFR BLD AUTO: 0 %
ERYTHROCYTE [DISTWIDTH] IN BLOOD BY AUTOMATED COUNT: 14.1 % (ref 10–15)
HCT VFR BLD AUTO: 37.2 % (ref 35–47)
HGB BLD-MCNC: 11.5 G/DL (ref 11.7–15.7)
IMM GRANULOCYTES # BLD: 0 10E3/UL
IMM GRANULOCYTES NFR BLD: 1 %
LYMPHOCYTES # BLD AUTO: 1.2 10E3/UL (ref 0.8–5.3)
LYMPHOCYTES NFR BLD AUTO: 21 %
MCH RBC QN AUTO: 28.5 PG (ref 26.5–33)
MCHC RBC AUTO-ENTMCNC: 30.9 G/DL (ref 31.5–36.5)
MCV RBC AUTO: 92 FL (ref 78–100)
MONOCYTES # BLD AUTO: 0.4 10E3/UL (ref 0–1.3)
MONOCYTES NFR BLD AUTO: 8 %
NEUTROPHILS # BLD AUTO: 3.9 10E3/UL (ref 1.6–8.3)
NEUTROPHILS NFR BLD AUTO: 70 %
NRBC # BLD AUTO: 0 10E3/UL
NRBC BLD AUTO-RTO: 0 /100
PLATELET # BLD AUTO: 219 10E3/UL (ref 150–450)
RBC # BLD AUTO: 4.04 10E6/UL (ref 3.8–5.2)
WBC # BLD AUTO: 5.5 10E3/UL (ref 4–11)

## 2023-07-03 PROCEDURE — P9604 ONE-WAY ALLOW PRORATED TRIP: HCPCS | Mod: ORL | Performed by: NURSE PRACTITIONER

## 2023-07-03 PROCEDURE — 36415 COLL VENOUS BLD VENIPUNCTURE: CPT | Mod: ORL | Performed by: NURSE PRACTITIONER

## 2023-07-03 PROCEDURE — 85025 COMPLETE CBC W/AUTO DIFF WBC: CPT | Mod: ORL | Performed by: NURSE PRACTITIONER

## 2023-07-12 ENCOUNTER — LAB REQUISITION (OUTPATIENT)
Dept: LAB | Facility: CLINIC | Age: 73
End: 2023-07-12
Payer: MEDICARE

## 2023-07-19 LAB
BASOPHILS # BLD AUTO: 0 10E3/UL (ref 0–0.2)
BASOPHILS NFR BLD AUTO: 0 %
EOSINOPHIL # BLD AUTO: 0 10E3/UL (ref 0–0.7)
EOSINOPHIL NFR BLD AUTO: 0 %
ERYTHROCYTE [DISTWIDTH] IN BLOOD BY AUTOMATED COUNT: 14 % (ref 10–15)
HCT VFR BLD AUTO: 37.2 % (ref 35–47)
HGB BLD-MCNC: 12 G/DL (ref 11.7–15.7)
LYMPHOCYTES # BLD AUTO: 1.6 10E3/UL (ref 0.8–5.3)
LYMPHOCYTES NFR BLD AUTO: 26 %
MCH RBC QN AUTO: 29.1 PG (ref 26.5–33)
MCHC RBC AUTO-ENTMCNC: 32.3 G/DL (ref 31.5–36.5)
MCV RBC AUTO: 90 FL (ref 78–100)
MONOCYTES # BLD AUTO: 0.5 10E3/UL (ref 0–1.3)
MONOCYTES NFR BLD AUTO: 8 %
NEUTROPHILS # BLD AUTO: 3.9 10E3/UL (ref 1.6–8.3)
NEUTROPHILS NFR BLD AUTO: 66 %
PLATELET # BLD AUTO: 190 10E3/UL (ref 150–450)
RBC # BLD AUTO: 4.12 10E6/UL (ref 3.8–5.2)
WBC # BLD AUTO: 6 10E3/UL (ref 4–11)

## 2023-07-19 PROCEDURE — 85025 COMPLETE CBC W/AUTO DIFF WBC: CPT | Mod: ORL | Performed by: NURSE PRACTITIONER

## 2023-07-19 PROCEDURE — 36415 COLL VENOUS BLD VENIPUNCTURE: CPT | Mod: ORL | Performed by: NURSE PRACTITIONER

## 2023-07-19 PROCEDURE — P9604 ONE-WAY ALLOW PRORATED TRIP: HCPCS | Mod: ORL | Performed by: NURSE PRACTITIONER

## 2023-07-20 ENCOUNTER — HOSPITAL ENCOUNTER (EMERGENCY)
Facility: CLINIC | Age: 73
Discharge: HOME | End: 2023-07-20
Attending: EMERGENCY MEDICINE | Admitting: EMERGENCY MEDICINE
Payer: MEDICARE

## 2023-07-20 ENCOUNTER — APPOINTMENT (OUTPATIENT)
Dept: CT IMAGING | Facility: CLINIC | Age: 73
End: 2023-07-20
Attending: EMERGENCY MEDICINE
Payer: MEDICARE

## 2023-07-20 VITALS
WEIGHT: 150 LBS | HEIGHT: 65 IN | TEMPERATURE: 97.6 F | DIASTOLIC BLOOD PRESSURE: 80 MMHG | BODY MASS INDEX: 24.99 KG/M2 | HEART RATE: 70 BPM | RESPIRATION RATE: 18 BRPM | SYSTOLIC BLOOD PRESSURE: 118 MMHG | OXYGEN SATURATION: 98 %

## 2023-07-20 DIAGNOSIS — F10.920 ALCOHOLIC INTOXICATION WITHOUT COMPLICATION (H): ICD-10-CM

## 2023-07-20 DIAGNOSIS — S09.90XA CLOSED HEAD INJURY, INITIAL ENCOUNTER: ICD-10-CM

## 2023-07-20 PROCEDURE — G1010 CDSM STANSON: HCPCS

## 2023-07-20 PROCEDURE — 99284 EMERGENCY DEPT VISIT MOD MDM: CPT | Mod: 25

## 2023-07-20 ASSESSMENT — ACTIVITIES OF DAILY LIVING (ADL)
ADLS_ACUITY_SCORE: 35
ADLS_ACUITY_SCORE: 35

## 2023-07-20 NOTE — ED NOTES
Writer attempted to contact [facility] in [place] to facilitate patient transfer.  They did not answer.  Will continue to call back

## 2023-07-20 NOTE — ED TRIAGE NOTES
Patient was at the [facility], where she goes every Thursday, and drank more then usual.  Patient resides at [facility] in [place].  Patient was found on the floor near the bathroom.  Patient states she fell. Patient denies hitting her head, and states she is not on thinners

## 2023-07-20 NOTE — ED NOTES
Bed: ED16  Expected date:   Expected time:   Means of arrival:   Comments:  441  73 f syncope/etoh  1543

## 2023-07-20 NOTE — ED PROVIDER NOTES
History     Chief Complaint:  Alcohol Intoxication       HPI   Liz Lieberman is a 73 year old female with tardive dyskinesia who was [facility] today when she had 5 beers which she states is her limit and then fell off her stool.  She did not lose consciousness.  She is on blood thinners.  She was unable to get up so she was brought here by EMS.  She has mild headache here but no other complaints.  No other injuries.  She has not really been ill.  No vomiting.      Independent Historian:    The patient    Review of External Notes:  None    Medications:    acetaminophen (TYLENOL) 500 MG tablet  albuterol (PROAIR HFA/PROVENTIL HFA/VENTOLIN HFA) 108 (90 Base) MCG/ACT inhaler  albuterol (PROVENTIL) (2.5 MG/3ML) 0.083% neb solution  amLODIPine (NORVASC) 2.5 MG tablet  atorvastatin (LIPITOR) 20 MG tablet  benztropine (COGENTIN) 1 MG tablet  Calcium Carbonate-Vitamin D (CALCIUM + D PO)  cloZAPine (CLOZARIL) 100 MG tablet  desoximetasone (TOPICORT) 0.25 % OINT  diphenhydrAMINE (BENADRYL) 25 MG capsule  emollient (VANICREAM) external cream  Ferrous Gluconate 324 (37.5 Fe) MG TABS  fluticasone (FLONASE) 50 MCG/ACT nasal spray  fluticasone (FLOVENT HFA) 110 MCG/ACT inhaler  folic acid (FOLVITE) 1 MG tablet  guaiFENesin (MUCINEX) 600 MG 12 hr tablet  levothyroxine (SYNTHROID/LEVOTHROID) 175 MCG tablet  mirabegron (MYRBETRIQ) 25 MG 24 hr tablet  multivitamin w/minerals (THERA-VIT-M) tablet  omeprazole (PRILOSEC) 20 MG DR capsule  polyethylene glycol (MIRALAX/GLYCOLAX) packet  sertraline (ZOLOFT) 50 MG tablet  sodium fluoride dental gel (PREVIDENT) 1.1 % GEL topical gel  thiamine 50 MG TABS        Past Medical History:    Past Medical History:   Diagnosis Date     Allergic rhinitis      Bronchiectasis (H)      Chronic pain      Depression      Diverticulosis      Gastro-oesophageal reflux disease      GERD (gastroesophageal reflux disease)      Hearing loss      Hiatal hernia      History of blood transfusion       "Hyperlipidemia      Hypothyroidism      Leukocytosis      Lung nodule      Mild intermittent asthma      Numbness and tingling      Osteoarthritis      Paranoid schizophrenia, in remission      RLS (restless legs syndrome)      Tardive dyskinesia      Urinary incontinence        Past Surgical History:    Past Surgical History:   Procedure Laterality Date     ARTHROPLASTY KNEE  2/20/2013    Procedure: ARTHROPLASTY KNEE;  LEFT TOTAL KNEE ARTHROPLASTY (SMITH & NEPHEW)^;  Surgeon: Khanh Bee MD;  Location:  OR     ENT SURGERY      tonsillectomy     ORTHOPEDIC SURGERY  2011    (R) total knee          Physical Exam     Patient Vitals for the past 24 hrs:   BP Temp Temp src Pulse Resp SpO2 Height Weight   07/20/23 1602 (!) 129/108 97.6  F (36.4  C) Temporal 78 16 96 % 1.651 m (5' 5\") 68 kg (150 lb)        Physical Exam  Constitutional: Vital signs reviewed as above  General: Alert, pleasant  HEENT: Moist mucous membranes  Eyes: Pupils are equal, round, and reactive to light.   Neck: Normal range of motion  Cardiovascular: normal rate, Regular rhythm and normal heart sounds.  Mo MRG  Pulmonary/Chest: Effort normal and breath sounds normal. No respiratory distress. Patient has no wheezes. Patient has no rales.   Gastrointestinal: Soft. Positive bowel sounds. No MRG.  Musculoskeletal/Extremities: Full ROM.  Endo: No pitting edema  Neurological: A/O x 3. CN-II-XII intact bilaterally. No pronator drift. Normal strength and sensation throughout all 4 extremities. GCS 15.  She has frequent twitching of all 4 extremities consistent with her tardive dyskinesia  Skin: Skin is warm and dry.   Psychiatric: Pleasant      Emergency Department Course     Imaging:  Head CT w/o contrast   Final Result   IMPRESSION:   1.  Normal head CT.        Report per radiology        Emergency Department Course & Assessments:    Independent Interpretation (X-rays, CTs, rhythm strip):  CT scan of the head was negative for any acute process per " interpretation    Social Determinants of Health affecting care:  Patient is intoxicated     Disposition:  The patient was discharged to home.     Impression & Plan      Medical Decision Making:  Patient presents emergency department after drinking at the [facility] today and falling off a barstool hitting her head.  No LOC.  CT scan here is unremarkable.  She is alert.  There is no wounds.  No midline neck tenderness.  Clinically she seems sober.  She does have tardive dyskinesia.  She will be discharged back to her nursing facility.      Diagnosis:    ICD-10-CM    1. Alcoholic intoxication without complication (H)  F10.920       2. Closed head injury, initial encounter  S09.90XA            Discharge Medications:  New Prescriptions    No medications on file          Samson Tabares MD  7/20/2023   Samson Tabares MD Walters, Brent Aaron, MD  07/20/23 1734

## 2023-07-27 ENCOUNTER — LAB REQUISITION (OUTPATIENT)
Dept: LAB | Facility: CLINIC | Age: 73
End: 2023-07-27
Payer: MEDICARE

## 2023-07-31 LAB
BASOPHILS # BLD AUTO: 0 10E3/UL (ref 0–0.2)
BASOPHILS NFR BLD AUTO: 0 %
EOSINOPHIL # BLD AUTO: 0 10E3/UL (ref 0–0.7)
EOSINOPHIL NFR BLD AUTO: 0 %
ERYTHROCYTE [DISTWIDTH] IN BLOOD BY AUTOMATED COUNT: 13.6 % (ref 10–15)
HCT VFR BLD AUTO: 39.2 % (ref 35–47)
HGB BLD-MCNC: 12.4 G/DL (ref 11.7–15.7)
IMM GRANULOCYTES # BLD: 0 10E3/UL
IMM GRANULOCYTES NFR BLD: 1 %
LYMPHOCYTES # BLD AUTO: 1.3 10E3/UL (ref 0.8–5.3)
LYMPHOCYTES NFR BLD AUTO: 26 %
MCH RBC QN AUTO: 29.2 PG (ref 26.5–33)
MCHC RBC AUTO-ENTMCNC: 31.6 G/DL (ref 31.5–36.5)
MCV RBC AUTO: 93 FL (ref 78–100)
MONOCYTES # BLD AUTO: 0.4 10E3/UL (ref 0–1.3)
MONOCYTES NFR BLD AUTO: 8 %
NEUTROPHILS # BLD AUTO: 3.2 10E3/UL (ref 1.6–8.3)
NEUTROPHILS NFR BLD AUTO: 65 %
NRBC # BLD AUTO: 0 10E3/UL
NRBC BLD AUTO-RTO: 0 /100
PLATELET # BLD AUTO: 169 10E3/UL (ref 150–450)
RBC # BLD AUTO: 4.24 10E6/UL (ref 3.8–5.2)
WBC # BLD AUTO: 4.9 10E3/UL (ref 4–11)

## 2023-07-31 PROCEDURE — 85025 COMPLETE CBC W/AUTO DIFF WBC: CPT | Mod: ORL | Performed by: NURSE PRACTITIONER

## 2023-07-31 PROCEDURE — 36415 COLL VENOUS BLD VENIPUNCTURE: CPT | Mod: ORL | Performed by: NURSE PRACTITIONER

## 2023-07-31 PROCEDURE — P9604 ONE-WAY ALLOW PRORATED TRIP: HCPCS | Mod: ORL | Performed by: NURSE PRACTITIONER

## 2023-08-08 ENCOUNTER — LAB REQUISITION (OUTPATIENT)
Dept: LAB | Facility: CLINIC | Age: 73
End: 2023-08-08
Payer: MEDICARE

## 2023-08-14 LAB
BASOPHILS # BLD AUTO: 0 10E3/UL (ref 0–0.2)
BASOPHILS NFR BLD AUTO: 0 %
EOSINOPHIL # BLD AUTO: 0 10E3/UL (ref 0–0.7)
EOSINOPHIL NFR BLD AUTO: 0 %
ERYTHROCYTE [DISTWIDTH] IN BLOOD BY AUTOMATED COUNT: 13.6 % (ref 10–15)
HCT VFR BLD AUTO: 39.5 % (ref 35–47)
HGB BLD-MCNC: 12.4 G/DL (ref 11.7–15.7)
IMM GRANULOCYTES # BLD: 0 10E3/UL
IMM GRANULOCYTES NFR BLD: 1 %
LYMPHOCYTES # BLD AUTO: 1.4 10E3/UL (ref 0.8–5.3)
LYMPHOCYTES NFR BLD AUTO: 30 %
MCH RBC QN AUTO: 29.2 PG (ref 26.5–33)
MCHC RBC AUTO-ENTMCNC: 31.4 G/DL (ref 31.5–36.5)
MCV RBC AUTO: 93 FL (ref 78–100)
MONOCYTES # BLD AUTO: 0.4 10E3/UL (ref 0–1.3)
MONOCYTES NFR BLD AUTO: 8 %
NEUTROPHILS # BLD AUTO: 2.8 10E3/UL (ref 1.6–8.3)
NEUTROPHILS NFR BLD AUTO: 61 %
NRBC # BLD AUTO: 0 10E3/UL
NRBC BLD AUTO-RTO: 0 /100
PLATELET # BLD AUTO: 168 10E3/UL (ref 150–450)
RBC # BLD AUTO: 4.24 10E6/UL (ref 3.8–5.2)
WBC # BLD AUTO: 4.6 10E3/UL (ref 4–11)

## 2023-08-14 PROCEDURE — 85025 COMPLETE CBC W/AUTO DIFF WBC: CPT | Mod: ORL | Performed by: NURSE PRACTITIONER

## 2023-08-14 PROCEDURE — 36415 COLL VENOUS BLD VENIPUNCTURE: CPT | Mod: ORL | Performed by: NURSE PRACTITIONER

## 2023-08-14 PROCEDURE — P9603 ONE-WAY ALLOW PRORATED MILES: HCPCS | Mod: ORL | Performed by: NURSE PRACTITIONER

## 2023-08-22 ENCOUNTER — LAB REQUISITION (OUTPATIENT)
Dept: LAB | Facility: CLINIC | Age: 73
End: 2023-08-22
Payer: MEDICARE

## 2023-08-28 LAB
BASOPHILS # BLD AUTO: 0 10E3/UL (ref 0–0.2)
BASOPHILS NFR BLD AUTO: 0 %
EOSINOPHIL # BLD AUTO: 0 10E3/UL (ref 0–0.7)
EOSINOPHIL NFR BLD AUTO: 0 %
ERYTHROCYTE [DISTWIDTH] IN BLOOD BY AUTOMATED COUNT: 13.1 % (ref 10–15)
HCT VFR BLD AUTO: 41.1 % (ref 35–47)
HGB BLD-MCNC: 12.7 G/DL (ref 11.7–15.7)
IMM GRANULOCYTES # BLD: 0.1 10E3/UL
IMM GRANULOCYTES NFR BLD: 1 %
LYMPHOCYTES # BLD AUTO: 1.4 10E3/UL (ref 0.8–5.3)
LYMPHOCYTES NFR BLD AUTO: 21 %
MCH RBC QN AUTO: 28.8 PG (ref 26.5–33)
MCHC RBC AUTO-ENTMCNC: 30.9 G/DL (ref 31.5–36.5)
MCV RBC AUTO: 93 FL (ref 78–100)
MONOCYTES # BLD AUTO: 0.5 10E3/UL (ref 0–1.3)
MONOCYTES NFR BLD AUTO: 7 %
NEUTROPHILS # BLD AUTO: 4.9 10E3/UL (ref 1.6–8.3)
NEUTROPHILS NFR BLD AUTO: 71 %
NRBC # BLD AUTO: 0 10E3/UL
NRBC BLD AUTO-RTO: 0 /100
PLATELET # BLD AUTO: 203 10E3/UL (ref 150–450)
RBC # BLD AUTO: 4.41 10E6/UL (ref 3.8–5.2)
WBC # BLD AUTO: 6.9 10E3/UL (ref 4–11)

## 2023-08-28 PROCEDURE — P9604 ONE-WAY ALLOW PRORATED TRIP: HCPCS | Mod: ORL | Performed by: NURSE PRACTITIONER

## 2023-08-28 PROCEDURE — 36415 COLL VENOUS BLD VENIPUNCTURE: CPT | Mod: ORL | Performed by: NURSE PRACTITIONER

## 2023-08-28 PROCEDURE — 85025 COMPLETE CBC W/AUTO DIFF WBC: CPT | Mod: ORL | Performed by: NURSE PRACTITIONER

## 2023-09-07 ENCOUNTER — LAB REQUISITION (OUTPATIENT)
Dept: LAB | Facility: CLINIC | Age: 73
End: 2023-09-07
Payer: MEDICARE

## 2023-09-11 LAB
BASOPHILS # BLD AUTO: 0 10E3/UL (ref 0–0.2)
BASOPHILS NFR BLD AUTO: 0 %
EOSINOPHIL # BLD AUTO: 0 10E3/UL (ref 0–0.7)
EOSINOPHIL NFR BLD AUTO: 0 %
ERYTHROCYTE [DISTWIDTH] IN BLOOD BY AUTOMATED COUNT: 13.2 % (ref 10–15)
HCT VFR BLD AUTO: 40 % (ref 35–47)
HGB BLD-MCNC: 12.6 G/DL (ref 11.7–15.7)
IMM GRANULOCYTES # BLD: 0 10E3/UL
IMM GRANULOCYTES NFR BLD: 1 %
LYMPHOCYTES # BLD AUTO: 1.4 10E3/UL (ref 0.8–5.3)
LYMPHOCYTES NFR BLD AUTO: 23 %
MCH RBC QN AUTO: 29.6 PG (ref 26.5–33)
MCHC RBC AUTO-ENTMCNC: 31.5 G/DL (ref 31.5–36.5)
MCV RBC AUTO: 94 FL (ref 78–100)
MONOCYTES # BLD AUTO: 0.6 10E3/UL (ref 0–1.3)
MONOCYTES NFR BLD AUTO: 9 %
NEUTROPHILS # BLD AUTO: 4.1 10E3/UL (ref 1.6–8.3)
NEUTROPHILS NFR BLD AUTO: 67 %
NRBC # BLD AUTO: 0 10E3/UL
NRBC BLD AUTO-RTO: 0 /100
PLATELET # BLD AUTO: 232 10E3/UL (ref 150–450)
RBC # BLD AUTO: 4.26 10E6/UL (ref 3.8–5.2)
WBC # BLD AUTO: 6.1 10E3/UL (ref 4–11)

## 2023-09-11 PROCEDURE — P9604 ONE-WAY ALLOW PRORATED TRIP: HCPCS | Mod: ORL | Performed by: NURSE PRACTITIONER

## 2023-09-11 PROCEDURE — 85025 COMPLETE CBC W/AUTO DIFF WBC: CPT | Mod: ORL | Performed by: NURSE PRACTITIONER

## 2023-09-11 PROCEDURE — 36415 COLL VENOUS BLD VENIPUNCTURE: CPT | Mod: ORL | Performed by: NURSE PRACTITIONER

## 2023-09-20 ENCOUNTER — LAB REQUISITION (OUTPATIENT)
Dept: LAB | Facility: CLINIC | Age: 73
End: 2023-09-20
Payer: MEDICARE

## 2023-09-25 LAB
BASOPHILS # BLD AUTO: 0 10E3/UL (ref 0–0.2)
BASOPHILS NFR BLD AUTO: 0 %
EOSINOPHIL # BLD AUTO: 0 10E3/UL (ref 0–0.7)
EOSINOPHIL NFR BLD AUTO: 0 %
ERYTHROCYTE [DISTWIDTH] IN BLOOD BY AUTOMATED COUNT: 13.2 % (ref 10–15)
HCT VFR BLD AUTO: 43.7 % (ref 35–47)
HGB BLD-MCNC: 13.9 G/DL (ref 11.7–15.7)
IMM GRANULOCYTES # BLD: 0 10E3/UL
IMM GRANULOCYTES NFR BLD: 1 %
LYMPHOCYTES # BLD AUTO: 1.3 10E3/UL (ref 0.8–5.3)
LYMPHOCYTES NFR BLD AUTO: 22 %
MCH RBC QN AUTO: 29.8 PG (ref 26.5–33)
MCHC RBC AUTO-ENTMCNC: 31.8 G/DL (ref 31.5–36.5)
MCV RBC AUTO: 94 FL (ref 78–100)
MONOCYTES # BLD AUTO: 0.4 10E3/UL (ref 0–1.3)
MONOCYTES NFR BLD AUTO: 7 %
NEUTROPHILS # BLD AUTO: 4.2 10E3/UL (ref 1.6–8.3)
NEUTROPHILS NFR BLD AUTO: 70 %
NRBC # BLD AUTO: 0 10E3/UL
NRBC BLD AUTO-RTO: 0 /100
PLATELET # BLD AUTO: 179 10E3/UL (ref 150–450)
RBC # BLD AUTO: 4.67 10E6/UL (ref 3.8–5.2)
WBC # BLD AUTO: 6 10E3/UL (ref 4–11)

## 2023-09-25 PROCEDURE — 36415 COLL VENOUS BLD VENIPUNCTURE: CPT | Mod: ORL | Performed by: NURSE PRACTITIONER

## 2023-09-25 PROCEDURE — 85025 COMPLETE CBC W/AUTO DIFF WBC: CPT | Mod: ORL | Performed by: NURSE PRACTITIONER

## 2023-09-25 PROCEDURE — P9604 ONE-WAY ALLOW PRORATED TRIP: HCPCS | Mod: ORL | Performed by: NURSE PRACTITIONER

## 2023-10-03 ENCOUNTER — LAB REQUISITION (OUTPATIENT)
Dept: LAB | Facility: CLINIC | Age: 73
End: 2023-10-03
Payer: MEDICARE

## 2023-10-09 LAB
BASO+EOS+MONOS # BLD AUTO: ABNORMAL 10*3/UL
BASO+EOS+MONOS NFR BLD AUTO: ABNORMAL %
BASOPHILS # BLD AUTO: 0 10E3/UL (ref 0–0.2)
BASOPHILS NFR BLD AUTO: 0 %
EOSINOPHIL # BLD AUTO: 0 10E3/UL (ref 0–0.7)
EOSINOPHIL NFR BLD AUTO: 0 %
ERYTHROCYTE [DISTWIDTH] IN BLOOD BY AUTOMATED COUNT: 13.2 % (ref 10–15)
HCT VFR BLD AUTO: 37.8 % (ref 35–47)
HGB BLD-MCNC: 12 G/DL (ref 11.7–15.7)
IMM GRANULOCYTES # BLD: 0 10E3/UL
IMM GRANULOCYTES NFR BLD: 0 %
LYMPHOCYTES # BLD AUTO: 0.7 10E3/UL (ref 0.8–5.3)
LYMPHOCYTES NFR BLD AUTO: 12 %
MCH RBC QN AUTO: 29.5 PG (ref 26.5–33)
MCHC RBC AUTO-ENTMCNC: 31.7 G/DL (ref 31.5–36.5)
MCV RBC AUTO: 93 FL (ref 78–100)
MONOCYTES # BLD AUTO: 0.4 10E3/UL (ref 0–1.3)
MONOCYTES NFR BLD AUTO: 6 %
NEUTROPHILS # BLD AUTO: 4.8 10E3/UL (ref 1.6–8.3)
NEUTROPHILS NFR BLD AUTO: 82 %
NRBC # BLD AUTO: 0 10E3/UL
NRBC BLD AUTO-RTO: 0 /100
PLATELET # BLD AUTO: 145 10E3/UL (ref 150–450)
RBC # BLD AUTO: 4.07 10E6/UL (ref 3.8–5.2)
WBC # BLD AUTO: 5.9 10E3/UL (ref 4–11)

## 2023-10-09 PROCEDURE — 36415 COLL VENOUS BLD VENIPUNCTURE: CPT | Mod: ORL | Performed by: NURSE PRACTITIONER

## 2023-10-09 PROCEDURE — P9604 ONE-WAY ALLOW PRORATED TRIP: HCPCS | Mod: ORL | Performed by: NURSE PRACTITIONER

## 2023-10-09 PROCEDURE — 85025 COMPLETE CBC W/AUTO DIFF WBC: CPT | Mod: ORL | Performed by: NURSE PRACTITIONER

## 2023-10-17 ENCOUNTER — LAB REQUISITION (OUTPATIENT)
Dept: LAB | Facility: CLINIC | Age: 73
End: 2023-10-17
Payer: MEDICARE

## 2023-10-23 LAB
BASO+EOS+MONOS # BLD AUTO: NORMAL 10*3/UL
BASO+EOS+MONOS NFR BLD AUTO: NORMAL %
BASOPHILS # BLD AUTO: 0 10E3/UL (ref 0–0.2)
BASOPHILS NFR BLD AUTO: 0 %
EOSINOPHIL # BLD AUTO: 0 10E3/UL (ref 0–0.7)
EOSINOPHIL NFR BLD AUTO: 0 %
ERYTHROCYTE [DISTWIDTH] IN BLOOD BY AUTOMATED COUNT: 13.3 % (ref 10–15)
HCT VFR BLD AUTO: 39.5 % (ref 35–47)
HGB BLD-MCNC: 12.8 G/DL (ref 11.7–15.7)
IMM GRANULOCYTES # BLD: 0 10E3/UL
IMM GRANULOCYTES NFR BLD: 1 %
LYMPHOCYTES # BLD AUTO: 1.4 10E3/UL (ref 0.8–5.3)
LYMPHOCYTES NFR BLD AUTO: 25 %
MCH RBC QN AUTO: 29.5 PG (ref 26.5–33)
MCHC RBC AUTO-ENTMCNC: 32.4 G/DL (ref 31.5–36.5)
MCV RBC AUTO: 91 FL (ref 78–100)
MONOCYTES # BLD AUTO: 0.5 10E3/UL (ref 0–1.3)
MONOCYTES NFR BLD AUTO: 8 %
NEUTROPHILS # BLD AUTO: 3.8 10E3/UL (ref 1.6–8.3)
NEUTROPHILS NFR BLD AUTO: 66 %
NRBC # BLD AUTO: 0 10E3/UL
NRBC BLD AUTO-RTO: 0 /100
PLATELET # BLD AUTO: 258 10E3/UL (ref 150–450)
RBC # BLD AUTO: 4.34 10E6/UL (ref 3.8–5.2)
WBC # BLD AUTO: 5.7 10E3/UL (ref 4–11)

## 2023-10-23 PROCEDURE — P9604 ONE-WAY ALLOW PRORATED TRIP: HCPCS | Mod: ORL | Performed by: NURSE PRACTITIONER

## 2023-10-23 PROCEDURE — 85025 COMPLETE CBC W/AUTO DIFF WBC: CPT | Mod: ORL | Performed by: NURSE PRACTITIONER

## 2023-10-23 PROCEDURE — 36415 COLL VENOUS BLD VENIPUNCTURE: CPT | Mod: ORL | Performed by: NURSE PRACTITIONER

## 2023-10-27 ENCOUNTER — LAB REQUISITION (OUTPATIENT)
Dept: LAB | Facility: CLINIC | Age: 73
End: 2023-10-27
Payer: MEDICARE

## 2023-10-30 LAB — TSH SERPL DL<=0.005 MIU/L-ACNC: 1.73 UIU/ML (ref 0.3–4.2)

## 2023-10-30 PROCEDURE — P9604 ONE-WAY ALLOW PRORATED TRIP: HCPCS | Mod: ORL | Performed by: NURSE PRACTITIONER

## 2023-10-30 PROCEDURE — 84443 ASSAY THYROID STIM HORMONE: CPT | Mod: ORL | Performed by: NURSE PRACTITIONER

## 2023-10-30 PROCEDURE — 36415 COLL VENOUS BLD VENIPUNCTURE: CPT | Performed by: NURSE PRACTITIONER

## 2023-11-01 ENCOUNTER — LAB REQUISITION (OUTPATIENT)
Dept: LAB | Facility: CLINIC | Age: 73
End: 2023-11-01
Payer: MEDICARE

## 2023-11-06 LAB
BASOPHILS # BLD AUTO: 0 10E3/UL (ref 0–0.2)
BASOPHILS NFR BLD AUTO: 0 %
EOSINOPHIL # BLD AUTO: 0 10E3/UL (ref 0–0.7)
EOSINOPHIL NFR BLD AUTO: 0 %
ERYTHROCYTE [DISTWIDTH] IN BLOOD BY AUTOMATED COUNT: 13.8 % (ref 10–15)
HCT VFR BLD AUTO: 40.6 % (ref 35–47)
HGB BLD-MCNC: 12.4 G/DL (ref 11.7–15.7)
IMM GRANULOCYTES # BLD: 0 10E3/UL
IMM GRANULOCYTES NFR BLD: 1 %
LYMPHOCYTES # BLD AUTO: 1.2 10E3/UL (ref 0.8–5.3)
LYMPHOCYTES NFR BLD AUTO: 22 %
MCH RBC QN AUTO: 29.1 PG (ref 26.5–33)
MCHC RBC AUTO-ENTMCNC: 30.5 G/DL (ref 31.5–36.5)
MCV RBC AUTO: 95 FL (ref 78–100)
MONOCYTES # BLD AUTO: 0.3 10E3/UL (ref 0–1.3)
MONOCYTES NFR BLD AUTO: 5 %
NEUTROPHILS # BLD AUTO: 3.9 10E3/UL (ref 1.6–8.3)
NEUTROPHILS NFR BLD AUTO: 72 %
NRBC # BLD AUTO: 0 10E3/UL
NRBC BLD AUTO-RTO: 0 /100
PLATELET # BLD AUTO: 176 10E3/UL (ref 150–450)
RBC # BLD AUTO: 4.26 10E6/UL (ref 3.8–5.2)
WBC # BLD AUTO: 5.5 10E3/UL (ref 4–11)

## 2023-11-06 PROCEDURE — P9604 ONE-WAY ALLOW PRORATED TRIP: HCPCS | Mod: ORL | Performed by: NURSE PRACTITIONER

## 2023-11-06 PROCEDURE — 36415 COLL VENOUS BLD VENIPUNCTURE: CPT | Mod: ORL | Performed by: NURSE PRACTITIONER

## 2023-11-06 PROCEDURE — 85025 COMPLETE CBC W/AUTO DIFF WBC: CPT | Mod: ORL | Performed by: NURSE PRACTITIONER

## 2023-11-15 ENCOUNTER — LAB REQUISITION (OUTPATIENT)
Dept: LAB | Facility: CLINIC | Age: 73
End: 2023-11-15
Payer: MEDICARE

## 2023-11-20 LAB
BASOPHILS # BLD AUTO: 0 10E3/UL (ref 0–0.2)
BASOPHILS NFR BLD AUTO: 0 %
EOSINOPHIL # BLD AUTO: 0 10E3/UL (ref 0–0.7)
EOSINOPHIL NFR BLD AUTO: 0 %
ERYTHROCYTE [DISTWIDTH] IN BLOOD BY AUTOMATED COUNT: 13.7 % (ref 10–15)
HCT VFR BLD AUTO: 40.6 % (ref 35–47)
HGB BLD-MCNC: 12.9 G/DL (ref 11.7–15.7)
IMM GRANULOCYTES # BLD: 0 10E3/UL
IMM GRANULOCYTES NFR BLD: 0 %
LYMPHOCYTES # BLD AUTO: 1.4 10E3/UL (ref 0.8–5.3)
LYMPHOCYTES NFR BLD AUTO: 23 %
MCH RBC QN AUTO: 29.1 PG (ref 26.5–33)
MCHC RBC AUTO-ENTMCNC: 31.8 G/DL (ref 31.5–36.5)
MCV RBC AUTO: 92 FL (ref 78–100)
MONOCYTES # BLD AUTO: 0.4 10E3/UL (ref 0–1.3)
MONOCYTES NFR BLD AUTO: 7 %
NEUTROPHILS # BLD AUTO: 4.2 10E3/UL (ref 1.6–8.3)
NEUTROPHILS NFR BLD AUTO: 70 %
NRBC # BLD AUTO: 0 10E3/UL
NRBC BLD AUTO-RTO: 0 /100
PLATELET # BLD AUTO: 196 10E3/UL (ref 150–450)
RBC # BLD AUTO: 4.43 10E6/UL (ref 3.8–5.2)
WBC # BLD AUTO: 6 10E3/UL (ref 4–11)

## 2023-11-20 PROCEDURE — 85025 COMPLETE CBC W/AUTO DIFF WBC: CPT | Mod: ORL | Performed by: NURSE PRACTITIONER

## 2023-11-20 PROCEDURE — 36415 COLL VENOUS BLD VENIPUNCTURE: CPT | Mod: ORL | Performed by: NURSE PRACTITIONER

## 2023-11-20 PROCEDURE — P9604 ONE-WAY ALLOW PRORATED TRIP: HCPCS | Mod: ORL | Performed by: NURSE PRACTITIONER

## 2023-11-29 ENCOUNTER — LAB REQUISITION (OUTPATIENT)
Dept: LAB | Facility: CLINIC | Age: 73
End: 2023-11-29
Payer: MEDICARE

## 2023-12-04 LAB
BASOPHILS # BLD AUTO: 0 10E3/UL (ref 0–0.2)
BASOPHILS NFR BLD AUTO: 0 %
EOSINOPHIL # BLD AUTO: 0 10E3/UL (ref 0–0.7)
EOSINOPHIL NFR BLD AUTO: 0 %
ERYTHROCYTE [DISTWIDTH] IN BLOOD BY AUTOMATED COUNT: 13.5 % (ref 10–15)
HCT VFR BLD AUTO: 37.6 % (ref 35–47)
HGB BLD-MCNC: 12.1 G/DL (ref 11.7–15.7)
IMM GRANULOCYTES # BLD: 0.1 10E3/UL
IMM GRANULOCYTES NFR BLD: 1 %
LYMPHOCYTES # BLD AUTO: 1.2 10E3/UL (ref 0.8–5.3)
LYMPHOCYTES NFR BLD AUTO: 21 %
MCH RBC QN AUTO: 29.3 PG (ref 26.5–33)
MCHC RBC AUTO-ENTMCNC: 32.2 G/DL (ref 31.5–36.5)
MCV RBC AUTO: 91 FL (ref 78–100)
MONOCYTES # BLD AUTO: 0.3 10E3/UL (ref 0–1.3)
MONOCYTES NFR BLD AUTO: 6 %
NEUTROPHILS # BLD AUTO: 4.1 10E3/UL (ref 1.6–8.3)
NEUTROPHILS NFR BLD AUTO: 72 %
NRBC # BLD AUTO: 0 10E3/UL
NRBC BLD AUTO-RTO: 0 /100
PLATELET # BLD AUTO: 202 10E3/UL (ref 150–450)
RBC # BLD AUTO: 4.13 10E6/UL (ref 3.8–5.2)
WBC # BLD AUTO: 5.7 10E3/UL (ref 4–11)

## 2023-12-04 PROCEDURE — P9604 ONE-WAY ALLOW PRORATED TRIP: HCPCS | Mod: ORL | Performed by: NURSE PRACTITIONER

## 2023-12-04 PROCEDURE — 85025 COMPLETE CBC W/AUTO DIFF WBC: CPT | Mod: ORL | Performed by: NURSE PRACTITIONER

## 2023-12-04 PROCEDURE — 36415 COLL VENOUS BLD VENIPUNCTURE: CPT | Mod: ORL | Performed by: NURSE PRACTITIONER

## 2023-12-13 ENCOUNTER — LAB REQUISITION (OUTPATIENT)
Dept: LAB | Facility: CLINIC | Age: 73
End: 2023-12-13
Payer: MEDICARE

## 2023-12-19 ENCOUNTER — LAB REQUISITION (OUTPATIENT)
Dept: LAB | Facility: CLINIC | Age: 73
End: 2023-12-19
Payer: MEDICARE

## 2023-12-20 LAB
BASOPHILS # BLD AUTO: 0 10E3/UL (ref 0–0.2)
BASOPHILS NFR BLD AUTO: 0 %
EOSINOPHIL # BLD AUTO: 0 10E3/UL (ref 0–0.7)
EOSINOPHIL NFR BLD AUTO: 0 %
ERYTHROCYTE [DISTWIDTH] IN BLOOD BY AUTOMATED COUNT: 13.8 % (ref 10–15)
HCT VFR BLD AUTO: 39.4 % (ref 35–47)
HGB BLD-MCNC: 12.6 G/DL (ref 11.7–15.7)
IMM GRANULOCYTES # BLD: 0 10E3/UL
IMM GRANULOCYTES NFR BLD: 1 %
LYMPHOCYTES # BLD AUTO: 1 10E3/UL (ref 0.8–5.3)
LYMPHOCYTES NFR BLD AUTO: 18 %
MCH RBC QN AUTO: 29.7 PG (ref 26.5–33)
MCHC RBC AUTO-ENTMCNC: 32 G/DL (ref 31.5–36.5)
MCV RBC AUTO: 93 FL (ref 78–100)
MONOCYTES # BLD AUTO: 0.4 10E3/UL (ref 0–1.3)
MONOCYTES NFR BLD AUTO: 7 %
NEUTROPHILS # BLD AUTO: 4.3 10E3/UL (ref 1.6–8.3)
NEUTROPHILS NFR BLD AUTO: 74 %
NRBC # BLD AUTO: 0 10E3/UL
NRBC BLD AUTO-RTO: 0 /100
PLATELET # BLD AUTO: 255 10E3/UL (ref 150–450)
RBC # BLD AUTO: 4.24 10E6/UL (ref 3.8–5.2)
WBC # BLD AUTO: 5.7 10E3/UL (ref 4–11)

## 2023-12-20 PROCEDURE — 36415 COLL VENOUS BLD VENIPUNCTURE: CPT | Mod: ORL | Performed by: NURSE PRACTITIONER

## 2023-12-20 PROCEDURE — P9604 ONE-WAY ALLOW PRORATED TRIP: HCPCS | Mod: ORL | Performed by: NURSE PRACTITIONER

## 2023-12-20 PROCEDURE — 85025 COMPLETE CBC W/AUTO DIFF WBC: CPT | Mod: ORL | Performed by: NURSE PRACTITIONER

## 2023-12-26 ENCOUNTER — LAB REQUISITION (OUTPATIENT)
Dept: LAB | Facility: CLINIC | Age: 73
End: 2023-12-26
Payer: MEDICARE

## 2023-12-27 ENCOUNTER — LAB REQUISITION (OUTPATIENT)
Dept: LAB | Facility: CLINIC | Age: 73
End: 2023-12-27
Payer: MEDICARE

## 2023-12-28 LAB
ANION GAP SERPL CALCULATED.3IONS-SCNC: 11 MMOL/L (ref 7–15)
BUN SERPL-MCNC: 7.8 MG/DL (ref 8–23)
CALCIUM SERPL-MCNC: 9.4 MG/DL (ref 8.8–10.2)
CHLORIDE SERPL-SCNC: 96 MMOL/L (ref 98–107)
CREAT SERPL-MCNC: 0.58 MG/DL (ref 0.51–0.95)
DEPRECATED HCO3 PLAS-SCNC: 25 MMOL/L (ref 22–29)
EGFRCR SERPLBLD CKD-EPI 2021: >90 ML/MIN/1.73M2
GLUCOSE SERPL-MCNC: 100 MG/DL (ref 70–99)
POTASSIUM SERPL-SCNC: 4.2 MMOL/L (ref 3.4–5.3)
SODIUM SERPL-SCNC: 132 MMOL/L (ref 135–145)

## 2023-12-28 PROCEDURE — 36415 COLL VENOUS BLD VENIPUNCTURE: CPT | Mod: ORL | Performed by: NURSE PRACTITIONER

## 2023-12-28 PROCEDURE — 80048 BASIC METABOLIC PNL TOTAL CA: CPT | Mod: ORL | Performed by: NURSE PRACTITIONER

## 2023-12-28 PROCEDURE — P9604 ONE-WAY ALLOW PRORATED TRIP: HCPCS | Mod: ORL | Performed by: NURSE PRACTITIONER

## 2024-01-03 LAB
BASOPHILS # BLD AUTO: 0 10E3/UL (ref 0–0.2)
BASOPHILS NFR BLD AUTO: 0 %
EOSINOPHIL # BLD AUTO: 0 10E3/UL (ref 0–0.7)
EOSINOPHIL NFR BLD AUTO: 0 %
ERYTHROCYTE [DISTWIDTH] IN BLOOD BY AUTOMATED COUNT: 13.8 % (ref 10–15)
HCT VFR BLD AUTO: 37 % (ref 35–47)
HGB BLD-MCNC: 11.9 G/DL (ref 11.7–15.7)
IMM GRANULOCYTES # BLD: 0 10E3/UL
IMM GRANULOCYTES NFR BLD: 1 %
LYMPHOCYTES # BLD AUTO: 1.3 10E3/UL (ref 0.8–5.3)
LYMPHOCYTES NFR BLD AUTO: 16 %
MCH RBC QN AUTO: 28.7 PG (ref 26.5–33)
MCHC RBC AUTO-ENTMCNC: 32.2 G/DL (ref 31.5–36.5)
MCV RBC AUTO: 89 FL (ref 78–100)
MONOCYTES # BLD AUTO: 0.6 10E3/UL (ref 0–1.3)
MONOCYTES NFR BLD AUTO: 7 %
NEUTROPHILS # BLD AUTO: 5.9 10E3/UL (ref 1.6–8.3)
NEUTROPHILS NFR BLD AUTO: 76 %
NRBC # BLD AUTO: 0 10E3/UL
NRBC BLD AUTO-RTO: 0 /100
PLATELET # BLD AUTO: 263 10E3/UL (ref 150–450)
RBC # BLD AUTO: 4.14 10E6/UL (ref 3.8–5.2)
WBC # BLD AUTO: 7.8 10E3/UL (ref 4–11)

## 2024-01-03 PROCEDURE — 36415 COLL VENOUS BLD VENIPUNCTURE: CPT | Mod: ORL | Performed by: NURSE PRACTITIONER

## 2024-01-03 PROCEDURE — P9604 ONE-WAY ALLOW PRORATED TRIP: HCPCS | Mod: ORL | Performed by: NURSE PRACTITIONER

## 2024-01-03 PROCEDURE — 85025 COMPLETE CBC W/AUTO DIFF WBC: CPT | Mod: ORL | Performed by: NURSE PRACTITIONER

## 2024-01-11 ENCOUNTER — LAB REQUISITION (OUTPATIENT)
Dept: LAB | Facility: CLINIC | Age: 74
End: 2024-01-11
Payer: MEDICARE

## 2024-01-16 ENCOUNTER — LAB REQUISITION (OUTPATIENT)
Dept: LAB | Facility: CLINIC | Age: 74
End: 2024-01-16
Payer: MEDICARE

## 2024-01-17 LAB
BASOPHILS # BLD AUTO: 0 10E3/UL (ref 0–0.2)
BASOPHILS NFR BLD AUTO: 0 %
EOSINOPHIL # BLD AUTO: 0 10E3/UL (ref 0–0.7)
EOSINOPHIL NFR BLD AUTO: 0 %
ERYTHROCYTE [DISTWIDTH] IN BLOOD BY AUTOMATED COUNT: 13.9 % (ref 10–15)
HCT VFR BLD AUTO: 39.3 % (ref 35–47)
HGB BLD-MCNC: 12.6 G/DL (ref 11.7–15.7)
IMM GRANULOCYTES # BLD: 0 10E3/UL
IMM GRANULOCYTES NFR BLD: 1 %
LYMPHOCYTES # BLD AUTO: 1.3 10E3/UL (ref 0.8–5.3)
LYMPHOCYTES NFR BLD AUTO: 19 %
MCH RBC QN AUTO: 28.6 PG (ref 26.5–33)
MCHC RBC AUTO-ENTMCNC: 32.1 G/DL (ref 31.5–36.5)
MCV RBC AUTO: 89 FL (ref 78–100)
MONOCYTES # BLD AUTO: 0.6 10E3/UL (ref 0–1.3)
MONOCYTES NFR BLD AUTO: 9 %
NEUTROPHILS # BLD AUTO: 5.1 10E3/UL (ref 1.6–8.3)
NEUTROPHILS NFR BLD AUTO: 71 %
NRBC # BLD AUTO: 0 10E3/UL
NRBC BLD AUTO-RTO: 0 /100
PLATELET # BLD AUTO: 269 10E3/UL (ref 150–450)
RBC # BLD AUTO: 4.41 10E6/UL (ref 3.8–5.2)
WBC # BLD AUTO: 7 10E3/UL (ref 4–11)

## 2024-01-17 PROCEDURE — 36415 COLL VENOUS BLD VENIPUNCTURE: CPT | Mod: ORL | Performed by: NURSE PRACTITIONER

## 2024-01-17 PROCEDURE — 85025 COMPLETE CBC W/AUTO DIFF WBC: CPT | Mod: ORL | Performed by: NURSE PRACTITIONER

## 2024-01-17 PROCEDURE — P9604 ONE-WAY ALLOW PRORATED TRIP: HCPCS | Mod: ORL | Performed by: NURSE PRACTITIONER

## 2024-01-18 LAB
BASOPHILS # BLD AUTO: 0 10E3/UL (ref 0–0.2)
BASOPHILS NFR BLD AUTO: 0 %
EOSINOPHIL # BLD AUTO: 0 10E3/UL (ref 0–0.7)
EOSINOPHIL NFR BLD AUTO: 0 %
ERYTHROCYTE [DISTWIDTH] IN BLOOD BY AUTOMATED COUNT: 13.6 % (ref 10–15)
HCT VFR BLD AUTO: 32.5 % (ref 35–47)
HGB BLD-MCNC: 10.6 G/DL (ref 11.7–15.7)
IMM GRANULOCYTES # BLD: 0.1 10E3/UL
IMM GRANULOCYTES NFR BLD: 1 %
LYMPHOCYTES # BLD AUTO: 1.1 10E3/UL (ref 0.8–5.3)
LYMPHOCYTES NFR BLD AUTO: 18 %
MCH RBC QN AUTO: 29 PG (ref 26.5–33)
MCHC RBC AUTO-ENTMCNC: 32.6 G/DL (ref 31.5–36.5)
MCV RBC AUTO: 89 FL (ref 78–100)
MONOCYTES # BLD AUTO: 0.6 10E3/UL (ref 0–1.3)
MONOCYTES NFR BLD AUTO: 9 %
NEUTROPHILS # BLD AUTO: 4.7 10E3/UL (ref 1.6–8.3)
NEUTROPHILS NFR BLD AUTO: 73 %
NRBC # BLD AUTO: 0 10E3/UL
NRBC BLD AUTO-RTO: 0 /100
PLATELET # BLD AUTO: 282 10E3/UL (ref 150–450)
RBC # BLD AUTO: 3.65 10E6/UL (ref 3.8–5.2)
WBC # BLD AUTO: 6.4 10E3/UL (ref 4–11)

## 2024-01-18 PROCEDURE — P9604 ONE-WAY ALLOW PRORATED TRIP: HCPCS | Mod: ORL | Performed by: NURSE PRACTITIONER

## 2024-01-18 PROCEDURE — 36415 COLL VENOUS BLD VENIPUNCTURE: CPT | Mod: ORL | Performed by: NURSE PRACTITIONER

## 2024-01-18 PROCEDURE — 85025 COMPLETE CBC W/AUTO DIFF WBC: CPT | Mod: ORL | Performed by: NURSE PRACTITIONER

## 2024-01-24 ENCOUNTER — LAB REQUISITION (OUTPATIENT)
Dept: LAB | Facility: CLINIC | Age: 74
End: 2024-01-24
Payer: MEDICARE

## 2024-01-29 LAB
BASOPHILS # BLD AUTO: 0 10E3/UL (ref 0–0.2)
BASOPHILS NFR BLD AUTO: 0 %
EOSINOPHIL # BLD AUTO: 0 10E3/UL (ref 0–0.7)
EOSINOPHIL NFR BLD AUTO: 0 %
ERYTHROCYTE [DISTWIDTH] IN BLOOD BY AUTOMATED COUNT: 13.7 % (ref 10–15)
HCT VFR BLD AUTO: 38.6 % (ref 35–47)
HGB BLD-MCNC: 12.4 G/DL (ref 11.7–15.7)
IMM GRANULOCYTES # BLD: 0 10E3/UL
IMM GRANULOCYTES NFR BLD: 1 %
LYMPHOCYTES # BLD AUTO: 1.2 10E3/UL (ref 0.8–5.3)
LYMPHOCYTES NFR BLD AUTO: 20 %
MCH RBC QN AUTO: 28.9 PG (ref 26.5–33)
MCHC RBC AUTO-ENTMCNC: 32.1 G/DL (ref 31.5–36.5)
MCV RBC AUTO: 90 FL (ref 78–100)
MONOCYTES # BLD AUTO: 0.4 10E3/UL (ref 0–1.3)
MONOCYTES NFR BLD AUTO: 7 %
NEUTROPHILS # BLD AUTO: 4.3 10E3/UL (ref 1.6–8.3)
NEUTROPHILS NFR BLD AUTO: 72 %
NRBC # BLD AUTO: 0 10E3/UL
NRBC BLD AUTO-RTO: 0 /100
PLATELET # BLD AUTO: 268 10E3/UL (ref 150–450)
RBC # BLD AUTO: 4.29 10E6/UL (ref 3.8–5.2)
WBC # BLD AUTO: 6 10E3/UL (ref 4–11)

## 2024-01-29 PROCEDURE — 85025 COMPLETE CBC W/AUTO DIFF WBC: CPT | Performed by: NURSE PRACTITIONER

## 2024-01-29 PROCEDURE — 36415 COLL VENOUS BLD VENIPUNCTURE: CPT | Performed by: NURSE PRACTITIONER

## 2024-01-29 PROCEDURE — P9604 ONE-WAY ALLOW PRORATED TRIP: HCPCS | Performed by: NURSE PRACTITIONER

## 2024-02-05 ENCOUNTER — LAB REQUISITION (OUTPATIENT)
Dept: LAB | Facility: CLINIC | Age: 74
End: 2024-02-05
Payer: MEDICARE

## 2024-02-05 LAB
FLUAV RNA SPEC QL NAA+PROBE: NEGATIVE
FLUBV RNA RESP QL NAA+PROBE: NEGATIVE
RSV RNA SPEC NAA+PROBE: NEGATIVE
SARS-COV-2 RNA RESP QL NAA+PROBE: NEGATIVE

## 2024-02-05 PROCEDURE — 87637 SARSCOV2&INF A&B&RSV AMP PRB: CPT | Mod: ORL | Performed by: INTERNAL MEDICINE

## 2024-02-08 ENCOUNTER — LAB REQUISITION (OUTPATIENT)
Dept: LAB | Facility: CLINIC | Age: 74
End: 2024-02-08
Payer: MEDICARE

## 2024-02-12 ENCOUNTER — LAB REQUISITION (OUTPATIENT)
Dept: LAB | Facility: CLINIC | Age: 74
End: 2024-02-12
Payer: MEDICARE

## 2024-02-12 LAB
BASOPHILS # BLD AUTO: 0 10E3/UL (ref 0–0.2)
BASOPHILS NFR BLD AUTO: 0 %
EOSINOPHIL # BLD AUTO: 0 10E3/UL (ref 0–0.7)
EOSINOPHIL NFR BLD AUTO: 0 %
ERYTHROCYTE [DISTWIDTH] IN BLOOD BY AUTOMATED COUNT: 13.5 % (ref 10–15)
FLUAV RNA SPEC QL NAA+PROBE: NEGATIVE
FLUBV RNA RESP QL NAA+PROBE: NEGATIVE
HCT VFR BLD AUTO: 38.6 % (ref 35–47)
HGB BLD-MCNC: 12.1 G/DL (ref 11.7–15.7)
IMM GRANULOCYTES # BLD: 0 10E3/UL
IMM GRANULOCYTES NFR BLD: 1 %
LYMPHOCYTES # BLD AUTO: 1.6 10E3/UL (ref 0.8–5.3)
LYMPHOCYTES NFR BLD AUTO: 26 %
MCH RBC QN AUTO: 28 PG (ref 26.5–33)
MCHC RBC AUTO-ENTMCNC: 31.3 G/DL (ref 31.5–36.5)
MCV RBC AUTO: 89 FL (ref 78–100)
MONOCYTES # BLD AUTO: 0.4 10E3/UL (ref 0–1.3)
MONOCYTES NFR BLD AUTO: 7 %
NEUTROPHILS # BLD AUTO: 4.1 10E3/UL (ref 1.6–8.3)
NEUTROPHILS NFR BLD AUTO: 66 %
NRBC # BLD AUTO: 0 10E3/UL
NRBC BLD AUTO-RTO: 0 /100
PLATELET # BLD AUTO: 208 10E3/UL (ref 150–450)
RBC # BLD AUTO: 4.32 10E6/UL (ref 3.8–5.2)
RSV RNA SPEC NAA+PROBE: NEGATIVE
SARS-COV-2 RNA RESP QL NAA+PROBE: NEGATIVE
WBC # BLD AUTO: 6.1 10E3/UL (ref 4–11)

## 2024-02-12 PROCEDURE — 87637 SARSCOV2&INF A&B&RSV AMP PRB: CPT | Mod: ORL | Performed by: NURSE PRACTITIONER

## 2024-02-12 PROCEDURE — 85025 COMPLETE CBC W/AUTO DIFF WBC: CPT | Mod: ORL | Performed by: NURSE PRACTITIONER

## 2024-02-12 PROCEDURE — P9604 ONE-WAY ALLOW PRORATED TRIP: HCPCS | Mod: ORL | Performed by: NURSE PRACTITIONER

## 2024-02-12 PROCEDURE — 36415 COLL VENOUS BLD VENIPUNCTURE: CPT | Mod: ORL | Performed by: NURSE PRACTITIONER

## 2024-02-19 ENCOUNTER — LAB REQUISITION (OUTPATIENT)
Dept: LAB | Facility: CLINIC | Age: 74
End: 2024-02-19
Payer: MEDICARE

## 2024-02-19 PROCEDURE — 87637 SARSCOV2&INF A&B&RSV AMP PRB: CPT | Mod: ORL | Performed by: NURSE PRACTITIONER

## 2024-02-20 ENCOUNTER — LAB REQUISITION (OUTPATIENT)
Dept: LAB | Facility: CLINIC | Age: 74
End: 2024-02-20
Payer: MEDICARE

## 2024-02-26 ENCOUNTER — LAB REQUISITION (OUTPATIENT)
Dept: LAB | Facility: CLINIC | Age: 74
End: 2024-02-26
Payer: MEDICARE

## 2024-02-26 LAB
BASOPHILS # BLD AUTO: 0 10E3/UL (ref 0–0.2)
BASOPHILS NFR BLD AUTO: 0 %
EOSINOPHIL # BLD AUTO: 0 10E3/UL (ref 0–0.7)
EOSINOPHIL NFR BLD AUTO: 0 %
ERYTHROCYTE [DISTWIDTH] IN BLOOD BY AUTOMATED COUNT: 13.5 % (ref 10–15)
FLUAV RNA SPEC QL NAA+PROBE: NEGATIVE
FLUBV RNA RESP QL NAA+PROBE: NEGATIVE
HCT VFR BLD AUTO: 41.3 % (ref 35–47)
HGB BLD-MCNC: 13.1 G/DL (ref 11.7–15.7)
IMM GRANULOCYTES # BLD: 0 10E3/UL
IMM GRANULOCYTES NFR BLD: 1 %
LYMPHOCYTES # BLD AUTO: 1.3 10E3/UL (ref 0.8–5.3)
LYMPHOCYTES NFR BLD AUTO: 25 %
MCH RBC QN AUTO: 28.7 PG (ref 26.5–33)
MCHC RBC AUTO-ENTMCNC: 31.7 G/DL (ref 31.5–36.5)
MCV RBC AUTO: 91 FL (ref 78–100)
MONOCYTES # BLD AUTO: 0.4 10E3/UL (ref 0–1.3)
MONOCYTES NFR BLD AUTO: 8 %
NEUTROPHILS # BLD AUTO: 3.4 10E3/UL (ref 1.6–8.3)
NEUTROPHILS NFR BLD AUTO: 66 %
NRBC # BLD AUTO: 0 10E3/UL
NRBC BLD AUTO-RTO: 0 /100
PLATELET # BLD AUTO: 187 10E3/UL (ref 150–450)
RBC # BLD AUTO: 4.56 10E6/UL (ref 3.8–5.2)
RSV RNA SPEC NAA+PROBE: NEGATIVE
SARS-COV-2 RNA RESP QL NAA+PROBE: NEGATIVE
WBC # BLD AUTO: 5.2 10E3/UL (ref 4–11)

## 2024-02-26 PROCEDURE — 87637 SARSCOV2&INF A&B&RSV AMP PRB: CPT | Mod: ORL | Performed by: INTERNAL MEDICINE

## 2024-02-26 PROCEDURE — P9604 ONE-WAY ALLOW PRORATED TRIP: HCPCS | Mod: ORL | Performed by: NURSE PRACTITIONER

## 2024-02-26 PROCEDURE — 36415 COLL VENOUS BLD VENIPUNCTURE: CPT | Mod: ORL | Performed by: NURSE PRACTITIONER

## 2024-02-26 PROCEDURE — 85025 COMPLETE CBC W/AUTO DIFF WBC: CPT | Mod: ORL | Performed by: NURSE PRACTITIONER

## 2024-03-04 ENCOUNTER — LAB REQUISITION (OUTPATIENT)
Dept: LAB | Facility: CLINIC | Age: 74
End: 2024-03-04
Payer: MEDICARE

## 2024-03-04 PROCEDURE — 87637 SARSCOV2&INF A&B&RSV AMP PRB: CPT | Mod: ORL | Performed by: INTERNAL MEDICINE

## 2024-03-06 ENCOUNTER — LAB REQUISITION (OUTPATIENT)
Dept: LAB | Facility: CLINIC | Age: 74
End: 2024-03-06
Payer: MEDICARE

## 2024-03-11 LAB
BASOPHILS # BLD AUTO: 0 10E3/UL (ref 0–0.2)
BASOPHILS NFR BLD AUTO: 0 %
EOSINOPHIL # BLD AUTO: 0.1 10E3/UL (ref 0–0.7)
EOSINOPHIL NFR BLD AUTO: 2 %
ERYTHROCYTE [DISTWIDTH] IN BLOOD BY AUTOMATED COUNT: 13.9 % (ref 10–15)
HCT VFR BLD AUTO: 39.9 % (ref 35–47)
HGB BLD-MCNC: 12.3 G/DL (ref 11.7–15.7)
IMM GRANULOCYTES # BLD: 0 10E3/UL
IMM GRANULOCYTES NFR BLD: 1 %
LYMPHOCYTES # BLD AUTO: 1.4 10E3/UL (ref 0.8–5.3)
LYMPHOCYTES NFR BLD AUTO: 24 %
MCH RBC QN AUTO: 27.8 PG (ref 26.5–33)
MCHC RBC AUTO-ENTMCNC: 30.8 G/DL (ref 31.5–36.5)
MCV RBC AUTO: 90 FL (ref 78–100)
MONOCYTES # BLD AUTO: 0.5 10E3/UL (ref 0–1.3)
MONOCYTES NFR BLD AUTO: 10 %
NEUTROPHILS # BLD AUTO: 3.5 10E3/UL (ref 1.6–8.3)
NEUTROPHILS NFR BLD AUTO: 63 %
NRBC # BLD AUTO: 0 10E3/UL
NRBC BLD AUTO-RTO: 0 /100
PLATELET # BLD AUTO: 183 10E3/UL (ref 150–450)
RBC # BLD AUTO: 4.42 10E6/UL (ref 3.8–5.2)
WBC # BLD AUTO: 5.6 10E3/UL (ref 4–11)

## 2024-03-11 PROCEDURE — 85025 COMPLETE CBC W/AUTO DIFF WBC: CPT | Mod: ORL | Performed by: NURSE PRACTITIONER

## 2024-03-11 PROCEDURE — 36415 COLL VENOUS BLD VENIPUNCTURE: CPT | Mod: ORL | Performed by: NURSE PRACTITIONER

## 2024-03-11 PROCEDURE — P9604 ONE-WAY ALLOW PRORATED TRIP: HCPCS | Mod: ORL | Performed by: NURSE PRACTITIONER

## 2024-03-16 ENCOUNTER — LAB REQUISITION (OUTPATIENT)
Dept: LAB | Facility: CLINIC | Age: 74
End: 2024-03-16
Payer: MEDICARE

## 2024-03-16 LAB
ALBUMIN UR-MCNC: NEGATIVE MG/DL
APPEARANCE UR: CLEAR
BILIRUB UR QL STRIP: NEGATIVE
COLOR UR AUTO: NORMAL
GLUCOSE UR STRIP-MCNC: NEGATIVE MG/DL
HGB UR QL STRIP: NEGATIVE
KETONES UR STRIP-MCNC: NEGATIVE MG/DL
LEUKOCYTE ESTERASE UR QL STRIP: NEGATIVE
NITRATE UR QL: NEGATIVE
PH UR STRIP: 6 [PH] (ref 5–7)
SP GR UR STRIP: 1.01 (ref 1–1.03)
UROBILINOGEN UR STRIP-MCNC: NORMAL MG/DL

## 2024-03-16 PROCEDURE — 81003 URINALYSIS AUTO W/O SCOPE: CPT | Mod: ORL | Performed by: INTERNAL MEDICINE

## 2024-03-20 PROCEDURE — P9604 ONE-WAY ALLOW PRORATED TRIP: HCPCS | Mod: ORL | Performed by: NURSE PRACTITIONER

## 2024-03-20 PROCEDURE — 36415 COLL VENOUS BLD VENIPUNCTURE: CPT | Mod: ORL | Performed by: NURSE PRACTITIONER

## 2024-03-20 PROCEDURE — 85025 COMPLETE CBC W/AUTO DIFF WBC: CPT | Mod: ORL | Performed by: NURSE PRACTITIONER

## 2024-03-21 ENCOUNTER — LAB REQUISITION (OUTPATIENT)
Dept: LAB | Facility: CLINIC | Age: 74
End: 2024-03-21
Payer: MEDICARE

## 2024-03-25 LAB
BASOPHILS # BLD AUTO: 0 10E3/UL (ref 0–0.2)
BASOPHILS NFR BLD AUTO: 0 %
EOSINOPHIL # BLD AUTO: 0 10E3/UL (ref 0–0.7)
EOSINOPHIL NFR BLD AUTO: 0 %
ERYTHROCYTE [DISTWIDTH] IN BLOOD BY AUTOMATED COUNT: 13.8 % (ref 10–15)
HCT VFR BLD AUTO: 35.7 % (ref 35–47)
HGB BLD-MCNC: 11.2 G/DL (ref 11.7–15.7)
IMM GRANULOCYTES # BLD: 0.1 10E3/UL
IMM GRANULOCYTES NFR BLD: 1 %
LYMPHOCYTES # BLD AUTO: 1.5 10E3/UL (ref 0.8–5.3)
LYMPHOCYTES NFR BLD AUTO: 22 %
MCH RBC QN AUTO: 27.7 PG (ref 26.5–33)
MCHC RBC AUTO-ENTMCNC: 31.4 G/DL (ref 31.5–36.5)
MCV RBC AUTO: 88 FL (ref 78–100)
MONOCYTES # BLD AUTO: 0.5 10E3/UL (ref 0–1.3)
MONOCYTES NFR BLD AUTO: 8 %
NEUTROPHILS # BLD AUTO: 4.7 10E3/UL (ref 1.6–8.3)
NEUTROPHILS NFR BLD AUTO: 69 %
NRBC # BLD AUTO: 0 10E3/UL
NRBC BLD AUTO-RTO: 0 /100
PLATELET # BLD AUTO: 226 10E3/UL (ref 150–450)
RBC # BLD AUTO: 4.04 10E6/UL (ref 3.8–5.2)
WBC # BLD AUTO: 6.9 10E3/UL (ref 4–11)

## 2024-04-03 ENCOUNTER — LAB REQUISITION (OUTPATIENT)
Dept: LAB | Facility: CLINIC | Age: 74
End: 2024-04-03
Payer: MEDICARE

## 2024-04-05 ENCOUNTER — LAB REQUISITION (OUTPATIENT)
Dept: LAB | Facility: CLINIC | Age: 74
End: 2024-04-05
Payer: MEDICARE

## 2024-04-08 LAB
BASOPHILS # BLD AUTO: 0 10E3/UL (ref 0–0.2)
BASOPHILS NFR BLD AUTO: 0 %
EOSINOPHIL # BLD AUTO: 0 10E3/UL (ref 0–0.7)
EOSINOPHIL NFR BLD AUTO: 0 %
ERYTHROCYTE [DISTWIDTH] IN BLOOD BY AUTOMATED COUNT: 14.4 % (ref 10–15)
HCT VFR BLD AUTO: 38.5 % (ref 35–47)
HGB BLD-MCNC: 12.2 G/DL (ref 11.7–15.7)
IMM GRANULOCYTES # BLD: 0 10E3/UL
IMM GRANULOCYTES NFR BLD: 0 %
LYMPHOCYTES # BLD AUTO: 1.3 10E3/UL (ref 0.8–5.3)
LYMPHOCYTES NFR BLD AUTO: 23 %
MCH RBC QN AUTO: 28.4 PG (ref 26.5–33)
MCHC RBC AUTO-ENTMCNC: 31.7 G/DL (ref 31.5–36.5)
MCV RBC AUTO: 90 FL (ref 78–100)
MONOCYTES # BLD AUTO: 0.4 10E3/UL (ref 0–1.3)
MONOCYTES NFR BLD AUTO: 7 %
NEUTROPHILS # BLD AUTO: 3.8 10E3/UL (ref 1.6–8.3)
NEUTROPHILS NFR BLD AUTO: 70 %
NRBC # BLD AUTO: 0 10E3/UL
NRBC BLD AUTO-RTO: 0 /100
PLATELET # BLD AUTO: 251 10E3/UL (ref 150–450)
RBC # BLD AUTO: 4.29 10E6/UL (ref 3.8–5.2)
WBC # BLD AUTO: 5.5 10E3/UL (ref 4–11)

## 2024-04-08 PROCEDURE — P9604 ONE-WAY ALLOW PRORATED TRIP: HCPCS | Mod: ORL | Performed by: INTERNAL MEDICINE

## 2024-04-08 PROCEDURE — 85025 COMPLETE CBC W/AUTO DIFF WBC: CPT | Mod: ORL | Performed by: INTERNAL MEDICINE

## 2024-04-08 PROCEDURE — 36415 COLL VENOUS BLD VENIPUNCTURE: CPT | Mod: ORL | Performed by: INTERNAL MEDICINE

## 2024-04-16 ENCOUNTER — LAB REQUISITION (OUTPATIENT)
Dept: LAB | Facility: CLINIC | Age: 74
End: 2024-04-16
Payer: MEDICARE

## 2024-04-22 LAB
BASOPHILS # BLD AUTO: 0 10E3/UL (ref 0–0.2)
BASOPHILS NFR BLD AUTO: 0 %
EOSINOPHIL # BLD AUTO: 0 10E3/UL (ref 0–0.7)
EOSINOPHIL NFR BLD AUTO: 0 %
ERYTHROCYTE [DISTWIDTH] IN BLOOD BY AUTOMATED COUNT: 14.6 % (ref 10–15)
HCT VFR BLD AUTO: 38 % (ref 35–47)
HGB BLD-MCNC: 12 G/DL (ref 11.7–15.7)
IMM GRANULOCYTES # BLD: 0 10E3/UL
IMM GRANULOCYTES NFR BLD: 0 %
LYMPHOCYTES # BLD AUTO: 1.3 10E3/UL (ref 0.8–5.3)
LYMPHOCYTES NFR BLD AUTO: 20 %
MCH RBC QN AUTO: 27.8 PG (ref 26.5–33)
MCHC RBC AUTO-ENTMCNC: 31.6 G/DL (ref 31.5–36.5)
MCV RBC AUTO: 88 FL (ref 78–100)
MONOCYTES # BLD AUTO: 0.4 10E3/UL (ref 0–1.3)
MONOCYTES NFR BLD AUTO: 6 %
NEUTROPHILS # BLD AUTO: 4.7 10E3/UL (ref 1.6–8.3)
NEUTROPHILS NFR BLD AUTO: 74 %
NRBC # BLD AUTO: 0 10E3/UL
NRBC BLD AUTO-RTO: 0 /100
PLATELET # BLD AUTO: 214 10E3/UL (ref 150–450)
RBC # BLD AUTO: 4.31 10E6/UL (ref 3.8–5.2)
WBC # BLD AUTO: 6.5 10E3/UL (ref 4–11)

## 2024-04-22 PROCEDURE — 85025 COMPLETE CBC W/AUTO DIFF WBC: CPT | Mod: ORL | Performed by: INTERNAL MEDICINE

## 2024-04-22 PROCEDURE — P9604 ONE-WAY ALLOW PRORATED TRIP: HCPCS | Mod: ORL | Performed by: INTERNAL MEDICINE

## 2024-04-22 PROCEDURE — 36415 COLL VENOUS BLD VENIPUNCTURE: CPT | Mod: ORL | Performed by: INTERNAL MEDICINE

## 2024-05-03 ENCOUNTER — LAB REQUISITION (OUTPATIENT)
Dept: LAB | Facility: CLINIC | Age: 74
End: 2024-05-03
Payer: MEDICARE

## 2024-05-06 LAB
BASOPHILS # BLD AUTO: 0 10E3/UL (ref 0–0.2)
BASOPHILS NFR BLD AUTO: 0 %
EOSINOPHIL # BLD AUTO: 0 10E3/UL (ref 0–0.7)
EOSINOPHIL NFR BLD AUTO: 0 %
ERYTHROCYTE [DISTWIDTH] IN BLOOD BY AUTOMATED COUNT: 14.2 % (ref 10–15)
HCT VFR BLD AUTO: 39.4 % (ref 35–47)
HGB BLD-MCNC: 12.4 G/DL (ref 11.7–15.7)
IMM GRANULOCYTES # BLD: 0 10E3/UL
IMM GRANULOCYTES NFR BLD: 1 %
LYMPHOCYTES # BLD AUTO: 1.6 10E3/UL (ref 0.8–5.3)
LYMPHOCYTES NFR BLD AUTO: 26 %
MCH RBC QN AUTO: 27.9 PG (ref 26.5–33)
MCHC RBC AUTO-ENTMCNC: 31.5 G/DL (ref 31.5–36.5)
MCV RBC AUTO: 89 FL (ref 78–100)
MONOCYTES # BLD AUTO: 0.4 10E3/UL (ref 0–1.3)
MONOCYTES NFR BLD AUTO: 7 %
NEUTROPHILS # BLD AUTO: 4.1 10E3/UL (ref 1.6–8.3)
NEUTROPHILS NFR BLD AUTO: 66 %
NRBC # BLD AUTO: 0 10E3/UL
NRBC BLD AUTO-RTO: 0 /100
PLATELET # BLD AUTO: 196 10E3/UL (ref 150–450)
RBC # BLD AUTO: 4.44 10E6/UL (ref 3.8–5.2)
WBC # BLD AUTO: 6.2 10E3/UL (ref 4–11)

## 2024-05-06 PROCEDURE — P9604 ONE-WAY ALLOW PRORATED TRIP: HCPCS | Mod: ORL | Performed by: INTERNAL MEDICINE

## 2024-05-06 PROCEDURE — 36415 COLL VENOUS BLD VENIPUNCTURE: CPT | Mod: ORL | Performed by: INTERNAL MEDICINE

## 2024-05-06 PROCEDURE — 85025 COMPLETE CBC W/AUTO DIFF WBC: CPT | Mod: ORL | Performed by: INTERNAL MEDICINE

## 2024-05-16 ENCOUNTER — LAB REQUISITION (OUTPATIENT)
Dept: LAB | Facility: CLINIC | Age: 74
End: 2024-05-16
Payer: MEDICARE

## 2024-05-20 LAB
BASOPHILS # BLD AUTO: 0 10E3/UL (ref 0–0.2)
BASOPHILS NFR BLD AUTO: 0 %
EOSINOPHIL # BLD AUTO: 0 10E3/UL (ref 0–0.7)
EOSINOPHIL NFR BLD AUTO: 0 %
ERYTHROCYTE [DISTWIDTH] IN BLOOD BY AUTOMATED COUNT: 14.3 % (ref 10–15)
HCT VFR BLD AUTO: 40.7 % (ref 35–47)
HGB BLD-MCNC: 12.9 G/DL (ref 11.7–15.7)
IMM GRANULOCYTES # BLD: 0 10E3/UL
IMM GRANULOCYTES NFR BLD: 0 %
LYMPHOCYTES # BLD AUTO: 1.5 10E3/UL (ref 0.8–5.3)
LYMPHOCYTES NFR BLD AUTO: 19 %
MCH RBC QN AUTO: 28.3 PG (ref 26.5–33)
MCHC RBC AUTO-ENTMCNC: 31.7 G/DL (ref 31.5–36.5)
MCV RBC AUTO: 89 FL (ref 78–100)
MONOCYTES # BLD AUTO: 0.6 10E3/UL (ref 0–1.3)
MONOCYTES NFR BLD AUTO: 7 %
NEUTROPHILS # BLD AUTO: 5.7 10E3/UL (ref 1.6–8.3)
NEUTROPHILS NFR BLD AUTO: 74 %
NRBC # BLD AUTO: 0 10E3/UL
NRBC BLD AUTO-RTO: 0 /100
PLATELET # BLD AUTO: 265 10E3/UL (ref 150–450)
RBC # BLD AUTO: 4.56 10E6/UL (ref 3.8–5.2)
WBC # BLD AUTO: 7.9 10E3/UL (ref 4–11)

## 2024-05-20 PROCEDURE — 36415 COLL VENOUS BLD VENIPUNCTURE: CPT | Mod: ORL | Performed by: INTERNAL MEDICINE

## 2024-05-20 PROCEDURE — P9604 ONE-WAY ALLOW PRORATED TRIP: HCPCS | Mod: ORL | Performed by: INTERNAL MEDICINE

## 2024-05-20 PROCEDURE — 85025 COMPLETE CBC W/AUTO DIFF WBC: CPT | Mod: ORL | Performed by: INTERNAL MEDICINE

## 2024-05-28 ENCOUNTER — LAB REQUISITION (OUTPATIENT)
Dept: LAB | Facility: CLINIC | Age: 74
End: 2024-05-28
Payer: MEDICARE

## 2024-06-03 LAB
BASOPHILS # BLD AUTO: 0 10E3/UL (ref 0–0.2)
BASOPHILS NFR BLD AUTO: 0 %
EOSINOPHIL # BLD AUTO: 0 10E3/UL (ref 0–0.7)
EOSINOPHIL NFR BLD AUTO: 0 %
ERYTHROCYTE [DISTWIDTH] IN BLOOD BY AUTOMATED COUNT: 14.2 % (ref 10–15)
HCT VFR BLD AUTO: 37.6 % (ref 35–47)
HGB BLD-MCNC: 11.8 G/DL (ref 11.7–15.7)
IMM GRANULOCYTES # BLD: 0 10E3/UL
IMM GRANULOCYTES NFR BLD: 0 %
LYMPHOCYTES # BLD AUTO: 1.1 10E3/UL (ref 0.8–5.3)
LYMPHOCYTES NFR BLD AUTO: 14 %
MCH RBC QN AUTO: 28.2 PG (ref 26.5–33)
MCHC RBC AUTO-ENTMCNC: 31.4 G/DL (ref 31.5–36.5)
MCV RBC AUTO: 90 FL (ref 78–100)
MONOCYTES # BLD AUTO: 0.4 10E3/UL (ref 0–1.3)
MONOCYTES NFR BLD AUTO: 5 %
NEUTROPHILS # BLD AUTO: 6.7 10E3/UL (ref 1.6–8.3)
NEUTROPHILS NFR BLD AUTO: 81 %
NRBC # BLD AUTO: 0 10E3/UL
NRBC BLD AUTO-RTO: 0 /100
PLATELET # BLD AUTO: 166 10E3/UL (ref 150–450)
RBC # BLD AUTO: 4.18 10E6/UL (ref 3.8–5.2)
WBC # BLD AUTO: 8.2 10E3/UL (ref 4–11)

## 2024-06-03 PROCEDURE — P9604 ONE-WAY ALLOW PRORATED TRIP: HCPCS | Mod: ORL | Performed by: INTERNAL MEDICINE

## 2024-06-03 PROCEDURE — 36415 COLL VENOUS BLD VENIPUNCTURE: CPT | Mod: ORL | Performed by: INTERNAL MEDICINE

## 2024-06-03 PROCEDURE — 85025 COMPLETE CBC W/AUTO DIFF WBC: CPT | Mod: ORL | Performed by: INTERNAL MEDICINE

## 2024-06-11 ENCOUNTER — LAB REQUISITION (OUTPATIENT)
Dept: LAB | Facility: CLINIC | Age: 74
End: 2024-06-11
Payer: MEDICARE

## 2024-06-17 LAB
BASOPHILS # BLD AUTO: 0 10E3/UL (ref 0–0.2)
BASOPHILS NFR BLD AUTO: 0 %
EOSINOPHIL # BLD AUTO: 0 10E3/UL (ref 0–0.7)
EOSINOPHIL NFR BLD AUTO: 0 %
ERYTHROCYTE [DISTWIDTH] IN BLOOD BY AUTOMATED COUNT: 13.7 % (ref 10–15)
HCT VFR BLD AUTO: 35 % (ref 35–47)
HGB BLD-MCNC: 11 G/DL (ref 11.7–15.7)
IMM GRANULOCYTES # BLD: 0.1 10E3/UL
IMM GRANULOCYTES NFR BLD: 1 %
LYMPHOCYTES # BLD AUTO: 1.3 10E3/UL (ref 0.8–5.3)
LYMPHOCYTES NFR BLD AUTO: 15 %
MCH RBC QN AUTO: 28.4 PG (ref 26.5–33)
MCHC RBC AUTO-ENTMCNC: 31.4 G/DL (ref 31.5–36.5)
MCV RBC AUTO: 90 FL (ref 78–100)
MONOCYTES # BLD AUTO: 0.5 10E3/UL (ref 0–1.3)
MONOCYTES NFR BLD AUTO: 6 %
NEUTROPHILS # BLD AUTO: 6.7 10E3/UL (ref 1.6–8.3)
NEUTROPHILS NFR BLD AUTO: 78 %
NRBC # BLD AUTO: 0 10E3/UL
NRBC BLD AUTO-RTO: 0 /100
PLATELET # BLD AUTO: 302 10E3/UL (ref 150–450)
RBC # BLD AUTO: 3.87 10E6/UL (ref 3.8–5.2)
WBC # BLD AUTO: 8.5 10E3/UL (ref 4–11)

## 2024-06-17 PROCEDURE — P9604 ONE-WAY ALLOW PRORATED TRIP: HCPCS | Mod: ORL | Performed by: INTERNAL MEDICINE

## 2024-06-17 PROCEDURE — 85025 COMPLETE CBC W/AUTO DIFF WBC: CPT | Mod: ORL | Performed by: INTERNAL MEDICINE

## 2024-06-17 PROCEDURE — 36415 COLL VENOUS BLD VENIPUNCTURE: CPT | Mod: ORL | Performed by: INTERNAL MEDICINE

## 2024-06-23 ENCOUNTER — APPOINTMENT (OUTPATIENT)
Dept: CT IMAGING | Facility: CLINIC | Age: 74
End: 2024-06-23
Attending: EMERGENCY MEDICINE
Payer: MEDICARE

## 2024-06-23 ENCOUNTER — APPOINTMENT (OUTPATIENT)
Dept: GENERAL RADIOLOGY | Facility: CLINIC | Age: 74
End: 2024-06-23
Attending: EMERGENCY MEDICINE
Payer: MEDICARE

## 2024-06-23 ENCOUNTER — HOSPITAL ENCOUNTER (EMERGENCY)
Facility: CLINIC | Age: 74
Discharge: HOME | End: 2024-06-23
Attending: EMERGENCY MEDICINE | Admitting: EMERGENCY MEDICINE
Payer: MEDICARE

## 2024-06-23 VITALS
SYSTOLIC BLOOD PRESSURE: 121 MMHG | TEMPERATURE: 98.3 F | OXYGEN SATURATION: 95 % | HEART RATE: 93 BPM | RESPIRATION RATE: 18 BRPM | DIASTOLIC BLOOD PRESSURE: 82 MMHG
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
## 2024-06-23 DIAGNOSIS — S01.01XA LACERATION OF SCALP WITHOUT FOREIGN BODY, INITIAL ENCOUNTER: ICD-10-CM

## 2024-06-23 DIAGNOSIS — G24.01 TARDIVE DYSKINESIA: ICD-10-CM

## 2024-06-23 LAB
ALBUMIN SERPL BCG-MCNC: 3.7 G/DL (ref 3.5–5.2)
ALP SERPL-CCNC: 137 U/L (ref 40–150)
ALT SERPL W P-5'-P-CCNC: 14 U/L (ref 0–50)
ANION GAP SERPL CALCULATED.3IONS-SCNC: 10 MMOL/L (ref 7–15)
AST SERPL W P-5'-P-CCNC: 19 U/L (ref 0–45)
ATRIAL RATE - MUSE: 86 BPM
BASOPHILS # BLD AUTO: 0 10E3/UL (ref 0–0.2)
BASOPHILS NFR BLD AUTO: 0 %
BILIRUB SERPL-MCNC: <0.2 MG/DL
BUN SERPL-MCNC: 9.6 MG/DL (ref 8–23)
CALCIUM SERPL-MCNC: 8.4 MG/DL (ref 8.8–10.2)
CHLORIDE SERPL-SCNC: 93 MMOL/L (ref 98–107)
CREAT SERPL-MCNC: 0.6 MG/DL (ref 0.51–0.95)
DEPRECATED HCO3 PLAS-SCNC: 24 MMOL/L (ref 22–29)
DIASTOLIC BLOOD PRESSURE - MUSE: NORMAL MMHG
EGFRCR SERPLBLD CKD-EPI 2021: >90 ML/MIN/1.73M2
EOSINOPHIL # BLD AUTO: 0 10E3/UL (ref 0–0.7)
EOSINOPHIL NFR BLD AUTO: 0 %
ERYTHROCYTE [DISTWIDTH] IN BLOOD BY AUTOMATED COUNT: 13.3 % (ref 10–15)
GLUCOSE SERPL-MCNC: 90 MG/DL (ref 70–99)
HCT VFR BLD AUTO: 32.9 % (ref 35–47)
HGB BLD-MCNC: 10.6 G/DL (ref 11.7–15.7)
HOLD SPECIMEN: NORMAL
HOLD SPECIMEN: NORMAL
IMM GRANULOCYTES # BLD: 0.1 10E3/UL
IMM GRANULOCYTES NFR BLD: 1 %
INTERPRETATION ECG - MUSE: NORMAL
LYMPHOCYTES # BLD AUTO: 1.7 10E3/UL (ref 0.8–5.3)
LYMPHOCYTES NFR BLD AUTO: 23 %
MCH RBC QN AUTO: 28.6 PG (ref 26.5–33)
MCHC RBC AUTO-ENTMCNC: 32.2 G/DL (ref 31.5–36.5)
MCV RBC AUTO: 89 FL (ref 78–100)
MONOCYTES # BLD AUTO: 0.6 10E3/UL (ref 0–1.3)
MONOCYTES NFR BLD AUTO: 8 %
NEUTROPHILS # BLD AUTO: 5 10E3/UL (ref 1.6–8.3)
NEUTROPHILS NFR BLD AUTO: 68 %
NRBC # BLD AUTO: 0 10E3/UL
NRBC BLD AUTO-RTO: 0 /100
P AXIS - MUSE: 40 DEGREES
PLATELET # BLD AUTO: 273 10E3/UL (ref 150–450)
POTASSIUM SERPL-SCNC: 4 MMOL/L (ref 3.4–5.3)
PR INTERVAL - MUSE: 142 MS
PROT SERPL-MCNC: 6.8 G/DL (ref 6.4–8.3)
QRS DURATION - MUSE: 84 MS
QT - MUSE: 372 MS
QTC - MUSE: 445 MS
R AXIS - MUSE: 44 DEGREES
RBC # BLD AUTO: 3.7 10E6/UL (ref 3.8–5.2)
SODIUM SERPL-SCNC: 127 MMOL/L (ref 135–145)
SYSTOLIC BLOOD PRESSURE - MUSE: NORMAL MMHG
T AXIS - MUSE: 43 DEGREES
VENTRICULAR RATE- MUSE: 86 BPM
WBC # BLD AUTO: 7.4 10E3/UL (ref 4–11)

## 2024-06-23 PROCEDURE — 70450 CT HEAD/BRAIN W/O DYE: CPT | Mod: MA

## 2024-06-23 PROCEDURE — 12001 RPR S/N/AX/GEN/TRNK 2.5CM/<: CPT

## 2024-06-23 PROCEDURE — 72125 CT NECK SPINE W/O DYE: CPT | Mod: MA

## 2024-06-23 PROCEDURE — 80053 COMPREHEN METABOLIC PANEL: CPT | Performed by: EMERGENCY MEDICINE

## 2024-06-23 PROCEDURE — 85025 COMPLETE CBC W/AUTO DIFF WBC: CPT | Performed by: EMERGENCY MEDICINE

## 2024-06-23 PROCEDURE — 73502 X-RAY EXAM HIP UNI 2-3 VIEWS: CPT

## 2024-06-23 PROCEDURE — 36415 COLL VENOUS BLD VENIPUNCTURE: CPT | Performed by: EMERGENCY MEDICINE

## 2024-06-23 PROCEDURE — 93005 ELECTROCARDIOGRAM TRACING: CPT | Mod: XU

## 2024-06-23 PROCEDURE — 99285 EMERGENCY DEPT VISIT HI MDM: CPT | Mod: 25

## 2024-06-23 PROCEDURE — 250N000013 HC RX MED GY IP 250 OP 250 PS 637: Performed by: EMERGENCY MEDICINE

## 2024-06-23 RX ORDER — ACETAMINOPHEN 500 MG
1000 TABLET ORAL ONCE
Status: COMPLETED | OUTPATIENT
Start: 2024-06-23 | End: 2024-06-23

## 2024-06-23 RX ADMIN — ACETAMINOPHEN 1000 MG: 500 TABLET, FILM COATED ORAL at 15:48

## 2024-06-23 ASSESSMENT — COLUMBIA-SUICIDE SEVERITY RATING SCALE - C-SSRS
6. HAVE YOU EVER DONE ANYTHING, STARTED TO DO ANYTHING, OR PREPARED TO DO ANYTHING TO END YOUR LIFE?: NO
2. HAVE YOU ACTUALLY HAD ANY THOUGHTS OF KILLING YOURSELF IN THE PAST MONTH?: NO
1. IN THE PAST MONTH, HAVE YOU WISHED YOU WERE DEAD OR WISHED YOU COULD GO TO SLEEP AND NOT WAKE UP?: NO

## 2024-06-23 ASSESSMENT — ACTIVITIES OF DAILY LIVING (ADL)
ADLS_ACUITY_SCORE: 38

## 2024-06-23 NOTE — ED NOTES
Bed: ED21  Expected date:   Expected time:   Means of arrival:   Comments:  Hems [address] 74 F fall head lac no thinners eta 1339

## 2024-06-23 NOTE — ED TRIAGE NOTES
Patient BIBA after feeling dizzy and falling backwards hitting back of head. Denies LOC, no thinners. Has hx of Tardive Dyskinesia.     Patient lives in [facility]

## 2024-06-23 NOTE — ED PROVIDER NOTES
Emergency Department Note      History of Present Illness     Chief Complaint  Fall and Laceration    HPI  Liz Lieberman is a 74 year old female with a history of tardive dyskinesia and dementia who presents following a fall. She hit her head and has a laceration to the back of her head. No neck pain. She has low back pain which started today. No LOC. She adds she feel after she stumbled getting out of the store. She denies leg pain.     Independent Historian  None    Review of External Notes  None  Past Medical History   Medical History and Problem List  Allergic rhinitis  Alcohol use disorder  Bronchiectasis  Chronic pain  Depression  Dysphagia, pharyngeal phase   Diverticulosis  Dementia   GERD  Hearing loss  Hiatal hernia  History of blood transfusion  Hyperlipidemia  Hypertension   Hypothyroidism  RAJANI  Leukocytosis  Left ovarian cyst   Lung nodule  Mild intermittent asthma  Osteoarthritis  Paranoid schizophrenia  Pneumonia   RLS  Tardive dyskinesia  TIA   Urinary incontinence   Vitamin D deficiency  Vitreous degeneration      Medications  Albuterol inhaler  Albuterol neb solution  Amlodipine   Atorvastatin  Benztropine   Calcium Carbonate-Vitamin D  Clozapine   Ferrous Gluconate 324  Folic acid  Levothyroxine   Mirabegron   Omeprazole  Sertraline  Thiamine    Surgical History   Arthroplasty, knee, left  Tonsillectomy  Right total knee replacement     Physical Exam   Patient Vitals for the past 24 hrs:   BP Temp Temp src Pulse Resp SpO2   06/23/24 1504 121/82 -- -- 93 18 95 %   06/23/24 1500 121/82 -- -- 88 18 96 %   06/23/24 1400 129/66 -- -- -- -- 97 %   06/23/24 1344 99/69 98.3  F (36.8  C) Oral 87 18 95 %     Physical Exam  Constitutional: Vital signs reviewed as above  General: Alert, uncomfortable appearing. Flailing around in bed.   HEENT: Moist mucous membranes. 2 cm laceration to right lower scalp.   Eyes: Conjunctiva normal.   Neck: Normal range of motion. No midline neck tenderness.   Cardiovascular:  Regular rate, Regular rhythm and normal heart sounds.  No MRG  Pulmonary/Chest: Effort normal and breath sounds normal. No respiratory distress. Patient has no wheezes. Patient has no rales.   Abdominal: Soft. Positive bowel sounds. No MRG.  Musculoskeletal/Extremities: Full ROM. Tenderness over the left hip and left sciatic notch. No bony deformities.   Endo: No pitting edema  Neurological: Alert, no focal deficits.  Skin: Skin is warm and dry.   Psychiatric: Pleasant     Diagnostics   Lab Results   Labs Ordered and Resulted from Time of ED Arrival to Time of ED Departure   COMPREHENSIVE METABOLIC PANEL - Abnormal       Result Value    Sodium 127 (*)     Potassium 4.0      Carbon Dioxide (CO2) 24      Anion Gap 10      Urea Nitrogen 9.6      Creatinine 0.60      GFR Estimate >90      Calcium 8.4 (*)     Chloride 93 (*)     Glucose 90      Alkaline Phosphatase 137      AST 19      ALT 14      Protein Total 6.8      Albumin 3.7      Bilirubin Total <0.2     CBC WITH PLATELETS AND DIFFERENTIAL - Abnormal    WBC Count 7.4      RBC Count 3.70 (*)     Hemoglobin 10.6 (*)     Hematocrit 32.9 (*)     MCV 89      MCH 28.6      MCHC 32.2      RDW 13.3      Platelet Count 273      % Neutrophils 68      % Lymphocytes 23      % Monocytes 8      % Eosinophils 0      % Basophils 0      % Immature Granulocytes 1      NRBCs per 100 WBC 0      Absolute Neutrophils 5.0      Absolute Lymphocytes 1.7      Absolute Monocytes 0.6      Absolute Eosinophils 0.0      Absolute Basophils 0.0      Absolute Immature Granulocytes 0.1      Absolute NRBCs 0.0       Imaging  Head CT w/o contrast   Final Result   IMPRESSION:     1.  No evidence of acute intracranial hemorrhage or mass effect.   2.  Mild nonspecific white matter changes.   3.  Mild brain parenchymal volume loss.      CT Cervical Spine w/o Contrast   Final Result   IMPRESSION:   1.  Motion degraded exam. No definite evidence of acute fracture or subluxation of the cervical spine by CT  imaging. If there is a persistent clinical concern for cervical spine injury, recommend repeat imaging when patient condition allows.    2.  Degenerative cervical spondylosis with level by level analysis as described above.      XR Pelvis w Hip Left 1 View   Final Result   IMPRESSION: Anatomic alignment left hip. No acute displaced left hip fracture. Mild bilateral hip osteoarthritis. No acute displaced pelvic fracture is seen. Degenerative change at the symphysis pubis. Degenerative change lower lumbar spine. Diffuse bone    demineralization.        EKG   ECG taken at 1405, ECG read at 1445  Normal sinus rhythm    No significant changes as compared to prior, dated 5/25/2021.  Rate 86 bpm. LA interval 142 ms. QRS duration 8 ms. QT/QTc 372/445 ms. P-R-T axes 40 44 43.    Independent Interpretation  CT Head: No intracranial hemorrhage.  X-ray left hip and shows no fracture  ED Course    Medications Administered  Medications   acetaminophen (TYLENOL) tablet 1,000 mg (1,000 mg Oral $Given 6/23/24 2557)       Procedures  Procedures     Laceration Repair      Procedure: Laceration Repair    Indication: Laceration    Consent: Verbal    Tetanus status reviewed    Location: Right Scalp     Length: 2 cm    Preparation: Irrigation with Sterile Saline.    Anesthesia/Sedation: None      Treatment/Exploration: Wound explored, no foreign bodies found     Closure: The wound was closed with  3 staples.    Patient Status: The patient tolerated the procedure well: Yes. There were no complications.     Discussion of Management  See ED course.     Social Determinants of Health adding to complexity of care  None    ED Course  ED Course as of 06/23/24 1718   Sun Jun 23, 2024   1421 I obtained the patient's history and examined as noted above.    1547 I rechecked the patient and performed a laceration repair.      Medical Decision Making / Diagnosis   CMS Diagnoses: None    MIPS     None    [facility]  Lizquiana Lieberman is a 74 year old female patient  presents emergency department after sustaining a fall while in the grocery store.  She does have tardive dyskinesia and sometimes stumbles.  She did not lose consciousness.  She is on blood thinners.  She has a small laceration of the posterior scalp which was stapled.  Her tetanus shot is up-to-date.  CT scan of the head and C-spine's were both negative.  X-ray of left hip and pelvis was also negative.  Patient did receive Tylenol here and is feeling better.  She discharged home.  Staples out in 7 days.    Disposition  The patient was discharged.     ICD-10 Codes:    ICD-10-CM    1. Fall, initial encounter  W19.XXXA       2. Tardive dyskinesia  G24.01       3. Laceration of scalp without foreign body, initial encounter  S01.01XA            Discharge Medications  New Prescriptions    No medications on file     Scribe Disclosure:  I, Precioussonia Ndiaye, am serving as a scribe at 2:19 PM on 6/23/2024 to document services personally performed by Samson Tabares MD based on my observations and the provider's statements to me.      Samson Tabares MD Walters, Brent Aaron, MD  06/23/24 6912

## 2024-06-26 ENCOUNTER — LAB REQUISITION (OUTPATIENT)
Dept: LAB | Facility: CLINIC | Age: 74
End: 2024-06-26
Payer: MEDICARE

## 2024-07-01 LAB
BASOPHILS # BLD AUTO: 0 10E3/UL (ref 0–0.2)
BASOPHILS NFR BLD AUTO: 0 %
EOSINOPHIL # BLD AUTO: 0 10E3/UL (ref 0–0.7)
EOSINOPHIL NFR BLD AUTO: 0 %
ERYTHROCYTE [DISTWIDTH] IN BLOOD BY AUTOMATED COUNT: 13.9 % (ref 10–15)
HCT VFR BLD AUTO: 35.4 % (ref 35–47)
HGB BLD-MCNC: 11.5 G/DL (ref 11.7–15.7)
IMM GRANULOCYTES # BLD: 0 10E3/UL
IMM GRANULOCYTES NFR BLD: 1 %
LYMPHOCYTES # BLD AUTO: 1.1 10E3/UL (ref 0.8–5.3)
LYMPHOCYTES NFR BLD AUTO: 21 %
MCH RBC QN AUTO: 28.9 PG (ref 26.5–33)
MCHC RBC AUTO-ENTMCNC: 32.5 G/DL (ref 31.5–36.5)
MCV RBC AUTO: 89 FL (ref 78–100)
MONOCYTES # BLD AUTO: 0.4 10E3/UL (ref 0–1.3)
MONOCYTES NFR BLD AUTO: 7 %
NEUTROPHILS # BLD AUTO: 3.7 10E3/UL (ref 1.6–8.3)
NEUTROPHILS NFR BLD AUTO: 71 %
NRBC # BLD AUTO: 0 10E3/UL
NRBC BLD AUTO-RTO: 0 /100
PLATELET # BLD AUTO: 233 10E3/UL (ref 150–450)
RBC # BLD AUTO: 3.98 10E6/UL (ref 3.8–5.2)
WBC # BLD AUTO: 5.2 10E3/UL (ref 4–11)

## 2024-07-01 PROCEDURE — P9604 ONE-WAY ALLOW PRORATED TRIP: HCPCS | Mod: ORL | Performed by: NURSE PRACTITIONER

## 2024-07-01 PROCEDURE — 36415 COLL VENOUS BLD VENIPUNCTURE: CPT | Mod: ORL | Performed by: NURSE PRACTITIONER

## 2024-07-01 PROCEDURE — 85025 COMPLETE CBC W/AUTO DIFF WBC: CPT | Mod: ORL | Performed by: NURSE PRACTITIONER

## 2024-07-09 ENCOUNTER — LAB REQUISITION (OUTPATIENT)
Dept: LAB | Facility: CLINIC | Age: 74
End: 2024-07-09
Payer: MEDICARE

## 2024-07-15 LAB
BASOPHILS # BLD AUTO: 0 10E3/UL (ref 0–0.2)
BASOPHILS NFR BLD AUTO: 0 %
EOSINOPHIL # BLD AUTO: 0 10E3/UL (ref 0–0.7)
EOSINOPHIL NFR BLD AUTO: 0 %
ERYTHROCYTE [DISTWIDTH] IN BLOOD BY AUTOMATED COUNT: 14.2 % (ref 10–15)
HCT VFR BLD AUTO: 37.1 % (ref 35–47)
HGB BLD-MCNC: 11.5 G/DL (ref 11.7–15.7)
IMM GRANULOCYTES # BLD: 0 10E3/UL
IMM GRANULOCYTES NFR BLD: 1 %
LYMPHOCYTES # BLD AUTO: 1.6 10E3/UL (ref 0.8–5.3)
LYMPHOCYTES NFR BLD AUTO: 23 %
MCH RBC QN AUTO: 28.3 PG (ref 26.5–33)
MCHC RBC AUTO-ENTMCNC: 31 G/DL (ref 31.5–36.5)
MCV RBC AUTO: 91 FL (ref 78–100)
MONOCYTES # BLD AUTO: 0.5 10E3/UL (ref 0–1.3)
MONOCYTES NFR BLD AUTO: 8 %
NEUTROPHILS # BLD AUTO: 4.6 10E3/UL (ref 1.6–8.3)
NEUTROPHILS NFR BLD AUTO: 68 %
NRBC # BLD AUTO: 0 10E3/UL
NRBC BLD AUTO-RTO: 0 /100
PLATELET # BLD AUTO: 214 10E3/UL (ref 150–450)
RBC # BLD AUTO: 4.07 10E6/UL (ref 3.8–5.2)
WBC # BLD AUTO: 6.7 10E3/UL (ref 4–11)

## 2024-07-15 PROCEDURE — 36415 COLL VENOUS BLD VENIPUNCTURE: CPT | Mod: ORL | Performed by: NURSE PRACTITIONER

## 2024-07-15 PROCEDURE — P9604 ONE-WAY ALLOW PRORATED TRIP: HCPCS | Mod: ORL | Performed by: NURSE PRACTITIONER

## 2024-07-15 PROCEDURE — 85025 COMPLETE CBC W/AUTO DIFF WBC: CPT | Mod: ORL | Performed by: NURSE PRACTITIONER

## 2024-07-25 ENCOUNTER — LAB REQUISITION (OUTPATIENT)
Dept: LAB | Facility: CLINIC | Age: 74
End: 2024-07-25
Payer: MEDICARE

## 2024-07-29 LAB
BASOPHILS # BLD AUTO: 0 10E3/UL (ref 0–0.2)
BASOPHILS NFR BLD AUTO: 0 %
EOSINOPHIL # BLD AUTO: 0 10E3/UL (ref 0–0.7)
EOSINOPHIL NFR BLD AUTO: 0 %
ERYTHROCYTE [DISTWIDTH] IN BLOOD BY AUTOMATED COUNT: 14.5 % (ref 10–15)
HCT VFR BLD AUTO: 39.3 % (ref 35–47)
HGB BLD-MCNC: 12.3 G/DL (ref 11.7–15.7)
IMM GRANULOCYTES # BLD: 0 10E3/UL
IMM GRANULOCYTES NFR BLD: 1 %
LYMPHOCYTES # BLD AUTO: 1.4 10E3/UL (ref 0.8–5.3)
LYMPHOCYTES NFR BLD AUTO: 26 %
MCH RBC QN AUTO: 28.3 PG (ref 26.5–33)
MCHC RBC AUTO-ENTMCNC: 31.3 G/DL (ref 31.5–36.5)
MCV RBC AUTO: 90 FL (ref 78–100)
MONOCYTES # BLD AUTO: 0.4 10E3/UL (ref 0–1.3)
MONOCYTES NFR BLD AUTO: 8 %
NEUTROPHILS # BLD AUTO: 3.4 10E3/UL (ref 1.6–8.3)
NEUTROPHILS NFR BLD AUTO: 65 %
NRBC # BLD AUTO: 0 10E3/UL
NRBC BLD AUTO-RTO: 0 /100
PLATELET # BLD AUTO: 197 10E3/UL (ref 150–450)
RBC # BLD AUTO: 4.35 10E6/UL (ref 3.8–5.2)
WBC # BLD AUTO: 5.3 10E3/UL (ref 4–11)

## 2024-07-29 PROCEDURE — P9604 ONE-WAY ALLOW PRORATED TRIP: HCPCS | Mod: ORL | Performed by: NURSE PRACTITIONER

## 2024-07-29 PROCEDURE — 36415 COLL VENOUS BLD VENIPUNCTURE: CPT | Mod: ORL | Performed by: NURSE PRACTITIONER

## 2024-07-29 PROCEDURE — 85025 COMPLETE CBC W/AUTO DIFF WBC: CPT | Mod: ORL | Performed by: NURSE PRACTITIONER

## 2024-08-12 ENCOUNTER — LAB REQUISITION (OUTPATIENT)
Dept: LAB | Facility: CLINIC | Age: 74
End: 2024-08-12
Payer: MEDICARE

## 2024-08-14 LAB
BASOPHILS # BLD AUTO: 0 10E3/UL (ref 0–0.2)
BASOPHILS NFR BLD AUTO: 0 %
EOSINOPHIL # BLD AUTO: 0 10E3/UL (ref 0–0.7)
EOSINOPHIL NFR BLD AUTO: 0 %
ERYTHROCYTE [DISTWIDTH] IN BLOOD BY AUTOMATED COUNT: 14.5 % (ref 10–15)
HCT VFR BLD AUTO: 43 % (ref 35–47)
HGB BLD-MCNC: 13.2 G/DL (ref 11.7–15.7)
IMM GRANULOCYTES # BLD: 0.1 10E3/UL
IMM GRANULOCYTES NFR BLD: 1 %
LYMPHOCYTES # BLD AUTO: 1.3 10E3/UL (ref 0.8–5.3)
LYMPHOCYTES NFR BLD AUTO: 13 %
MCH RBC QN AUTO: 28.2 PG (ref 26.5–33)
MCHC RBC AUTO-ENTMCNC: 30.7 G/DL (ref 31.5–36.5)
MCV RBC AUTO: 92 FL (ref 78–100)
MONOCYTES # BLD AUTO: 0.5 10E3/UL (ref 0–1.3)
MONOCYTES NFR BLD AUTO: 5 %
NEUTROPHILS # BLD AUTO: 8.4 10E3/UL (ref 1.6–8.3)
NEUTROPHILS NFR BLD AUTO: 81 %
NRBC # BLD AUTO: 0 10E3/UL
NRBC BLD AUTO-RTO: 0 /100
PLATELET # BLD AUTO: 215 10E3/UL (ref 150–450)
RBC # BLD AUTO: 4.68 10E6/UL (ref 3.8–5.2)
WBC # BLD AUTO: 10.2 10E3/UL (ref 4–11)

## 2024-08-14 PROCEDURE — 36415 COLL VENOUS BLD VENIPUNCTURE: CPT | Mod: ORL | Performed by: NURSE PRACTITIONER

## 2024-08-14 PROCEDURE — P9604 ONE-WAY ALLOW PRORATED TRIP: HCPCS | Mod: ORL | Performed by: NURSE PRACTITIONER

## 2024-08-14 PROCEDURE — 85025 COMPLETE CBC W/AUTO DIFF WBC: CPT | Mod: ORL | Performed by: NURSE PRACTITIONER

## 2024-08-20 ENCOUNTER — LAB REQUISITION (OUTPATIENT)
Dept: LAB | Facility: CLINIC | Age: 74
End: 2024-08-20
Payer: MEDICARE

## 2024-08-26 LAB
BASOPHILS # BLD AUTO: 0 10E3/UL (ref 0–0.2)
BASOPHILS NFR BLD AUTO: 0 %
EOSINOPHIL # BLD AUTO: 0 10E3/UL (ref 0–0.7)
EOSINOPHIL NFR BLD AUTO: 0 %
ERYTHROCYTE [DISTWIDTH] IN BLOOD BY AUTOMATED COUNT: 14.1 % (ref 10–15)
HCT VFR BLD AUTO: 40.8 % (ref 35–47)
HGB BLD-MCNC: 12.4 G/DL (ref 11.7–15.7)
IMM GRANULOCYTES # BLD: 0 10E3/UL
IMM GRANULOCYTES NFR BLD: 1 %
LYMPHOCYTES # BLD AUTO: 1.4 10E3/UL (ref 0.8–5.3)
LYMPHOCYTES NFR BLD AUTO: 22 %
MCH RBC QN AUTO: 27.8 PG (ref 26.5–33)
MCHC RBC AUTO-ENTMCNC: 30.4 G/DL (ref 31.5–36.5)
MCV RBC AUTO: 92 FL (ref 78–100)
MONOCYTES # BLD AUTO: 0.4 10E3/UL (ref 0–1.3)
MONOCYTES NFR BLD AUTO: 6 %
NEUTROPHILS # BLD AUTO: 4.5 10E3/UL (ref 1.6–8.3)
NEUTROPHILS NFR BLD AUTO: 71 %
NRBC # BLD AUTO: 0 10E3/UL
NRBC BLD AUTO-RTO: 0 /100
PLATELET # BLD AUTO: 142 10E3/UL (ref 150–450)
RBC # BLD AUTO: 4.46 10E6/UL (ref 3.8–5.2)
WBC # BLD AUTO: 6.3 10E3/UL (ref 4–11)

## 2024-08-26 PROCEDURE — P9604 ONE-WAY ALLOW PRORATED TRIP: HCPCS | Mod: ORL | Performed by: NURSE PRACTITIONER

## 2024-08-26 PROCEDURE — 85025 COMPLETE CBC W/AUTO DIFF WBC: CPT | Mod: ORL | Performed by: NURSE PRACTITIONER

## 2024-08-26 PROCEDURE — 36415 COLL VENOUS BLD VENIPUNCTURE: CPT | Mod: ORL | Performed by: NURSE PRACTITIONER

## 2024-09-04 ENCOUNTER — LAB REQUISITION (OUTPATIENT)
Dept: LAB | Facility: CLINIC | Age: 74
End: 2024-09-04
Payer: MEDICARE

## 2024-09-05 PROCEDURE — 80159 DRUG ASSAY CLOZAPINE: CPT | Mod: ORL | Performed by: NURSE PRACTITIONER

## 2024-09-05 PROCEDURE — P9604 ONE-WAY ALLOW PRORATED TRIP: HCPCS | Mod: ORL | Performed by: NURSE PRACTITIONER

## 2024-09-05 PROCEDURE — 36415 COLL VENOUS BLD VENIPUNCTURE: CPT | Mod: ORL | Performed by: NURSE PRACTITIONER

## 2024-09-08 LAB
CLOZAPINE SERPL-MCNC: 418 NG/ML
CLOZAPINE+NOR SERPL-MCNC: 664 NG/ML
CNO SERPL-MCNC: <100 NG/ML
NORCLOZAPINE SERPL-MCNC: 246 NG/ML

## 2024-09-09 LAB
BASOPHILS # BLD AUTO: 0 10E3/UL (ref 0–0.2)
BASOPHILS NFR BLD AUTO: 0 %
EOSINOPHIL # BLD AUTO: 0 10E3/UL (ref 0–0.7)
EOSINOPHIL NFR BLD AUTO: 0 %
ERYTHROCYTE [DISTWIDTH] IN BLOOD BY AUTOMATED COUNT: 14.3 % (ref 10–15)
HCT VFR BLD AUTO: 39 % (ref 35–47)
HGB BLD-MCNC: 12.2 G/DL (ref 11.7–15.7)
IMM GRANULOCYTES # BLD: 0 10E3/UL
IMM GRANULOCYTES NFR BLD: 0 %
LYMPHOCYTES # BLD AUTO: 1.3 10E3/UL (ref 0.8–5.3)
LYMPHOCYTES NFR BLD AUTO: 26 %
MCH RBC QN AUTO: 28.2 PG (ref 26.5–33)
MCHC RBC AUTO-ENTMCNC: 31.3 G/DL (ref 31.5–36.5)
MCV RBC AUTO: 90 FL (ref 78–100)
MONOCYTES # BLD AUTO: 0.4 10E3/UL (ref 0–1.3)
MONOCYTES NFR BLD AUTO: 7 %
NEUTROPHILS # BLD AUTO: 3.2 10E3/UL (ref 1.6–8.3)
NEUTROPHILS NFR BLD AUTO: 67 %
NRBC # BLD AUTO: 0 10E3/UL
NRBC BLD AUTO-RTO: 0 /100
PLATELET # BLD AUTO: 231 10E3/UL (ref 150–450)
RBC # BLD AUTO: 4.33 10E6/UL (ref 3.8–5.2)
WBC # BLD AUTO: 4.9 10E3/UL (ref 4–11)

## 2024-09-09 PROCEDURE — 36415 COLL VENOUS BLD VENIPUNCTURE: CPT | Mod: ORL | Performed by: NURSE PRACTITIONER

## 2024-09-09 PROCEDURE — P9604 ONE-WAY ALLOW PRORATED TRIP: HCPCS | Mod: ORL | Performed by: NURSE PRACTITIONER

## 2024-09-09 PROCEDURE — 85025 COMPLETE CBC W/AUTO DIFF WBC: CPT | Mod: ORL | Performed by: NURSE PRACTITIONER

## 2024-09-24 ENCOUNTER — LAB REQUISITION (OUTPATIENT)
Dept: LAB | Facility: CLINIC | Age: 74
End: 2024-09-24
Payer: MEDICARE

## 2024-09-25 LAB
BASOPHILS # BLD AUTO: 0 10E3/UL (ref 0–0.2)
BASOPHILS NFR BLD AUTO: 0 %
EOSINOPHIL # BLD AUTO: 0 10E3/UL (ref 0–0.7)
EOSINOPHIL NFR BLD AUTO: 0 %
ERYTHROCYTE [DISTWIDTH] IN BLOOD BY AUTOMATED COUNT: 14.2 % (ref 10–15)
HCT VFR BLD AUTO: 39.8 % (ref 35–47)
HGB BLD-MCNC: 12.3 G/DL (ref 11.7–15.7)
IMM GRANULOCYTES # BLD: 0 10E3/UL
IMM GRANULOCYTES NFR BLD: 0 %
LYMPHOCYTES # BLD AUTO: 0.7 10E3/UL (ref 0.8–5.3)
LYMPHOCYTES NFR BLD AUTO: 13 %
MCH RBC QN AUTO: 28.1 PG (ref 26.5–33)
MCHC RBC AUTO-ENTMCNC: 30.9 G/DL (ref 31.5–36.5)
MCV RBC AUTO: 91 FL (ref 78–100)
MONOCYTES # BLD AUTO: 0.3 10E3/UL (ref 0–1.3)
MONOCYTES NFR BLD AUTO: 6 %
NEUTROPHILS # BLD AUTO: 4.2 10E3/UL (ref 1.6–8.3)
NEUTROPHILS NFR BLD AUTO: 80 %
NRBC # BLD AUTO: 0 10E3/UL
NRBC BLD AUTO-RTO: 0 /100
PLATELET # BLD AUTO: 149 10E3/UL (ref 150–450)
RBC # BLD AUTO: 4.38 10E6/UL (ref 3.8–5.2)
WBC # BLD AUTO: 5.3 10E3/UL (ref 4–11)

## 2024-09-25 PROCEDURE — P9604 ONE-WAY ALLOW PRORATED TRIP: HCPCS | Mod: ORL | Performed by: NURSE PRACTITIONER

## 2024-09-25 PROCEDURE — 36415 COLL VENOUS BLD VENIPUNCTURE: CPT | Mod: ORL | Performed by: NURSE PRACTITIONER

## 2024-09-25 PROCEDURE — 85025 COMPLETE CBC W/AUTO DIFF WBC: CPT | Mod: ORL | Performed by: NURSE PRACTITIONER

## 2024-10-01 ENCOUNTER — LAB REQUISITION (OUTPATIENT)
Dept: LAB | Facility: CLINIC | Age: 74
End: 2024-10-01
Payer: MEDICARE

## 2024-10-07 LAB
BASOPHILS # BLD AUTO: 0 10E3/UL (ref 0–0.2)
BASOPHILS NFR BLD AUTO: 0 %
EOSINOPHIL # BLD AUTO: 0 10E3/UL (ref 0–0.7)
EOSINOPHIL NFR BLD AUTO: 0 %
ERYTHROCYTE [DISTWIDTH] IN BLOOD BY AUTOMATED COUNT: 13.9 % (ref 10–15)
HCT VFR BLD AUTO: 36.5 % (ref 35–47)
HGB BLD-MCNC: 11.3 G/DL (ref 11.7–15.7)
IMM GRANULOCYTES # BLD: 0 10E3/UL
IMM GRANULOCYTES NFR BLD: 0 %
LYMPHOCYTES # BLD AUTO: 1.2 10E3/UL (ref 0.8–5.3)
LYMPHOCYTES NFR BLD AUTO: 18 %
MCH RBC QN AUTO: 27.8 PG (ref 26.5–33)
MCHC RBC AUTO-ENTMCNC: 31 G/DL (ref 31.5–36.5)
MCV RBC AUTO: 90 FL (ref 78–100)
MONOCYTES # BLD AUTO: 0.5 10E3/UL (ref 0–1.3)
MONOCYTES NFR BLD AUTO: 7 %
NEUTROPHILS # BLD AUTO: 5.2 10E3/UL (ref 1.6–8.3)
NEUTROPHILS NFR BLD AUTO: 75 %
NRBC # BLD AUTO: 0 10E3/UL
NRBC BLD AUTO-RTO: 0 /100
PLATELET # BLD AUTO: 213 10E3/UL (ref 150–450)
RBC # BLD AUTO: 4.06 10E6/UL (ref 3.8–5.2)
WBC # BLD AUTO: 7 10E3/UL (ref 4–11)

## 2024-10-07 PROCEDURE — 85025 COMPLETE CBC W/AUTO DIFF WBC: CPT | Mod: ORL | Performed by: NURSE PRACTITIONER

## 2024-10-07 PROCEDURE — P9604 ONE-WAY ALLOW PRORATED TRIP: HCPCS | Mod: ORL | Performed by: NURSE PRACTITIONER

## 2024-10-07 PROCEDURE — 36415 COLL VENOUS BLD VENIPUNCTURE: CPT | Mod: ORL | Performed by: NURSE PRACTITIONER

## 2024-10-08 ENCOUNTER — LAB REQUISITION (OUTPATIENT)
Dept: LAB | Facility: CLINIC | Age: 74
End: 2024-10-08
Payer: MEDICARE

## 2024-10-09 LAB
ANION GAP SERPL CALCULATED.3IONS-SCNC: 12 MMOL/L (ref 7–15)
BUN SERPL-MCNC: 11.7 MG/DL (ref 8–23)
CALCIUM SERPL-MCNC: 9.3 MG/DL (ref 8.8–10.4)
CHLORIDE SERPL-SCNC: 99 MMOL/L (ref 98–107)
CREAT SERPL-MCNC: 0.57 MG/DL (ref 0.51–0.95)
EGFRCR SERPLBLD CKD-EPI 2021: >90 ML/MIN/1.73M2
FOLATE SERPL-MCNC: 27.7 NG/ML (ref 4.6–34.8)
GLUCOSE SERPL-MCNC: 108 MG/DL (ref 70–99)
HCO3 SERPL-SCNC: 24 MMOL/L (ref 22–29)
POTASSIUM SERPL-SCNC: 4.4 MMOL/L (ref 3.4–5.3)
SODIUM SERPL-SCNC: 135 MMOL/L (ref 135–145)
TSH SERPL DL<=0.005 MIU/L-ACNC: 3.9 UIU/ML (ref 0.3–4.2)
VIT B12 SERPL-MCNC: 655 PG/ML (ref 232–1245)

## 2024-10-09 PROCEDURE — 80048 BASIC METABOLIC PNL TOTAL CA: CPT | Mod: ORL | Performed by: NURSE PRACTITIONER

## 2024-10-09 PROCEDURE — 82746 ASSAY OF FOLIC ACID SERUM: CPT | Mod: ORL | Performed by: NURSE PRACTITIONER

## 2024-10-09 PROCEDURE — 82607 VITAMIN B-12: CPT | Mod: ORL | Performed by: NURSE PRACTITIONER

## 2024-10-09 PROCEDURE — 36415 COLL VENOUS BLD VENIPUNCTURE: CPT | Mod: ORL | Performed by: NURSE PRACTITIONER

## 2024-10-09 PROCEDURE — 84443 ASSAY THYROID STIM HORMONE: CPT | Mod: ORL | Performed by: NURSE PRACTITIONER

## 2024-10-09 PROCEDURE — P9604 ONE-WAY ALLOW PRORATED TRIP: HCPCS | Mod: ORL | Performed by: NURSE PRACTITIONER

## 2024-10-15 ENCOUNTER — LAB REQUISITION (OUTPATIENT)
Dept: LAB | Facility: CLINIC | Age: 74
End: 2024-10-15
Payer: MEDICARE

## 2024-10-21 LAB
BASOPHILS # BLD AUTO: 0 10E3/UL (ref 0–0.2)
BASOPHILS NFR BLD AUTO: 0 %
EOSINOPHIL # BLD AUTO: 0 10E3/UL (ref 0–0.7)
EOSINOPHIL NFR BLD AUTO: 0 %
ERYTHROCYTE [DISTWIDTH] IN BLOOD BY AUTOMATED COUNT: 13.9 % (ref 10–15)
HCT VFR BLD AUTO: 37.7 % (ref 35–47)
HGB BLD-MCNC: 11.6 G/DL (ref 11.7–15.7)
IMM GRANULOCYTES # BLD: 0 10E3/UL
IMM GRANULOCYTES NFR BLD: 0 %
LYMPHOCYTES # BLD AUTO: 1.3 10E3/UL (ref 0.8–5.3)
LYMPHOCYTES NFR BLD AUTO: 25 %
MCH RBC QN AUTO: 27.9 PG (ref 26.5–33)
MCHC RBC AUTO-ENTMCNC: 30.8 G/DL (ref 31.5–36.5)
MCV RBC AUTO: 91 FL (ref 78–100)
MONOCYTES # BLD AUTO: 0.4 10E3/UL (ref 0–1.3)
MONOCYTES NFR BLD AUTO: 7 %
NEUTROPHILS # BLD AUTO: 3.4 10E3/UL (ref 1.6–8.3)
NEUTROPHILS NFR BLD AUTO: 67 %
NRBC # BLD AUTO: 0 10E3/UL
NRBC BLD AUTO-RTO: 0 /100
PLATELET # BLD AUTO: 234 10E3/UL (ref 150–450)
RBC # BLD AUTO: 4.16 10E6/UL (ref 3.8–5.2)
WBC # BLD AUTO: 5.1 10E3/UL (ref 4–11)

## 2024-10-21 PROCEDURE — 36415 COLL VENOUS BLD VENIPUNCTURE: CPT | Mod: ORL | Performed by: NURSE PRACTITIONER

## 2024-10-21 PROCEDURE — 85025 COMPLETE CBC W/AUTO DIFF WBC: CPT | Mod: ORL | Performed by: NURSE PRACTITIONER

## 2024-10-21 PROCEDURE — P9604 ONE-WAY ALLOW PRORATED TRIP: HCPCS | Mod: ORL | Performed by: NURSE PRACTITIONER

## 2024-10-30 ENCOUNTER — LAB REQUISITION (OUTPATIENT)
Dept: LAB | Facility: CLINIC | Age: 74
End: 2024-10-30
Payer: MEDICARE

## 2024-11-06 LAB
BASOPHILS # BLD AUTO: 0 10E3/UL (ref 0–0.2)
BASOPHILS NFR BLD AUTO: 0 %
EOSINOPHIL # BLD AUTO: 0 10E3/UL (ref 0–0.7)
EOSINOPHIL NFR BLD AUTO: 0 %
ERYTHROCYTE [DISTWIDTH] IN BLOOD BY AUTOMATED COUNT: 14 % (ref 10–15)
HCT VFR BLD AUTO: 40.6 % (ref 35–47)
HGB BLD-MCNC: 12.6 G/DL (ref 11.7–15.7)
IMM GRANULOCYTES # BLD: 0 10E3/UL
IMM GRANULOCYTES NFR BLD: 1 %
LYMPHOCYTES # BLD AUTO: 1.6 10E3/UL (ref 0.8–5.3)
LYMPHOCYTES NFR BLD AUTO: 28 %
MCH RBC QN AUTO: 28.3 PG (ref 26.5–33)
MCHC RBC AUTO-ENTMCNC: 31 G/DL (ref 31.5–36.5)
MCV RBC AUTO: 91 FL (ref 78–100)
MONOCYTES # BLD AUTO: 0.4 10E3/UL (ref 0–1.3)
MONOCYTES NFR BLD AUTO: 8 %
NEUTROPHILS # BLD AUTO: 3.4 10E3/UL (ref 1.6–8.3)
NEUTROPHILS NFR BLD AUTO: 63 %
NRBC # BLD AUTO: 0 10E3/UL
NRBC BLD AUTO-RTO: 0 /100
PLATELET # BLD AUTO: 251 10E3/UL (ref 150–450)
RBC # BLD AUTO: 4.46 10E6/UL (ref 3.8–5.2)
WBC # BLD AUTO: 5.5 10E3/UL (ref 4–11)

## 2024-11-06 PROCEDURE — P9604 ONE-WAY ALLOW PRORATED TRIP: HCPCS | Mod: ORL | Performed by: NURSE PRACTITIONER

## 2024-11-06 PROCEDURE — 85025 COMPLETE CBC W/AUTO DIFF WBC: CPT | Mod: ORL | Performed by: NURSE PRACTITIONER

## 2024-11-06 PROCEDURE — 36415 COLL VENOUS BLD VENIPUNCTURE: CPT | Mod: ORL | Performed by: NURSE PRACTITIONER

## 2024-11-13 ENCOUNTER — LAB REQUISITION (OUTPATIENT)
Dept: LAB | Facility: CLINIC | Age: 74
End: 2024-11-13
Payer: MEDICARE

## 2024-11-18 LAB
ANION GAP SERPL CALCULATED.3IONS-SCNC: 13 MMOL/L (ref 7–15)
APTT PPP: 30 SECONDS (ref 22–38)
BASOPHILS # BLD AUTO: 0 10E3/UL (ref 0–0.2)
BASOPHILS NFR BLD AUTO: 0 %
BUN SERPL-MCNC: 9.3 MG/DL (ref 8–23)
CALCIUM SERPL-MCNC: 9.7 MG/DL (ref 8.8–10.4)
CHLORIDE SERPL-SCNC: 103 MMOL/L (ref 98–107)
CREAT SERPL-MCNC: 0.58 MG/DL (ref 0.51–0.95)
EGFRCR SERPLBLD CKD-EPI 2021: >90 ML/MIN/1.73M2
EOSINOPHIL # BLD AUTO: 0 10E3/UL (ref 0–0.7)
EOSINOPHIL NFR BLD AUTO: 0 %
ERYTHROCYTE [DISTWIDTH] IN BLOOD BY AUTOMATED COUNT: 14.1 % (ref 10–15)
GLUCOSE SERPL-MCNC: 106 MG/DL (ref 70–99)
HCO3 SERPL-SCNC: 22 MMOL/L (ref 22–29)
HCT VFR BLD AUTO: 37.9 % (ref 35–47)
HGB BLD-MCNC: 11.8 G/DL (ref 11.7–15.7)
IMM GRANULOCYTES # BLD: 0 10E3/UL
IMM GRANULOCYTES NFR BLD: 0 %
LYMPHOCYTES # BLD AUTO: 1 10E3/UL (ref 0.8–5.3)
LYMPHOCYTES NFR BLD AUTO: 21 %
MCH RBC QN AUTO: 28 PG (ref 26.5–33)
MCHC RBC AUTO-ENTMCNC: 31.1 G/DL (ref 31.5–36.5)
MCV RBC AUTO: 90 FL (ref 78–100)
MONOCYTES # BLD AUTO: 0.4 10E3/UL (ref 0–1.3)
MONOCYTES NFR BLD AUTO: 9 %
NEUTROPHILS # BLD AUTO: 3.1 10E3/UL (ref 1.6–8.3)
NEUTROPHILS NFR BLD AUTO: 69 %
NRBC # BLD AUTO: 0 10E3/UL
NRBC BLD AUTO-RTO: 0 /100
PLATELET # BLD AUTO: 167 10E3/UL (ref 150–450)
POTASSIUM SERPL-SCNC: 4.4 MMOL/L (ref 3.4–5.3)
RBC # BLD AUTO: 4.22 10E6/UL (ref 3.8–5.2)
SODIUM SERPL-SCNC: 138 MMOL/L (ref 135–145)
WBC # BLD AUTO: 4.5 10E3/UL (ref 4–11)

## 2024-11-18 PROCEDURE — P9604 ONE-WAY ALLOW PRORATED TRIP: HCPCS | Mod: ORL | Performed by: NURSE PRACTITIONER

## 2024-11-18 PROCEDURE — 85025 COMPLETE CBC W/AUTO DIFF WBC: CPT | Mod: ORL | Performed by: NURSE PRACTITIONER

## 2024-11-18 PROCEDURE — 36415 COLL VENOUS BLD VENIPUNCTURE: CPT | Mod: ORL | Performed by: NURSE PRACTITIONER

## 2024-11-18 PROCEDURE — 80048 BASIC METABOLIC PNL TOTAL CA: CPT | Mod: ORL | Performed by: NURSE PRACTITIONER

## 2024-11-18 PROCEDURE — 85730 THROMBOPLASTIN TIME PARTIAL: CPT | Mod: ORL | Performed by: NURSE PRACTITIONER

## 2024-11-29 ENCOUNTER — LAB REQUISITION (OUTPATIENT)
Dept: LAB | Facility: CLINIC | Age: 74
End: 2024-11-29
Payer: MEDICARE

## 2024-12-02 LAB
BASOPHILS # BLD AUTO: 0 10E3/UL (ref 0–0.2)
BASOPHILS NFR BLD AUTO: 0 %
EOSINOPHIL # BLD AUTO: 0 10E3/UL (ref 0–0.7)
EOSINOPHIL NFR BLD AUTO: 0 %
ERYTHROCYTE [DISTWIDTH] IN BLOOD BY AUTOMATED COUNT: 13.6 % (ref 10–15)
HCT VFR BLD AUTO: 33.2 % (ref 35–47)
HGB BLD-MCNC: 10.2 G/DL (ref 11.7–15.7)
IMM GRANULOCYTES # BLD: 0 10E3/UL
IMM GRANULOCYTES NFR BLD: 0 %
LYMPHOCYTES # BLD AUTO: 0.7 10E3/UL (ref 0.8–5.3)
LYMPHOCYTES NFR BLD AUTO: 15 %
MCH RBC QN AUTO: 27.9 PG (ref 26.5–33)
MCHC RBC AUTO-ENTMCNC: 30.7 G/DL (ref 31.5–36.5)
MCV RBC AUTO: 91 FL (ref 78–100)
MONOCYTES # BLD AUTO: 0.4 10E3/UL (ref 0–1.3)
MONOCYTES NFR BLD AUTO: 8 %
NEUTROPHILS # BLD AUTO: 3.8 10E3/UL (ref 1.6–8.3)
NEUTROPHILS NFR BLD AUTO: 77 %
NRBC # BLD AUTO: 0 10E3/UL
NRBC BLD AUTO-RTO: 0 /100
PLATELET # BLD AUTO: 239 10E3/UL (ref 150–450)
RBC # BLD AUTO: 3.65 10E6/UL (ref 3.8–5.2)
WBC # BLD AUTO: 5 10E3/UL (ref 4–11)

## 2024-12-30 ENCOUNTER — APPOINTMENT (OUTPATIENT)
Dept: GENERAL RADIOLOGY | Facility: CLINIC | Age: 74
DRG: 193 | End: 2024-12-30
Attending: NURSE PRACTITIONER
Payer: MEDICARE

## 2024-12-30 ENCOUNTER — APPOINTMENT (OUTPATIENT)
Dept: GENERAL RADIOLOGY | Facility: CLINIC | Age: 74
DRG: 193 | End: 2024-12-30
Attending: EMERGENCY MEDICINE
Payer: MEDICARE

## 2024-12-30 ENCOUNTER — HOSPITAL ENCOUNTER (INPATIENT)
Facility: CLINIC | Age: 74
DRG: 193 | End: 2024-12-30
Attending: EMERGENCY MEDICINE | Admitting: HOSPITALIST
Payer: MEDICARE

## 2024-12-30 ENCOUNTER — RESULTS ONLY (OUTPATIENT)
Facility: CLINIC | Age: 74
End: 2024-12-30

## 2024-12-30 DIAGNOSIS — R41.82 ALTERED MENTAL STATUS, UNSPECIFIED ALTERED MENTAL STATUS TYPE: ICD-10-CM

## 2024-12-30 DIAGNOSIS — R06.82 TACHYPNEA: ICD-10-CM

## 2024-12-30 DIAGNOSIS — A41.9 SEPSIS, DUE TO UNSPECIFIED ORGANISM, UNSPECIFIED WHETHER ACUTE ORGAN DYSFUNCTION PRESENT (H): Primary | ICD-10-CM

## 2024-12-30 DIAGNOSIS — E87.1 HYPONATREMIA: ICD-10-CM

## 2024-12-30 DIAGNOSIS — E86.1 HYPOVOLEMIA: ICD-10-CM

## 2024-12-30 LAB
ALBUMIN SERPL BCG-MCNC: 3.4 G/DL (ref 3.5–5.2)
ALBUMIN UR-MCNC: 30 MG/DL
ALP SERPL-CCNC: 165 U/L (ref 40–150)
ALT SERPL W P-5'-P-CCNC: 23 U/L (ref 0–50)
ANION GAP SERPL CALCULATED.3IONS-SCNC: 12 MMOL/L (ref 7–15)
ANION GAP SERPL CALCULATED.3IONS-SCNC: 22 MMOL/L (ref 7–15)
APPEARANCE UR: CLEAR
AST SERPL W P-5'-P-CCNC: 50 U/L (ref 0–45)
ATRIAL RATE - MUSE: 85 BPM
ATRIAL RATE - MUSE: 99 BPM
BASE EXCESS BLDV CALC-SCNC: -13 MMOL/L (ref -3–3)
BASE EXCESS BLDV CALC-SCNC: -4.8 MMOL/L (ref -3–3)
BASOPHILS # BLD AUTO: 0 10E3/UL (ref 0–0.2)
BASOPHILS NFR BLD AUTO: 0 %
BILIRUB SERPL-MCNC: 0.2 MG/DL
BILIRUB UR QL STRIP: NEGATIVE
BUN SERPL-MCNC: 8.2 MG/DL (ref 8–23)
BUN SERPL-MCNC: 9.3 MG/DL (ref 8–23)
CALCIUM SERPL-MCNC: 7.8 MG/DL (ref 8.8–10.4)
CALCIUM SERPL-MCNC: 8.5 MG/DL (ref 8.8–10.4)
CHLORIDE SERPL-SCNC: 87 MMOL/L (ref 98–107)
CHLORIDE SERPL-SCNC: 93 MMOL/L (ref 98–107)
COLOR UR AUTO: ABNORMAL
CREAT SERPL-MCNC: 0.58 MG/DL (ref 0.51–0.95)
CREAT SERPL-MCNC: 0.7 MG/DL (ref 0.51–0.95)
DIASTOLIC BLOOD PRESSURE - MUSE: NORMAL MMHG
DIASTOLIC BLOOD PRESSURE - MUSE: NORMAL MMHG
EGFRCR SERPLBLD CKD-EPI 2021: >90 ML/MIN/1.73M2
EGFRCR SERPLBLD CKD-EPI 2021: >90 ML/MIN/1.73M2
EOSINOPHIL # BLD AUTO: 0 10E3/UL (ref 0–0.7)
EOSINOPHIL NFR BLD AUTO: 0 %
ERYTHROCYTE [DISTWIDTH] IN BLOOD BY AUTOMATED COUNT: 13.4 % (ref 10–15)
ERYTHROCYTE [DISTWIDTH] IN BLOOD BY AUTOMATED COUNT: 13.5 % (ref 10–15)
FLUAV RNA SPEC QL NAA+PROBE: NEGATIVE
FLUBV RNA RESP QL NAA+PROBE: NEGATIVE
GLUCOSE BLDC GLUCOMTR-MCNC: 199 MG/DL (ref 70–99)
GLUCOSE BLDC GLUCOMTR-MCNC: 93 MG/DL (ref 70–99)
GLUCOSE SERPL-MCNC: 214 MG/DL (ref 70–99)
GLUCOSE SERPL-MCNC: 90 MG/DL (ref 70–99)
GLUCOSE UR STRIP-MCNC: NEGATIVE MG/DL
HCO3 BLDV-SCNC: 14 MMOL/L (ref 21–28)
HCO3 BLDV-SCNC: 22 MMOL/L (ref 21–28)
HCO3 SERPL-SCNC: 12 MMOL/L (ref 22–29)
HCO3 SERPL-SCNC: 19 MMOL/L (ref 22–29)
HCT VFR BLD AUTO: 29.3 % (ref 35–47)
HCT VFR BLD AUTO: 30.9 % (ref 35–47)
HGB BLD-MCNC: 9.4 G/DL (ref 11.7–15.7)
HGB BLD-MCNC: 9.8 G/DL (ref 11.7–15.7)
HGB UR QL STRIP: NEGATIVE
HOLD SPECIMEN: NORMAL
IMM GRANULOCYTES # BLD: 0.2 10E3/UL
IMM GRANULOCYTES NFR BLD: 2 %
INTERPRETATION ECG - MUSE: NORMAL
INTERPRETATION ECG - MUSE: NORMAL
KETONES UR STRIP-MCNC: ABNORMAL MG/DL
LACTATE BLD-SCNC: 9.4 MMOL/L
LACTATE SERPL-SCNC: 0.9 MMOL/L (ref 0.7–2)
LACTATE SERPL-SCNC: 1 MMOL/L (ref 0.7–2)
LEUKOCYTE ESTERASE UR QL STRIP: ABNORMAL
LYMPHOCYTES # BLD AUTO: 2.7 10E3/UL (ref 0.8–5.3)
LYMPHOCYTES NFR BLD AUTO: 28 %
MCH RBC QN AUTO: 26.9 PG (ref 26.5–33)
MCH RBC QN AUTO: 27 PG (ref 26.5–33)
MCHC RBC AUTO-ENTMCNC: 31.7 G/DL (ref 31.5–36.5)
MCHC RBC AUTO-ENTMCNC: 32.1 G/DL (ref 31.5–36.5)
MCV RBC AUTO: 84 FL (ref 78–100)
MCV RBC AUTO: 85 FL (ref 78–100)
MONOCYTES # BLD AUTO: 0.7 10E3/UL (ref 0–1.3)
MONOCYTES NFR BLD AUTO: 7 %
MRSA DNA SPEC QL NAA+PROBE: NEGATIVE
NEUTROPHILS # BLD AUTO: 6.1 10E3/UL (ref 1.6–8.3)
NEUTROPHILS NFR BLD AUTO: 63 %
NITRATE UR QL: NEGATIVE
NRBC # BLD AUTO: 0 10E3/UL
NRBC BLD AUTO-RTO: 0 /100
NT-PROBNP SERPL-MCNC: 2832 PG/ML (ref 0–900)
NT-PROBNP SERPL-MCNC: 3969 PG/ML (ref 0–900)
O2/TOTAL GAS SETTING VFR VENT: 100 %
OXYHGB MFR BLDV: 26 % (ref 70–75)
P AXIS - MUSE: 102 DEGREES
P AXIS - MUSE: 77 DEGREES
PCO2 BLDV: 34 MM HG (ref 40–50)
PCO2 BLDV: 47 MM HG (ref 40–50)
PH BLDV: 7.21 [PH] (ref 7.32–7.43)
PH BLDV: 7.28 [PH] (ref 7.32–7.43)
PH UR STRIP: 6 [PH] (ref 5–7)
PLATELET # BLD AUTO: 290 10E3/UL (ref 150–450)
PLATELET # BLD AUTO: 418 10E3/UL (ref 150–450)
PO2 BLDV: 22 MM HG (ref 25–47)
PO2 BLDV: 90 MM HG (ref 25–47)
POTASSIUM SERPL-SCNC: 4.3 MMOL/L (ref 3.4–5.3)
POTASSIUM SERPL-SCNC: 5.1 MMOL/L (ref 3.4–5.3)
PR INTERVAL - MUSE: 146 MS
PR INTERVAL - MUSE: 154 MS
PROCALCITONIN SERPL IA-MCNC: 3.06 NG/ML
PROCALCITONIN SERPL IA-MCNC: 3.94 NG/ML
PROT SERPL-MCNC: 6.6 G/DL (ref 6.4–8.3)
QRS DURATION - MUSE: 62 MS
QRS DURATION - MUSE: 84 MS
QT - MUSE: 350 MS
QT - MUSE: 366 MS
QTC - MUSE: 435 MS
QTC - MUSE: 449 MS
R AXIS - MUSE: 19 DEGREES
R AXIS - MUSE: 59 DEGREES
RBC # BLD AUTO: 3.48 10E6/UL (ref 3.8–5.2)
RBC # BLD AUTO: 3.64 10E6/UL (ref 3.8–5.2)
RBC URINE: 0 /HPF
RSV RNA SPEC NAA+PROBE: NEGATIVE
SA TARGET DNA: POSITIVE
SAO2 % BLDV: 26.3 % (ref 70–75)
SAO2 % BLDV: 95 % (ref 70–75)
SARS-COV-2 RNA RESP QL NAA+PROBE: NEGATIVE
SODIUM SERPL-SCNC: 121 MMOL/L (ref 135–145)
SODIUM SERPL-SCNC: 123 MMOL/L (ref 135–145)
SODIUM SERPL-SCNC: 124 MMOL/L (ref 135–145)
SODIUM SERPL-SCNC: 129 MMOL/L (ref 135–145)
SP GR UR STRIP: 1.02 (ref 1–1.03)
SQUAMOUS EPITHELIAL: 6 /HPF
SYSTOLIC BLOOD PRESSURE - MUSE: NORMAL MMHG
SYSTOLIC BLOOD PRESSURE - MUSE: NORMAL MMHG
T AXIS - MUSE: -4 DEGREES
T AXIS - MUSE: 52 DEGREES
TROPONIN T SERPL HS-MCNC: 239 NG/L
TROPONIN T SERPL HS-MCNC: 273 NG/L
UROBILINOGEN UR STRIP-MCNC: NORMAL MG/DL
VENTRICULAR RATE- MUSE: 85 BPM
VENTRICULAR RATE- MUSE: 99 BPM
WBC # BLD AUTO: 10.1 10E3/UL (ref 4–11)
WBC # BLD AUTO: 9.6 10E3/UL (ref 4–11)
WBC URINE: 2 /HPF

## 2024-12-30 PROCEDURE — 87641 MR-STAPH DNA AMP PROBE: CPT | Performed by: HOSPITALIST

## 2024-12-30 PROCEDURE — 120N000013 HC R&B IMCU

## 2024-12-30 PROCEDURE — 93005 ELECTROCARDIOGRAM TRACING: CPT

## 2024-12-30 PROCEDURE — 96367 TX/PROPH/DG ADDL SEQ IV INF: CPT | Mod: 59

## 2024-12-30 PROCEDURE — 83605 ASSAY OF LACTIC ACID: CPT | Performed by: EMERGENCY MEDICINE

## 2024-12-30 PROCEDURE — 84155 ASSAY OF PROTEIN SERUM: CPT | Performed by: EMERGENCY MEDICINE

## 2024-12-30 PROCEDURE — 99207 PR APP CREDIT; MD BILLING SHARED VISIT: CPT | Performed by: NURSE PRACTITIONER

## 2024-12-30 PROCEDURE — 99222 1ST HOSP IP/OBS MODERATE 55: CPT | Performed by: PSYCHIATRY & NEUROLOGY

## 2024-12-30 PROCEDURE — 87040 BLOOD CULTURE FOR BACTERIA: CPT | Performed by: EMERGENCY MEDICINE

## 2024-12-30 PROCEDURE — 99207 PR NO BILLABLE SERVICE THIS VISIT: CPT | Performed by: NURSE PRACTITIONER

## 2024-12-30 PROCEDURE — 99418 PROLNG IP/OBS E/M EA 15 MIN: CPT | Performed by: HOSPITALIST

## 2024-12-30 PROCEDURE — 99291 CRITICAL CARE FIRST HOUR: CPT | Mod: 25

## 2024-12-30 PROCEDURE — 87149 DNA/RNA DIRECT PROBE: CPT | Performed by: EMERGENCY MEDICINE

## 2024-12-30 PROCEDURE — 250N000011 HC RX IP 250 OP 636: Performed by: EMERGENCY MEDICINE

## 2024-12-30 PROCEDURE — 84145 PROCALCITONIN (PCT): CPT | Performed by: NURSE PRACTITIONER

## 2024-12-30 PROCEDURE — 87640 STAPH A DNA AMP PROBE: CPT | Performed by: HOSPITALIST

## 2024-12-30 PROCEDURE — 85041 AUTOMATED RBC COUNT: CPT | Performed by: NURSE PRACTITIONER

## 2024-12-30 PROCEDURE — 99223 1ST HOSP IP/OBS HIGH 75: CPT | Mod: AI | Performed by: HOSPITALIST

## 2024-12-30 PROCEDURE — 84484 ASSAY OF TROPONIN QUANT: CPT | Performed by: NURSE PRACTITIONER

## 2024-12-30 PROCEDURE — 258N000003 HC RX IP 258 OP 636: Performed by: EMERGENCY MEDICINE

## 2024-12-30 PROCEDURE — 36415 COLL VENOUS BLD VENIPUNCTURE: CPT | Performed by: HOSPITALIST

## 2024-12-30 PROCEDURE — 83605 ASSAY OF LACTIC ACID: CPT

## 2024-12-30 PROCEDURE — 85014 HEMATOCRIT: CPT | Performed by: NURSE PRACTITIONER

## 2024-12-30 PROCEDURE — 250N000011 HC RX IP 250 OP 636: Performed by: HOSPITALIST

## 2024-12-30 PROCEDURE — 250N000011 HC RX IP 250 OP 636: Performed by: NURSE PRACTITIONER

## 2024-12-30 PROCEDURE — 71045 X-RAY EXAM CHEST 1 VIEW: CPT | Mod: 77

## 2024-12-30 PROCEDURE — 87637 SARSCOV2&INF A&B&RSV AMP PRB: CPT | Performed by: EMERGENCY MEDICINE

## 2024-12-30 PROCEDURE — 85018 HEMOGLOBIN: CPT | Performed by: EMERGENCY MEDICINE

## 2024-12-30 PROCEDURE — 82962 GLUCOSE BLOOD TEST: CPT

## 2024-12-30 PROCEDURE — 999N000157 HC STATISTIC RCP TIME EA 10 MIN

## 2024-12-30 PROCEDURE — 82805 BLOOD GASES W/O2 SATURATION: CPT | Performed by: NURSE PRACTITIONER

## 2024-12-30 PROCEDURE — 83605 ASSAY OF LACTIC ACID: CPT | Performed by: HOSPITALIST

## 2024-12-30 PROCEDURE — 85004 AUTOMATED DIFF WBC COUNT: CPT | Performed by: EMERGENCY MEDICINE

## 2024-12-30 PROCEDURE — 83880 ASSAY OF NATRIURETIC PEPTIDE: CPT | Performed by: NURSE PRACTITIONER

## 2024-12-30 PROCEDURE — 36415 COLL VENOUS BLD VENIPUNCTURE: CPT | Performed by: EMERGENCY MEDICINE

## 2024-12-30 PROCEDURE — 84295 ASSAY OF SERUM SODIUM: CPT | Performed by: HOSPITALIST

## 2024-12-30 PROCEDURE — 250N000013 HC RX MED GY IP 250 OP 250 PS 637: Performed by: STUDENT IN AN ORGANIZED HEALTH CARE EDUCATION/TRAINING PROGRAM

## 2024-12-30 PROCEDURE — 82803 BLOOD GASES ANY COMBINATION: CPT

## 2024-12-30 PROCEDURE — 250N000009 HC RX 250: Performed by: EMERGENCY MEDICINE

## 2024-12-30 PROCEDURE — 36415 COLL VENOUS BLD VENIPUNCTURE: CPT | Performed by: NURSE PRACTITIONER

## 2024-12-30 PROCEDURE — 96361 HYDRATE IV INFUSION ADD-ON: CPT | Mod: 59

## 2024-12-30 PROCEDURE — 96374 THER/PROPH/DIAG INJ IV PUSH: CPT | Mod: 59

## 2024-12-30 PROCEDURE — 81001 URINALYSIS AUTO W/SCOPE: CPT | Performed by: EMERGENCY MEDICINE

## 2024-12-30 PROCEDURE — 71045 X-RAY EXAM CHEST 1 VIEW: CPT

## 2024-12-30 PROCEDURE — 99292 CRITICAL CARE ADDL 30 MIN: CPT

## 2024-12-30 PROCEDURE — 96365 THER/PROPH/DIAG IV INF INIT: CPT | Mod: 59

## 2024-12-30 PROCEDURE — 96375 TX/PRO/DX INJ NEW DRUG ADDON: CPT | Mod: 59

## 2024-12-30 PROCEDURE — 83880 ASSAY OF NATRIURETIC PEPTIDE: CPT | Performed by: EMERGENCY MEDICINE

## 2024-12-30 PROCEDURE — 250N000013 HC RX MED GY IP 250 OP 250 PS 637: Performed by: HOSPITALIST

## 2024-12-30 PROCEDURE — 82310 ASSAY OF CALCIUM: CPT | Performed by: EMERGENCY MEDICINE

## 2024-12-30 PROCEDURE — 5A09357 ASSISTANCE WITH RESPIRATORY VENTILATION, LESS THAN 24 CONSECUTIVE HOURS, CONTINUOUS POSITIVE AIRWAY PRESSURE: ICD-10-PCS | Performed by: EMERGENCY MEDICINE

## 2024-12-30 RX ORDER — NYSTATIN 100000 U/G
CREAM TOPICAL DAILY PRN
COMMUNITY

## 2024-12-30 RX ORDER — ACETAMINOPHEN 325 MG/1
650 TABLET ORAL EVERY 4 HOURS PRN
Status: DISCONTINUED | OUTPATIENT
Start: 2024-12-30 | End: 2025-01-04 | Stop reason: HOSPADM

## 2024-12-30 RX ORDER — DIPHENHYDRAMINE HYDROCHLORIDE 50 MG/ML
25 INJECTION INTRAMUSCULAR; INTRAVENOUS ONCE
Status: COMPLETED | OUTPATIENT
Start: 2024-12-30 | End: 2024-12-30

## 2024-12-30 RX ORDER — DIAZEPAM 10 MG/2ML
2.5 INJECTION, SOLUTION INTRAMUSCULAR; INTRAVENOUS EVERY 4 HOURS PRN
Status: DISCONTINUED | OUTPATIENT
Start: 2024-12-30 | End: 2025-01-04 | Stop reason: HOSPADM

## 2024-12-30 RX ORDER — FLUOCINONIDE TOPICAL SOLUTION USP, 0.05% 0.5 MG/ML
SOLUTION TOPICAL 2 TIMES DAILY
COMMUNITY

## 2024-12-30 RX ORDER — IPRATROPIUM BROMIDE AND ALBUTEROL SULFATE 2.5; .5 MG/3ML; MG/3ML
3 SOLUTION RESPIRATORY (INHALATION) ONCE
Status: COMPLETED | OUTPATIENT
Start: 2024-12-30 | End: 2024-12-30

## 2024-12-30 RX ORDER — DIAZEPAM 10 MG/2ML
2.5 INJECTION, SOLUTION INTRAMUSCULAR; INTRAVENOUS ONCE
Status: COMPLETED | OUTPATIENT
Start: 2024-12-30 | End: 2024-12-30

## 2024-12-30 RX ORDER — ALBUTEROL SULFATE 0.83 MG/ML
2.5 SOLUTION RESPIRATORY (INHALATION)
Status: DISCONTINUED | OUTPATIENT
Start: 2024-12-30 | End: 2025-01-03

## 2024-12-30 RX ORDER — HALOPERIDOL 5 MG/ML
5 INJECTION INTRAMUSCULAR ONCE
Status: COMPLETED | OUTPATIENT
Start: 2024-12-30 | End: 2024-12-30

## 2024-12-30 RX ORDER — ENOXAPARIN SODIUM 100 MG/ML
40 INJECTION SUBCUTANEOUS EVERY 24 HOURS
Status: DISCONTINUED | OUTPATIENT
Start: 2024-12-30 | End: 2025-01-04 | Stop reason: HOSPADM

## 2024-12-30 RX ORDER — ALBUTEROL SULFATE 0.83 MG/ML
2.5 SOLUTION RESPIRATORY (INHALATION) 2 TIMES DAILY
COMMUNITY

## 2024-12-30 RX ORDER — DIPHENHYDRAMINE HYDROCHLORIDE 50 MG/ML
INJECTION INTRAMUSCULAR; INTRAVENOUS
Status: COMPLETED
Start: 2024-12-30 | End: 2024-12-30

## 2024-12-30 RX ORDER — AMANTADINE HYDROCHLORIDE 100 MG/1
100 CAPSULE, GELATIN COATED ORAL 3 TIMES DAILY
COMMUNITY

## 2024-12-30 RX ORDER — FLUTICASONE PROPIONATE 110 UG/1
1 AEROSOL, METERED RESPIRATORY (INHALATION) 2 TIMES DAILY
Status: DISCONTINUED | OUTPATIENT
Start: 2024-12-30 | End: 2025-01-04 | Stop reason: HOSPADM

## 2024-12-30 RX ORDER — DIAZEPAM 2 MG/1
2 TABLET ORAL EVERY 6 HOURS PRN
Status: DISCONTINUED | OUTPATIENT
Start: 2024-12-30 | End: 2025-01-04 | Stop reason: HOSPADM

## 2024-12-30 RX ORDER — ACETAMINOPHEN 500 MG
1000 TABLET ORAL 3 TIMES DAILY
Status: DISCONTINUED | OUTPATIENT
Start: 2024-12-30 | End: 2024-12-31

## 2024-12-30 RX ORDER — AMANTADINE HYDROCHLORIDE 100 MG/1
100 CAPSULE, GELATIN COATED ORAL 3 TIMES DAILY
Status: DISCONTINUED | OUTPATIENT
Start: 2024-12-30 | End: 2025-01-04 | Stop reason: HOSPADM

## 2024-12-30 RX ORDER — PIPERACILLIN SODIUM, TAZOBACTAM SODIUM 4; .5 G/20ML; G/20ML
4.5 INJECTION, POWDER, LYOPHILIZED, FOR SOLUTION INTRAVENOUS ONCE
Status: COMPLETED | OUTPATIENT
Start: 2024-12-30 | End: 2024-12-30

## 2024-12-30 RX ORDER — AMOXICILLIN 250 MG
1 CAPSULE ORAL 2 TIMES DAILY PRN
Status: DISCONTINUED | OUTPATIENT
Start: 2024-12-30 | End: 2025-01-04 | Stop reason: HOSPADM

## 2024-12-30 RX ORDER — POLYETHYLENE GLYCOL 3350 17 G/17G
17 POWDER, FOR SOLUTION ORAL 2 TIMES DAILY PRN
Status: DISCONTINUED | OUTPATIENT
Start: 2024-12-30 | End: 2025-01-04 | Stop reason: HOSPADM

## 2024-12-30 RX ORDER — BENZTROPINE MESYLATE 1 MG/1
1 TABLET ORAL 3 TIMES DAILY
Status: DISCONTINUED | OUTPATIENT
Start: 2024-12-30 | End: 2025-01-04 | Stop reason: HOSPADM

## 2024-12-30 RX ORDER — AMOXICILLIN 250 MG
2 CAPSULE ORAL 2 TIMES DAILY PRN
Status: DISCONTINUED | OUTPATIENT
Start: 2024-12-30 | End: 2025-01-04 | Stop reason: HOSPADM

## 2024-12-30 RX ORDER — FLUTICASONE PROPIONATE 50 MCG
2 SPRAY, SUSPENSION (ML) NASAL DAILY
Status: DISCONTINUED | OUTPATIENT
Start: 2024-12-31 | End: 2025-01-04 | Stop reason: HOSPADM

## 2024-12-30 RX ORDER — SENNOSIDES 8.6 MG
2 TABLET ORAL 2 TIMES DAILY
Status: ON HOLD | COMMUNITY
End: 2025-01-04

## 2024-12-30 RX ORDER — GUAIFENESIN 600 MG/1
600 TABLET, EXTENDED RELEASE ORAL DAILY
COMMUNITY

## 2024-12-30 RX ORDER — HALOPERIDOL 5 MG/ML
5 INJECTION INTRAMUSCULAR
Status: COMPLETED | OUTPATIENT
Start: 2024-12-30 | End: 2024-12-30

## 2024-12-30 RX ORDER — ONDANSETRON 4 MG/1
4 TABLET, ORALLY DISINTEGRATING ORAL EVERY 8 HOURS PRN
COMMUNITY

## 2024-12-30 RX ORDER — DIAZEPAM 10 MG/2ML
5 INJECTION, SOLUTION INTRAMUSCULAR; INTRAVENOUS ONCE
Status: COMPLETED | OUTPATIENT
Start: 2024-12-30 | End: 2024-12-30

## 2024-12-30 RX ORDER — FUROSEMIDE 10 MG/ML
20 INJECTION INTRAMUSCULAR; INTRAVENOUS ONCE
Status: COMPLETED | OUTPATIENT
Start: 2024-12-30 | End: 2024-12-30

## 2024-12-30 RX ORDER — HEPARIN SODIUM 10000 [USP'U]/100ML
0-5000 INJECTION, SOLUTION INTRAVENOUS CONTINUOUS
Status: DISCONTINUED | OUTPATIENT
Start: 2024-12-30 | End: 2025-01-01

## 2024-12-30 RX ORDER — PIPERACILLIN SODIUM, TAZOBACTAM SODIUM 4; .5 G/20ML; G/20ML
4.5 INJECTION, POWDER, LYOPHILIZED, FOR SOLUTION INTRAVENOUS EVERY 6 HOURS
Status: DISCONTINUED | OUTPATIENT
Start: 2024-12-30 | End: 2025-01-03

## 2024-12-30 RX ORDER — PANTOPRAZOLE SODIUM 40 MG/1
40 TABLET, DELAYED RELEASE ORAL
Status: DISCONTINUED | OUTPATIENT
Start: 2024-12-30 | End: 2025-01-04 | Stop reason: HOSPADM

## 2024-12-30 RX ORDER — GABAPENTIN 100 MG/1
100 CAPSULE ORAL AT BEDTIME
COMMUNITY

## 2024-12-30 RX ORDER — CALCIUM CARBONATE 500 MG/1
1000 TABLET, CHEWABLE ORAL 4 TIMES DAILY PRN
Status: DISCONTINUED | OUTPATIENT
Start: 2024-12-30 | End: 2025-01-04 | Stop reason: HOSPADM

## 2024-12-30 RX ORDER — ACETAMINOPHEN 650 MG/1
650 SUPPOSITORY RECTAL EVERY 4 HOURS PRN
Status: DISCONTINUED | OUTPATIENT
Start: 2024-12-30 | End: 2025-01-04 | Stop reason: HOSPADM

## 2024-12-30 RX ORDER — LORAZEPAM 1 MG/1
1 TABLET ORAL EVERY 6 HOURS PRN
Status: DISCONTINUED | OUTPATIENT
Start: 2024-12-30 | End: 2024-12-30

## 2024-12-30 RX ORDER — LIDOCAINE 40 MG/G
CREAM TOPICAL
Status: DISCONTINUED | OUTPATIENT
Start: 2024-12-30 | End: 2024-12-30

## 2024-12-30 RX ORDER — GABAPENTIN 100 MG/1
100 CAPSULE ORAL AT BEDTIME
Status: DISCONTINUED | OUTPATIENT
Start: 2024-12-30 | End: 2025-01-04 | Stop reason: HOSPADM

## 2024-12-30 RX ORDER — CETIRIZINE HYDROCHLORIDE 5 MG/1
5 TABLET ORAL DAILY
COMMUNITY

## 2024-12-30 RX ORDER — TRIAMCINOLONE ACETONIDE 1 MG/G
CREAM TOPICAL DAILY PRN
COMMUNITY

## 2024-12-30 RX ORDER — CETIRIZINE HYDROCHLORIDE 5 MG/1
5 TABLET ORAL DAILY
Status: DISCONTINUED | OUTPATIENT
Start: 2024-12-30 | End: 2025-01-04 | Stop reason: HOSPADM

## 2024-12-30 RX ORDER — ALBUTEROL SULFATE 0.83 MG/ML
2.5 SOLUTION RESPIRATORY (INHALATION) EVERY 8 HOURS PRN
Status: DISCONTINUED | OUTPATIENT
Start: 2024-12-30 | End: 2025-01-04 | Stop reason: HOSPADM

## 2024-12-30 RX ORDER — LIDOCAINE 40 MG/G
CREAM TOPICAL
Status: DISCONTINUED | OUTPATIENT
Start: 2024-12-30 | End: 2025-01-04 | Stop reason: HOSPADM

## 2024-12-30 RX ORDER — CARBOXYMETHYLCELLULOSE SODIUM 5 MG/ML
2 SOLUTION/ DROPS OPHTHALMIC 4 TIMES DAILY
Status: DISCONTINUED | OUTPATIENT
Start: 2024-12-30 | End: 2025-01-04 | Stop reason: HOSPADM

## 2024-12-30 RX ADMIN — ENOXAPARIN SODIUM 40 MG: 40 INJECTION SUBCUTANEOUS at 15:56

## 2024-12-30 RX ADMIN — DIAZEPAM 2.5 MG: 5 INJECTION INTRAMUSCULAR; INTRAVENOUS at 20:38

## 2024-12-30 RX ADMIN — SODIUM CHLORIDE 1000 ML: 9 INJECTION, SOLUTION INTRAVENOUS at 08:41

## 2024-12-30 RX ADMIN — VANCOMYCIN HYDROCHLORIDE 1500 MG: 10 INJECTION, POWDER, LYOPHILIZED, FOR SOLUTION INTRAVENOUS at 09:09

## 2024-12-30 RX ADMIN — CLOZAPINE 250 MG: 200 TABLET ORAL at 22:01

## 2024-12-30 RX ADMIN — SERTRALINE HYDROCHLORIDE 150 MG: 100 TABLET ORAL at 22:02

## 2024-12-30 RX ADMIN — DIPHENHYDRAMINE HYDROCHLORIDE 25 MG: 50 INJECTION INTRAMUSCULAR; INTRAVENOUS at 08:32

## 2024-12-30 RX ADMIN — ACETAMINOPHEN 1000 MG: 500 TABLET, FILM COATED ORAL at 22:04

## 2024-12-30 RX ADMIN — BENZTROPINE MESYLATE 1 MG: 1 TABLET ORAL at 15:56

## 2024-12-30 RX ADMIN — FUROSEMIDE 20 MG: 10 INJECTION, SOLUTION INTRAMUSCULAR; INTRAVENOUS at 20:56

## 2024-12-30 RX ADMIN — AMANTADINE HYDROCHLORIDE 100 MG: 100 CAPSULE ORAL at 22:04

## 2024-12-30 RX ADMIN — BENZTROPINE MESYLATE 1 MG: 1 TABLET ORAL at 22:05

## 2024-12-30 RX ADMIN — HEPARIN SODIUM 750 UNITS/HR: 10000 INJECTION, SOLUTION INTRAVENOUS at 21:59

## 2024-12-30 RX ADMIN — PIPERACILLIN AND TAZOBACTAM 4.5 G: 4; .5 INJECTION, POWDER, FOR SOLUTION INTRAVENOUS at 14:24

## 2024-12-30 RX ADMIN — CARBOXYMETHYLCELLULOSE SODIUM 2 DROP: 5 SOLUTION/ DROPS OPHTHALMIC at 16:02

## 2024-12-30 RX ADMIN — DIPHENHYDRAMINE HYDROCHLORIDE 25 MG: 50 INJECTION, SOLUTION INTRAMUSCULAR; INTRAVENOUS at 08:41

## 2024-12-30 RX ADMIN — PIPERACILLIN AND TAZOBACTAM 4.5 G: 4; .5 INJECTION, POWDER, FOR SOLUTION INTRAVENOUS at 08:57

## 2024-12-30 RX ADMIN — HALOPERIDOL LACTATE 5 MG: 5 INJECTION, SOLUTION INTRAMUSCULAR at 13:24

## 2024-12-30 RX ADMIN — Medication 1 TABLET: at 20:39

## 2024-12-30 RX ADMIN — DIAZEPAM 2.5 MG: 5 INJECTION INTRAMUSCULAR; INTRAVENOUS at 18:30

## 2024-12-30 RX ADMIN — IPRATROPIUM BROMIDE AND ALBUTEROL SULFATE 3 ML: .5; 3 SOLUTION RESPIRATORY (INHALATION) at 08:35

## 2024-12-30 RX ADMIN — HALOPERIDOL LACTATE 5 MG: 5 INJECTION, SOLUTION INTRAMUSCULAR at 08:30

## 2024-12-30 RX ADMIN — GABAPENTIN 100 MG: 100 CAPSULE ORAL at 22:05

## 2024-12-30 RX ADMIN — DIPHENHYDRAMINE HYDROCHLORIDE 25 MG: 50 INJECTION, SOLUTION INTRAMUSCULAR; INTRAVENOUS at 08:32

## 2024-12-30 RX ADMIN — DIAZEPAM 2.5 MG: 5 INJECTION INTRAMUSCULAR; INTRAVENOUS at 17:41

## 2024-12-30 RX ADMIN — DICLOFENAC 2 G: 10 GEL TOPICAL at 15:57

## 2024-12-30 RX ADMIN — PIPERACILLIN AND TAZOBACTAM 4.5 G: 4; .5 INJECTION, POWDER, FOR SOLUTION INTRAVENOUS at 20:41

## 2024-12-30 RX ADMIN — AMANTADINE HYDROCHLORIDE 100 MG: 100 CAPSULE ORAL at 15:56

## 2024-12-30 RX ADMIN — PANTOPRAZOLE SODIUM 40 MG: 40 TABLET, DELAYED RELEASE ORAL at 15:56

## 2024-12-30 RX ADMIN — DIAZEPAM 2 MG: 2 TABLET ORAL at 16:42

## 2024-12-30 RX ADMIN — ACETAMINOPHEN 1000 MG: 500 TABLET, FILM COATED ORAL at 15:56

## 2024-12-30 RX ADMIN — DIAZEPAM 5 MG: 5 INJECTION, SOLUTION INTRAMUSCULAR; INTRAVENOUS at 08:56

## 2024-12-30 RX ADMIN — CETIRIZINE HYDROCHLORIDE 5 MG: 5 TABLET ORAL at 15:56

## 2024-12-30 RX ADMIN — CARBOXYMETHYLCELLULOSE SODIUM 2 DROP: 5 SOLUTION/ DROPS OPHTHALMIC at 22:07

## 2024-12-30 RX ADMIN — SODIUM CHLORIDE 1000 ML: 9 INJECTION, SOLUTION INTRAVENOUS at 09:10

## 2024-12-30 ASSESSMENT — ACTIVITIES OF DAILY LIVING (ADL)
ADLS_ACUITY_SCORE: 51
ADLS_ACUITY_SCORE: 59
ADLS_ACUITY_SCORE: 54
ADLS_ACUITY_SCORE: 51
ADLS_ACUITY_SCORE: 59
ADLS_ACUITY_SCORE: 59
ADLS_ACUITY_SCORE: 54
ADLS_ACUITY_SCORE: 51
ADLS_ACUITY_SCORE: 51
ADLS_ACUITY_SCORE: 59
ADLS_ACUITY_SCORE: 55
ADLS_ACUITY_SCORE: 51
ADLS_ACUITY_SCORE: 59
ADLS_ACUITY_SCORE: 51
ADLS_ACUITY_SCORE: 55

## 2024-12-30 ASSESSMENT — COLUMBIA-SUICIDE SEVERITY RATING SCALE - C-SSRS
6. HAVE YOU EVER DONE ANYTHING, STARTED TO DO ANYTHING, OR PREPARED TO DO ANYTHING TO END YOUR LIFE?: NO
1. IN THE PAST MONTH, HAVE YOU WISHED YOU WERE DEAD OR WISHED YOU COULD GO TO SLEEP AND NOT WAKE UP?: NO
2. HAVE YOU ACTUALLY HAD ANY THOUGHTS OF KILLING YOURSELF IN THE PAST MONTH?: NO

## 2024-12-30 NOTE — ED NOTES
Bed: ST03  Expected date:   Expected time:   Means of arrival:   Comments:  452  74 F resp distress/85 % on c-pap//pulmonary edema  0825

## 2024-12-30 NOTE — ED TRIAGE NOTES
Patient presents to the ER via EMS. Per report, patient comes from [facility] where staff called for complaints of increased twitching movements which had been worsening since Saturday.   When EMS showed, patient was laying flat on her face and cyanotic. EMS found her to be hypoxic at 50% while on room air.   No meds given in route. Patient was placed on Cipap. EMS reports that she had a brief period of unresponsiveness in route- DNR/DNI     Triage Assessment (Adult)       Row Name 12/30/24 0831          Triage Assessment    Airway WDL WDL        Respiratory WDL    Respiratory WDL X        Skin Circulation/Temperature WDL    Skin Circulation/Temperature WDL WDL        Cardiac WDL    Cardiac WDL X        Peripheral/Neurovascular WDL    Peripheral Neurovascular WDL WDL        Cognitive/Neuro/Behavioral WDL    Cognitive/Neuro/Behavioral WDL X

## 2024-12-30 NOTE — ED NOTES
Report received from RANDY Bo. Pt transferred to ED 15. Pt care assumed. Will continue to monitor

## 2024-12-30 NOTE — ED PROVIDER NOTES
"  Emergency Department Note      History of Present Illness     Chief Complaint  Shortness of Breath and Respiratory Distress    HPI  Liz Lieberman is a 74 year old female who presents to the emergency room with agitation and respiratory distress after being found to be very restless at her nursing facility.  EMS was called for this, but EMS notes when they arrived she was saturating 50% and tachypneic and had \"very blue lips\".  At this point he started BiPAP, they did a lung ultrasound which they said showed B-lines, and transported.  Patient is DNR/DNI, no reported history of heart failure but is on numerous psych meds and does have a history of akathisia.  Patient herself is agitated but unable to meaningfully participate in history.      Independent Historian  No    Review of External Notes  Yes I reviewed the full medication list at her care facility, no blood thinners or CHF medications noted.  I have also reviewed her medical note at the nursing home from 12-4-2024 which confirms that she is DNR/DNI.      Past Medical History   Medical History and Problem List  Past Medical History:   Diagnosis Date    Allergic rhinitis     Bronchiectasis (H)     Chronic pain     Depression     Diverticulosis     Gastro-oesophageal reflux disease     GERD (gastroesophageal reflux disease)     Hearing loss     Hiatal hernia     History of blood transfusion     Hyperlipidemia     Hypothyroidism     Leukocytosis     Lung nodule     Mild intermittent asthma     Numbness and tingling     Osteoarthritis     Paranoid schizophrenia, in remission     RLS (restless legs syndrome)     Tardive dyskinesia     Urinary incontinence        Medications  acetaminophen (TYLENOL) 500 MG tablet  albuterol (PROAIR HFA/PROVENTIL HFA/VENTOLIN HFA) 108 (90 Base) MCG/ACT inhaler  albuterol (PROVENTIL) (2.5 MG/3ML) 0.083% neb solution  amLODIPine (NORVASC) 2.5 MG tablet  atorvastatin (LIPITOR) 20 MG tablet  benztropine (COGENTIN) 1 MG tablet  Calcium " Carbonate-Vitamin D (CALCIUM + D PO)  cloZAPine (CLOZARIL) 100 MG tablet  desoximetasone (TOPICORT) 0.25 % OINT  diphenhydrAMINE (BENADRYL) 25 MG capsule  emollient (VANICREAM) external cream  Ferrous Gluconate 324 (37.5 Fe) MG TABS  fluticasone (FLONASE) 50 MCG/ACT nasal spray  fluticasone (FLOVENT HFA) 110 MCG/ACT inhaler  folic acid (FOLVITE) 1 MG tablet  guaiFENesin (MUCINEX) 600 MG 12 hr tablet  levothyroxine (SYNTHROID/LEVOTHROID) 175 MCG tablet  mirabegron (MYRBETRIQ) 25 MG 24 hr tablet  multivitamin w/minerals (THERA-VIT-M) tablet  omeprazole (PRILOSEC) 20 MG DR capsule  polyethylene glycol (MIRALAX/GLYCOLAX) packet  sertraline (ZOLOFT) 50 MG tablet  sodium fluoride dental gel (PREVIDENT) 1.1 % GEL topical gel  thiamine 50 MG TABS        Surgical History   Past Surgical History:   Procedure Laterality Date    ARTHROPLASTY KNEE  2/20/2013    Procedure: ARTHROPLASTY KNEE;  LEFT TOTAL KNEE ARTHROPLASTY (SMITH & NEPHEW)^;  Surgeon: Khanh Bee MD;  Location:  OR    ENT SURGERY      tonsillectomy    ORTHOPEDIC SURGERY  2011    (R) total knee         Physical Exam   Patient Vitals for the past 24 hrs:   BP Pulse Resp SpO2   12/30/24 0834 92/46 -- -- --   12/30/24 0831 -- 102 (!) 38 99 %       Physical Exam  Vitals: reviewed by me  General: Pt seen on [facility], quite ill-appearing, very agitated, looking around the room, in clear respiratory distress.  On CPAP on arrival  Eyes: Tracking well, clear conjunctiva BL  ENT: MMM, midline trachea.   Lungs: Very tachypneic, moderate to severe respiratory distress noted, lungs are without wheezing to the apices but does seem to have crackles and coarse of breath sounds to the lung bases, right greater than left.  CV: Rate as above  Abd: Soft, non tender, no guarding, no rebound. Non distended   MSK: no joint effusion.  No evidence of trauma  Skin: No rash  Neuro: Not really speaking, does seem to be quite agitated and restless, moving all extremity  spontaneously.  Psych: Not RIS, no e/o /      Diagnostics   Lab Results   Labs Ordered and Resulted from Time of ED Arrival to Time of ED Departure   COMPREHENSIVE METABOLIC PANEL - Abnormal       Result Value    Sodium 121 (*)     Potassium 5.1      Carbon Dioxide (CO2) 12 (*)     Anion Gap 22 (*)     Urea Nitrogen 9.3      Creatinine 0.70      GFR Estimate >90      Calcium 8.5 (*)     Chloride 87 (*)     Glucose 214 (*)     Alkaline Phosphatase 165 (*)     AST 50 (*)     ALT 23      Protein Total 6.6      Albumin 3.4 (*)     Bilirubin Total 0.2     CBC WITH PLATELETS AND DIFFERENTIAL - Abnormal    WBC Count 9.6      RBC Count 3.64 (*)     Hemoglobin 9.8 (*)     Hematocrit 30.9 (*)     MCV 85      MCH 26.9      MCHC 31.7      RDW 13.4      Platelet Count 418      % Neutrophils 63      % Lymphocytes 28      % Monocytes 7      % Eosinophils 0      % Basophils 0      % Immature Granulocytes 2      NRBCs per 100 WBC 0      Absolute Neutrophils 6.1      Absolute Lymphocytes 2.7      Absolute Monocytes 0.7      Absolute Eosinophils 0.0      Absolute Basophils 0.0      Absolute Immature Granulocytes 0.2      Absolute NRBCs 0.0     GLUCOSE BY METER - Abnormal    GLUCOSE BY METER POCT 199 (*)    ISTAT GASES LACTATE VENOUS POCT - Abnormal    Lactic Acid POCT 9.4 (*)     Bicarbonate Venous POCT 14 (*)     O2 Sat, Venous POCT 95 (*)     pCO2 Venous POCT 34 (*)     pH Venous POCT 7.21 (*)     pO2 Venous POCT 90 (*)     Base Excess/Deficit (+/-) POCT -13.0 (*)    NT PROBNP INPATIENT - Abnormal    N terminal Pro BNP Inpatient 2,832 (*)    INFLUENZA A/B, RSV AND SARS-COV2 PCR - Normal    Influenza A PCR Negative      Influenza B PCR Negative      RSV PCR Negative      SARS CoV2 PCR Negative     ROUTINE UA WITH MICROSCOPIC REFLEX TO CULTURE   LACTIC ACID WHOLE BLOOD   LACTIC ACID WHOLE BLOOD WITH 1X REPEAT IN 2 HR WHEN >2   BLOOD CULTURE   BLOOD CULTURE       Imaging  XR Chest Port 1 View   Preliminary Result   IMPRESSION:  Normal cardiac silhouette. Diffuse bilateral interstitial   prominence appears mildly increased and this could be mild pulmonary   edema. Mild left base atelectasis.          EKG   ECG results from 12/30/24   EKG 12-lead, tracing only     Value    Systolic Blood Pressure     Diastolic Blood Pressure     Ventricular Rate 99    Atrial Rate 99    OR Interval 154    QRS Duration 84        QTc 449    P Axis 77    R AXIS 59    T Axis 52    Interpretation ECG      Sinus rhythm  Nonspecific ST and T wave abnormality  Abnormal ECG  Reviewed by Basim ORTEZ             Independent Interpretation  Yes I have independently reviewed the patient's chest x-ray, no obvious pneumothorax noted.      ED Course      Medications Administered   Medications   haloperidol lactate (HALDOL) injection 5 mg (has no administration in time range)   sodium chloride 0.9% BOLUS 1,000 mL (0 mLs Intravenous Stopped 12/30/24 0948)   piperacillin-tazobactam (ZOSYN) 4.5 g vial to attach to  mL bag (0 g Intravenous Stopped 12/30/24 0948)   vancomycin (VANCOCIN) 1,500 mg in 0.9% NaCl 265 mL intermittent infusion (0 mg Intravenous Stopped 12/30/24 1118)   haloperidol lactate (HALDOL) injection 5 mg (5 mg IV/IM $Given 12/30/24 0830)   diphenhydrAMINE (BENADRYL) injection 25 mg (25 mg Intravenous $Given 12/30/24 0832)   ipratropium - albuterol 0.5 mg/2.5 mg/3 mL (DUONEB) neb solution 3 mL (3 mLs Nebulization $Given 12/30/24 0835)   diphenhydrAMINE (BENADRYL) injection 25 mg (25 mg Intravenous $Given 12/30/24 0841)   diazepam (VALIUM) injection 5 mg (5 mg Intravenous $Given 12/30/24 0856)   sodium chloride 0.9% BOLUS 1,000 mL (0 mLs Intravenous Stopped 12/30/24 0948)           Optional/Additional Documentation  Transportation  and Stress/Adjustment Disorders       Medical Decision Making / Diagnosis     CMS Diagnoses: Lactic acid significantly elevated possible due to hypovolemia and sepsis.  On my repeat perfusion exam she is doing better, she  did receive the septic bundle, judicious fluids only however given her respiratory distress and x-ray showing possible fluid overload.  She also was hyponatremic and we do not want to correct too quickly.  Antibiotics were given, repeat lactic ordered.          MDM  This is a 74-year-old female who presents to the emergency room initially with agitation and akathisia, but also noted by EMS to be hypoxic and with respiratory distress and cyanotic lips.  She was placed on BiPAP prior to arrival, and we are now trying to titrate her off of this as it became clear that at least part of her tachypnea and respiratory symptoms was due to compensation for a clear metabolic acidosis.  The metabolic acidosis is likely due to sepsis and dehydration as she also has hypovolemic hyponatremia.  This is being corrected, and we are balancing her fluid needs from sepsis and a critically low blood pressure with elevated lactic acid with correction of her sodium and possible mild pulmonary edema.  No obvious source of an infection was found as yet, although I did start broad-spectrum antibiotics.  We are trialing her off the BiPAP now and she is actually doing well, remains tachypneic but again this is secondary.  She has no evidence of trauma to her head or person, no obvious cellulitis, and x-ray does not show pneumonia.  She is DNR/DNI, but I do think that she will need to go to the [facility] even if she is able to be taken off the BiPAP.      Critical care time was 30 minutes excluding procedures.      ICD-10 Codes:    ICD-10-CM    1. Sepsis, due to unspecified organism, unspecified whether acute organ dysfunction present (H)  A41.9       2. Hyponatremia  E87.1       3. Hypovolemia  E86.1       4. Altered mental status, unspecified altered mental status type  R41.82       5. Tachypnea  R06.82                         Anselmo Stallworth MD  12/30/24 1128

## 2024-12-30 NOTE — H&P
[facility]    History and Physical - Hospitalist Service       Date of Admission:  12/30/2024    Assessment & Plan      Liz Lieberman is a 74 year old female admitted on 12/30/2024.  Past history of paranoid schizophrenia, TD, depression, dementia, asthma, HTN, among others who was send from her facility due to worsening of jerking movements.  She was found to have multiple electrolyte abnormalities, hypotension and tachycardia with lactic acidosis concerning for sepsis.       Possible sepsis  Severe lactic acidosis with anion gap metabolic acidosis  *on arrival hypotensive, tachycardic, with lactate >9 - BP and heart rate improved with fluid bolus  *CXR with mild edema, but denies any pneumonia symptoms, COVID/FLU/RSV negative; otherwise denies any symptoms to suggest focal infection  - trend lactate q2h, repeat BMP at 1400  - continue vanco and zosyn  - follow blood cultures  - UA pending collection  - monitor on [facility] - note has been difficult to obtain reliable BP readings due to involuntary movements    Hyponatremia, suspected hypovolemic  *hx mild intermittent hyponatremia about 130, though most recent level 11/18/24 wnl  *admission Na 121 - patient reporting significant stooling over past 2 days and feels dry, appears dry on exam  *received 2L IVF in ED due to hypotension/concern for sepsis  - trend Na q4h, anticipate will rise quickly  ADDENDUM:  repeat Na 124 this afternoon, will hold further fluids as taking PO and monitor.    Acute on chronic tardive dyskinesia  Paranoid schizophrenia  Depression  Dementia  *last seen by Psychiatry 11/2023 at which point her TD was felt to be worsening, but declined any dosage adjustment to amantadine  *marked involuntary movements of body and extremities on arrival  *alert and oriented x4 at time of admission  - consult Psych ASAP for med review given severity of symptoms    ADDENDUM:  discussed with Psych on call, they reviewed medications and  report there is nothing we should hold from home that would otherwise worsen her TD.  Question if acute exacerbation is due to underlying metabolic condition.  OK to use ativan 1 mg PO q6h prn for TD movements, hold for sedation.  Should NOT use haldol as may worsen symptoms.     ADDENDUM:  Discussed with Psych after their eval.  Prefer to use valium prn for longer half-life over ativan (switched).  Report truncal TD very rare and recommend general neurology consult as well to assess for any other etiologies.  Pharmacy liaison consult to assess coverage of valbenavine for TD treatment.     Elevated LFT's  *mildly elevated alk phos and AST on admission, benign abdominal exam  - trend daily    HTN  - hold PTA amlodipine with possible sepsis    HLD  - hold PTA statin with mildly elevated LFT's    Chronic pain  - continue PTA tylenol, voltaren, gabapentin    Asthma  - continue PTA inhalers/nebs    Hypothyroid  - continue PTA levothyroxine    Hx alcohol use disorder  *reports drinking about once monthly    GERD  - continue PTA PPI          Diet:  reg diet (note is edentulous, should eat soft foods)  DVT Prophylaxis: Enoxaparin (Lovenox) SQ  [place] Catheter: Not present  Lines: None     Cardiac Monitoring: None  Code Status:  DNR/DNI per patient and POLST    Clinically Significant Risk Factors Present on Admission         # Hyponatremia: Lowest Na = 121 mmol/L in last 2 days, will monitor as appropriate  # Hypochloremia: Lowest Cl = 87 mmol/L in last 2 days, will monitor as appropriate  # Hypocalcemia: Lowest Ca = 8.5 mg/dL in last 2 days, will monitor and replace as appropriate    # Anion Gap Metabolic Acidosis: Highest Anion Gap = 22 mmol/L in last 2 days, will monitor and treat as appropriate  # Hypoalbuminemia: Lowest albumin = 3.4 g/dL at 12/30/2024  8:29 AM, will monitor as appropriate     # Hypertension: Noted on problem list      # Anemia: based on hgb <11           # Asthma: noted on problem list     "    Disposition Plan     Medically Ready for Discharge: Anticipated in 2-4 Days           Anton Dale MD  Hospitalist Service  [facility]  Securely message with Skimlinks (more info)  Text page via [employer] Paging/Directory     ______________________________________________________________________    Chief Complaint   Worsening jerking movements     History is obtained from the patient, chart review and discussion with ED provider.     History of Present Illness   Liz Lieberman is a 74 year old female who presents from her with acute worsening of involuntary movements.  She has a known history of tardive dyskinesia.  She was seen by her outpatient psychiatrist in November of last year with a concern that tardive dyskinesia was worsening but she declined any change in her medications.  Over the past 2 days she reports she has had significant worsening of her involuntary movements.  She was sent in by her facility due to worsening of these today.  She reports \"I was stumbling\" and \"I could not sleep\" due to the worsening of these movements over the past 2 days.    The patient otherwise does not offer much on review of systems.  On specific questioning, she reports \"off-and-on\" fevers and chills, she reports she has been having significant stooling over the past 2 days, though does not specifically endorse diarrhea.  She just endorses a large volume of stool.  She also endorses some lightheadedness which is worse than usual.  She feels dry and dehydrated.  She denies any abdominal pain or nausea.  Denies any rash or sores.  Denies any dysuria, urgency, frequency or other infectious symptoms including headache, sore throat, myalgias or arthralgias.      Past Medical History    Past Medical History:   Diagnosis Date    Allergic rhinitis     Bronchiectasis (H)     mild RML    Chronic pain     ft, knee, shoulders    Depression     Diverticulosis     on colonoscopy    Gastro-oesophageal reflux disease  "    GERD (gastroesophageal reflux disease)     Hearing loss     Hiatal hernia     History of blood transfusion     after tonsillectomy    Hyperlipidemia     Hypothyroidism     Leukocytosis     Lung nodule     Mild intermittent asthma     Numbness and tingling     hands ceasar numb R/T carpal tunnel    Osteoarthritis     knee, ft, shoulders    Paranoid schizophrenia, in remission     RLS (restless legs syndrome)     Tardive dyskinesia     Urinary incontinence        Past Surgical History   Past Surgical History:   Procedure Laterality Date    ARTHROPLASTY KNEE  2/20/2013    Procedure: ARTHROPLASTY KNEE;  LEFT TOTAL KNEE ARTHROPLASTY (SMITH & NEPHEW)^;  Surgeon: Khanh Bee MD;  Location:  OR    ENT SURGERY      tonsillectomy    ORTHOPEDIC SURGERY  2011    (R) total knee       Prior to Admission Medications   Prior to Admission Medications   Prescriptions Last Dose Informant Patient Reported? Taking?   Ferrous Gluconate 324 (37.5 Fe) MG TABS   Yes No   Sig: Take 324 mg by mouth daily (with breakfast)    acetaminophen (TYLENOL) 500 MG tablet   Yes No   Sig: Take 1,000 mg by mouth every 6 hours as needed for mild pain   albuterol (PROAIR HFA/PROVENTIL HFA/VENTOLIN HFA) 108 (90 Base) MCG/ACT inhaler  Self Yes Yes   Sig: Inhale 1-2 puffs into the lungs every 6 hours as needed for wheezing    albuterol (PROVENTIL) (2.5 MG/3ML) 0.083% neb solution   Yes Yes   Sig: Take 2.5 mg by nebulization every 8 hours as needed for wheezing.   albuterol (PROVENTIL) (2.5 MG/3ML) 0.083% neb solution 12/30/2024 Morning  Yes Yes   Sig: Take 2.5 mg by nebulization 2 times daily.   amLODIPine (NORVASC) 5 MG tablet 12/29/2024 Evening  Yes Yes   Sig: Take 5 mg by mouth at bedtime.   amantadine (SYMMETREL) 100 MG capsule 12/29/2024 Evening  Yes Yes   Sig: Take 100 mg by mouth 3 times daily.   atorvastatin (LIPITOR) 20 MG tablet 12/29/2024 Evening  Yes Yes   Sig: Take 20 mg by mouth At Bedtime    benztropine (COGENTIN) 1 MG tablet 12/29/2024  Evening Self Yes Yes   Sig: Take 1 mg by mouth 3 times daily    calcium carbonate-vitamin D (OSCAL) 500-5 MG-MCG tablet 12/29/2024 Evening  Yes Yes   Sig: Take 1 tablet by mouth 2 times daily.   cetirizine (ZYRTEC) 5 MG tablet 12/29/2024 Evening  Yes Yes   Sig: Take 5 mg by mouth daily. For itching   cloZAPine (CLOZARIL) 100 MG tablet 12/28/2024 Evening  No Yes   Sig: Take 2.5 tablets (250 mg) by mouth At Bedtime   desoximetasone (TOPICORT) 0.25 % OINT  Self Yes Yes   Sig: Apply  topically as needed.   diphenhydrAMINE (BENADRYL) 25 MG capsule   Yes No   Sig: Take 25 mg by mouth every 6 hours as needed for itching   emollient (VANICREAM) external cream   Yes No   Sig: Apply topically 3 times daily as needed for other (irritation)   fluocinonide (LIDEX) 0.05 % external solution 12/30/2024 Morning  Yes Yes   Sig: Apply topically 2 times daily. For itching   fluticasone (FLONASE) 50 MCG/ACT nasal spray 12/30/2024 Morning  Yes Yes   Sig: Spray 2 sprays into both nostrils daily.   fluticasone (FLOVENT HFA) 110 MCG/ACT inhaler 12/30/2024 Morning Self Yes Yes   Sig: Inhale 1 puff into the lungs 2 times daily    folic acid (FOLVITE) 1 MG tablet   No No   Sig: Take 1 tablet (1 mg) by mouth daily   gabapentin (NEURONTIN) 100 MG capsule 12/29/2024 Evening  Yes Yes   Sig: Take 100 mg by mouth at bedtime.   guaiFENesin (MUCINEX) 600 MG 12 hr tablet  Self Yes Yes   Sig: Take 600 mg by mouth 2 times daily as needed for congestion.   guaiFENesin (MUCINEX) 600 MG 12 hr tablet 12/29/2024 Morning  Yes Yes   Sig: Take 600 mg by mouth daily.   levothyroxine (SYNTHROID/LEVOTHROID) 175 MCG tablet  Self Yes No   Sig: Take 175 mcg by mouth daily   mirabegron (MYRBETRIQ) 25 MG 24 hr tablet   Yes No   Sig: Take 25 mg by mouth daily   multivitamin w/minerals (THERA-VIT-M) tablet   No No   Sig: Take 1 tablet by mouth daily   omeprazole (PRILOSEC) 20 MG DR capsule   Yes No   Sig: Take 20 mg by mouth 2 times daily   polyethylene glycol  (MIRALAX/GLYCOLAX) packet  Self Yes No   Sig: Take 17 g by mouth daily as needed for constipation    sertraline (ZOLOFT) 50 MG tablet 12/30/2024 Morning Self Yes Yes   Sig: Take 150 mg by mouth At Bedtime    sodium fluoride dental gel (PREVIDENT) 1.1 % GEL topical gel   Yes No   Sig: Apply to affected area daily   thiamine 50 MG TABS   No No   Sig: Take 1 tablet (50 mg) by mouth daily      Facility-Administered Medications: None           Physical Exam   Vital Signs: Temp: 97.2  F (36.2  C)   BP: 90/53 Pulse: 80   Resp: 27 SpO2: 100 % O2 Device: BiPAP/CPAP    Weight: 0 lbs 0 oz    General Appearance: Thin female in no acute distress  Eyes: Pupils equal, round and reactive to light, sclera anicteric  HEENT: Mucous membranes dry  Respiratory: Lungs clear to auscultation bilaterally, no wheeze or crackles, tachypnea  Cardiovascular: Mildly tachycardic, regular rhythm and normal S1/S2 without murmurs rubs or gallops  GI: Abdomen soft, nontender, nondistended, normal bowel sounds  Lymph/Hematologic: No peripheral edema  Musculoskeletal: Extremities warm well-perfused  Neurologic: Alert and appropriate, cranial nerves grossly intact, severe involuntary movements of extremities and trunk, oriented x 3 and to situation  Psychiatric: Normal affect    Medical Decision Making       75 MINUTES SPENT BY ME on the date of service doing chart review, history, exam, documentation & further activities per the note.      Data     I have personally reviewed the following data over the past 24 hrs:    9.6  \   9.8 (L)   / 418     121 (L) 87 (L) 9.3 /  199 (H)   5.1 12 (L) 0.70 \     ALT: 23 AST: 50 (H) AP: 165 (H) TBILI: 0.2   ALB: 3.4 (L) TOT PROTEIN: 6.6 LIPASE: N/A     Trop: N/A BNP: 2,832 (H)     Procal: N/A CRP: N/A Lactic Acid: 9.4 (HH)         Imaging results reviewed over the past 24 hrs:   Recent Results (from the past 24 hours)   XR Chest Port 1 View    Narrative    CHEST PORTABLE ONE VIEW December 30, 2024 8:44 AM      HISTORY: Shortness of breath.    COMPARISON: 3/2/2019.      Impression    IMPRESSION: Normal cardiac silhouette. Diffuse bilateral interstitial  prominence appears mildly increased and this could be mild pulmonary  edema. Mild left base atelectasis.

## 2024-12-30 NOTE — PHARMACY-VANCOMYCIN DOSING SERVICE
"Pharmacy Vancomycin Initial Note  Date of Service 2024  Patient's  1950  74 year old, female    Indication: Sepsis    Current estimated CrCl = Estimated Creatinine Clearance: 69.8 mL/min (based on SCr of 0.7 mg/dL).    Creatinine for last 3 days  2024:  8:29 AM Creatinine 0.70 mg/dL    Recent Vancomycin Level(s) for last 3 days  No results found for requested labs within last 3 days.      Vancomycin IV Administrations (past 72 hours)                     vancomycin (VANCOCIN) 1,500 mg in 0.9% NaCl 265 mL intermittent infusion (mg) 1,500 mg New Bag 24 0909                    Nephrotoxins and other renal medications (From now, onward)      Start     Dose/Rate Route Frequency Ordered Stop    24 1500  piperacillin-tazobactam (ZOSYN) 4.5 g vial to attach to  mL bag        Note to Pharmacy: For [facility], [facility] and [facility]: For Zosyn-naive patients, use the \"Zosyn initial dose + extended infusion\" order panel.    4.5 g  over 30 Minutes Intravenous EVERY 6 HOURS 24 1355              Contrast Orders - past 72 hours (72h ago, onward)      None          [employer] Prediction of Planned Initial Vancomycin Regimen  Loading dose: 1500 mg earlier this AM  Regimen: 1500 mg IV every 24 hours.  Start time: 09:09 on 2024  Exposure target: AUC24 (range)400-600 mg/L.hr   AUC24,ss: 510 mg/L.hr  Probability of AUC24 > 400: 76 %  Ctrough,ss: 13.6 mg/L  Probability of Ctrough,ss > 20: 21 %  Probability of nephrotoxicity (Lodise GURINDER ): 9 %        Plan:  Received 1500 mg x 1 earlier today at 0909. Continue with vancomycin 1500 mg IV q24h.   Vancomycin monitoring method: AUC  Vancomycin therapeutic monitoring goal: 400-600 mg*h/L  Pharmacy will check vancomycin levels as appropriate in 1-3 Days.    Serum creatinine levels will be ordered daily for the first week of therapy and at least twice weekly for subsequent weeks.      Nan Head RPH    "

## 2024-12-30 NOTE — ED NOTES
Pt's sister Alexandria Solorio called and would like updates about the patient as she is the POA. Pt given brief update. Pt's sister would like MD to call her with plan of care.

## 2024-12-30 NOTE — CONSULTS
"[facility]    Psychiatry Consultation     Date of Admission:  12/30/2024  Date of Consult (When I saw the patient): 12/30/24    Assessment & Plan   Liz Lieberman is a 74 year old female who was admitted on 12/30/2024. I was asked to see the patient for \" acute worsening of tardive dyskinesia\"    Patient has worsening of her movement disorder.  Records from [facility] where patient lives indicate that this change occurred overnight in 1 day.  They noticed that previously patient is able to walk and eat by herself.  However since morning she has been rolling from side-to-side she could not walk.  Patient did get IV Haldol in the ER.  Patient is on Clozaril which is least likely to cause tardive dyskinesia.  Patient is also on amantadine which is supposed to help with tardive dyskinesia  Patient is on benztropine 1 mg 3 times a day  Patient is also on gabapentin 100 mg at bedtime  Given severity of the underlying schizoaffective disorder based on patient's description as well as relative stability since 1989 on clozapine as well as given the unlikelihood that clozapine is contributing to the underlying tardive dyskinesia we think it is more prudent to go ahead and continue the clozapine 250 mg at bedtime. She also is on sertraline 150 mg daily, which can be continued. Cogentin is equivocal in terms of effectiveness for tardive dyskinesia. Attempt to taper over time could be considered, but I think we could defer that to the outpatient setting.       Recommendation  Please discontinue any IV first generation antipsychotics including Haldol as these can worsen tardive dyskinesia.  Continue Clozaril  250 mg.po at bedtime   Will order a Clozaril level  Continue amantadine 100 mg 3 times daily which helps with tardive dyskinesia.  Will recommend neurology consult to ensure not to miss any comorbid causes.  Please treat underlying medical conditions which could be contributing to patient's " "agitation.  This agitation can increase patient's underlying tardive dyskinesia.    Patient can try benzodiazepines to help with this acute exacerbation.  Valium clonazepam are recommended.  However caution should be used because patient is on oxygen.  These medications can reduce respiratory drive  Low doses of Valium 2.5 mg 3 times daily can be tried, with a plan to hold for sedation or decreased respiratory drive.    Vmat 2 can help with tardive dyskinesia we will put a pharmacy consult to see if it is covered.    collateral information from the nurse who knows the patient more.  Please address any underlying causes that can cause acute exacerbation.    Principal Diagnosis:   Schizophrenia  Tardive dyskinesia  Medications: Clozaril 250 mg at bedtime  And amantadine 100 mg 3 times daily  Benztropine 1 mg 3 times daily          Medical diagnoses to be addressed this admission:      Patient Active Problem List   Diagnosis    Paranoid schizophrenia in remission (H)    S/P total knee arthroplasty    Pneumonia    TIA (transient ischemic attack)    Pruritic erythematous rash    Gait instability    Orthostatic hypotension    RLS (restless legs syndrome)    Hypothyroidism    Mild intermittent asthma    Tardive dyskinesia    Benign essential hypertension    Physical deconditioning    RAJANI (iron deficiency anemia)    History of alcohol use disorder    Dizziness    Unsteady    Tachypnea    Hypovolemia    Hyponatremia    Altered mental status, unspecified altered mental status type    Sepsis, due to unspecified organism, unspecified whether acute organ dysfunction present (H)           The risks, benefits, alternatives and side effects have been discussed and are understood by the patient and other caregivers.    Ky Gonzalez MD    Reason for Consult   Reason for consult: \"acute worsening of tardive dyskinesia\"    Primary Care Physician   Era Livingston    Chief Complaint   \" Feel terrible    History is obtained from " "the patient  Also spoke with the nurse from [place] [id number]    Review of external notes and/or information: Kasey Leyva,   Psychiatry consult on Ricky Saunders MD  10/3/23    The patient is not aware of tardive dyskinesia, but does acknowledge her chronic symptoms of abnormal movements of her arms, hands, feet, arms, legs as well as other bodily dyskinesia movements. She does not seem particularly concerned about this. \"    History of Present Illness     Liz Lieberman is a 74 year old female who presents to the emergency room with agitation and respiratory distress after being found to be very restless at her nursing facility.  EMS was called for this, but EMS notes when they arrived she was saturating 50% and tachypneic and had \"very blue lips\"       Ms. Lieberman reported that she has had schizoaffective disorder for many years.   Patient denies any auditory or visual hallucinations.  Patient denies any suicidal ideation plan or intent.  Patient has been on Clozaril for his schizoaffective disorder.  She has been stable on it since 1980s.    Records indicate in the past patient can be disorganized tangential.    Records also indicate that patient has tardive dyskinesia this is a chronic problem: She has had abnormal movements of her arms hands and feet legs going back to 2021.  Past Medical History   I have reviewed this patient's medical history and updated it with pertinent information if needed.   Past Medical History:   Diagnosis Date    Allergic rhinitis     Bronchiectasis (H)     mild RML    Chronic pain     ft, knee, shoulders    Depression     Diverticulosis     on colonoscopy    Gastro-oesophageal reflux disease     GERD (gastroesophageal reflux disease)     Hearing loss     Hiatal hernia     History of blood transfusion     after tonsillectomy    Hyperlipidemia     Hypothyroidism     Leukocytosis     Lung nodule     Mild intermittent asthma     Numbness and tingling     hands ceasar " numb R/T carpal tunnel    Osteoarthritis     knee, ft, shoulders    Paranoid schizophrenia, in remission     RLS (restless legs syndrome)     Tardive dyskinesia     Urinary incontinence        Past Psychiatric History \patient was psychiatrically hospitalized in the past  She had significant suicide attempts in the past  History of prior psychiatric treatment, including being on Clozaril for several years for psychosis    Past Surgical History   I have reviewed this patient's surgical history and updated it with pertinent information if needed.  Past Surgical History:   Procedure Laterality Date    ARTHROPLASTY KNEE  2/20/2013    Procedure: ARTHROPLASTY KNEE;  LEFT TOTAL KNEE ARTHROPLASTY (SMITH & NEPHEW)^;  Surgeon: Khanh Bee MD;  Location:  OR    ENT SURGERY      tonsillectomy    ORTHOPEDIC SURGERY  2011    (R) total knee       Prior to Admission Medications   Prior to Admission Medications   Prescriptions Last Dose Informant Patient Reported? Taking?   Ferrous Gluconate 324 (37.5 Fe) MG TABS   Yes No   Sig: Take 324 mg by mouth daily (with breakfast)    acetaminophen (TYLENOL) 500 MG tablet 12/29/2024  Yes Yes   Sig: Take 1,000 mg by mouth 3 times daily.   albuterol (PROAIR HFA/PROVENTIL HFA/VENTOLIN HFA) 108 (90 Base) MCG/ACT inhaler  Self Yes Yes   Sig: Inhale 1-2 puffs into the lungs every 6 hours as needed for wheezing    albuterol (PROVENTIL) (2.5 MG/3ML) 0.083% neb solution   Yes Yes   Sig: Take 2.5 mg by nebulization every 8 hours as needed for wheezing.   albuterol (PROVENTIL) (2.5 MG/3ML) 0.083% neb solution 12/30/2024 Morning  Yes Yes   Sig: Take 2.5 mg by nebulization 2 times daily.   amLODIPine (NORVASC) 5 MG tablet 12/29/2024 Evening  Yes Yes   Sig: Take 5 mg by mouth at bedtime.   amantadine (SYMMETREL) 100 MG capsule 12/29/2024 Evening  Yes Yes   Sig: Take 100 mg by mouth 3 times daily.   atorvastatin (LIPITOR) 20 MG tablet 12/29/2024 Evening  Yes Yes   Sig: Take 20 mg by mouth At  Bedtime    benztropine (COGENTIN) 1 MG tablet 12/29/2024 Evening Self Yes Yes   Sig: Take 1 mg by mouth 3 times daily    calcium carbonate-vitamin D (OSCAL) 500-5 MG-MCG tablet 12/29/2024 Evening  Yes Yes   Sig: Take 1 tablet by mouth 2 times daily.   cetirizine (ZYRTEC) 5 MG tablet 12/29/2024 Evening  Yes Yes   Sig: Take 5 mg by mouth daily. For itching   cloZAPine (CLOZARIL) 100 MG tablet 12/28/2024 Evening  No Yes   Sig: Take 2.5 tablets (250 mg) by mouth At Bedtime   desoximetasone (TOPICORT) 0.25 % OINT  Self Yes Yes   Sig: Apply  topically as needed.   diclofenac (VOLTAREN) 1 % topical gel 12/29/2024  Yes Yes   Sig: Apply 2 g topically 4 times daily. Apply to wrists   emollient (VANICREAM) external cream   Yes Yes   Sig: Apply topically 3 times daily as needed for other (irritation)   fluocinonide (LIDEX) 0.05 % external solution 12/30/2024 Morning  Yes Yes   Sig: Apply topically 2 times daily. For itching   fluticasone (FLONASE) 50 MCG/ACT nasal spray 12/30/2024 Morning  Yes Yes   Sig: Spray 2 sprays into both nostrils daily.   fluticasone (FLOVENT HFA) 110 MCG/ACT inhaler 12/30/2024 Morning Self Yes Yes   Sig: Inhale 1 puff into the lungs 2 times daily    gabapentin (NEURONTIN) 100 MG capsule 12/29/2024 Evening  Yes Yes   Sig: Take 100 mg by mouth at bedtime.   guaiFENesin (MUCINEX) 600 MG 12 hr tablet  Self Yes Yes   Sig: Take 600 mg by mouth 2 times daily as needed for congestion.   guaiFENesin (MUCINEX) 600 MG 12 hr tablet 12/29/2024 Morning  Yes Yes   Sig: Take 600 mg by mouth daily.   levothyroxine (SYNTHROID/LEVOTHROID) 175 MCG tablet 12/30/2024 Morning Self Yes Yes   Sig: Take 175 mcg by mouth daily   multivitamin w/minerals (THERA-VIT-M) tablet 12/29/2024 Morning  No Yes   Sig: Take 1 tablet by mouth daily   nystatin (MYCOSTATIN) 574315 UNIT/GM external cream   Yes Yes   Sig: Apply topically daily as needed (rash). Apply to groin and upper thighs   omeprazole (PRILOSEC) 20 MG DR capsule 12/30/2024  Morning  Yes Yes   Sig: Take 20 mg by mouth 2 times daily   ondansetron (ZOFRAN ODT) 4 MG ODT tab   Yes Yes   Sig: Take 4 mg by mouth every 8 hours as needed for nausea.   polyethylene glycol (MIRALAX/GLYCOLAX) packet  Self Yes Yes   Sig: Take 17 g by mouth daily as needed for constipation    polyethylene glycol-propylene glycol (SYSTANE ULTRA) 0.4-0.3 % SOLN ophthalmic solution 12/29/2024 Evening  Yes Yes   Sig: Place 2 drops into both eyes 4 times daily.   sennosides (SENOKOT) 8.6 MG tablet 12/29/2024 Morning  Yes Yes   Sig: Take 2 tablets by mouth 2 times daily.   sertraline (ZOLOFT) 50 MG tablet 12/29/2024 Evening Self Yes Yes   Sig: Take 150 mg by mouth At Bedtime    sodium fluoride dental gel (PREVIDENT) 1.1 % GEL topical gel   Yes Yes   Sig: Apply to affected area daily   triamcinolone (KENALOG) 0.1 % external cream   Yes Yes   Sig: Apply topically daily as needed for irritation.      Facility-Administered Medications: None     Allergies   Allergies   Allergen Reactions    Formaldehyde Rash    Latex Rash       Social History   I have reviewed this patient's social history and updated it with pertinent information if needed. Liz Lieberman  reports that she quit smoking about 30 years ago. She has never used smokeless tobacco. She reports current alcohol use of about 3.0 - 5.0 standard drinks of alcohol per week. She reports that she does not use drugs.    Family History   I have reviewed this patient's family history and updated it with pertinent information if needed.   Family History   Problem Relation Age of Onset    Cerebrovascular Disease No family hx of     Diabetes No family hx of        Review of Systems   The 10 point Review of Systems is negative other than noted in the HPI or here.     Physical Exam     Vital Signs with Ranges  Temp:  [97.2  F (36.2  C)-98.2  F (36.8  C)] 98.2  F (36.8  C)  Pulse:  [] 84  Resp:  [21-63] 30  BP: ()/(42-90) 118/71  FiO2 (%):  [35 %] 35 %  SpO2:  [97 %-100  "%] 98 %  136 lbs 3.91 oz    Appearance:  Distressed  Behavior/relationship to examiner/demeanor:  Cooperative  Orientation: Oriented to person, place, time, and situation  Speech Rate:  Normal  Speech Spontaneity:  Normal  Mood:  \"feel terrible\"  Affect:  Appropriate/mood-congruent  Thought Process:  Logical  Associations: No loosening of associations  Thought Content:  no evidence of suicidal or homicidal ideation, no overt psychosis, and denies suicidal ideation, intent or thoughts  Abnormal Perception:  None  Attention/Concentration:  Normal  Memory: adequate  Language:  Intact  Fund of Knowledge: Average  Abstraction: Normal  Insight:  Fair  Judgment:  Adequate for safety        Data   Results for orders placed or performed during the hospital encounter of 12/30/24 (from the past 24 hours)   EKG 12-lead, tracing only   Result Value Ref Range    Systolic Blood Pressure  mmHg    Diastolic Blood Pressure  mmHg    Ventricular Rate 99 BPM    Atrial Rate 99 BPM    ND Interval 154 ms    QRS Duration 84 ms     ms    QTc 449 ms    P Axis 77 degrees    R AXIS 59 degrees    T Axis 52 degrees    Interpretation ECG       Sinus rhythm  Nonspecific ST and T wave abnormality  Abnormal ECG  When compared with ECG of 23-Jun-2024 14:05,  Nonspecific T wave abnormality now evident in Lateral leads  Confirmed by GENERATED REPORT, COMPUTER ([id number]),  Deangelo Garcia ([id number]) on 12/30/2024 10:22:23 AM     CBC with platelets + differential    Narrative    The following orders were created for panel order CBC with platelets + differential.  Procedure                               Abnormality         Status                     ---------                               -----------         ------                     CBC with platelets and d...[[id number]]  Abnormal            Final result                 Please view results for these tests on the individual orders.   Comprehensive metabolic panel   Result Value Ref Range    Sodium " 121 (L) 135 - 145 mmol/L    Potassium 5.1 3.4 - 5.3 mmol/L    Carbon Dioxide (CO2) 12 (L) 22 - 29 mmol/L    Anion Gap 22 (H) 7 - 15 mmol/L    Urea Nitrogen 9.3 8.0 - 23.0 mg/dL    Creatinine 0.70 0.51 - 0.95 mg/dL    GFR Estimate >90 >60 mL/min/1.73m2    Calcium 8.5 (L) 8.8 - 10.4 mg/dL    Chloride 87 (L) 98 - 107 mmol/L    Glucose 214 (H) 70 - 99 mg/dL    Alkaline Phosphatase 165 (H) 40 - 150 U/L    AST 50 (H) 0 - 45 U/L    ALT 23 0 - 50 U/L    Protein Total 6.6 6.4 - 8.3 g/dL    Albumin 3.4 (L) 3.5 - 5.2 g/dL    Bilirubin Total 0.2 <=1.2 mg/dL   [place] Draw    Narrative    The following orders were created for panel order [place] Draw.  Procedure                               Abnormality         Status                     ---------                               -----------         ------                     Extra Blue Top Tube[[id number]]                              Final result                 Please view results for these tests on the individual orders.   CBC with platelets and differential   Result Value Ref Range    WBC Count 9.6 4.0 - 11.0 10e3/uL    RBC Count 3.64 (L) 3.80 - 5.20 10e6/uL    Hemoglobin 9.8 (L) 11.7 - 15.7 g/dL    Hematocrit 30.9 (L) 35.0 - 47.0 %    MCV 85 78 - 100 fL    MCH 26.9 26.5 - 33.0 pg    MCHC 31.7 31.5 - 36.5 g/dL    RDW 13.4 10.0 - 15.0 %    Platelet Count 418 150 - 450 10e3/uL    % Neutrophils 63 %    % Lymphocytes 28 %    % Monocytes 7 %    % Eosinophils 0 %    % Basophils 0 %    % Immature Granulocytes 2 %    NRBCs per 100 WBC 0 <1 /100    Absolute Neutrophils 6.1 1.6 - 8.3 10e3/uL    Absolute Lymphocytes 2.7 0.8 - 5.3 10e3/uL    Absolute Monocytes 0.7 0.0 - 1.3 10e3/uL    Absolute Eosinophils 0.0 0.0 - 0.7 10e3/uL    Absolute Basophils 0.0 0.0 - 0.2 10e3/uL    Absolute Immature Granulocytes 0.2 <=0.4 10e3/uL    Absolute NRBCs 0.0 10e3/uL   Extra Blue Top Tube   Result Value Ref Range    Hold Specimen JIC    Nt probnp inpatient (BNP)   Result Value Ref Range    N terminal Pro  BNP Inpatient 2,832 (H) 0 - 900 pg/mL   Glucose by meter   Result Value Ref Range    GLUCOSE BY METER POCT 199 (H) 70 - 99 mg/dL   iStat Gases (lactate) venous, POCT   Result Value Ref Range    Lactic Acid POCT 9.4 (HH) <=2.0 mmol/L    Bicarbonate Venous POCT 14 (L) 21 - 28 mmol/L    O2 Sat, Venous POCT 95 (H) 70 - 75 %    pCO2 Venous POCT 34 (L) 40 - 50 mm Hg    pH Venous POCT 7.21 (L) 7.32 - 7.43    pO2 Venous POCT 90 (H) 25 - 47 mm Hg    Base Excess/Deficit (+/-) POCT -13.0 (L) -3.0 - 3.0 mmol/L   Influenza A/B, RSV and SARS-CoV2 PCR (COVID-19) Nose    Specimen: Nose; Swab   Result Value Ref Range    Influenza A PCR Negative Negative    Influenza B PCR Negative Negative    RSV PCR Negative Negative    SARS CoV2 PCR Negative Negative    Narrative    Testing was performed using the Xpert Xpress CoV2/Flu/RSV Assay on the Cepheid GeneXpert Instrument. This test should be ordered for the detection of SARS-CoV2, influenza, and RSV viruses in individuals with signs and symptoms of respiratory tract infection. This test is for in vitro diagnostic use under the [place] FDA for laboratories certified under CLIA to perform high or moderate complexity testing. This test has been [place] FDA cleared. A negative result does not rule out the presence of PCR inhibitors in the specimen or target RNA in concentration below the limit of detection for the assay. If only one viral target is positive but coinfection with multiple targets is suspected, the sample should be re-tested with another FDA cleared, approved, or authorized test, if coninfection would change clinical management. This test was validated by the [facility]. These laboratories are certified under the Clinical Laboratory Improvement Amendments of 1988 (CLIA-88) as qualified to perfom high complexity laboratory testing.   XR Chest Port 1 View    Narrative    CHEST PORTABLE ONE VIEW December 30, 2024 8:44 AM     HISTORY: Shortness of breath.    COMPARISON:  3/2/2019.      Impression    IMPRESSION: Normal cardiac silhouette. Diffuse bilateral interstitial  prominence appears mildly increased and this could be mild pulmonary  edema. Mild left base atelectasis.    XIOMARA CAMERON MD         SYSTEM ID:  [id number]   UA with Microscopic reflex to Culture    Specimen: Urine, Clean Catch   Result Value Ref Range    Color Urine Light Yellow Colorless, Straw, Light Yellow, Yellow    Appearance Urine Clear Clear    Glucose Urine Negative Negative mg/dL    Bilirubin Urine Negative Negative    Ketones Urine Trace (A) Negative mg/dL    Specific Gravity Urine 1.023 1.003 - 1.035    Blood Urine Negative Negative    pH Urine 6.0 5.0 - 7.0    Protein Albumin Urine 30 (A) Negative mg/dL    Urobilinogen Urine Normal Normal, 2.0 mg/dL    Nitrite Urine Negative Negative    Leukocyte Esterase Urine Small (A) Negative    RBC Urine 0 <=2 /HPF    WBC Urine 2 <=5 /HPF    Squamous Epithelials Urine 6 (H) <=1 /HPF    Narrative    Urine Culture not indicated   Lactic acid whole blood with 1x repeat in 2 hr when >2   Result Value Ref Range    Lactic Acid, Initial 1.0 0.7 - 2.0 mmol/L   Glucose by meter   Result Value Ref Range    GLUCOSE BY METER POCT 93 70 - 99 mg/dL   Lactic acid whole blood   Result Value Ref Range    Lactic Acid 0.9 0.7 - 2.0 mmol/L   Basic metabolic panel   Result Value Ref Range    Sodium 124 (L) 135 - 145 mmol/L    Potassium 4.3 3.4 - 5.3 mmol/L    Chloride 93 (L) 98 - 107 mmol/L    Carbon Dioxide (CO2) 19 (L) 22 - 29 mmol/L    Anion Gap 12 7 - 15 mmol/L    Urea Nitrogen 8.2 8.0 - 23.0 mg/dL    Creatinine 0.58 0.51 - 0.95 mg/dL    GFR Estimate >90 >60 mL/min/1.73m2    Calcium 7.8 (L) 8.8 - 10.4 mg/dL    Glucose 90 70 - 99 mg/dL     Most Recent 3 CBC's:  Recent Labs   Lab Test 12/30/24  0829 12/02/24  0852 11/18/24  0632   WBC 9.6 5.0 4.5   HGB 9.8* 10.2* 11.8   MCV 85 91 90    239 167      Most Recent 3 BMP's:  Recent Labs   Lab Test 12/30/24  1514 12/30/24  1354  "12/30/24  0830 12/30/24  0829 11/18/24  0632   *  --   --  121* 138   POTASSIUM 4.3  --   --  5.1 4.4   CHLORIDE 93*  --   --  87* 103   CO2 19*  --   --  12* 22   BUN 8.2  --   --  9.3 9.3   CR 0.58  --   --  0.70 0.58   ANIONGAP 12  --   --  22* 13   ADRIAN 7.8*  --   --  8.5* 9.7   GLC 90 93 199* 214* 106*     Most Recent 2 LFT's:  Recent Labs   Lab Test 12/30/24  0829 06/23/24  1358   AST 50* 19   ALT 23 14   ALKPHOS 165* 137   BILITOTAL 0.2 <0.2     Most Recent INR's and Anticoagulation Dosing History:  Anticoagulation Dose History          Latest Ref Rng & Units 5/18/2011 3/1/2019 7/26/2020 5/9/2021 4/12/2022   Recent Dosing and Labs   INR 0.85 - 1.15 1.11  1.06  1.02  0.98  1.10      Most Recent 3 Troponin's:  Recent Labs   Lab Test 05/25/21  1932 07/26/20  1857 10/21/16  1456   TROPI <0.015 <0.015 <0.015  The 99th percentile for upper reference range is 0.045 ug/L.  Troponin values in   the range of 0.045 - 0.120 ug/L may be associated with risks of adverse   clinical events.       Most Recent Cholesterol Panel:  Recent Labs   Lab Test 05/25/22  1025   CHOL 133   LDL 51   HDL 51   TRIG 155*     Most Recent 6 Bacteria Isolates From Any Culture (See EPIC Reports for Culture Details):No lab results found.  Most Recent TSH, T4 and A1c Labs:  Recent Labs   Lab Test 10/09/24  0654 10/30/23  0610 05/25/22  1025   TSH 3.90   < >  --    A1C  --   --  5.9*    < > = values in this interval not displayed.       Total time spent in chart review, patient interview and coordination of care; 60 min  \"Much or all of the text in this note was generated through the use of Dragon Dictate voice to text software. Errors in spelling or words which appear to be out of contact are unintentional, may be present due having escaped editing\"      "

## 2024-12-30 NOTE — PHARMACY-ADMISSION MEDICATION HISTORY
Pharmacist Admission Medication History    Admission medication history is complete. The information provided in this note is only as accurate as the sources available at the time of the update.    Information Source(s): Facility ([facility]/) medication list/MAR ([place] [facility]) via N/A    Pertinent Information: none    Changes made to PTA medication list:  Added: albuterol scheduled, amantadine, zyrtec, fluocinonide, gabapentin, nystatin, senna, systane, triamcinolone, diclofenac topical  Deleted: diphenhydramine, folic acid, thiamine, mirabegron  Changed: None    Allergies reviewed with patient and updates made in EHR: no    Medication History Completed By: Adair Monroe RPH 12/30/2024 10:11 AM    PTA Med List   Medication Sig Last Dose/Taking    acetaminophen (TYLENOL) 500 MG tablet Take 1,000 mg by mouth 3 times daily. 12/29/2024    albuterol (PROAIR HFA/PROVENTIL HFA/VENTOLIN HFA) 108 (90 Base) MCG/ACT inhaler Inhale 1-2 puffs into the lungs every 6 hours as needed for wheezing  Taking As Needed    albuterol (PROVENTIL) (2.5 MG/3ML) 0.083% neb solution Take 2.5 mg by nebulization 2 times daily. 12/30/2024 Morning    albuterol (PROVENTIL) (2.5 MG/3ML) 0.083% neb solution Take 2.5 mg by nebulization every 8 hours as needed for wheezing. Taking As Needed    amantadine (SYMMETREL) 100 MG capsule Take 100 mg by mouth 3 times daily. 12/29/2024 Evening    amLODIPine (NORVASC) 5 MG tablet Take 5 mg by mouth at bedtime. 12/29/2024 Evening    atorvastatin (LIPITOR) 20 MG tablet Take 20 mg by mouth At Bedtime  12/29/2024 Evening    benztropine (COGENTIN) 1 MG tablet Take 1 mg by mouth 3 times daily  12/29/2024 Evening    calcium carbonate-vitamin D (OSCAL) 500-5 MG-MCG tablet Take 1 tablet by mouth 2 times daily. 12/29/2024 Evening    cetirizine (ZYRTEC) 5 MG tablet Take 5 mg by mouth daily. For itching 12/29/2024 Evening    cloZAPine (CLOZARIL) 100 MG tablet Take 2.5 tablets (250 mg) by mouth At Bedtime  12/28/2024 Evening    desoximetasone (TOPICORT) 0.25 % OINT Apply  topically as needed. Taking As Needed    diclofenac (VOLTAREN) 1 % topical gel Apply 2 g topically 4 times daily. Apply to wrists 12/29/2024    emollient (VANICREAM) external cream Apply topically 3 times daily as needed for other (irritation) Taking As Needed    fluocinonide (LIDEX) 0.05 % external solution Apply topically 2 times daily. For itching 12/30/2024 Morning    fluticasone (FLONASE) 50 MCG/ACT nasal spray Spray 2 sprays into both nostrils daily. 12/30/2024 Morning    fluticasone (FLOVENT HFA) 110 MCG/ACT inhaler Inhale 1 puff into the lungs 2 times daily  12/30/2024 Morning    gabapentin (NEURONTIN) 100 MG capsule Take 100 mg by mouth at bedtime. 12/29/2024 Evening    guaiFENesin (MUCINEX) 600 MG 12 hr tablet Take 600 mg by mouth daily. 12/29/2024 Morning    guaiFENesin (MUCINEX) 600 MG 12 hr tablet Take 600 mg by mouth 2 times daily as needed for congestion. Taking As Needed    levothyroxine (SYNTHROID/LEVOTHROID) 175 MCG tablet Take 175 mcg by mouth daily 12/30/2024 Morning    multivitamin w/minerals (THERA-VIT-M) tablet Take 1 tablet by mouth daily 12/29/2024 Morning    nystatin (MYCOSTATIN) 857223 UNIT/GM external cream Apply topically daily as needed (rash). Apply to groin and upper thighs Taking As Needed    omeprazole (PRILOSEC) 20 MG DR capsule Take 20 mg by mouth 2 times daily 12/30/2024 Morning    ondansetron (ZOFRAN ODT) 4 MG ODT tab Take 4 mg by mouth every 8 hours as needed for nausea. Taking As Needed    polyethylene glycol (MIRALAX/GLYCOLAX) packet Take 17 g by mouth daily as needed for constipation  Taking As Needed    polyethylene glycol-propylene glycol (SYSTANE ULTRA) 0.4-0.3 % SOLN ophthalmic solution Place 2 drops into both eyes 4 times daily. 12/29/2024 Evening    sennosides (SENOKOT) 8.6 MG tablet Take 2 tablets by mouth 2 times daily. 12/29/2024 Morning    sertraline (ZOLOFT) 50 MG tablet Take 150 mg by mouth At  Bedtime  12/29/2024 Evening    sodium fluoride dental gel (PREVIDENT) 1.1 % GEL topical gel Apply to affected area daily Taking    triamcinolone (KENALOG) 0.1 % external cream Apply topically daily as needed for irritation. Taking As Needed

## 2024-12-30 NOTE — CODE/RAPID RESPONSE
[facility]    RRT Note  12/30/2024   Time Called: 516pm    RRT called for: Cyanosis increased work of breathing    HPI:    Liz Lieberman is a 74 year old female w/ PMH of paranoid schizophrenia, TD, depression, dementia, asthma, HTN, among others who was admitted on 12/30/2024 with acute on chronic tardive dyskinesia and possible sepsis, severe lactic acidosis after being sent from her facility due to worsening of jerking movements. She was found to have multiple electrolyte abnormalities, hypotension and tachycardia with lactic acidosis (9.4 that has improved down to 0.9) concerning for sepsis.     Staff report patient been on 2 L nasal cannula but because of SpO2 68% although with questionably reliable waveform but patient concurrently had  blue lips and increased work of breathing and ongoing uncontrolled ED despite having recently received Cogentin and Valium p.o.    On my arrival patient is practically thrashing in the bed with her TD.  Staff are attempting to hold her down.  She is tachypneic no cyanosis is present, good air entry bilateral lung fields.  She will require additional TD control before any further diagnostics can be obtained.  She is able to speak but it is difficult given her degree of distress post from her tachypnea and her motor movement    Assessment & Plan   Acute hypoxic respiratory failure SpO2 reading unreliable but clinically appeared hypoxic with perioral cyanosis    INTERVENTIONS:  -start HFNC  -Valium IV 2.5 mg to control excessive involuntary motor movements/TD and facilitate diagnostics  -First dose IV valium worked well to decreased her TD movements  -stat port Chest x-ray and ABG pending  - Valium had to be repeated, 2.5 mg IV x 1 to complete chest x-ray as involuntary motor movements though less than during my initial exam were ongoing too much to complete the chest x-ray  -using caution so as to not overly sedate her to cause respiratory depression (she  is DNR/DNI) but enough so as to control her mvmts  -As needed IV Valium every 4 hours 2.5 mg if her TD is too severe to safely take p.o.  -add on procal    Last 24H PRN:     diazepam (VALIUM) tablet 2 mg, 2 mg at 12/30/24 1642    Working diagnosis:possible hypoxia    At the end of the RRT TD involuntary movements were significantly less and IV agents were moderately successful in controlling her involuntary motor movements to facilitate diagnostic    disposition current level of care, C    I have asked the night shift house NP to please follow-up on ABG and chest x-ray    Code Status: No CPR- Do NOT Intubate    Physical Exam   Vital Signs with Ranges:  Temp:  [97.2  F (36.2  C)-99.6  F (37.6  C)] 99.6  F (37.6  C)  Pulse:  [] 94  Resp:  [21-63] 44  BP: ()/() 101/34  FiO2 (%):  [35 %-100 %] 100 %  SpO2:  [84 %-100 %] 84 %  I/O last 3 completed shifts:  In: 60 [P.O.:60]  Out: -     Constitutional: vs as above and/or per EMR  General:  adult pt lying in bed with acute distress in terms of significant involuntary motor movement   GCS:   Motor All extremities involuntarily   Verbal 5=Oriented answers questions   Eye Opening 4=Spontaneous   Total: 14     Neuro: MCKENZIE wildly and involuntarily, is able to talk  Head, ENT & mouth: NC/AT,  mouth moist oral mucosa, lips pink  Neck: supple  CV S1S2 sinus tachycardia on telemetry  resp: Tachypneic but lung sounds grossly clear exam is difficult because of her motor movement  gi:normoactive bowel sounds, soft, nontender, nondisteded  Ext: no edema or mottling  Skin: no rashes on exposed skin  Musculoskeletal no bony joint deformities      Data     ABG:  Pending    IMAGING: (X-ray/CT/MRI)   Recent Results (from the past 24 hours)   XR Chest Port 1 View    Narrative    CHEST PORTABLE ONE VIEW December 30, 2024 8:44 AM     HISTORY: Shortness of breath.    COMPARISON: 3/2/2019.      Impression    IMPRESSION: Normal cardiac silhouette. Diffuse bilateral  interstitial  prominence appears mildly increased and this could be mild pulmonary  edema. Mild left base atelectasis.    XIOMARA CAMERON MD         SYSTEM ID:  [id number]   XR Chest Port 1 View    Narrative    EXAM: XR CHEST PORT 1 VIEW  LOCATION: [facility]  DATE: 12/30/2024    INDICATION: increased O2 needs  COMPARISON: X-ray 12/30/2024      Impression    IMPRESSION: Cardiomegaly with central vascular congestion and interstitial edema. Mild bibasilar opacities likely reflect atelectasis. No definite pleural effusion.       CBC with Diff:  Recent Labs   Lab Test 12/30/24  0829 04/26/22  1005 04/12/22  1530   WBC 9.6   < > 5.9   HGB 9.8*   < > 11.7   MCV 85   < > 86      < > 220   INR  --   --  1.10    < > = values in this interval not displayed.        Lactic Acid:    9.4-->0.9    Comprehensive Metabolic Panel:  Recent Labs   Lab 12/30/24  1514 12/30/24  0830 12/30/24  0829   *  --  121*   POTASSIUM 4.3  --  5.1   CHLORIDE 93*  --  87*   CO2 19*  --  12*   ANIONGAP 12  --  22*   GLC 90   < > 214*   BUN 8.2  --  9.3   CR 0.58  --  0.70   GFRESTIMATED >90  --  >90   ADRIAN 7.8*  --  8.5*   PROTTOTAL  --   --  6.6   ALBUMIN  --   --  3.4*   BILITOTAL  --   --  0.2   ALKPHOS  --   --  165*   AST  --   --  50*   ALT  --   --  23    < > = values in this interval not displayed.     UA:  Recent Labs   Lab 12/30/24  1201   COLOR Light Yellow   APPEARANCE Clear   URINEGLC Negative   URINEBILI Negative   URINEKETONE Trace*   SG 1.023   UBLD Negative   URINEPH 6.0   PROTEIN 30*   NITRITE Negative   LEUKEST Small*   RBCU 0   WBCU 2       Time Spent on this Encounter   I spent 20 minutes on the unit/floor managing the care of Liz Lieberman. Over 50% of my time was spent counseling the patient and/or coordinating care regarding services listed in this note.      TOPHER Reyes [facility]  Hospitalist Service  [facility]  Securely message with Vocera (more info)  Text page via  [facility] Paging/Directory

## 2024-12-30 NOTE — CONSULTS
[facility]    Neurology Consultation    Liz Lieberman MRN# [id number]   YOB: 1950 Age: 74 year old    Code Status:No CPR- Do NOT Intubate   Date of Admission: 12/30/2024  Date of Consult:12/30/2024                                                                                       Assessment and Plan:                                         #.  Tardive dyskinesia  -- Overall patient does have finding of tardive dyskinesia with mouth movements, head movements of typical tardive dyskinesia.  She has shoulder shrug and rocking and swaying movements as well as sourcing of her hips to be seen with tardive dyskinesia of the trunk.  She also seems to have respiratory dyskinesia with grunting noises, tachypnea, and irregular breathing.  Primary team should could continue to investigate concern for CHF as well contribute to the shortness of breath.  I do a her dose of Haldol may have provoked this respiratory as well as truncal tardive dyskinesia, no clear documentation of this truncal movement in the ED notes.  On the differential is myoclonus though patient does not have uremia and her gabapentin dose is small that I would not suspect that to be contributing.  Amantadine may contribute to myoclonus but she has been on this in the past with no worsening.  Given the concern that this is likely discontinued I do recommend this stay on.  EEG could be considered if patient is not improving with medications recommended by psychiatry.  -Will continue to monitor, at this time agree with psychiatry recommendations for tardive dyskinesias  -If no improvement with medications, then will consider an EEG tomorrow  -Could consider reducing amantadine to 100 mg twice daily, but this is helpful on dyskinesia which I think is more likely going on rather than myoclonus  -Avoid further use of Haldol or antiemetics    Neurology will continue to  follow  ----------------------------------------------------------------------------------  ----------------------------------------------------------------------------------  Reason for consult: as above       Chief Complaint:   Dyskinsia            History of Present Illness:   This patient is a 74 year old female who presents with abnormal movements.     Patient has history of paranoid schizophrenia, tardive dyskinesia, depression, dementia, asthma, hypertension.  She was brought in from her facility after being found down and saturating only 50% as well as tachypneic.  Ultrasound by EMS showed B-lines and so she was placed on CPAP and brought in.  ED notes report that she is not speaking, seems agitated and restless, moving all extremities spontaneously.  She was given dose of 5 mg of Haldol at 8:30 AM.  Patient was found to have multiple electrolyte abnormalities, hypotension, tachycardia with lactic acidosis concerning for sepsis.   She since started vancomycin and Zosyn.  Chest x-ray with mild edema but no fevers or chills to suggest a focal infection.  On admission her sodium was 121, she was given 2 L of IV fluid in the ED and will trend her sodium.  She follows with psychiatry but was last seen in November 2023 in which her.  Dyskinesia was worsening at that time.  No dose adjustment recommended for her amantadine.  Psychiatry evaluated and recommended Valium but noted truncal tardive dyskinesia, which is rare sp recommended neurology consult to assess for any other etiologies.  Patient's proBNP was elevated at 2832.     Upon interview the patient she has difficulty describing symptoms.  She appears in distress and denies any weakness, numbness, vision changes, or headache.  States she feels unwell but is unable to elaborate more.    MEDICAL DOCUMENTATION REVIEWED: H&P, ED notes, psych note           Past Medical History:     Past Medical History:   Diagnosis Date    Allergic rhinitis     Bronchiectasis  (H)     mild RML    Chronic pain     ft, knee, shoulders    Depression     Diverticulosis     on colonoscopy    Gastro-oesophageal reflux disease     GERD (gastroesophageal reflux disease)     Hearing loss     Hiatal hernia     History of blood transfusion     after tonsillectomy    Hyperlipidemia     Hypothyroidism     Leukocytosis     Lung nodule     Mild intermittent asthma     Numbness and tingling     hands ceasar numb R/T carpal tunnel    Osteoarthritis     knee, ft, shoulders    Paranoid schizophrenia, in remission     RLS (restless legs syndrome)     Tardive dyskinesia     Urinary incontinence          Past Surgical History:     Past Surgical History:   Procedure Laterality Date    ARTHROPLASTY KNEE  2013    Procedure: ARTHROPLASTY KNEE;  LEFT TOTAL KNEE ARTHROPLASTY (SMITH & NEPHEW)^;  Surgeon: Khanh Bee MD;  Location:  OR    ENT SURGERY      tonsillectomy    ORTHOPEDIC SURGERY      (R) total knee          Social History:     Social History     Socioeconomic History    Marital status:    Tobacco Use    Smoking status: Former     Current packs/day: 0.00     Types: Cigarettes     Quit date: 2/15/1994     Years since quittin.8    Smokeless tobacco: Never   Substance and Sexual Activity    Alcohol use: Yes     Alcohol/week: 3.0 - 5.0 standard drinks of alcohol     Types: 3 - 5 Cans of beer per week    Drug use: No           Family History:     Family History   Problem Relation Age of Onset    Cerebrovascular Disease No family hx of     Diabetes No family hx of           Home Medications:     Prior to Admission Medications   Prescriptions Last Dose Informant Patient Reported? Taking?   Ferrous Gluconate 324 (37.5 Fe) MG TABS   Yes No   Sig: Take 324 mg by mouth daily (with breakfast)    acetaminophen (TYLENOL) 500 MG tablet 2024  Yes Yes   Sig: Take 1,000 mg by mouth 3 times daily.   albuterol (PROAIR HFA/PROVENTIL HFA/VENTOLIN HFA) 108 (90 Base) MCG/ACT inhaler  Self Yes Yes    Sig: Inhale 1-2 puffs into the lungs every 6 hours as needed for wheezing    albuterol (PROVENTIL) (2.5 MG/3ML) 0.083% neb solution   Yes Yes   Sig: Take 2.5 mg by nebulization every 8 hours as needed for wheezing.   albuterol (PROVENTIL) (2.5 MG/3ML) 0.083% neb solution 12/30/2024 Morning  Yes Yes   Sig: Take 2.5 mg by nebulization 2 times daily.   amLODIPine (NORVASC) 5 MG tablet 12/29/2024 Evening  Yes Yes   Sig: Take 5 mg by mouth at bedtime.   amantadine (SYMMETREL) 100 MG capsule 12/29/2024 Evening  Yes Yes   Sig: Take 100 mg by mouth 3 times daily.   atorvastatin (LIPITOR) 20 MG tablet 12/29/2024 Evening  Yes Yes   Sig: Take 20 mg by mouth At Bedtime    benztropine (COGENTIN) 1 MG tablet 12/29/2024 Evening Self Yes Yes   Sig: Take 1 mg by mouth 3 times daily    calcium carbonate-vitamin D (OSCAL) 500-5 MG-MCG tablet 12/29/2024 Evening  Yes Yes   Sig: Take 1 tablet by mouth 2 times daily.   cetirizine (ZYRTEC) 5 MG tablet 12/29/2024 Evening  Yes Yes   Sig: Take 5 mg by mouth daily. For itching   cloZAPine (CLOZARIL) 100 MG tablet 12/28/2024 Evening  No Yes   Sig: Take 2.5 tablets (250 mg) by mouth At Bedtime   desoximetasone (TOPICORT) 0.25 % OINT  Self Yes Yes   Sig: Apply  topically as needed.   diclofenac (VOLTAREN) 1 % topical gel 12/29/2024  Yes Yes   Sig: Apply 2 g topically 4 times daily. Apply to wrists   emollient (VANICREAM) external cream   Yes Yes   Sig: Apply topically 3 times daily as needed for other (irritation)   fluocinonide (LIDEX) 0.05 % external solution 12/30/2024 Morning  Yes Yes   Sig: Apply topically 2 times daily. For itching   fluticasone (FLONASE) 50 MCG/ACT nasal spray 12/30/2024 Morning  Yes Yes   Sig: Spray 2 sprays into both nostrils daily.   fluticasone (FLOVENT HFA) 110 MCG/ACT inhaler 12/30/2024 Morning Self Yes Yes   Sig: Inhale 1 puff into the lungs 2 times daily    gabapentin (NEURONTIN) 100 MG capsule 12/29/2024 Evening  Yes Yes   Sig: Take 100 mg by mouth at bedtime.    guaiFENesin (MUCINEX) 600 MG 12 hr tablet  Self Yes Yes   Sig: Take 600 mg by mouth 2 times daily as needed for congestion.   guaiFENesin (MUCINEX) 600 MG 12 hr tablet 12/29/2024 Morning  Yes Yes   Sig: Take 600 mg by mouth daily.   levothyroxine (SYNTHROID/LEVOTHROID) 175 MCG tablet 12/30/2024 Morning Self Yes Yes   Sig: Take 175 mcg by mouth daily   multivitamin w/minerals (THERA-VIT-M) tablet 12/29/2024 Morning  No Yes   Sig: Take 1 tablet by mouth daily   nystatin (MYCOSTATIN) 055482 UNIT/GM external cream   Yes Yes   Sig: Apply topically daily as needed (rash). Apply to groin and upper thighs   omeprazole (PRILOSEC) 20 MG DR capsule 12/30/2024 Morning  Yes Yes   Sig: Take 20 mg by mouth 2 times daily   ondansetron (ZOFRAN ODT) 4 MG ODT tab   Yes Yes   Sig: Take 4 mg by mouth every 8 hours as needed for nausea.   polyethylene glycol (MIRALAX/GLYCOLAX) packet  Self Yes Yes   Sig: Take 17 g by mouth daily as needed for constipation    polyethylene glycol-propylene glycol (SYSTANE ULTRA) 0.4-0.3 % SOLN ophthalmic solution 12/29/2024 Evening  Yes Yes   Sig: Place 2 drops into both eyes 4 times daily.   sennosides (SENOKOT) 8.6 MG tablet 12/29/2024 Morning  Yes Yes   Sig: Take 2 tablets by mouth 2 times daily.   sertraline (ZOLOFT) 50 MG tablet 12/29/2024 Evening Self Yes Yes   Sig: Take 150 mg by mouth At Bedtime    sodium fluoride dental gel (PREVIDENT) 1.1 % GEL topical gel   Yes Yes   Sig: Apply to affected area daily   triamcinolone (KENALOG) 0.1 % external cream   Yes Yes   Sig: Apply topically daily as needed for irritation.      Facility-Administered Medications: None          Allergy:     Allergies   Allergen Reactions    Formaldehyde Rash    Latex Rash          Inpatient Medications:   Scheduled Meds:  Current Facility-Administered Medications   Medication Dose Route Frequency Provider Last Rate Last Admin    acetaminophen (TYLENOL) tablet 1,000 mg  1,000 mg Oral TID Anton Dale MD        albuterol  (PROVENTIL) neb solution 2.5 mg  2.5 mg Nebulization 2 times daily Anton Dale MD        amantadine (SYMMETREL) capsule 100 mg  100 mg Oral TID Anton Dale MD        benztropine (COGENTIN) tablet 1 mg  1 mg Oral TID Anton Dale MD        calcium carbonate-vitamin D (OSCAL) 500-5 MG-MCG per tablet 1 tablet  1 tablet Oral BID Anton Dale MD        carboxymethylcellulose PF (REFRESH PLUS) 0.5 % ophthalmic solution 2 drop  2 drop Both Eyes 4x Daily Anton Dale MD        cetirizine (zyrTEC) tablet 5 mg  5 mg Oral Daily Anton Dale MD        cloZAPine (CLOZARIL) tablet 250 mg  250 mg Oral At Bedtime Anton Dale MD        diclofenac (VOLTAREN) 1 % topical gel 2 g  2 g Topical 4x Daily Anton Dale MD        enoxaparin ANTICOAGULANT (LOVENOX) injection 40 mg  40 mg Subcutaneous Q24H Anton Dale MD        [START ON 12/31/2024] fluticasone (FLONASE) 50 MCG/ACT spray 2 spray  2 spray Both Nostrils Daily Anton Dale MD        fluticasone (FLOVENT HFA) 110 MCG/ACT Inhaler 1 puff  1 puff Inhalation BID Anton Dale MD        gabapentin (NEURONTIN) capsule 100 mg  100 mg Oral At Bedtime Anton Dale MD        [START ON 12/31/2024] levothyroxine (SYNTHROID/LEVOTHROID) tablet 175 mcg  175 mcg Oral Daily Anton Dale MD        pantoprazole (PROTONIX) EC tablet 40 mg  40 mg Oral BID AC Anton Dale MD        piperacillin-tazobactam (ZOSYN) 4.5 g vial to attach to  mL bag  4.5 g Intravenous Q6H Anton Dale MD   4.5 g at 12/30/24 1424    sertraline (ZOLOFT) tablet 150 mg  150 mg Oral At Bedtime Anton Dale MD        sodium chloride (PF) 0.9% PF flush 3 mL  3 mL Intracatheter Q8H Anton Dale MD        sodium chloride (PF) 0.9% PF flush 3 mL  3 mL Intracatheter Q8H Anton Dale MD   3 mL at 12/30/24 1430    [START ON 12/31/2024] vancomycin (VANCOCIN) 1,500 mg in 0.9% NaCl 265 mL intermittent infusion  1,500 mg Intravenous Q24H Anton Dale MD         PRN Meds:   Current  "Facility-Administered Medications   Medication Dose Route Frequency Provider Last Rate Last Admin    acetaminophen (TYLENOL) tablet 650 mg  650 mg Oral Q4H PRN Anton Dale MD        Or    acetaminophen (TYLENOL) Suppository 650 mg  650 mg Rectal Q4H PRN Anton Dale MD        albuterol (PROVENTIL) neb solution 2.5 mg  2.5 mg Nebulization Q8H PRN Anton Dale MD        calcium carbonate (TUMS) chewable tablet 1,000 mg  1,000 mg Oral 4x Daily PRN Anton Dale MD        diazepam (VALIUM) tablet 2 mg  2 mg Oral Q6H PRN Anton Dale MD        lidocaine (LMX4) cream   Topical Q1H PRN Anton Dale MD        lidocaine (LMX4) cream   Topical Q1H PRN Anton Dale MD        lidocaine 1 % 0.1-1 mL  0.1-1 mL Other Q1H PRN Anton Dale MD        lidocaine 1 % 0.1-1 mL  0.1-1 mL Other Q1H PRN Anton Dale MD        polyethylene glycol (MIRALAX) Packet 17 g  17 g Oral BID PRN Anton Dale MD        senna-docusate (SENOKOT-S/PERICOLACE) 8.6-50 MG per tablet 1 tablet  1 tablet Oral BID PRN Anton Dale MD        Or    senna-docusate (SENOKOT-S/PERICOLACE) 8.6-50 MG per tablet 2 tablet  2 tablet Oral BID PRN Anton Dale MD        sodium chloride (PF) 0.9% PF flush 3 mL  3 mL Intracatheter q1 min prn Anton Dale MD        sodium chloride (PF) 0.9% PF flush 3 mL  3 mL Intracatheter q1 min prn Anton Dale MD                 Physical Exam:   Physical Exam   Vitals:  Height:5' 5\"  Weight:136 lbs 3.91 oz   Temp: 98.2  F (36.8  C) Temp src: Axillary BP: 118/71 Pulse: 84   Resp: 30 SpO2: 98 % O2 Device: Nasal cannula Oxygen Delivery: 2 LPM  General Appearance: Appears in distress.   Neuro Exam:  Mental Status Exam: Alert but has difficulty answering questions, has shortness of breath and has difficulty speaking in full senses but is able to answer in short sentences with no aphasia.  Able to follow commands of confrontational testing.    Cranial Nerves: Pupils are equal and reactive to light. Extraocular " movements intact. Facial strength and sensation is normal. No jaw or tongue deviation.  Motor: Motor tone and bulk are intact in extremities.  Full strength to confrontational testing in the arms and legs.  Patient has shoulder shrug, hip thrusting, rocking and swaying in the bed.  Often has grunting and tachypnea.  Dyskinetic movements of the mouth and left greater than right hand bilaterally.  No choreiform movements noted.  No tremors noted.  Reflexes: Symmetrically intact in the biceps, brachioradialis, and left patella (absent reflex on the right patella). Plantar signs normal.  Sensory: Intact to light touch in the upper and lower extremities  Coordination: Coordination difficult to assess as patient has truncal movement interrupting her finger-nose-finger testing.  Neck: No nuchal rigidity  Extremities: No clubbing, no cyanosis, no edema          Data:   ROUTINE IP LABS   CBC RESULTS:     Recent Labs   Lab 12/30/24 0829   WBC 9.6   RBC 3.64*   HGB 9.8*   HCT 30.9*        Basic Metabolic Panel:   Recent Labs   Lab Test 12/30/24  1514 12/30/24  1354 12/30/24  0830 12/30/24  0829 11/18/24  0632   *  --   --  121* 138   POTASSIUM 4.3  --   --  5.1 4.4   CHLORIDE 93*  --   --  87* 103   CO2 19*  --   --  12* 22   BUN 8.2  --   --  9.3 9.3   CR 0.58  --   --  0.70 0.58   GLC 90 93 199* 214* 106*   ADRIAN 7.8*  --   --  8.5* 9.7     Liver panel:  Recent Labs   Lab Test 12/30/24  0829 06/23/24  1358 04/12/22  1530 07/01/21  0908 06/15/21  0719 06/03/21  0700 05/09/21  1722 08/08/20  1455   PROTTOTAL 6.6 6.8 7.5  --   --   --  6.8 6.9   ALBUMIN 3.4* 3.7 3.6  --   --   --  3.2* 3.2*   BILITOTAL 0.2 <0.2 0.2  --   --   --  0.2 0.3   ALKPHOS 165* 137 128*  --   --   --  133 136   AST 50* 19 20 14 15   < > 16 15   ALT 23 14 16  --   --   --  25 20    < > = values in this interval not displayed.     Lipid Profile:  Recent Labs   Lab Test 05/25/22  1025 04/11/22  1023   CHOL 133 137   HDL 51 55   LDL 51 65   TRIG  155* 84     Thyroid Panel:  Recent Labs   Lab Test 10/09/24  0654 10/30/23  0610 05/25/21  1932 05/13/20  0625   TSH 3.90 1.73 2.28 7.13*      Vitamin B12:   Recent Labs   Lab Test 10/09/24  0654 05/26/21  1221   B12 655 462           IMAGING:   Independent interpretation of the following studies by myself as part of today's encounter.   CT head 6/23/24:   --IMPRESSION:    1.  No evidence of acute intracranial hemorrhage or mass effect.  2.  Mild nonspecific white matter changes.  3.  Mild brain parenchymal volume loss.    Time:  78 minutes evaluation and management.     Haley Chin M.D.  [facility]  Office [phone]

## 2024-12-31 ENCOUNTER — TELEPHONE (OUTPATIENT)
Facility: CLINIC | Age: 74
End: 2024-12-31
Payer: MEDICARE

## 2024-12-31 ENCOUNTER — APPOINTMENT (OUTPATIENT)
Dept: ULTRASOUND IMAGING | Facility: CLINIC | Age: 74
DRG: 193 | End: 2024-12-31
Attending: HOSPITALIST
Payer: MEDICARE

## 2024-12-31 LAB
ALBUMIN SERPL BCG-MCNC: 3.2 G/DL (ref 3.5–5.2)
ALP SERPL-CCNC: 174 U/L (ref 40–150)
ALT SERPL W P-5'-P-CCNC: 61 U/L (ref 0–50)
ANION GAP SERPL CALCULATED.3IONS-SCNC: 15 MMOL/L (ref 7–15)
AST SERPL W P-5'-P-CCNC: 110 U/L (ref 0–45)
BILIRUB SERPL-MCNC: 0.2 MG/DL
BUN SERPL-MCNC: 7.4 MG/DL (ref 8–23)
CALCIUM SERPL-MCNC: 8.3 MG/DL (ref 8.8–10.4)
CHLORIDE SERPL-SCNC: 103 MMOL/L (ref 98–107)
CREAT SERPL-MCNC: 0.61 MG/DL (ref 0.51–0.95)
EGFRCR SERPLBLD CKD-EPI 2021: >90 ML/MIN/1.73M2
ENTEROCOCCUS FAECALIS: NOT DETECTED
ENTEROCOCCUS FAECIUM: NOT DETECTED
ERYTHROCYTE [DISTWIDTH] IN BLOOD BY AUTOMATED COUNT: 13.5 % (ref 10–15)
GLUCOSE SERPL-MCNC: 83 MG/DL (ref 70–99)
HCO3 SERPL-SCNC: 20 MMOL/L (ref 22–29)
HCT VFR BLD AUTO: 30.5 % (ref 35–47)
HGB BLD-MCNC: 10.1 G/DL (ref 11.7–15.7)
LISTERIA SPECIES (DETECTED/NOT DETECTED): NOT DETECTED
MCH RBC QN AUTO: 27.7 PG (ref 26.5–33)
MCHC RBC AUTO-ENTMCNC: 33.1 G/DL (ref 31.5–36.5)
MCV RBC AUTO: 84 FL (ref 78–100)
PLATELET # BLD AUTO: 275 10E3/UL (ref 150–450)
POTASSIUM SERPL-SCNC: 3.5 MMOL/L (ref 3.4–5.3)
PROT SERPL-MCNC: 6.5 G/DL (ref 6.4–8.3)
RBC # BLD AUTO: 3.65 10E6/UL (ref 3.8–5.2)
SODIUM SERPL-SCNC: 137 MMOL/L (ref 135–145)
SODIUM SERPL-SCNC: 138 MMOL/L (ref 135–145)
SODIUM SERPL-SCNC: 139 MMOL/L (ref 135–145)
STAPHYLOCOCCUS AUREUS: NOT DETECTED
STAPHYLOCOCCUS EPIDERMIDIS: NOT DETECTED
STAPHYLOCOCCUS LUGDUNENSIS: NOT DETECTED
STAPHYLOCOCCUS SPECIES: NOT DETECTED
STREPTOCOCCUS AGALACTIAE: NOT DETECTED
STREPTOCOCCUS ANGINOSUS GROUP: NOT DETECTED
STREPTOCOCCUS PNEUMONIAE: NOT DETECTED
STREPTOCOCCUS PYOGENES: NOT DETECTED
STREPTOCOCCUS SPECIES: DETECTED
UFH PPP CHRO-ACNC: 0.15 IU/ML
UFH PPP CHRO-ACNC: 0.2 IU/ML
UFH PPP CHRO-ACNC: 0.67 IU/ML
WBC # BLD AUTO: 6.8 10E3/UL (ref 4–11)

## 2024-12-31 PROCEDURE — 258N000003 HC RX IP 258 OP 636: Performed by: HOSPITALIST

## 2024-12-31 PROCEDURE — 250N000011 HC RX IP 250 OP 636: Performed by: HOSPITALIST

## 2024-12-31 PROCEDURE — 84295 ASSAY OF SERUM SODIUM: CPT | Performed by: HOSPITALIST

## 2024-12-31 PROCEDURE — 99233 SBSQ HOSP IP/OBS HIGH 50: CPT | Performed by: HOSPITALIST

## 2024-12-31 PROCEDURE — G0463 HOSPITAL OUTPT CLINIC VISIT: HCPCS

## 2024-12-31 PROCEDURE — 84075 ASSAY ALKALINE PHOSPHATASE: CPT | Performed by: HOSPITALIST

## 2024-12-31 PROCEDURE — 250N000013 HC RX MED GY IP 250 OP 250 PS 637: Performed by: HOSPITALIST

## 2024-12-31 PROCEDURE — 999N000157 HC STATISTIC RCP TIME EA 10 MIN

## 2024-12-31 PROCEDURE — 99232 SBSQ HOSP IP/OBS MODERATE 35: CPT | Performed by: PSYCHIATRY & NEUROLOGY

## 2024-12-31 PROCEDURE — 76705 ECHO EXAM OF ABDOMEN: CPT

## 2024-12-31 PROCEDURE — 250N000013 HC RX MED GY IP 250 OP 250 PS 637: Performed by: STUDENT IN AN ORGANIZED HEALTH CARE EDUCATION/TRAINING PROGRAM

## 2024-12-31 PROCEDURE — 85520 HEPARIN ASSAY: CPT | Performed by: NURSE PRACTITIONER

## 2024-12-31 PROCEDURE — 99222 1ST HOSP IP/OBS MODERATE 55: CPT | Performed by: INTERNAL MEDICINE

## 2024-12-31 PROCEDURE — 250N000011 HC RX IP 250 OP 636: Performed by: INTERNAL MEDICINE

## 2024-12-31 PROCEDURE — 85520 HEPARIN ASSAY: CPT | Performed by: HOSPITALIST

## 2024-12-31 PROCEDURE — 94640 AIRWAY INHALATION TREATMENT: CPT

## 2024-12-31 PROCEDURE — 36415 COLL VENOUS BLD VENIPUNCTURE: CPT | Performed by: HOSPITALIST

## 2024-12-31 PROCEDURE — 120N000013 HC R&B IMCU

## 2024-12-31 PROCEDURE — 82435 ASSAY OF BLOOD CHLORIDE: CPT | Performed by: HOSPITALIST

## 2024-12-31 PROCEDURE — 85018 HEMOGLOBIN: CPT | Performed by: HOSPITALIST

## 2024-12-31 PROCEDURE — 250N000009 HC RX 250: Performed by: HOSPITALIST

## 2024-12-31 RX ORDER — FUROSEMIDE 10 MG/ML
40 INJECTION INTRAMUSCULAR; INTRAVENOUS ONCE
Status: COMPLETED | OUTPATIENT
Start: 2024-12-31 | End: 2024-12-31

## 2024-12-31 RX ORDER — DEXTROSE MONOHYDRATE 50 MG/ML
INJECTION, SOLUTION INTRAVENOUS CONTINUOUS
Status: DISCONTINUED | OUTPATIENT
Start: 2024-12-31 | End: 2024-12-31

## 2024-12-31 RX ORDER — ACETAMINOPHEN 500 MG
1000 TABLET ORAL 2 TIMES DAILY
Status: DISCONTINUED | OUTPATIENT
Start: 2024-12-31 | End: 2025-01-04 | Stop reason: HOSPADM

## 2024-12-31 RX ADMIN — CLOZAPINE 250 MG: 200 TABLET ORAL at 22:05

## 2024-12-31 RX ADMIN — DICLOFENAC 2 G: 10 GEL TOPICAL at 17:04

## 2024-12-31 RX ADMIN — FUROSEMIDE 40 MG: 10 INJECTION, SOLUTION INTRAMUSCULAR; INTRAVENOUS at 12:43

## 2024-12-31 RX ADMIN — BENZTROPINE MESYLATE 1 MG: 1 TABLET ORAL at 09:10

## 2024-12-31 RX ADMIN — LEVOTHYROXINE SODIUM 175 MCG: 125 TABLET ORAL at 09:10

## 2024-12-31 RX ADMIN — FLUTICASONE PROPIONATE 1 PUFF: 110 AEROSOL, METERED RESPIRATORY (INHALATION) at 09:17

## 2024-12-31 RX ADMIN — Medication 1 TABLET: at 22:04

## 2024-12-31 RX ADMIN — FLUTICASONE PROPIONATE 1 PUFF: 110 AEROSOL, METERED RESPIRATORY (INHALATION) at 22:08

## 2024-12-31 RX ADMIN — AMANTADINE HYDROCHLORIDE 100 MG: 100 CAPSULE ORAL at 22:06

## 2024-12-31 RX ADMIN — CARBOXYMETHYLCELLULOSE SODIUM 2 DROP: 5 SOLUTION/ DROPS OPHTHALMIC at 22:04

## 2024-12-31 RX ADMIN — PANTOPRAZOLE SODIUM 40 MG: 40 TABLET, DELAYED RELEASE ORAL at 09:10

## 2024-12-31 RX ADMIN — PIPERACILLIN AND TAZOBACTAM 4.5 G: 4; .5 INJECTION, POWDER, FOR SOLUTION INTRAVENOUS at 02:27

## 2024-12-31 RX ADMIN — FLUTICASONE PROPIONATE 2 SPRAY: 50 SPRAY, METERED NASAL at 09:17

## 2024-12-31 RX ADMIN — ACETAMINOPHEN 1000 MG: 500 TABLET, FILM COATED ORAL at 22:05

## 2024-12-31 RX ADMIN — ALBUTEROL SULFATE 2.5 MG: 2.5 SOLUTION RESPIRATORY (INHALATION) at 07:29

## 2024-12-31 RX ADMIN — PIPERACILLIN AND TAZOBACTAM 4.5 G: 4; .5 INJECTION, POWDER, FOR SOLUTION INTRAVENOUS at 21:59

## 2024-12-31 RX ADMIN — BENZTROPINE MESYLATE 1 MG: 1 TABLET ORAL at 17:04

## 2024-12-31 RX ADMIN — SERTRALINE HYDROCHLORIDE 150 MG: 100 TABLET ORAL at 22:04

## 2024-12-31 RX ADMIN — CETIRIZINE HYDROCHLORIDE 5 MG: 5 TABLET ORAL at 09:10

## 2024-12-31 RX ADMIN — CARBOXYMETHYLCELLULOSE SODIUM 2 DROP: 5 SOLUTION/ DROPS OPHTHALMIC at 12:44

## 2024-12-31 RX ADMIN — AMANTADINE HYDROCHLORIDE 100 MG: 100 CAPSULE ORAL at 09:10

## 2024-12-31 RX ADMIN — AMANTADINE HYDROCHLORIDE 100 MG: 100 CAPSULE ORAL at 17:04

## 2024-12-31 RX ADMIN — PIPERACILLIN AND TAZOBACTAM 4.5 G: 4; .5 INJECTION, POWDER, FOR SOLUTION INTRAVENOUS at 14:39

## 2024-12-31 RX ADMIN — CARBOXYMETHYLCELLULOSE SODIUM 2 DROP: 5 SOLUTION/ DROPS OPHTHALMIC at 17:04

## 2024-12-31 RX ADMIN — PIPERACILLIN AND TAZOBACTAM 4.5 G: 4; .5 INJECTION, POWDER, FOR SOLUTION INTRAVENOUS at 09:08

## 2024-12-31 RX ADMIN — DICLOFENAC 2 G: 10 GEL TOPICAL at 09:20

## 2024-12-31 RX ADMIN — DICLOFENAC 2 G: 10 GEL TOPICAL at 22:08

## 2024-12-31 RX ADMIN — Medication 1 TABLET: at 09:10

## 2024-12-31 RX ADMIN — ACETAMINOPHEN 1000 MG: 500 TABLET, FILM COATED ORAL at 09:10

## 2024-12-31 RX ADMIN — PANTOPRAZOLE SODIUM 40 MG: 40 TABLET, DELAYED RELEASE ORAL at 17:04

## 2024-12-31 RX ADMIN — DICLOFENAC 2 G: 10 GEL TOPICAL at 12:44

## 2024-12-31 RX ADMIN — GABAPENTIN 100 MG: 100 CAPSULE ORAL at 22:04

## 2024-12-31 RX ADMIN — BENZTROPINE MESYLATE 1 MG: 1 TABLET ORAL at 22:06

## 2024-12-31 RX ADMIN — CARBOXYMETHYLCELLULOSE SODIUM 2 DROP: 5 SOLUTION/ DROPS OPHTHALMIC at 09:11

## 2024-12-31 ASSESSMENT — ACTIVITIES OF DAILY LIVING (ADL)
ADLS_ACUITY_SCORE: 79
ADLS_ACUITY_SCORE: 70
ADLS_ACUITY_SCORE: 68
ADLS_ACUITY_SCORE: 68
ADLS_ACUITY_SCORE: 74
ADLS_ACUITY_SCORE: 74
ADLS_ACUITY_SCORE: 68
ADLS_ACUITY_SCORE: 68
ADLS_ACUITY_SCORE: 70
ADLS_ACUITY_SCORE: 70
ADLS_ACUITY_SCORE: 68
ADLS_ACUITY_SCORE: 70
ADLS_ACUITY_SCORE: 68
ADLS_ACUITY_SCORE: 55
DEPENDENT_IADLS:: LAUNDRY;MEAL PREPARATION;MEDICATION MANAGEMENT
ADLS_ACUITY_SCORE: 70
ADLS_ACUITY_SCORE: 70
ADLS_ACUITY_SCORE: 74
ADLS_ACUITY_SCORE: 70
ADLS_ACUITY_SCORE: 68
ADLS_ACUITY_SCORE: 70
ADLS_ACUITY_SCORE: 70
ADLS_ACUITY_SCORE: 74
ADLS_ACUITY_SCORE: 74

## 2024-12-31 NOTE — CONSULTS
Care Management Initial Consult    General Information  Assessment completed with: VM-chart review, Care Team Member,    Type of CM/SW Visit: Initial Assessment    Primary Care Provider verified and updated as needed:     Readmission within the last 30 days: no previous admission in last 30 days      Reason for Consult: discharge planning  Advance Care Planning: Advance Care Planning Reviewed: present on chart          Communication Assessment  Patient's communication style: spoken language (English or Bilingual)    Hearing Difficulty or Deaf: yes   Wear Glasses or Blind: yes    Cognitive  Cognitive/Neuro/Behavioral: .WDL except  Level of Consciousness: alert, lethargic  Arousal Level: arouses to voice, arouses to touch/gentle shaking  Orientation: oriented x 4  Mood/Behavior: restless, cooperative  Best Language: 0 - No aphasia  Speech: spontaneous, logical, garbled, other (see comments) (pt dry mouth this AM but jaw moves w/ restless movement making speak more garbled)    Living Environment:   People in home: facility resident     Current living Arrangements: extended care facility  [facility] of Facility: [facility]   Able to return to prior arrangements: no       Family/Social Support:  Care provided by: self  Provides care for: no one  Marital Status: Single  Support system: Sibling(s)          Description of Support System: Involved    Support Assessment: Adequate family and caregiver support    Current Resources:   Patient receiving home care services: No        Community Resources: None  Equipment currently used at home:    Supplies currently used at home:      Employment/Financial:  Employment Status: retired        Financial Concerns: none   Referral to Financial Worker: No       Does the patient's insurance plan have a 3 day qualifying hospital stay waiver?  No    Lifestyle & Psychosocial Needs:  Social Drivers of Health     Food Insecurity: Not on file   Depression: Not at risk  (5/31/2022)    Received from [facility]    PHQ-2     PHQ-2 Score: 2   Housing Stability: Not on file   Tobacco Use: Medium Risk (2/17/2024)    Received from [employer] [facility]    Patient History     Smoking Tobacco Use: Former     Smokeless Tobacco Use: Never     Passive Exposure: Not on file   Financial Resource Strain: Not on file   Alcohol Use: Unknown (12/30/2020)    Received from [facility]    AUDIT-C     Frequency of Alcohol Consumption: 2-3 times a week     Average Number of Drinks: 1 or 2     Frequency of Binge Drinking: Not on file   Transportation Needs: Not on file   Physical Activity: Not on file   Interpersonal Safety: Not on file   Stress: Not on file   Social Connections: Not on file   Health Literacy: Not on file       Functional Status:  Prior to admission patient needed assistance:   Dependent ADLs:: Ambulation-walker  Dependent IADLs:: Laundry, Meal Preparation, Medication Management       Mental Health Status:  Mental Health Status: No Current Concerns       Chemical Dependency Status:  Chemical Dependency Status: No Current Concerns             Values/Beliefs:  Spiritual, Cultural Beliefs, [employer]beterian Practices, Values that affect care: no               Discussed  Partnership in Safe Discharge Planning  document with patient/family: No    Additional Information:  SW/CM consult for discharge planning, per MD order. SW reviewed chart. Pt admitted [facility]. [facility] called and spoke with Kinjal Manrique, [phone], staff member for that side of the building. She reports pt ambulates independently with a walker. She dresses, toilets and grooms her self. Meals, meds and laundry is provided by building staff. Pt goes out weekly with friends and arranges her own transportation. Noted pt is currently on high flow O2 and not yet ready for discharge planning. Anticipate pt will most likely need TCU, when ready, which [facility] has on their campus.  SW will meet with pt and send a referral, when appropriate. Anticipate pt will benefit from PT/OT, for discharge recommendations, when appropriate.     Next Steps: SW following for discharge planning. Watch for PT/OT recommendations and send TCU referrals.     FRANCISCO Mckeon, [facility]  [phone] Desk phone  [phone] Cell/text (Preferred)  [facility]

## 2024-12-31 NOTE — TELEPHONE ENCOUNTER
Prior Authorization Approval    Medication: INGREZZA 40 MG PO CAPS  Authorization Effective Date: 12/1/2024  Authorization Expiration Date: 3/31/2025  Approved Dose/Quantity: 30 for 30  Reference #: [id number]   Insurance Company: [employer] - Phone [phone] Fax [phone]  Expected CoPay: $ 0  CoPay Card Available:      Financial Assistance Needed:   Which Pharmacy is filling the prescription:    Pharmacy Notified:   Patient Notified:          None

## 2024-12-31 NOTE — PLAN OF CARE
Orientations: A&Ox4   Vitals/Pain: VSS, 2L NC, LS fine crackles; good cough. Pain (bilateral shoulders).   Tele: SR (89)  Lines/Drains: PIV R infusing; PIV L SL (red - outlined - no change)   Skin/Wounds: L buttcheek skin tear/scratch and coccyx redness. Scattered bruising. R shin skin tear.   GI/: Incont; Purewick. No BM (+ flatus and BS)   Labs: Abnormal/Trends: BUN (7.4), ALT (61), AST (110), Hgb (10.1), hematocrit (30.5), RBC (3.65)  Electrolyte Replacement- N/A  Ambulation/Assist: NOOB; turn and repo q2hr.   Sleep Quality: Napped on and off today   Plan: Monitoring Na (MD paged this AM per PCO for Na level 139). Abdom US this AM. ECHO ordered. Bilateral mitts still in place for muscle jerking while awake. Heparin gtt 600 unit(s)/hr, hepXa recheck 2030. Cards consult completed. WOC, psych, neuro, hospitalist following. Possible EEG tomorrow pending s/s. Sisters at bedside this evening.

## 2024-12-31 NOTE — PROGRESS NOTES
Hospitalist service cross-cover note:     Paged regarding positive blood cultures 1 of 2 sets with GPC in pairs and chains. Currently on vancomycin and piperacillin-tazobactam IV, continue. Await additional Verigene testing.     Erasmo Rocha MD   Hospitalist

## 2024-12-31 NOTE — PROVIDER NOTIFICATION
"MD Notification    Notified Person: MD    Notified Person Name: Dr. Rocha, hospitalist.     Notification Date/Time: 12/31/24, at 0332.    Notification Interaction: Vocera page    Purpose of Notification: \"FYI. abnormal blood cult result: gram positive cocci in pairs and chains. Lab was collected on 12/30/24 at 0839 am. Please be advised. Thanks\"     Paged again at 0613, \"FYI. Verigene panel result came back: positive for Streptococcus species.\"    Paged at 0651, \"lab just called for the second bottle abnormal result: positive for gram bacilli.\"    Orders Received: Dr. Rocha noted, continue current plan.\"    Comments:   "

## 2024-12-31 NOTE — PROGRESS NOTES
House [facility] brief RRT follow up:    Elevated troponin concern for acute CHF, cannot exclude ACS.  Acute hypoxic respiratory failure suspect 2/2 pulmonary edema.  Anemia.    Received bedside handoff from colleague, TOPHRE Weathers, CNP, house JIM and was asked to follow up on CXR, ABG results in setting of acute worsening of hypoxia requiring placement on HFNC.  At time of arrival, pt resting in bed, has persistent whole body movements consistent with PMH tardive dyskinesia.  Pt's two sisters present at pt's bedside as well, updated on plan of care during handoff.      Interventions:  - Will add on procalcitonin, troponin.  If unable to obtain ABG, placed order for repeat VBG with next Na lab draw at 2200.    - Updated by nursing that pt required bilateral mitts; ordered    Addendum: 2018: Contacted by nursing requesting bedside assessment in setting of pt's O2 sats 80s% on 100% FiO2 via HFNC; also noted trop 273.  Presented to pt's bedside.  At time of arrival, pt resting in bed, very minimal TD symptoms noted, in no overt distress with O2 sats 100% on 100% FiO2 via HFNC.  When pt has increased TD symptoms, pt's O2 pleth waveform not accurate.  Pt's lung sounds with very minimal fine crackles throughout.  Pt reports no chest pain, heaviness or tightness; reports no nausea, diaphoresis, SOB.        Interventions:  - Stat EKG - NSR, no acute ischemic ST or T wave changes noted  - After reviewing CXR and noted elevated trop, BNP; will judiciously order one time dose IV lasix 20 mg now.    - Will repeat trop in 2 hours, trend until peak  - After discussion with Dr. Trimble, cross covering hospitalist, will initiate pt on IV heparin infusion, ACS protocol, low intensity, in setting of elevated trop, BNP of unknown cause at this time.  Noted mildly decreased Hgb; no obvious concerns for bleeding at this time.  Contacted pt's POA and sister, Alexandria, to discuss above.  Alexandria in agreement to proceed with IV heparin.  Of  note, Alexandria, confirmed DNR/DNI; in agreement to proceed with Bipap therapy if deemed appropriate.  - Will place lovenox on hold  - Will order echocardiogram  - Will consult cardiology, appreciate expertise, recommendations and further management regarding above  - Continues on zosyn and vancomycin at this time  - Remains IMC status  - Repeat CBC in AM unless concerns for bleeding, has PRN Hgb available    Recent Labs   Lab 12/30/24  2214 12/30/24  0832   PHV 7.28* 7.21*   PO2V 22* 90*   PCO2V 47 34*   HCO3V 22 14*     IMAGING: (X-ray/CT/MRI)   Recent Results (from the past 24 hours)   XR Chest Port 1 View    Narrative    CHEST PORTABLE ONE VIEW December 30, 2024 8:44 AM     HISTORY: Shortness of breath.    COMPARISON: 3/2/2019.      Impression    IMPRESSION: Normal cardiac silhouette. Diffuse bilateral interstitial  prominence appears mildly increased and this could be mild pulmonary  edema. Mild left base atelectasis.    XIOMARA CAMERON MD         SYSTEM ID:  [id number]   XR Chest Port 1 View    Narrative    EXAM: XR CHEST PORT 1 VIEW  LOCATION: [facility]  DATE: 12/30/2024    INDICATION: increased O2 needs  COMPARISON: X-ray 12/30/2024      Impression    IMPRESSION: Cardiomegaly with central vascular congestion and interstitial edema. Mild bibasilar opacities likely reflect atelectasis. No definite pleural effusion.     TOPHER Kat, CNP  Hospitalist-House J[facility]  Hospitalist Service  Securely message with [employer] (more info)  Text page via [employer] Paging/Directory     Medical Decision Making       35 MINUTES SPENT BY ME on the date of service doing chart review, history, exam, documentation & further activities per the note.

## 2024-12-31 NOTE — PROGRESS NOTES
[facility]    Medicine Progress Note - Hospitalist Service    Date of Admission:  12/30/2024    Assessment & Plan      Liz Lieberman is a 74 year old female admitted on 12/30/2024.  Past history of paranoid schizophrenia, TD, depression, dementia, asthma, HTN, among others who was send from her facility due to worsening of jerking movements.  She was found to have multiple electrolyte abnormalities, hypotension and tachycardia with lactic acidosis concerning for sepsis.       Sepsis due to Strep bacteremia  Severe lactic acidosis with anion gap metabolic acidosis  *on arrival hypotensive, tachycardic, with lactate >9 - BP and heart rate improved and lactate normalized with fluid bolus  *CXR with mild edema, but denies any pneumonia symptoms, COVID/FLU/RSV negative, UA unremarkable; otherwise denies any symptoms to suggest focal infection other than stooling  *LFT's mildly elevated with benign abdominal exam on admission    - 1/2 admission blood cultures growing Strep species, 1/2 cultures growing gram positive bacilli - await speciation and sensitivities  - continue vanco and zosyn   - will ask ID to consult for further antibiotic management  - LFT's further elevated 12/31/24 ; obtain RUQ US and trend  - gap closed and bicarb improved 12/31/24; repeat tomorrow    Acute hypoxic respiratory failure  Possible ACS  *on 2L oxygen on admission without respiratory complaints  *admission CXR with possible mild pulmonary edema  *RRT called 12/30 due to worsening resp status - Pro-BNP and troponin elevated with concern for CHF and given lasix 20 mg IV x1 and initiated on heparin gtt  - requiring HFNC overnight - saturating well on 8L nasal cannula during transport to US this AM, will hold further lasix for now so as not to drive Na higher  - continue heparin gtt  - TTE  - Cards consult pending  - follow I/O's, daily weights and BMP    Hyponatremia, suspected hypovolemic  *hx mild intermittent  hyponatremia about 130, though most recent level 11/18/24 wnl  *admission Na 121 - patient reporting significant stooling over past 2 days, appears dry on exam  *received 2L IVF in ED due to hypotension/concern for sepsis  - Na initially trending up appropriately, significant jump to 139 this AM after dose of lasix above  - will not try to drive Na back down with D5W with concern for CHF above    Acute on chronic tardive dyskinesia  Paranoid schizophrenia  Depression  Dementia  *last seen by Psychiatry 11/2023 at which point her TD was felt to be worsening, but declined any dosage adjustment to amantadine  *marked involuntary movements of body and extremities on arrival  *alert and oriented x4 at time of admission  *Psych consulted, report truncal TD very rare and recommend general neurology consult for further eval for other etiologies  *neuro consulted, feel exam is consistent with TD and recommend continued management of metabolic issues  - no significant movement issues during sleep per RN  - mitts as inadvertently pulling lines due to movements  - prn valium per Psych (NO antipsychotics)  - Pharmacy liaison consult for valbenavine coverage for TD treatment     HTN  - hold PTA amlodipine with possible sepsis    HLD  - hold PTA statin with mildly elevated LFT's    Chronic pain  - continue PTA tylenol, voltaren, gabapentin    Asthma  - continue PTA inhalers/nebs    Hypothyroid  - continue PTA levothyroxine    Hx alcohol use disorder  *reports drinking about once monthly    GERD  - continue PTA PPI          Diet: Mechanical/Dental Soft Diet    DVT Prophylaxis: heparin gtt  [place] Catheter: Not present  Lines: None     Cardiac Monitoring: ACTIVE order. Indication: acute resp failure  Code Status: No CPR- Do NOT Intubate      Clinically Significant Risk Factors Present on Admission         # Hyponatremia: Lowest Na = 121 mmol/L in last 2 days, will monitor as appropriate  # Hypochloremia: Lowest Cl = 87 mmol/L in last  2 days, will monitor as appropriate  # Hypocalcemia: Lowest Ca = 7.8 mg/dL in last 2 days, will monitor and replace as appropriate    # Anion Gap Metabolic Acidosis: Highest Anion Gap = 22 mmol/L in last 2 days, will monitor and treat as appropriate  # Hypoalbuminemia: Lowest albumin = 3.2 g/dL at 12/31/2024  4:07 AM, will monitor as appropriate     # Hypertension: Noted on problem list     # Acute Hypoxic Respiratory Failure: Documented O2 saturation < 90%. Continue supplemental oxygen as needed   # Anemia: based on hgb <11           # Asthma: noted on problem list        Social Drivers of Health    Tobacco Use: Medium Risk (2/17/2024)    Received from [facility]    Patient History     Smoking Tobacco Use: Former     Smokeless Tobacco Use: Never   Alcohol Use: Unknown (12/30/2020)    Received from [facility]    AUDIT-C     Frequency of Alcohol Consumption: 2-3 times a week     Average Number of Drinks: 1 or 2          Disposition Plan     Medically Ready for Discharge: Anticipated in 2-4 Days             Anton Dale MD  Hospitalist Service  [facility]  Securely message with [employer] (more info)  Text page via [employer] Paging/Directory   ______________________________________________________________________    Interval History   She endorses some abdominal pain to palpation today.  Endorses dyspnea and some central chest discomfort, though is hard to pin down.  Primarily complains of feeling sleepy.      Physical Exam   Vital Signs: Temp: 98.4  F (36.9  C) Temp src: Axillary BP: (!) 142/64 Pulse: 88   Resp: 14 SpO2: 100 % O2 Device: High Flow Nasal Cannula (HFNC) Oxygen Delivery: 40 LPM  Weight: 136 lbs 3.91 oz    General Appearance: Thin female in NAD  Respiratory: few scattered crackles bilaterally, no increased work of breathing  Cardiovascular: RRR, normal s1/s2 without murmur  GI: abdomen soft, nondistended, normal bowel sounds, endorses some discomfort with right  sided palpation  Skin: trace peripheral edema  Other: Sleepy but arouses to voice, some slurred speech likely due to very dry mouth     Medical Decision Making       45 MINUTES SPENT BY ME on the date of service doing chart review, history, exam, documentation & further activities per the note.  MANAGEMENT DISCUSSED with the following over the past 24 hours: bedside RN   Tests ORDERED & REVIEWED in the past 24 hours:  - BMP  - CBC  - BNP  - LFTs  - Procalcitonin  - Troponin  Medical complexity over the past 24 hours:  - Intensive monitoring for MEDICATION TOXICITY  - Prescription DRUG MANAGEMENT performed      Data     I have personally reviewed the following data over the past 24 hrs:    6.8  \   10.1 (L)   / 275     139 103 7.4 (L) /  83   3.5 20 (L) 0.61 \     ALT: 61 (H) AST: 110 (H) AP: 174 (H) TBILI: 0.2   ALB: 3.2 (L) TOT PROTEIN: 6.5 LIPASE: N/A     Trop: 239 (HH) BNP: 3,969 (H)     Procal: 3.06 (H) CRP: N/A Lactic Acid: 0.9         Imaging results reviewed over the past 24 hrs:   Recent Results (from the past 24 hours)   XR Chest Port 1 View    Narrative    EXAM: XR CHEST PORT 1 VIEW  LOCATION: [facility]  DATE: 12/30/2024    INDICATION: increased O2 needs  COMPARISON: X-ray 12/30/2024      Impression    IMPRESSION: Cardiomegaly with central vascular congestion and interstitial edema. Mild bibasilar opacities likely reflect atelectasis. No definite pleural effusion.   US Abdomen Limited    Narrative    ULTRASOUND ABDOMEN LIMITED December 31, 2024 8:31 AM    CLINICAL HISTORY: Elevated liver function tests, bacteremia of unclear  source - assess for gallbladder pathology.     TECHNIQUE: Limited abdominal ultrasound.    COMPARISON: None.    FINDINGS:    GALLBLADDER: The gallbladder is normal. No gallstones, wall  thickening, or pericholecystic fluid. Negative sonographic Barr's  sign.    BILE DUCTS: There is no biliary dilatation. The common duct measures 6  mm, which is likely  within normal limits given the patient's age.    LIVER: Homogeneous parenchyma. No focal mass.    RIGHT KIDNEY: No hydronephrosis.    PANCREAS: The visualized portions of the pancreas are normal.    No ascites.      Impression    IMPRESSION: Unremarkable gallbladder. No cholelithiasis or  cholecystitis.

## 2024-12-31 NOTE — PLAN OF CARE
Goal Outcome Evaluation:    [id number]    Orientations: A/O x4. Forgetful at times. Neuro's intact. PRN oral valium provided x1 for involuntary TD movements.   Vitals/Pain: Tachypneic and BP's elevated at times, hard to assess d/t involuntary jerking movements on HFNC 100%/60L. LS are dim. Denies pain  Tele: SR/ST  Lines/Drains: PIV x2 CDI/SL  Skin/Wounds: Scattered bruising. R shin abrasion, mepilex in place. Skin tears on sacrum/coccyx and perineum, mepilex in place on sacrum.   GI/: Adequate UOP. No BM, BS audible  Labs: Abnormal/Trends, Electrolyte Replacement- Na q4h checks, 121, 124.  Ambulation/Assist: Not OOB during shift, per pt utilizes walker at home.  Diet: Mechanical soft diet, tolerating well. Takes pill whole 1 at a time.   Plan: WOC consult tomorrow.     RRT called at 1715 for worsening O2 saturations and cyanosis. Please see House NP's note for details.

## 2024-12-31 NOTE — PLAN OF CARE
Goal Outcome Evaluation:      Plan of Care Reviewed With: caregiver          Outcome Evaluation: Anticipate need for TCU when ready

## 2024-12-31 NOTE — CONSULTS
"[facility] Nurse Inpatient Assessment     Consulted for: Wound buttocks/coccyx     Summary: patient with POA mild masd to buttocks perianal with signs of friction component, placed care orders for primary RN and nurse assistant to continue, [facility] team singing off 12/31 reconsult if area worsens     Patient History (according to provider note(s):      \"74 year old female admitted on 12/30/2024. Past history of paranoid schizophrenia, TD, depression, dementia, asthma, HTN, among others who was send from her facility due to worsening of jerking movements. She was found to have multiple electrolyte abnormalities, hypotension and tachycardia with lactic acidosis concerning for sepsis. \"    Assessment:      Areas visualized during today's visit: Focused: and Sacrum/coccyx    Wound location: buttock perianal and right buttock     Last photo: 12/31  Wound due to: Friction and Moisture Associated Skin Damage (MASD)  Wound history/plan of care: found with sacral mepilex and criticaid and purewick in use per facility protocols   Wound base:  Epidermis,     Palpation of the wound bed: normal      Drainage: none     Description of drainage: none     Measurements (length x width x depth, in cm): without open area noted      Tunneling: N/A     Undermining: N/A  Periwound skin: Intact      Color: normal and consistent with surrounding tissue      Temperature: normal   Odor: none  Pain: denies , none  Pain interventions prior to dressing change: patient tolerated well  Treatment goal: Heal  and Protection  STATUS: initial assessment  Supplies ordered: gathered, at bedside, and supplies stored on unit       Treatment Plan:       Wound care  DAILY        Comments: Location: buttock  Care; provided qshift by primary RN  1. Cleanse with each incontinence episode, or at least every 2 hours with routine turns, with Tonja spray and soft dry helena wipes  2. Keep mepilex over sacrum, ok to lift for routine " "assessments and reapply, cleanse under mepilex at least daily with normal saline and 4x4\" gauze, pat dry and reapply  3. Apply Criticaid paste or Triad paste (#138030) to perianal, but not under this patients mepilex  4. Use cardinal covidien pad, no diapers when in bed          Skin care precautions  EFFECTIVE NOW        Comments: Pressure Injury Prevention (PIP) Plan:  If patient is declining pressure injury prevention interventions: Explore reason why and address patient's concerns, Educate on pressure injury risk and prevention intervention(s), If patient is still declining, document \"informed refusal\" , and Ensure Care team is aware ( provider, charge nurse, etc)  Mattress: Follow bed algorithm, add Low Air Loss (Air+) mattress pump if skin is very moist or constantly moist.  HOB: Maintain at or below 30 degrees, unless contraindicated  Repositioning in bed: Every 1-2 hours , Left/right positioning; avoid supine, and Raise foot of bed prior to raising head of bed, to reduce patient sliding down (shear)  Heels: Keep elevated off mattress and Pillows under calves  Protective Dressing: Sacral Mepilex for prevention (#496447),  especially for the agitated patient  Chair positioning: Chair cushion (#598936) , Assist patient to reposition hourly, and Do NOT use a donut for sitting (this increases pressure to smaller area and creates a higher potential for injury)    If patient has a buttock pressure injury, or high risk for PI use chair cushion or SPS.  Moisture Management: Perineal cleansing /protection: Follow Incontinence Protocol, Avoid brief in bed, Clean and dry skin folds with bathing , and Moisturize dry skin  Under Devices: Inspect skin under all medical devices during skin inspection , Ensure tubes are stabilized without tension, and Ensure patient is not lying on medical devices or equipment when repositioned  Ask provider to discontinue device when no longer needed.          Orders: Written    RECOMMEND " PRIMARY TEAM ORDER: None, at this time  Education provided: plan of care  Discussed plan of care with: Patient and Nurse  [facility] nurse follow-up plan: signing off  Notify [facility] if wound(s) deteriorate.  Nursing to notify the Provider(s) and re-consult the [facility] Nurse if new skin concern.    DATA:     Current support surface: Standard  Standard gel mattress (Isoflex)  Containment of urine/stool: Incontinence Protocol, Incontinent pad in bed, and Suction based external urinary catheter   BMI: Body mass index is 22.67 kg/m .   Active diet order: Orders Placed This Encounter      Mechanical/Dental Soft Diet     Output: I/O last 3 completed shifts:  In: 700 [P.O.:480; I.V.:220]  Out: 3750 [Urine:3750]     Labs:   Recent Labs   Lab 12/31/24  0407   ALBUMIN 3.2*   HGB 10.1*   WBC 6.8     Pressure injury risk assessment:   Sensory Perception: 3-->slightly limited  Moisture: 3-->occasionally moist  Activity: 2-->chairfast  Mobility: 3-->slightly limited  Nutrition: 2-->probably inadequate  Friction and Shear: 2-->potential problem  Jack Score: 15    Alicia CWOCN   1st choice: Securely message with [employer] ([facility] [employer])   (2nd option: [facility] Office Phone [phone], messages checked periodically Mon-Fri 8a-4p)

## 2024-12-31 NOTE — PROGRESS NOTES
Pt weaned off of high flow nasal cannula to 3 LPM NC. Tolerating well. Skin intact. Scheduled neb given. RT to follow.    Christine Jordan, RT  December 31, 2024

## 2024-12-31 NOTE — PROGRESS NOTES
Orientations: A&O x4. Forgetful. PRN IV valium given for involuntary movements.   Vitals/Pain: Stable on HFNC 60%/40 l O2. Denies pain  Respiratory : dim, crackles at bases.   Tele: SR  Diet: Mechanical soft diet, tolerating well. Takes pill whole.  Lines/Drains: PIV x2. L PIV infusing hep gtt at 450.   Skin/Wounds: R shin abrasion, mepi in place. Skin tears on sacrum/coccyx and perineum, mepilex in place.  Scattered bruising.  GI/: incontinent. Active BS, no BM, BS. Adequate UOP via purewick.   Labs: Abnormal/Trends, Electrolyte Replacement- Na(139) q4h, trop (239), hep Xa recheck at 1200.  Ambulation/Assist: Ax2 with lift, not OOB during shift, assited with T/R.   Plan: WOC, cardiology consult.

## 2024-12-31 NOTE — CONSULTS
Patient has Medicare Advantage through [employer] + Medicaid.    [place]:  Prior auth was required and obtained.  Copay is $0.  Drug must be filled at [facility].    Therese Caraballo  Pharmacy Technician/Liaison, Discharge Pharmacy   [phone] (voice or text)  [email or web address]

## 2024-12-31 NOTE — PROGRESS NOTES
[facility]  Neurology Daily Note      Admission Date:12/30/2024   Date of service: 12/31/2024   Hospital Day: 2                                                   Assessment and Plan:   #. Tardive dyskinesia  -- Overall patient had finding of tardive dyskinesia with mouth movements and hand movements of typical tardive dyskinesia.  She has shoulder shrug and rocking and swaying movements as well as swinging of her hips that can be seen with tardive dyskinesia of the trunk.  She also seems to have respiratory dyskinesia with grunting noises, tachypnea, and irregular breathing, probably worsened by her CHF/pulmonary edema that primary team is currently managing.  I suspect  her 5 mg dose of Haldol at 8 AM yesterday as well as 1 PM yesterday may have provoked these extreme truncal tardive dyskinesia and respiratory dyskinesia movements.  No clear documentation of this truncal movement in the ED notes, though this is something she has had documented in 2021 by Dr. Monroy.  Movements overall and not consistent with myoclonus or any other movement disorder. Recommendations as below.   -Will continue to monitor, at this time agree with psychiatry recommendations for tardive dyskinesias. She is receiveing clozapine (low risk of TD), amantadine, and benztropine in the outpatient setting and recommend this continues.   -Continue valium 2.5mg q4hrs PRN which has improved her symptoms.  May consider adding klonopin 0.25mg at bedtime for further TD treatment as an ongoing medication in the outpatient setting. However, psychiatry is attempting treatment with VMAT2 inhibitors (valbenazine 40mg) which would be the preferred medication barring no liver or renal disease.    -Avoid further use of Haldol or antiemetics  -Symptoms pretty consistent with her tardive dyskinesia, no need for EEG.    #. PT/OT/Speech  --continue evaluations  #.Nutrition     --Per speech therapy evaluation     General Neurology service will sign off as no  additional neurologic evaluation or management from our service is required at this time.  Please contact General Neurology service if questions related to neurologic status or follow up needed during this hospitalization.        Interval History:   -1 of 2 blood cultures growing strep species.  Continued on Vanco and Zosyn.  ID consulted.  -LFTs rising, right upper quadrant ultrasound ordered with no abnormalities noted.  -Yesterday evening, RRT called due to worsening respiratory status.  Troponin elevated and proBNP still elevated so given 1 dose of 20 mg IV Lasix.  Started on heparin drip.  Cardiology consulted.  -2 Milligram p.o. dose of Valium given at 1642, and 2.5 mg IV Valium given at 1741, 1830, 2038 yesterday.  No doses given this morning yet.   -In review of neurology note from 2021 from Dr. 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  -Prior authorization is attempted for VMAT2 inhibitor, pending approval         Medications:   Scheduled Meds:  Current Facility-Administered Medications   Medication Dose Route Frequency Provider Last Rate Last Admin    acetaminophen (TYLENOL) tablet 1,000 mg  1,000 mg Oral TID Anton Dale MD   1,000 mg at 12/30/24 2204    albuterol (PROVENTIL) neb solution 2.5 mg  2.5 mg Nebulization 2 times daily Anton Dale MD   2.5 mg at 12/31/24 0729    amantadine (SYMMETREL) capsule 100 mg  100 mg Oral TID Anton Dale MD   100 mg at 12/30/24 2204    benztropine (COGENTIN) tablet 1 mg  1 mg Oral TID Anton Dale MD   1 mg at 12/30/24 2205    calcium carbonate-vitamin D (OSCAL) 500-5 MG-MCG per tablet 1 tablet  1 tablet Oral BID Anton Dale MD   1 tablet at 12/30/24 2039    carboxymethylcellulose PF (REFRESH PLUS) 0.5 % ophthalmic solution 2 drop  2 drop Both Eyes 4x Daily Anton Dale MD   2 drop at 12/30/24 2207    cetirizine (zyrTEC) tablet 5 mg  5 mg Oral Daily Anton Dale MD   5 mg at 12/30/24 1556    cloZAPine  (CLOZARIL) tablet 250 mg  250 mg Oral At Bedtime Anton Dale MD   250 mg at 12/30/24 2201    diclofenac (VOLTAREN) 1 % topical gel 2 g  2 g Topical 4x Daily Anton Dale MD   2 g at 12/30/24 1557    [Held by provider] enoxaparin ANTICOAGULANT (LOVENOX) injection 40 mg  40 mg Subcutaneous Q24H Anton Dale MD   40 mg at 12/30/24 1556    fluticasone (FLONASE) 50 MCG/ACT spray 2 spray  2 spray Both Nostrils Daily Anton Dale MD        fluticasone (FLOVENT HFA) 110 MCG/ACT Inhaler 1 puff  1 puff Inhalation BID Anton Dale MD        gabapentin (NEURONTIN) capsule 100 mg  100 mg Oral At Bedtime Haley Chin MD   100 mg at 12/30/24 2205    levothyroxine (SYNTHROID/LEVOTHROID) tablet 175 mcg  175 mcg Oral Daily Anton Dale MD        pantoprazole (PROTONIX) EC tablet 40 mg  40 mg Oral BID AC Anton Dale MD   40 mg at 12/30/24 1556    piperacillin-tazobactam (ZOSYN) 4.5 g vial to attach to  mL bag  4.5 g Intravenous Q6H Anton Dale MD   4.5 g at 12/31/24 0227    sertraline (ZOLOFT) tablet 150 mg  150 mg Oral At Bedtime Anton Dale MD   150 mg at 12/30/24 2202    sodium chloride (PF) 0.9% PF flush 3 mL  3 mL Intracatheter Q8H Anton Dale MD   3 mL at 12/31/24 0558    vancomycin (VANCOCIN) 1,500 mg in 0.9% NaCl 265 mL intermittent infusion  1,500 mg Intravenous Q24H Anton Dale MD         PRN Meds:   Current Facility-Administered Medications   Medication Dose Route Frequency Provider Last Rate Last Admin    acetaminophen (TYLENOL) tablet 650 mg  650 mg Oral Q4H PRN Anton Dale MD        Or    acetaminophen (TYLENOL) Suppository 650 mg  650 mg Rectal Q4H PRN Anton Dale MD        albuterol (PROVENTIL) neb solution 2.5 mg  2.5 mg Nebulization Q8H PRN Anton Dale MD        calcium carbonate (TUMS) chewable tablet 1,000 mg  1,000 mg Oral 4x Daily PRN Anton Dale MD        diazepam (VALIUM) injection 2.5 mg  2.5 mg Intravenous Q4H PRN Wendi Gamino APRN CNP   2.5 mg at 12/30/24  2038    diazepam (VALIUM) tablet 2 mg  2 mg Oral Q6H PRN Anton Dale MD   2 mg at 12/30/24 1642    lidocaine (LMX4) cream   Topical Q1H PRN Anton Dale MD        lidocaine 1 % 0.1-1 mL  0.1-1 mL Other Q1H PRN Anton Dale MD        polyethylene glycol (MIRALAX) Packet 17 g  17 g Oral BID PRN Anton Dale MD        senna-docusate (SENOKOT-S/PERICOLACE) 8.6-50 MG per tablet 1 tablet  1 tablet Oral BID PRN Anton Dale MD        Or    senna-docusate (SENOKOT-S/PERICOLACE) 8.6-50 MG per tablet 2 tablet  2 tablet Oral BID PRN Anton Dale MD        sodium chloride (PF) 0.9% PF flush 3 mL  3 mL Intracatheter q1 min prn Anton Dale MD               Physical Exam:   Vitals: Temp: 98.2  F (36.8  C) Temp src: Axillary BP: (!) 142/64 Pulse: 88   Resp: 14 SpO2: 100 % O2 Device: High Flow Nasal Cannula (HFNC) Oxygen Delivery: 40 LPM  Vital Signs with Ranges: Temp:  [97.2  F (36.2  C)-99.6  F (37.6  C)] 98.2  F (36.8  C)  Pulse:  [76-97] 88  Resp:  [14-63] 14  BP: ()/() 142/64  FiO2 (%):  [35 %-100 %] 40 %  SpO2:  [73 %-100 %] 100 %    General Appearance:  No acute distress, resting comfortably when entering the room  Neuro:           Mental Status: The patient is sleepy upon arrival but opens her eyes to voice and tactile touch.  She is oriented to person, place, month, year. The speech is nondysarthric.   Cranial Nerves:  Pupils equal round and reactive to light, no APD.  No gaze deviation, dysconjugate gaze, or gaze palsy, no nystagmus, no ptosis. Facial sensation intact in all distributions of CN V. No facial asymmetry at rest or with activation on smile or eyebrow lift.  No tardive dyskinesia movements of her mouth noted this morning.   Motor: Moves her upper and lower extremities spontaneously and antigravity.  No tardive dyskinesia movements of her shoulders, trunk, or extremities when resting, mild dyskinetic movements with her hands when raising her arms.  Reflexes: The reflexes are 2/4 for  biceps, brachioradialis, and left patellar, and achilles tendon.  0/4 right patellar reflex.  Toes are down-going.   Sensory: The sensory examination is normal for light touch and pinch.  Extremities: No clubbing, no cyanosis, no edema       Data:   ROUTINE IP LABS (Last 3results)  CBC RESULTS:     Recent Labs   Lab Test 12/31/24  0407 12/30/24  2214 12/30/24  0829   WBC 6.8 10.1 9.6   RBC 3.65* 3.48* 3.64*   HGB 10.1* 9.4* 9.8*   HCT 30.5* 29.3* 30.9*    290 418     Basic Metabolic Panel:  Recent Labs   Lab Test 12/31/24  0540 12/31/24 0407 12/30/24 2214 12/30/24  1907 12/30/24  1514 12/30/24  1354 12/30/24  0830 12/30/24  0829    138  137 129*   < > 124*  --   --  121*   POTASSIUM  --  3.5  --   --  4.3  --   --  5.1   CHLORIDE  --  103  --   --  93*  --   --  87*   CO2  --  20*  --   --  19*  --   --  12*   BUN  --  7.4*  --   --  8.2  --   --  9.3   CR  --  0.61  --   --  0.58  --   --  0.70   GLC  --  83  --   --  90 93   < > 214*   ADRIAN  --  8.3*  --   --  7.8*  --   --  8.5*    < > = values in this interval not displayed.     Liver panel:  Recent Labs   Lab Test 12/31/24 0407 12/30/24  0829 06/23/24  1358 04/12/22  1530 07/01/21  0908 06/03/21  0700 05/09/21  1722   PROTTOTAL 6.5 6.6 6.8 7.5  --   --  6.8   ALBUMIN 3.2* 3.4* 3.7 3.6  --   --  3.2*   BILITOTAL 0.2 0.2 <0.2 0.2  --   --  0.2   ALKPHOS 174* 165* 137 128*  --   --  133   * 50* 19 20 14   < > 16   ALT 61* 23 14 16  --   --  25    < > = values in this interval not displayed.     Thyroid Panel:  Recent Labs   Lab Test 10/09/24  0654 10/30/23  0610 05/25/21  1932   TSH 3.90 1.73 2.28      Vitamin B12:   Recent Labs   Lab Test 10/09/24  0654 05/26/21  1221   B12 655 462      Ammonia: No lab results found.      TIME     36 minutes Evaluation/management time     Haley Chin M.D.  Neurologist  [facility]  Office [phone]

## 2024-12-31 NOTE — CONSULTS
"[facility] Psychiatric Consult Progress Note    Interval History:   Pt seen, chart reviewed, case discussed with nursing staff and treating clinicians.     Patient seen today.  She is alert oriented.  She denies any psychosis.  She denies any active suicidal ideation plan or intent she reports her mood is better.    Reviewed neurology consult  \"       Assessment and Plan:                                         #.  Tardive dyskinesia  -- Overall patient does have finding of tardive dyskinesia with mouth movements, head movements of typical tardive dyskinesia.  She has shoulder shrug and rocking and swaying movements as well as sourcing of her hips to be seen with tardive dyskinesia of the trunk.  She also seems to have respiratory dyskinesia with grunting noises, tachypnea, and irregular breathing.  Primary team should could continue to investigate concern for CHF as well contribute to the shortness of breath.  I do a her dose of Haldol may have provoked this respiratory as well as truncal tardive dyskinesia, no clear documentation of this truncal movement in the ED notes.  On the differential is myoclonus though patient does not have uremia and her gabapentin dose is small that I would not suspect that to be contributing.  Amantadine may contribute to myoclonus but she has been on this in the past with no worsening.  Given the concern that this is likely discontinued I do recommend this stay on.  EEG could be considered if patient is not improving with medications recommended by psychiatry.  -Will continue to monitor, at this time agree with psychiatry recommendations for tardive dyskinesias  -If no improvement with medications, then will consider an EEG tomorrow  -Could consider reducing amantadine to 100 mg twice daily, but this is helpful on dyskinesia which I think is more likely going on rather than myoclonus  -Avoid further use of Haldol or antiemetics     Neurology will continue to " follow  10 point Review of Systems completed by Dr. Gonzalez, and is  is negative other than noted in the HPI     Medications:     Current Facility-Administered Medications   Medication Dose Route Frequency Provider Last Rate Last Admin    acetaminophen (TYLENOL) tablet 1,000 mg  1,000 mg Oral BID Anton Dale MD   1,000 mg at 12/31/24 0910    albuterol (PROVENTIL) neb solution 2.5 mg  2.5 mg Nebulization 2 times daily Anton Dale MD   2.5 mg at 12/31/24 0729    amantadine (SYMMETREL) capsule 100 mg  100 mg Oral TID Anton Dale MD   100 mg at 12/31/24 0910    benztropine (COGENTIN) tablet 1 mg  1 mg Oral TID Anton Dale MD   1 mg at 12/31/24 0910    calcium carbonate-vitamin D (OSCAL) 500-5 MG-MCG per tablet 1 tablet  1 tablet Oral BID Anton Dale MD   1 tablet at 12/31/24 0910    carboxymethylcellulose PF (REFRESH PLUS) 0.5 % ophthalmic solution 2 drop  2 drop Both Eyes 4x Daily Anton Dale MD   2 drop at 12/31/24 0911    cetirizine (zyrTEC) tablet 5 mg  5 mg Oral Daily Anton Dale MD   5 mg at 12/31/24 0910    cloZAPine (CLOZARIL) tablet 250 mg  250 mg Oral At Bedtime Anton Dale MD   250 mg at 12/30/24 2201    diclofenac (VOLTAREN) 1 % topical gel 2 g  2 g Topical 4x Daily Anton Dale MD   2 g at 12/31/24 0920    [Held by provider] enoxaparin ANTICOAGULANT (LOVENOX) injection 40 mg  40 mg Subcutaneous Q24H Anton Dale MD   40 mg at 12/30/24 1556    fluticasone (FLONASE) 50 MCG/ACT spray 2 spray  2 spray Both Nostrils Daily Anton Dale MD   2 spray at 12/31/24 0917    fluticasone (FLOVENT HFA) 110 MCG/ACT Inhaler 1 puff  1 puff Inhalation BID Anton Dale MD   1 puff at 12/31/24 0917    gabapentin (NEURONTIN) capsule 100 mg  100 mg Oral At Bedtime Haley Chin MD   100 mg at 12/30/24 2205    levothyroxine (SYNTHROID/LEVOTHROID) tablet 175 mcg  175 mcg Oral Daily Anton Dale MD   175 mcg at 12/31/24 0910    pantoprazole (PROTONIX) EC tablet 40 mg  40 mg Oral BID AC Suyapa,  MD Anton   40 mg at 12/31/24 0910    piperacillin-tazobactam (ZOSYN) 4.5 g vial to attach to  mL bag  4.5 g Intravenous Q6H Anotn Dale MD   4.5 g at 12/31/24 0908    sertraline (ZOLOFT) tablet 150 mg  150 mg Oral At Bedtime Anton Dale MD   150 mg at 12/30/24 2202    sodium chloride (PF) 0.9% PF flush 3 mL  3 mL Intracatheter Q8H Anton Dale MD   3 mL at 12/31/24 0558    vancomycin (VANCOCIN) 1,500 mg in 0.9% NaCl 265 mL intermittent infusion  1,500 mg Intravenous Q24H Anton Dale MD   1,500 mg at 12/31/24 1039     Current Facility-Administered Medications   Medication Dose Route Frequency Provider Last Rate Last Admin    acetaminophen (TYLENOL) tablet 650 mg  650 mg Oral Q4H PRN Anton Dale MD        Or    acetaminophen (TYLENOL) Suppository 650 mg  650 mg Rectal Q4H PRN Anton Dale MD        albuterol (PROVENTIL) neb solution 2.5 mg  2.5 mg Nebulization Q8H PRN Anton Dale MD        calcium carbonate (TUMS) chewable tablet 1,000 mg  1,000 mg Oral 4x Daily PRN Anton Dale MD        diazepam (VALIUM) injection 2.5 mg  2.5 mg Intravenous Q4H PRN Wendi Gamino APRN CNP   2.5 mg at 12/30/24 2038    diazepam (VALIUM) tablet 2 mg  2 mg Oral Q6H PRN Anton Dale MD   2 mg at 12/30/24 1642    lidocaine (LMX4) cream   Topical Q1H PRN Anton Dale MD        lidocaine 1 % 0.1-1 mL  0.1-1 mL Other Q1H PRN Anton Dale MD        polyethylene glycol (MIRALAX) Packet 17 g  17 g Oral BID PRN Anton Dale MD        senna-docusate (SENOKOT-S/PERICOLACE) 8.6-50 MG per tablet 1 tablet  1 tablet Oral BID PRN Anton Dale MD        Or    senna-docusate (SENOKOT-S/PERICOLACE) 8.6-50 MG per tablet 2 tablet  2 tablet Oral BID PRN Anton Dale MD        sodium chloride (PF) 0.9% PF flush 3 mL  3 mL Intracatheter q1 min prn Anton Dale MD           Mental Status Examination:     Appearance:  awake, alert and adequately groomed  Eye Contact:  good  Speech:   dysarthria  Language:Normal  Psychomotor Behavior:  evidence of tardive dyskinesia  Mood:  better  Affect:  appropriate and in normal range and mood congruent  Thought Process:  logical no loose associations  Thought Content:  no evidence of suicidal ideation or homicidal ideation, no evidence of psychotic thought, no auditory hallucinations present, and no visual hallucinations present  Oriented to:  time, person, and place  Attention Span and Concentration:  intact  Recent and Remote Memory:  intact  Fund of Knowledge: appropriate    Gait and Station: Normal  Insight:  fair  Judgment:  fair        Labs/Vitals:     Recent Results (from the past 24 hours)   UA with Microscopic reflex to Culture    Collection Time: 12/30/24 12:01 PM    Specimen: Urine, Clean Catch   Result Value Ref Range    Color Urine Light Yellow Colorless, Straw, Light Yellow, Yellow    Appearance Urine Clear Clear    Glucose Urine Negative Negative mg/dL    Bilirubin Urine Negative Negative    Ketones Urine Trace (A) Negative mg/dL    Specific Gravity Urine 1.023 1.003 - 1.035    Blood Urine Negative Negative    pH Urine 6.0 5.0 - 7.0    Protein Albumin Urine 30 (A) Negative mg/dL    Urobilinogen Urine Normal Normal, 2.0 mg/dL    Nitrite Urine Negative Negative    Leukocyte Esterase Urine Small (A) Negative    RBC Urine 0 <=2 /HPF    WBC Urine 2 <=5 /HPF    Squamous Epithelials Urine 6 (H) <=1 /HPF   Lactic acid whole blood with 1x repeat in 2 hr when >2    Collection Time: 12/30/24 12:16 PM   Result Value Ref Range    Lactic Acid, Initial 1.0 0.7 - 2.0 mmol/L   Glucose by meter    Collection Time: 12/30/24  1:54 PM   Result Value Ref Range    GLUCOSE BY METER POCT 93 70 - 99 mg/dL   Lactic acid whole blood    Collection Time: 12/30/24  3:14 PM   Result Value Ref Range    Lactic Acid 0.9 0.7 - 2.0 mmol/L   Basic metabolic panel    Collection Time: 12/30/24  3:14 PM   Result Value Ref Range    Sodium 124 (L) 135 - 145 mmol/L    Potassium 4.3 3.4  - 5.3 mmol/L    Chloride 93 (L) 98 - 107 mmol/L    Carbon Dioxide (CO2) 19 (L) 22 - 29 mmol/L    Anion Gap 12 7 - 15 mmol/L    Urea Nitrogen 8.2 8.0 - 23.0 mg/dL    Creatinine 0.58 0.51 - 0.95 mg/dL    GFR Estimate >90 >60 mL/min/1.73m2    Calcium 7.8 (L) 8.8 - 10.4 mg/dL    Glucose 90 70 - 99 mg/dL   Procalcitonin    Collection Time: 12/30/24  3:14 PM   Result Value Ref Range    Procalcitonin 3.94 (H) <0.50 ng/mL   MRSA MSSA PCR, Nasal Swab    Collection Time: 12/30/24  4:01 PM    Specimen: Nare, Left; Swab   Result Value Ref Range    MRSA Target DNA Negative Negative    SA Target DNA Positive    Sodium    Collection Time: 12/30/24  7:07 PM   Result Value Ref Range    Sodium 123 (L) 135 - 145 mmol/L   Procalcitonin    Collection Time: 12/30/24  7:07 PM   Result Value Ref Range    Procalcitonin 3.06 (H) <0.50 ng/mL   Troponin T, High Sensitivity    Collection Time: 12/30/24  7:07 PM   Result Value Ref Range    Troponin T, High Sensitivity 273 (HH) <=14 ng/L   Nt probnp inpatient    Collection Time: 12/30/24  7:07 PM   Result Value Ref Range    N terminal Pro BNP Inpatient 3,969 (H) 0 - 900 pg/mL   EKG 12-lead, tracing only    Collection Time: 12/30/24  9:08 PM   Result Value Ref Range    Systolic Blood Pressure  mmHg    Diastolic Blood Pressure  mmHg    Ventricular Rate 85 BPM    Atrial Rate 85 BPM    NV Interval 146 ms    QRS Duration 62 ms     ms    QTc 435 ms    P Axis 102 degrees    R AXIS 19 degrees    T Axis -4 degrees    Interpretation ECG       Sinus rhythm with sinus arrhythmia  Normal ECG  When compared with ECG of 30-Dec-2024 08:25,  T wave inversion now evident in Inferior leads     Sodium    Collection Time: 12/30/24 10:14 PM   Result Value Ref Range    Sodium 129 (L) 135 - 145 mmol/L   Blood gas venous    Collection Time: 12/30/24 10:14 PM   Result Value Ref Range    pH Venous 7.28 (L) 7.32 - 7.43    pCO2 Venous 47 40 - 50 mm Hg    pO2 Venous 22 (L) 25 - 47 mm Hg    Bicarbonate Venous 22 21 - 28  mmol/L    Base Excess/Deficit Venous -4.8 (L) -3.0 - 3.0 mmol/L    FIO2 100     Oxyhemoglobin Venous 26 (L) 70 - 75 %    O2 Sat, Venous 26.3 (L) 70.0 - 75.0 %   Troponin T, High Sensitivity    Collection Time: 12/30/24 10:14 PM   Result Value Ref Range    Troponin T, High Sensitivity 239 (HH) <=14 ng/L   CBC with platelets    Collection Time: 12/30/24 10:14 PM   Result Value Ref Range    WBC Count 10.1 4.0 - 11.0 10e3/uL    RBC Count 3.48 (L) 3.80 - 5.20 10e6/uL    Hemoglobin 9.4 (L) 11.7 - 15.7 g/dL    Hematocrit 29.3 (L) 35.0 - 47.0 %    MCV 84 78 - 100 fL    MCH 27.0 26.5 - 33.0 pg    MCHC 32.1 31.5 - 36.5 g/dL    RDW 13.5 10.0 - 15.0 %    Platelet Count 290 150 - 450 10e3/uL   Sodium    Collection Time: 12/31/24  4:07 AM   Result Value Ref Range    Sodium 137 135 - 145 mmol/L   Heparin Unfractionated Anti Xa Level    Collection Time: 12/31/24  4:07 AM   Result Value Ref Range    Anti Xa Unfractionated Heparin 0.67 For Reference Range, See Comment IU/mL   Comprehensive metabolic panel    Collection Time: 12/31/24  4:07 AM   Result Value Ref Range    Sodium 138 135 - 145 mmol/L    Potassium 3.5 3.4 - 5.3 mmol/L    Carbon Dioxide (CO2) 20 (L) 22 - 29 mmol/L    Anion Gap 15 7 - 15 mmol/L    Urea Nitrogen 7.4 (L) 8.0 - 23.0 mg/dL    Creatinine 0.61 0.51 - 0.95 mg/dL    GFR Estimate >90 >60 mL/min/1.73m2    Calcium 8.3 (L) 8.8 - 10.4 mg/dL    Chloride 103 98 - 107 mmol/L    Glucose 83 70 - 99 mg/dL    Alkaline Phosphatase 174 (H) 40 - 150 U/L     (H) 0 - 45 U/L    ALT 61 (H) 0 - 50 U/L    Protein Total 6.5 6.4 - 8.3 g/dL    Albumin 3.2 (L) 3.5 - 5.2 g/dL    Bilirubin Total 0.2 <=1.2 mg/dL   CBC with platelets    Collection Time: 12/31/24  4:07 AM   Result Value Ref Range    WBC Count 6.8 4.0 - 11.0 10e3/uL    RBC Count 3.65 (L) 3.80 - 5.20 10e6/uL    Hemoglobin 10.1 (L) 11.7 - 15.7 g/dL    Hematocrit 30.5 (L) 35.0 - 47.0 %    MCV 84 78 - 100 fL    MCH 27.7 26.5 - 33.0 pg    MCHC 33.1 31.5 - 36.5 g/dL    RDW  13.5 10.0 - 15.0 %    Platelet Count 275 150 - 450 10e3/uL   Sodium    Collection Time: 12/31/24  5:40 AM   Result Value Ref Range    Sodium 139 135 - 145 mmol/L     B/P: 132/66, T: 98.2, P: 95, R: 18  Most Recent 3 CBC's:  Recent Labs   Lab Test 12/31/24  0407 12/30/24  2214 12/30/24  0829   WBC 6.8 10.1 9.6   HGB 10.1* 9.4* 9.8*   MCV 84 84 85    290 418      Most Recent 3 BMP's:  Recent Labs   Lab Test 12/31/24  0540 12/31/24  0407 12/30/24  2214 12/30/24  1907 12/30/24  1514 12/30/24  1354 12/30/24  0830 12/30/24  0829    138  137 129*   < > 124*  --   --  121*   POTASSIUM  --  3.5  --   --  4.3  --   --  5.1   CHLORIDE  --  103  --   --  93*  --   --  87*   CO2  --  20*  --   --  19*  --   --  12*   BUN  --  7.4*  --   --  8.2  --   --  9.3   CR  --  0.61  --   --  0.58  --   --  0.70   ANIONGAP  --  15  --   --  12  --   --  22*   ADRIAN  --  8.3*  --   --  7.8*  --   --  8.5*   GLC  --  83  --   --  90 93   < > 214*    < > = values in this interval not displayed.     Most Recent 2 LFT's:  Recent Labs   Lab Test 12/31/24 0407 12/30/24  0829   * 50*   ALT 61* 23   ALKPHOS 174* 165*   BILITOTAL 0.2 0.2     Most Recent INR's and Anticoagulation Dosing History:  Anticoagulation Dose History          Latest Ref Rng & Units 5/18/2011 3/1/2019 7/26/2020 5/9/2021 4/12/2022   Recent Dosing and Labs   INR 0.85 - 1.15 1.11  1.06  1.02  0.98  1.10      Most Recent 3 Troponin's:  Recent Labs   Lab Test 05/25/21  1932 07/26/20  1857 10/21/16  1456   TROPI <0.015 <0.015 <0.015  The 99th percentile for upper reference range is 0.045 ug/L.  Troponin values in   the range of 0.045 - 0.120 ug/L may be associated with risks of adverse   clinical events.       Most Recent Cholesterol Panel:  Recent Labs   Lab Test 05/25/22  1025   CHOL 133   LDL 51   HDL 51   TRIG 155*     Most Recent 6 Bacteria Isolates From Any Culture (See EPIC Reports for Culture Details):No lab results found.  Most Recent TSH, T4 and A1c  Labs:  Recent Labs   Lab Test 10/09/24  0654 10/30/23  0610 05/25/22  1025   TSH 3.90   < >  --    A1C  --   --  5.9*    < > = values in this interval not displayed.     Results for orders placed or performed during the hospital encounter of 12/30/24   XR Chest Port 1 View    Narrative    CHEST PORTABLE ONE VIEW December 30, 2024 8:44 AM     HISTORY: Shortness of breath.    COMPARISON: 3/2/2019.      Impression    IMPRESSION: Normal cardiac silhouette. Diffuse bilateral interstitial  prominence appears mildly increased and this could be mild pulmonary  edema. Mild left base atelectasis.    XIOMARA CAMERON MD         SYSTEM ID:  [id number]   XR Chest Port 1 View    Narrative    EXAM: XR CHEST PORT 1 VIEW  LOCATION: [facility]  DATE: 12/30/2024    INDICATION: increased O2 needs  COMPARISON: X-ray 12/30/2024      Impression    IMPRESSION: Cardiomegaly with central vascular congestion and interstitial edema. Mild bibasilar opacities likely reflect atelectasis. No definite pleural effusion.   US Abdomen Limited    Narrative    ULTRASOUND ABDOMEN LIMITED December 31, 2024 8:31 AM    CLINICAL HISTORY: Elevated liver function tests, bacteremia of unclear  source - assess for gallbladder pathology.     TECHNIQUE: Limited abdominal ultrasound.    COMPARISON: None.    FINDINGS:    GALLBLADDER: The gallbladder is normal. No gallstones, wall  thickening, or pericholecystic fluid. Negative sonographic Barr's  sign.    BILE DUCTS: There is no biliary dilatation. The common duct measures 6  mm, which is likely within normal limits given the patient's age.    LIVER: Homogeneous parenchyma. No focal mass.    RIGHT KIDNEY: No hydronephrosis.    PANCREAS: The visualized portions of the pancreas are normal.    No ascites.      Impression    IMPRESSION: Unremarkable gallbladder. No cholelithiasis or  cholecystitis.    FAUZIA ONEILL MD         SYSTEM ID:  [id number]       Impression:   Patient has paranoid  schizophrenia with chronic tardive dyskinesia.  From a psychiatric standpoint patient is at baseline.  She denies any active suicide ideation plan or intent denies any auditory or visual hallucinations.  Patient's dyskinesia is improving.      DIagnoses:     Schizophrenia  Tardive dyskinesia         Plan:   1.  Continue Clozaril 250 mg at bedtime.  Amantadine 100 mg 3 times a day  Benztropine 1 mg 3 times a day  2.  Appreciate neurology consult  3.  Benzodiazepines have improved her tardive dyskinesia however these should be only short term.  Please do not order them at the time of discharge for this patient.  4.  Prior authorization is attempted [id number] INHIBITORS , these are FDA approved for first line for td.  5.medical management as per primary trt team.  Patient needs psychiatry appointment and close follow-up with psychiatry.    Attestation:  Patient has been seen and evaluated by Ky davis MD

## 2024-12-31 NOTE — DISCHARGE INSTRUCTIONS
WOUND CARES  Continue using incontinence cleanser each episode   Continue applying silicone foam to sacrum   Continue applying zinc based paste to perianal

## 2024-12-31 NOTE — CONSULTS
Inpatient Cardiology Consultation.   [facility]  Date of Admission: 12/30/2024  Date of Consult: December 31, 2024        DIAGNOSES/ASSESSMENT    Severe sepsis and Streptococcus bacteremia.  Initially presented with hypotension, tachycardia, markedly elevated procalcitonin levels and received appropriate therapy with IV fluids and antibiotics.  Proved with low-dose Lasix.  Recommend additional furosemide and judicious use of IV fluids.  Fluid overload: Patient received aggressive fluid resuscitation for hypotension and sepsis, likely precipitation fluid overload.  Echocardiogram pending for LV function assessment.  Elevated troponin with normal EKG.  Significant cognitive impairment limits reliable history. Type I ACS unlikely given completely normal EKG.  I suspect the elevated troponin which is flat is due to sepsis, fluid overload (no rise and fall pattern of myocardial injury).    PLAN:    Additional 1 dose of 40 mg IV furosemide (I have ordered this), additional doses as needed.  Judicious IV fluids to balance fluid overload and sepsis management.  Transthoracic echocardiogram to assess LV function and wall motion.  Continue heparin infusion for 24-48 hours.  D/C heparin if echo shows no wall motion abnormalities.  Cardiology will sign off.  Thank you for consulting us.      Prisca Huerta MD, MD FACC  Cardiology      High complexity medical decision making and care coordination.  Total time today 64 minutes.    CODE STATUS:  No CPR- Do NOT Intubate      REASON FOR CONSULT:  Acute coronary syndrome and congestive heart failure.    HISTORY OF PRESENT ILLNESS:  Liz Leiberman is a 74 year old female admitted on 12/30/2024. Past history of paranoid schizophrenia, tardive dyskinesia, depression, dementia, asthma, hypertension, who was sent from her facility due to worsening of jerking movements. She was found to have multiple electrolyte abnormalities, hypotension and severe sepsis  and lactic acidosis due to Streptococcus bacteremia.    Earlier today, she became more hypoxemic with elevated NT proBNP of 4000 and troponinemia of 273 - 239.  Cardiology consulted with question of congestive heart failure and acute coronary syndrome.    Reliable history cannot be obtained from patient.  She has significant dyskinesia with grunting noises and tachypnea.  There is concern that the haloperidol she was given may be causing worsening of her truncal movements (per neurology).  Unable to know if she has chest pain or not.  But EKG shows normal sinus rhythm of 82 bpm without any ischemic changes.  Normal cardiac intervals with a QTc of 470 ms.  Echocardiogram is pending.    Labs: Sodium 139, potassium 3.5, creatinine 0.6, eGFR 90. Troponin 273 -- 239, NT-proBNP 4000. Procalcitonin markedly elevated. TSH normal. CBC shows no leukocytosis, chronic anemia, normal platelets. Blood cultures positive for Streptococcus pantolemia.    Chest x-ray shows normal cardiac silhouette and pulmonary edema, no pleural effusions or consolidation.    Transthoracic echocardiogram pending.    REVIEW OF SYSTEMS:  A comprehensive 10 point review of systems could not be completed due to significant cognitive decline.    FAMILY HISTORY:  Family History   Problem Relation Age of Onset    Cerebrovascular Disease No family hx of     Diabetes No family hx of        MEDICATIONS:  Prior to Admission Medications   Prescriptions Last Dose Informant Patient Reported? Taking?   Ferrous Gluconate 324 (37.5 Fe) MG TABS   Yes No   Sig: Take 324 mg by mouth daily (with breakfast)    acetaminophen (TYLENOL) 500 MG tablet 12/29/2024  Yes Yes   Sig: Take 1,000 mg by mouth 3 times daily.   albuterol (PROAIR HFA/PROVENTIL HFA/VENTOLIN HFA) 108 (90 Base) MCG/ACT inhaler  Self Yes Yes   Sig: Inhale 1-2 puffs into the lungs every 6 hours as needed for wheezing    albuterol (PROVENTIL) (2.5 MG/3ML) 0.083% neb solution   Yes Yes   Sig: Take 2.5 mg by  nebulization every 8 hours as needed for wheezing.   albuterol (PROVENTIL) (2.5 MG/3ML) 0.083% neb solution 12/30/2024 Morning  Yes Yes   Sig: Take 2.5 mg by nebulization 2 times daily.   amLODIPine (NORVASC) 5 MG tablet 12/29/2024 Evening  Yes Yes   Sig: Take 5 mg by mouth at bedtime.   amantadine (SYMMETREL) 100 MG capsule 12/29/2024 Evening  Yes Yes   Sig: Take 100 mg by mouth 3 times daily.   atorvastatin (LIPITOR) 20 MG tablet 12/29/2024 Evening  Yes Yes   Sig: Take 20 mg by mouth At Bedtime    benztropine (COGENTIN) 1 MG tablet 12/29/2024 Evening Self Yes Yes   Sig: Take 1 mg by mouth 3 times daily    calcium carbonate-vitamin D (OSCAL) 500-5 MG-MCG tablet 12/29/2024 Evening  Yes Yes   Sig: Take 1 tablet by mouth 2 times daily.   cetirizine (ZYRTEC) 5 MG tablet 12/29/2024 Evening  Yes Yes   Sig: Take 5 mg by mouth daily. For itching   cloZAPine (CLOZARIL) 100 MG tablet 12/28/2024 Evening  No Yes   Sig: Take 2.5 tablets (250 mg) by mouth At Bedtime   desoximetasone (TOPICORT) 0.25 % OINT  Self Yes Yes   Sig: Apply  topically as needed.   diclofenac (VOLTAREN) 1 % topical gel 12/29/2024  Yes Yes   Sig: Apply 2 g topically 4 times daily. Apply to wrists   emollient (VANICREAM) external cream   Yes Yes   Sig: Apply topically 3 times daily as needed for other (irritation)   fluocinonide (LIDEX) 0.05 % external solution 12/30/2024 Morning  Yes Yes   Sig: Apply topically 2 times daily. For itching   fluticasone (FLONASE) 50 MCG/ACT nasal spray 12/30/2024 Morning  Yes Yes   Sig: Spray 2 sprays into both nostrils daily.   fluticasone (FLOVENT HFA) 110 MCG/ACT inhaler 12/30/2024 Morning Self Yes Yes   Sig: Inhale 1 puff into the lungs 2 times daily    gabapentin (NEURONTIN) 100 MG capsule 12/29/2024 Evening  Yes Yes   Sig: Take 100 mg by mouth at bedtime.   guaiFENesin (MUCINEX) 600 MG 12 hr tablet  Self Yes Yes   Sig: Take 600 mg by mouth 2 times daily as needed for congestion.   guaiFENesin (MUCINEX) 600 MG 12 hr  tablet 12/29/2024 Morning  Yes Yes   Sig: Take 600 mg by mouth daily.   levothyroxine (SYNTHROID/LEVOTHROID) 175 MCG tablet 12/30/2024 Morning Self Yes Yes   Sig: Take 175 mcg by mouth daily   multivitamin w/minerals (THERA-VIT-M) tablet 12/29/2024 Morning  No Yes   Sig: Take 1 tablet by mouth daily   nystatin (MYCOSTATIN) 009409 UNIT/GM external cream   Yes Yes   Sig: Apply topically daily as needed (rash). Apply to groin and upper thighs   omeprazole (PRILOSEC) 20 MG DR capsule 12/30/2024 Morning  Yes Yes   Sig: Take 20 mg by mouth 2 times daily   ondansetron (ZOFRAN ODT) 4 MG ODT tab   Yes Yes   Sig: Take 4 mg by mouth every 8 hours as needed for nausea.   polyethylene glycol (MIRALAX/GLYCOLAX) packet  Self Yes Yes   Sig: Take 17 g by mouth daily as needed for constipation    polyethylene glycol-propylene glycol (SYSTANE ULTRA) 0.4-0.3 % SOLN ophthalmic solution 12/29/2024 Evening  Yes Yes   Sig: Place 2 drops into both eyes 4 times daily.   sennosides (SENOKOT) 8.6 MG tablet 12/29/2024 Morning  Yes Yes   Sig: Take 2 tablets by mouth 2 times daily.   sertraline (ZOLOFT) 50 MG tablet 12/29/2024 Evening Self Yes Yes   Sig: Take 150 mg by mouth At Bedtime    sodium fluoride dental gel (PREVIDENT) 1.1 % GEL topical gel   Yes Yes   Sig: Apply to affected area daily   triamcinolone (KENALOG) 0.1 % external cream   Yes Yes   Sig: Apply topically daily as needed for irritation.      Facility-Administered Medications: None       HOME MEDICATIONS:  Prior to Admission Medications   Prescriptions Last Dose Informant Patient Reported? Taking?   Ferrous Gluconate 324 (37.5 Fe) MG TABS   Yes No   Sig: Take 324 mg by mouth daily (with breakfast)    acetaminophen (TYLENOL) 500 MG tablet 12/29/2024  Yes Yes   Sig: Take 1,000 mg by mouth 3 times daily.   albuterol (PROAIR HFA/PROVENTIL HFA/VENTOLIN HFA) 108 (90 Base) MCG/ACT inhaler  Self Yes Yes   Sig: Inhale 1-2 puffs into the lungs every 6 hours as needed for wheezing     albuterol (PROVENTIL) (2.5 MG/3ML) 0.083% neb solution   Yes Yes   Sig: Take 2.5 mg by nebulization every 8 hours as needed for wheezing.   albuterol (PROVENTIL) (2.5 MG/3ML) 0.083% neb solution 12/30/2024 Morning  Yes Yes   Sig: Take 2.5 mg by nebulization 2 times daily.   amLODIPine (NORVASC) 5 MG tablet 12/29/2024 Evening  Yes Yes   Sig: Take 5 mg by mouth at bedtime.   amantadine (SYMMETREL) 100 MG capsule 12/29/2024 Evening  Yes Yes   Sig: Take 100 mg by mouth 3 times daily.   atorvastatin (LIPITOR) 20 MG tablet 12/29/2024 Evening  Yes Yes   Sig: Take 20 mg by mouth At Bedtime    benztropine (COGENTIN) 1 MG tablet 12/29/2024 Evening Self Yes Yes   Sig: Take 1 mg by mouth 3 times daily    calcium carbonate-vitamin D (OSCAL) 500-5 MG-MCG tablet 12/29/2024 Evening  Yes Yes   Sig: Take 1 tablet by mouth 2 times daily.   cetirizine (ZYRTEC) 5 MG tablet 12/29/2024 Evening  Yes Yes   Sig: Take 5 mg by mouth daily. For itching   cloZAPine (CLOZARIL) 100 MG tablet 12/28/2024 Evening  No Yes   Sig: Take 2.5 tablets (250 mg) by mouth At Bedtime   desoximetasone (TOPICORT) 0.25 % OINT  Self Yes Yes   Sig: Apply  topically as needed.   diclofenac (VOLTAREN) 1 % topical gel 12/29/2024  Yes Yes   Sig: Apply 2 g topically 4 times daily. Apply to wrists   emollient (VANICREAM) external cream   Yes Yes   Sig: Apply topically 3 times daily as needed for other (irritation)   fluocinonide (LIDEX) 0.05 % external solution 12/30/2024 Morning  Yes Yes   Sig: Apply topically 2 times daily. For itching   fluticasone (FLONASE) 50 MCG/ACT nasal spray 12/30/2024 Morning  Yes Yes   Sig: Spray 2 sprays into both nostrils daily.   fluticasone (FLOVENT HFA) 110 MCG/ACT inhaler 12/30/2024 Morning Self Yes Yes   Sig: Inhale 1 puff into the lungs 2 times daily    gabapentin (NEURONTIN) 100 MG capsule 12/29/2024 Evening  Yes Yes   Sig: Take 100 mg by mouth at bedtime.   guaiFENesin (MUCINEX) 600 MG 12 hr tablet  Self Yes Yes   Sig: Take 600 mg  by mouth 2 times daily as needed for congestion.   guaiFENesin (MUCINEX) 600 MG 12 hr tablet 12/29/2024 Morning  Yes Yes   Sig: Take 600 mg by mouth daily.   levothyroxine (SYNTHROID/LEVOTHROID) 175 MCG tablet 12/30/2024 Morning Self Yes Yes   Sig: Take 175 mcg by mouth daily   multivitamin w/minerals (THERA-VIT-M) tablet 12/29/2024 Morning  No Yes   Sig: Take 1 tablet by mouth daily   nystatin (MYCOSTATIN) [id number] UNIT/GM external cream   Yes Yes   Sig: Apply topically daily as needed (rash). Apply to groin and upper thighs   omeprazole (PRILOSEC) 20 MG DR capsule 12/30/2024 Morning  Yes Yes   Sig: Take 20 mg by mouth 2 times daily   ondansetron (ZOFRAN ODT) 4 MG ODT tab   Yes Yes   Sig: Take 4 mg by mouth every 8 hours as needed for nausea.   polyethylene glycol (MIRALAX/GLYCOLAX) packet  Self Yes Yes   Sig: Take 17 g by mouth daily as needed for constipation    polyethylene glycol-propylene glycol (SYSTANE ULTRA) 0.4-0.3 % SOLN ophthalmic solution 12/29/2024 Evening  Yes Yes   Sig: Place 2 drops into both eyes 4 times daily.   sennosides (SENOKOT) 8.6 MG tablet 12/29/2024 Morning  Yes Yes   Sig: Take 2 tablets by mouth 2 times daily.   sertraline (ZOLOFT) 50 MG tablet 12/29/2024 Evening Self Yes Yes   Sig: Take 150 mg by mouth At Bedtime    sodium fluoride dental gel (PREVIDENT) 1.1 % GEL topical gel   Yes Yes   Sig: Apply to affected area daily   triamcinolone (KENALOG) 0.1 % external cream   Yes Yes   Sig: Apply topically daily as needed for irritation.      Facility-Administered Medications: None       ALLERGIES:  Allergies   Allergen Reactions    Formaldehyde Rash    Latex Rash       PAST MEDICAL HISTORY:  Past Medical History:   Diagnosis Date    Allergic rhinitis     Bronchiectasis (H)     mild RML    Chronic pain     ft, knee, shoulders    Depression     Diverticulosis     on colonoscopy    Gastro-oesophageal reflux disease     GERD (gastroesophageal reflux disease)     Hearing loss     Hiatal hernia      History of blood transfusion     after tonsillectomy    Hyperlipidemia     Hypothyroidism     Leukocytosis     Lung nodule     Mild intermittent asthma     Numbness and tingling     hands ceasar numb R/T carpal tunnel    Osteoarthritis     knee, ft, shoulders    Paranoid schizophrenia, in remission     RLS (restless legs syndrome)     Tardive dyskinesia     Urinary incontinence        PAST SURGICAL HISTORY:  Past Surgical History:   Procedure Laterality Date    ARTHROPLASTY KNEE  2/20/2013    Procedure: ARTHROPLASTY KNEE;  LEFT TOTAL KNEE ARTHROPLASTY (SMITH & NEPHEW)^;  Surgeon: Khanh Bee MD;  Location:  OR    ENT SURGERY      tonsillectomy    ORTHOPEDIC SURGERY  2011    (R) total knee       SOCIAL HISTORY:   Liz Lieberman  reports that she quit smoking about 30 years ago. She has never used smokeless tobacco. She reports current alcohol use of about 3.0 - 5.0 standard drinks of alcohol per week. She reports that she does not use drugs.      PHYSICAL EXAMINATION:  Temp: 98.2  F (36.8  C) Temp src: Axillary BP: 132/66 Pulse: 95   Resp: 18 SpO2: 100 % O2 Device: High Flow Nasal Cannula (HFNC) Oxygen Delivery: 40 LPM  12/26 0700 - 12/31 0659  In: 700 [P.O.:480; I.V.:220]  Out: 3750 [Urine:3750]  Net: -3050  Vitals:    12/30/24 1409   Weight: 61.8 kg (136 lb 3.9 oz)       Clinically Significant Risk Factors         # Hyponatremia: Lowest Na = 121 mmol/L in last 2 days, will monitor as appropriate  # Hypochloremia: Lowest Cl = 87 mmol/L in last 2 days, will monitor as appropriate  # Hypocalcemia: Lowest Ca = 7.8 mg/dL in last 2 days, will monitor and replace as appropriate    # Anion Gap Metabolic Acidosis: Highest Anion Gap = 22 mmol/L in last 2 days, will monitor and treat as appropriate  # Hypoalbuminemia: Lowest albumin = 3.2 g/dL at 12/31/2024  4:07 AM, will monitor as appropriate     # Hypertension: Noted on problem list     # Acute Hypoxic Respiratory Failure: Documented O2 saturation < 90%. Continue  supplemental oxygen as needed             # Asthma: noted on problem list       Fluid overload, unspecified      Luis Albertol Angela Huerta MD, MD    This note was completed in part using dictation via the Dragon voice recognition software. Some word and grammatical errors may occur and must be interpreted in the appropriate clinical context. If there are any questions pertaining to this issue, please contact me for further clarification.

## 2024-12-31 NOTE — PROVIDER NOTIFICATION
"MD Notification    Notified Person: MD    Notified Person Name: Dr. Rocha, hospitalist     Notification Date/Time: 12/31/24, at 0658    Notification Interaction: Vocer page    Purpose of Notification: \" Pt`s Na came back 139.Has patient care order to contact hospitalist for >127 prior to 8 am , 12/31/24.\"    Orders Received: Dr. Rocha aware, no new orders.     Comments:   "

## 2025-01-01 ENCOUNTER — APPOINTMENT (OUTPATIENT)
Dept: CT IMAGING | Facility: CLINIC | Age: 75
DRG: 193 | End: 2025-01-01
Attending: HOSPITALIST
Payer: MEDICARE

## 2025-01-01 ENCOUNTER — APPOINTMENT (OUTPATIENT)
Dept: CARDIOLOGY | Facility: CLINIC | Age: 75
DRG: 193 | End: 2025-01-01
Attending: NURSE PRACTITIONER
Payer: MEDICARE

## 2025-01-01 LAB
ALBUMIN SERPL BCG-MCNC: 3 G/DL (ref 3.5–5.2)
ALP SERPL-CCNC: 138 U/L (ref 40–150)
ALT SERPL W P-5'-P-CCNC: 56 U/L (ref 0–50)
ANION GAP SERPL CALCULATED.3IONS-SCNC: 10 MMOL/L (ref 7–15)
AST SERPL W P-5'-P-CCNC: 57 U/L (ref 0–45)
BILIRUB DIRECT SERPL-MCNC: <0.2 MG/DL (ref 0–0.3)
BILIRUB SERPL-MCNC: <0.2 MG/DL
BUN SERPL-MCNC: 5.5 MG/DL (ref 8–23)
CALCIUM SERPL-MCNC: 8.2 MG/DL (ref 8.8–10.4)
CHLORIDE SERPL-SCNC: 101 MMOL/L (ref 98–107)
CREAT SERPL-MCNC: 0.53 MG/DL (ref 0.51–0.95)
CREAT SERPL-MCNC: 0.56 MG/DL (ref 0.51–0.95)
EGFRCR SERPLBLD CKD-EPI 2021: >90 ML/MIN/1.73M2
EGFRCR SERPLBLD CKD-EPI 2021: >90 ML/MIN/1.73M2
GLUCOSE SERPL-MCNC: 107 MG/DL (ref 70–99)
HCO3 SERPL-SCNC: 26 MMOL/L (ref 22–29)
LVEF ECHO: NORMAL
PLATELET # BLD AUTO: 217 10E3/UL (ref 150–450)
POTASSIUM SERPL-SCNC: 3 MMOL/L (ref 3.4–5.3)
POTASSIUM SERPL-SCNC: 4.1 MMOL/L (ref 3.4–5.3)
PROT SERPL-MCNC: 5.9 G/DL (ref 6.4–8.3)
SODIUM SERPL-SCNC: 137 MMOL/L (ref 135–145)
UFH PPP CHRO-ACNC: 0.16 IU/ML
UFH PPP CHRO-ACNC: 0.24 IU/ML

## 2025-01-01 PROCEDURE — 250N000013 HC RX MED GY IP 250 OP 250 PS 637: Performed by: STUDENT IN AN ORGANIZED HEALTH CARE EDUCATION/TRAINING PROGRAM

## 2025-01-01 PROCEDURE — 70450 CT HEAD/BRAIN W/O DYE: CPT

## 2025-01-01 PROCEDURE — 36415 COLL VENOUS BLD VENIPUNCTURE: CPT | Performed by: HOSPITALIST

## 2025-01-01 PROCEDURE — 999N000208 ECHOCARDIOGRAM COMPLETE

## 2025-01-01 PROCEDURE — 80048 BASIC METABOLIC PNL TOTAL CA: CPT | Performed by: HOSPITALIST

## 2025-01-01 PROCEDURE — 250N000011 HC RX IP 250 OP 636: Performed by: HOSPITALIST

## 2025-01-01 PROCEDURE — 85520 HEPARIN ASSAY: CPT | Performed by: HOSPITALIST

## 2025-01-01 PROCEDURE — 85049 AUTOMATED PLATELET COUNT: CPT | Performed by: HOSPITALIST

## 2025-01-01 PROCEDURE — 80053 COMPREHEN METABOLIC PANEL: CPT | Performed by: HOSPITALIST

## 2025-01-01 PROCEDURE — 82248 BILIRUBIN DIRECT: CPT | Performed by: HOSPITALIST

## 2025-01-01 PROCEDURE — 250N000011 HC RX IP 250 OP 636: Performed by: NURSE PRACTITIONER

## 2025-01-01 PROCEDURE — 255N000002 HC RX 255 OP 636: Performed by: NURSE PRACTITIONER

## 2025-01-01 PROCEDURE — C8929 TTE W OR WO FOL WCON,DOPPLER: HCPCS

## 2025-01-01 PROCEDURE — 250N000013 HC RX MED GY IP 250 OP 250 PS 637: Performed by: HOSPITALIST

## 2025-01-01 PROCEDURE — 84132 ASSAY OF SERUM POTASSIUM: CPT | Performed by: HOSPITALIST

## 2025-01-01 PROCEDURE — 82565 ASSAY OF CREATININE: CPT | Performed by: HOSPITALIST

## 2025-01-01 PROCEDURE — 99232 SBSQ HOSP IP/OBS MODERATE 35: CPT | Performed by: HOSPITALIST

## 2025-01-01 PROCEDURE — 120N000001 HC R&B MED SURG/OB

## 2025-01-01 PROCEDURE — 93306 TTE W/DOPPLER COMPLETE: CPT | Mod: 26 | Performed by: INTERNAL MEDICINE

## 2025-01-01 RX ORDER — POTASSIUM CHLORIDE 1.5 G/1.58G
40 POWDER, FOR SOLUTION ORAL ONCE
Status: COMPLETED | OUTPATIENT
Start: 2025-01-01 | End: 2025-01-01

## 2025-01-01 RX ORDER — POTASSIUM CHLORIDE 1.5 G/1.58G
20 POWDER, FOR SOLUTION ORAL ONCE
Status: COMPLETED | OUTPATIENT
Start: 2025-01-01 | End: 2025-01-01

## 2025-01-01 RX ADMIN — POTASSIUM CHLORIDE 20 MEQ: 1.5 POWDER, FOR SOLUTION ORAL at 15:10

## 2025-01-01 RX ADMIN — AMANTADINE HYDROCHLORIDE 100 MG: 100 CAPSULE ORAL at 15:10

## 2025-01-01 RX ADMIN — GABAPENTIN 100 MG: 100 CAPSULE ORAL at 21:08

## 2025-01-01 RX ADMIN — DICLOFENAC 2 G: 10 GEL TOPICAL at 08:22

## 2025-01-01 RX ADMIN — CARBOXYMETHYLCELLULOSE SODIUM 2 DROP: 5 SOLUTION/ DROPS OPHTHALMIC at 21:07

## 2025-01-01 RX ADMIN — PANTOPRAZOLE SODIUM 40 MG: 40 TABLET, DELAYED RELEASE ORAL at 15:10

## 2025-01-01 RX ADMIN — DICLOFENAC 2 G: 10 GEL TOPICAL at 17:30

## 2025-01-01 RX ADMIN — SERTRALINE HYDROCHLORIDE 150 MG: 100 TABLET ORAL at 21:07

## 2025-01-01 RX ADMIN — MICONAZOLE NITRATE: 2 POWDER TOPICAL at 21:07

## 2025-01-01 RX ADMIN — LEVOTHYROXINE SODIUM 175 MCG: 125 TABLET ORAL at 08:21

## 2025-01-01 RX ADMIN — PIPERACILLIN AND TAZOBACTAM 4.5 G: 4; .5 INJECTION, POWDER, FOR SOLUTION INTRAVENOUS at 21:23

## 2025-01-01 RX ADMIN — AMANTADINE HYDROCHLORIDE 100 MG: 100 CAPSULE ORAL at 08:21

## 2025-01-01 RX ADMIN — CARBOXYMETHYLCELLULOSE SODIUM 2 DROP: 5 SOLUTION/ DROPS OPHTHALMIC at 17:29

## 2025-01-01 RX ADMIN — BENZTROPINE MESYLATE 1 MG: 1 TABLET ORAL at 15:10

## 2025-01-01 RX ADMIN — PIPERACILLIN AND TAZOBACTAM 4.5 G: 4; .5 INJECTION, POWDER, FOR SOLUTION INTRAVENOUS at 02:19

## 2025-01-01 RX ADMIN — PANTOPRAZOLE SODIUM 40 MG: 40 TABLET, DELAYED RELEASE ORAL at 08:21

## 2025-01-01 RX ADMIN — HEPARIN SODIUM 750 UNITS/HR: 10000 INJECTION, SOLUTION INTRAVENOUS at 02:27

## 2025-01-01 RX ADMIN — BENZTROPINE MESYLATE 1 MG: 1 TABLET ORAL at 21:08

## 2025-01-01 RX ADMIN — PIPERACILLIN AND TAZOBACTAM 4.5 G: 4; .5 INJECTION, POWDER, FOR SOLUTION INTRAVENOUS at 15:09

## 2025-01-01 RX ADMIN — FLUTICASONE PROPIONATE 1 PUFF: 110 AEROSOL, METERED RESPIRATORY (INHALATION) at 21:17

## 2025-01-01 RX ADMIN — CARBOXYMETHYLCELLULOSE SODIUM 2 DROP: 5 SOLUTION/ DROPS OPHTHALMIC at 12:57

## 2025-01-01 RX ADMIN — AMANTADINE HYDROCHLORIDE 100 MG: 100 CAPSULE ORAL at 21:07

## 2025-01-01 RX ADMIN — ACETAMINOPHEN 1000 MG: 500 TABLET, FILM COATED ORAL at 08:21

## 2025-01-01 RX ADMIN — POTASSIUM CHLORIDE 40 MEQ: 1.5 POWDER, FOR SOLUTION ORAL at 12:53

## 2025-01-01 RX ADMIN — DICLOFENAC 2 G: 10 GEL TOPICAL at 21:16

## 2025-01-01 RX ADMIN — PIPERACILLIN AND TAZOBACTAM 4.5 G: 4; .5 INJECTION, POWDER, FOR SOLUTION INTRAVENOUS at 08:55

## 2025-01-01 RX ADMIN — DICLOFENAC 2 G: 10 GEL TOPICAL at 12:57

## 2025-01-01 RX ADMIN — CARBOXYMETHYLCELLULOSE SODIUM 2 DROP: 5 SOLUTION/ DROPS OPHTHALMIC at 08:22

## 2025-01-01 RX ADMIN — ACETAMINOPHEN 1000 MG: 500 TABLET, FILM COATED ORAL at 21:07

## 2025-01-01 RX ADMIN — Medication 1 TABLET: at 08:22

## 2025-01-01 RX ADMIN — FLUTICASONE PROPIONATE 1 PUFF: 110 AEROSOL, METERED RESPIRATORY (INHALATION) at 08:22

## 2025-01-01 RX ADMIN — FLUTICASONE PROPIONATE 2 SPRAY: 50 SPRAY, METERED NASAL at 08:22

## 2025-01-01 RX ADMIN — Medication 1 TABLET: at 21:08

## 2025-01-01 RX ADMIN — PERFLUTREN 10 ML: 6.52 INJECTION, SUSPENSION INTRAVENOUS at 10:10

## 2025-01-01 RX ADMIN — CLOZAPINE 250 MG: 200 TABLET ORAL at 21:08

## 2025-01-01 RX ADMIN — BENZTROPINE MESYLATE 1 MG: 1 TABLET ORAL at 08:21

## 2025-01-01 RX ADMIN — CETIRIZINE HYDROCHLORIDE 5 MG: 5 TABLET ORAL at 08:22

## 2025-01-01 ASSESSMENT — ACTIVITIES OF DAILY LIVING (ADL)
ADLS_ACUITY_SCORE: 79
ADLS_ACUITY_SCORE: 74
ADLS_ACUITY_SCORE: 79
ADLS_ACUITY_SCORE: 70
ADLS_ACUITY_SCORE: 74
ADLS_ACUITY_SCORE: 79
ADLS_ACUITY_SCORE: 70
ADLS_ACUITY_SCORE: 79
ADLS_ACUITY_SCORE: 79
ADLS_ACUITY_SCORE: 70
ADLS_ACUITY_SCORE: 79
ADLS_ACUITY_SCORE: 70
ADLS_ACUITY_SCORE: 79
ADLS_ACUITY_SCORE: 70
ADLS_ACUITY_SCORE: 70
ADLS_ACUITY_SCORE: 74
ADLS_ACUITY_SCORE: 74

## 2025-01-01 NOTE — PROGRESS NOTES
Orientations: A&O x4. Forgetful.  at bedside.   Vitals/Pain: Stable on 2 l/m O2 via NC. Soreness on shoulders and neck, tylenol and diclo gel applied.   Respiratory : dim, crackles at bases.   Tele: SR with abnormal T wave.  Diet: [facility] soft diet, tolerating well. Takes pill whole.  Lines/Drains: PIV x2. R PIV infusing hep gtt at 900. Purewick in place.   Skin/Wounds: R shin abrasion, mepi in place. L buttock wound, redness on sacrum/coccyx and perineum, mepilex in place. Scattered bruising.  GI/: incontinent. Active BS, no BM, BS. Adequate UOP via purewick.   Labs: Abnormal/Trends, Electrolyte Replacement, hep Xa recheck at 1116.  Ambulation/Assist: Ax2 with lift, not OOB this shift shift, assited with T/R.   Plan: to do echo, EEG. TCU placement at discharge.

## 2025-01-01 NOTE — PLAN OF CARE
Orientations: A&Ox4 (Intermittent confusion)  Vitals/Pain: VSS, 1L NC (w/ sleep), LS fine crackles; good cough. Pain (bilateral shoulders).   Tele: SR (87)  Lines/Drains: PIV R and L SL  Skin/Wounds: L buttcheek skin tear/scratch and coccyx redness. Scattered bruising. R shin skin tear.   GI/: Incont; Purewick. No BM (+ flatus and BS). Mech soft diet, eating well w/ assistance   Labs: Abnormal/Trends: K (3.0), BUN (5.5), ALT (56), AST (57)  Electrolyte Replacement- K replaced x1 1850 recheck  Ambulation/Assist: NOOB; turn and repo q2hr.   Sleep Quality: Napped on and off today   Plan: ECHO completed. Bilateral mitts discontinued. BM meds ordered. Heparin gtt discontinued per cards note. WOC, psych, neuro, hospitalist following. Sisters at bedside this evening. Off IMC. Psych outpt follow-up. Head CT completed.

## 2025-01-01 NOTE — PROGRESS NOTES
[facility]    Medicine Progress Note - Hospitalist Service    Date of Admission:  12/30/2024    Assessment & Plan      Liz Lieberman is a 74 year old female admitted on 12/30/2024.  Past history of paranoid schizophrenia, TD, depression, dementia, asthma, HTN, among others who was send from her facility due to worsening of jerking movements.  She was found to have multiple electrolyte abnormalities, hypotension and tachycardia with lactic acidosis concerning for sepsis.       Possible sepsis  Strep and GPB on blood culture, suspected contaminants  Severe lactic acidosis with anion gap metabolic acidosis  *on arrival hypotensive, tachycardic, with lactate >9, procal >3 - BP and heart rate improved and lactate normalized with fluid bolus  *CXR with mild edema, but denies any pneumonia symptoms, COVID/FLU/RSV negative, UA unremarkable; otherwise denies any symptoms to suggest focal infection other than stooling  *LFT's mildly elevated with benign abdominal exam - RUQ US 12/31 negative for biliary or hepatic pathology  *1/2 admission blood cultures growing Strep species, 1/2 cultures growing gram positive bacilli - discussed with ID and suspect contaminant as only identified in single culture  *vanco discontinued 12/31    - consult ID if 2nd blood culture turns positive as would indicate possible true infection  - continue zosyn, anticipate completing empiric course abx though no clear source  - CMP pending 01/01/25     Acute hypoxic respiratory failure  Possible ACS  *on 2L oxygen on admission without respiratory complaints  *admission CXR with possible mild pulmonary edema  *RRT called 12/30 due to worsening resp status requiring HFNC - Pro-BNP and troponin elevated with concern for CHF and given lasix 20 mg IV x1 and initiated on heparin gtt    - weaned to 2L NC 01/01/25; wean as able  - cards consulted 12/31 and have low suspicion for ACS, gave additional 40 mg IV lasix  - continue heparin  gtt 24-48h per cards, stop if TTE shows no WMA  - TTE pending completion  - Cards signed off 12/31  - follow I/O's, daily weights - I/O appears equivalent past 24 hours, no weight past 2 days  - BMP pending 01/01/25     Hyponatremia, suspected hypovolemic  *hx mild intermittent hyponatremia about 130, though most recent level 11/18/24 wnl  *admission Na 121 - patient reporting significant stooling over past 2 days, appears dry on admission exam  *received 2L IVF in ED due to hypotension/concern for sepsis  *Na initially trending up appropriately, significant jump to 139 on 12/31 following lasix for resp failure  - will not try to drive Na back down with D5W with concern for CHF above    Acute on chronic tardive dyskinesia  Paranoid schizophrenia  Depression  Dementia  *last seen by Psychiatry 11/2023 at which point her TD was felt to be worsening, but declined any dosage adjustment to amantadine  *marked involuntary movements of body and extremities on arrival   *alert and oriented x4 at time of admission  *Psych consulted, suspect exacerbation due to metabolic issues and also receiving haldol in ED, though recommended neuro consult given the rarity of truncal TD  *neuro consulted, feel exam is consistent with TD and recommend continued management of metabolic issues - no need for EEG, signed off 12/31  *TD symptoms improved 12/31, Psych recommending continue PTA psychiatric regimen and follow up with outpatient Psychiatrist for consideration of valbenazine for TD (would require prior auth per Pharmacy liaison), signed off 12/31    - mitts as inadvertently pulling lines due to movements  - prn valium while inpatient per Psych (NO antipsychotics, would not discharge on benzos)  - she is slurring her words 01/01/25 (was yesterday as well attributed to dry mouth) with CN II-XII otherwise intact, strength and light touch appearing intact, unable to test coordination due to TD and limited patient effort though has not  received any recent valium - she is just waking from sleep so will monitor at this time, but will plan for MRI if not improving    ADDENDUM:  elected to proceed with CT head as was on heparin gtt, which returned wnl.  Discussed with sister 01/01/25, who reports that slurred speech is typical for patient.  Reports will have fairly clear speech at times, but can develop markedly slurred speech (to the point of being unable to understand). She feels what was described this morning is typical for her.  As such, will not pursue MRI.    HTN  - hold PTA amlodipine as remains normotensive    HLD  - hold PTA statin with mildly elevated LFT's    Chronic pain  - continue PTA tylenol (decreased to bid dosing due to LFT's), voltaren, gabapentin    Asthma  - continue PTA inhalers/nebs    Hypothyroid  - continue PTA levothyroxine    Hx alcohol use disorder  *reports drinking about once monthly    GERD  - continue PTA PPI    MASD of buttocks and coccyx  - WOC RN following          Diet: Mechanical/Dental Soft Diet    DVT Prophylaxis: heparin gtt  [place] Catheter: Not present  Lines: None     Cardiac Monitoring: ACTIVE order. Indication: acute resp failure  Code Status: No CPR- Do NOT Intubate      Clinically Significant Risk Factors         # Hyponatremia: Lowest Na = 121 mmol/L in last 2 days, will monitor as appropriate  # Hypochloremia: Lowest Cl = 87 mmol/L in last 2 days, will monitor as appropriate  # Hypocalcemia: Lowest Ca = 7.8 mg/dL in last 2 days, will monitor and replace as appropriate    # Anion Gap Metabolic Acidosis: Highest Anion Gap = 22 mmol/L in last 2 days, will monitor and treat as appropriate  # Hypoalbuminemia: Lowest albumin = 3.2 g/dL at 12/31/2024  4:07 AM, will monitor as appropriate     # Hypertension: Noted on problem list                  # Financial/Environmental Concerns: none  # Asthma: noted on problem list        Social Drivers of Health    Tobacco Use: Medium Risk (2/17/2024)    Received from  [facility]    Patient History     Smoking Tobacco Use: Former     Smokeless Tobacco Use: Never   Alcohol Use: Unknown (12/30/2020)    Received from [facility]    AUDIT-C     Frequency of Alcohol Consumption: 2-3 times a week     Average Number of Drinks: 1 or 2          Disposition Plan     Medically Ready for Discharge: Anticipated in 2-4 Days             Anton Dale MD  Hospitalist Service  [facility]  Securely message with [employer] (more info)  Text page via [employer] Paging/Directory   ______________________________________________________________________    Interval History   She reports ongoing dyspnea, also reports some cough.  No fever/chills.  Feels she is slurring her words some.     Physical Exam   Vital Signs: Temp: 97.7  F (36.5  C) Temp src: Axillary BP: 110/52 Pulse: 92   Resp: 18 SpO2: 97 % O2 Device: Nasal cannula Oxygen Delivery: 2 LPM  Weight: 136 lbs 3.91 oz    General Appearance: Thin female in NAD  Respiratory: lungs CTAB, no wheezes or crackles  Cardiovascular: RRR, normal s1/s2 without murmur  GI: abdomen soft, nondistended, normal bowel sounds, nontender  Skin: no peripheral edema  Other: Sleepy but arouses to voice, dysarthric speech with CN II-XII otherwise intact, extremity strength appears intact, unable to test coordination due to TD and limited patient effort, oriented x3    Medical Decision Making       45 MINUTES SPENT BY ME on the date of service doing chart review, history, exam, documentation & further activities per the note.      Data         Imaging results reviewed over the past 24 hrs:   Recent Results (from the past 24 hours)   US Abdomen Limited    Narrative    ULTRASOUND ABDOMEN LIMITED December 31, 2024 8:31 AM    CLINICAL HISTORY: Elevated liver function tests, bacteremia of unclear  source - assess for gallbladder pathology.     TECHNIQUE: Limited abdominal ultrasound.    COMPARISON: None.    FINDINGS:    GALLBLADDER: The  gallbladder is normal. No gallstones, wall  thickening, or pericholecystic fluid. Negative sonographic Barr's  sign.    BILE DUCTS: There is no biliary dilatation. The common duct measures 6  mm, which is likely within normal limits given the patient's age.    LIVER: Homogeneous parenchyma. No focal mass.    RIGHT KIDNEY: No hydronephrosis.    PANCREAS: The visualized portions of the pancreas are normal.    No ascites.      Impression    IMPRESSION: Unremarkable gallbladder. No cholelithiasis or  cholecystitis.    FAUZIA ONEILL MD         SYSTEM ID:  [id number]

## 2025-01-02 ENCOUNTER — APPOINTMENT (OUTPATIENT)
Dept: CT IMAGING | Facility: CLINIC | Age: 75
DRG: 193 | End: 2025-01-02
Attending: HOSPITALIST
Payer: MEDICARE

## 2025-01-02 ENCOUNTER — APPOINTMENT (OUTPATIENT)
Dept: PHYSICAL THERAPY | Facility: CLINIC | Age: 75
DRG: 193 | End: 2025-01-02
Attending: HOSPITALIST
Payer: MEDICARE

## 2025-01-02 ENCOUNTER — APPOINTMENT (OUTPATIENT)
Dept: OCCUPATIONAL THERAPY | Facility: CLINIC | Age: 75
DRG: 193 | End: 2025-01-02
Attending: HOSPITALIST
Payer: MEDICARE

## 2025-01-02 VITALS
HEART RATE: 80 BPM | WEIGHT: 123.68 LBS | RESPIRATION RATE: 18 BRPM | DIASTOLIC BLOOD PRESSURE: 80 MMHG | OXYGEN SATURATION: 95 % | SYSTOLIC BLOOD PRESSURE: 168 MMHG | BODY MASS INDEX: 20.61 KG/M2 | HEIGHT: 65 IN | TEMPERATURE: 97.5 F

## 2025-01-02 LAB
ALBUMIN SERPL BCG-MCNC: 2.9 G/DL (ref 3.5–5.2)
ALP SERPL-CCNC: 131 U/L (ref 40–150)
ALT SERPL W P-5'-P-CCNC: 44 U/L (ref 0–50)
ANION GAP SERPL CALCULATED.3IONS-SCNC: 8 MMOL/L (ref 7–15)
AST SERPL W P-5'-P-CCNC: 34 U/L (ref 0–45)
BACTERIA BLD CULT: ABNORMAL
BACTERIA BLD CULT: NORMAL
BILIRUB DIRECT SERPL-MCNC: <0.2 MG/DL (ref 0–0.3)
BILIRUB SERPL-MCNC: <0.2 MG/DL
BUN SERPL-MCNC: 6 MG/DL (ref 8–23)
CALCIUM SERPL-MCNC: 8.6 MG/DL (ref 8.8–10.4)
CHLORIDE SERPL-SCNC: 106 MMOL/L (ref 98–107)
CREAT SERPL-MCNC: 0.55 MG/DL (ref 0.51–0.95)
EGFRCR SERPLBLD CKD-EPI 2021: >90 ML/MIN/1.73M2
GLUCOSE SERPL-MCNC: 118 MG/DL (ref 70–99)
HCO3 SERPL-SCNC: 29 MMOL/L (ref 22–29)
POTASSIUM SERPL-SCNC: 4.2 MMOL/L (ref 3.4–5.3)
PROT SERPL-MCNC: 5.9 G/DL (ref 6.4–8.3)
SODIUM SERPL-SCNC: 143 MMOL/L (ref 135–145)

## 2025-01-02 PROCEDURE — 250N000011 HC RX IP 250 OP 636: Performed by: HOSPITALIST

## 2025-01-02 PROCEDURE — 250N000009 HC RX 250: Performed by: HOSPITALIST

## 2025-01-02 PROCEDURE — 94640 AIRWAY INHALATION TREATMENT: CPT | Mod: 76

## 2025-01-02 PROCEDURE — 71275 CT ANGIOGRAPHY CHEST: CPT

## 2025-01-02 PROCEDURE — 97165 OT EVAL LOW COMPLEX 30 MIN: CPT | Mod: GO

## 2025-01-02 PROCEDURE — 99221 1ST HOSP IP/OBS SF/LOW 40: CPT | Performed by: PSYCHIATRY & NEUROLOGY

## 2025-01-02 PROCEDURE — 120N000001 HC R&B MED SURG/OB

## 2025-01-02 PROCEDURE — 250N000013 HC RX MED GY IP 250 OP 250 PS 637: Performed by: STUDENT IN AN ORGANIZED HEALTH CARE EDUCATION/TRAINING PROGRAM

## 2025-01-02 PROCEDURE — 97535 SELF CARE MNGMENT TRAINING: CPT | Mod: GO

## 2025-01-02 PROCEDURE — 250N000013 HC RX MED GY IP 250 OP 250 PS 637: Performed by: HOSPITALIST

## 2025-01-02 PROCEDURE — 82040 ASSAY OF SERUM ALBUMIN: CPT | Performed by: HOSPITALIST

## 2025-01-02 PROCEDURE — 74177 CT ABD & PELVIS W/CONTRAST: CPT

## 2025-01-02 PROCEDURE — 82248 BILIRUBIN DIRECT: CPT | Performed by: HOSPITALIST

## 2025-01-02 PROCEDURE — 97530 THERAPEUTIC ACTIVITIES: CPT | Mod: GP

## 2025-01-02 PROCEDURE — 36415 COLL VENOUS BLD VENIPUNCTURE: CPT | Performed by: HOSPITALIST

## 2025-01-02 PROCEDURE — 97161 PT EVAL LOW COMPLEX 20 MIN: CPT | Mod: GP

## 2025-01-02 PROCEDURE — 82947 ASSAY GLUCOSE BLOOD QUANT: CPT | Performed by: HOSPITALIST

## 2025-01-02 PROCEDURE — 82435 ASSAY OF BLOOD CHLORIDE: CPT | Performed by: HOSPITALIST

## 2025-01-02 PROCEDURE — 99233 SBSQ HOSP IP/OBS HIGH 50: CPT | Performed by: HOSPITALIST

## 2025-01-02 RX ORDER — GUAIFENESIN 600 MG/1
600 TABLET, EXTENDED RELEASE ORAL 2 TIMES DAILY
Status: DISCONTINUED | OUTPATIENT
Start: 2025-01-02 | End: 2025-01-04 | Stop reason: HOSPADM

## 2025-01-02 RX ORDER — DOXYCYCLINE 100 MG/10ML
100 INJECTION, POWDER, LYOPHILIZED, FOR SOLUTION INTRAVENOUS EVERY 12 HOURS
Status: DISCONTINUED | OUTPATIENT
Start: 2025-01-02 | End: 2025-01-03

## 2025-01-02 RX ORDER — IOPAMIDOL 755 MG/ML
65 INJECTION, SOLUTION INTRAVASCULAR ONCE
Status: COMPLETED | OUTPATIENT
Start: 2025-01-02 | End: 2025-01-02

## 2025-01-02 RX ADMIN — SODIUM CHLORIDE 83 ML: 9 INJECTION, SOLUTION INTRAVENOUS at 12:21

## 2025-01-02 RX ADMIN — FLUTICASONE PROPIONATE 2 SPRAY: 50 SPRAY, METERED NASAL at 09:07

## 2025-01-02 RX ADMIN — CARBOXYMETHYLCELLULOSE SODIUM 2 DROP: 5 SOLUTION/ DROPS OPHTHALMIC at 13:47

## 2025-01-02 RX ADMIN — CLOZAPINE 250 MG: 200 TABLET ORAL at 22:28

## 2025-01-02 RX ADMIN — CARBOXYMETHYLCELLULOSE SODIUM 2 DROP: 5 SOLUTION/ DROPS OPHTHALMIC at 09:07

## 2025-01-02 RX ADMIN — BENZTROPINE MESYLATE 1 MG: 1 TABLET ORAL at 22:28

## 2025-01-02 RX ADMIN — CETIRIZINE HYDROCHLORIDE 5 MG: 5 TABLET ORAL at 09:05

## 2025-01-02 RX ADMIN — PIPERACILLIN AND TAZOBACTAM 4.5 G: 4; .5 INJECTION, POWDER, FOR SOLUTION INTRAVENOUS at 20:39

## 2025-01-02 RX ADMIN — ACETAMINOPHEN 1000 MG: 500 TABLET, FILM COATED ORAL at 20:39

## 2025-01-02 RX ADMIN — DOXYCYCLINE 100 MG: 100 INJECTION, POWDER, LYOPHILIZED, FOR SOLUTION INTRAVENOUS at 15:53

## 2025-01-02 RX ADMIN — FLUTICASONE PROPIONATE 1 PUFF: 110 AEROSOL, METERED RESPIRATORY (INHALATION) at 09:07

## 2025-01-02 RX ADMIN — CARBOXYMETHYLCELLULOSE SODIUM 2 DROP: 5 SOLUTION/ DROPS OPHTHALMIC at 22:27

## 2025-01-02 RX ADMIN — ACETAMINOPHEN 1000 MG: 500 TABLET, FILM COATED ORAL at 09:05

## 2025-01-02 RX ADMIN — GABAPENTIN 100 MG: 100 CAPSULE ORAL at 22:28

## 2025-01-02 RX ADMIN — FLUTICASONE PROPIONATE 1 PUFF: 110 AEROSOL, METERED RESPIRATORY (INHALATION) at 22:28

## 2025-01-02 RX ADMIN — BENZTROPINE MESYLATE 1 MG: 1 TABLET ORAL at 15:54

## 2025-01-02 RX ADMIN — ALBUTEROL SULFATE 2.5 MG: 2.5 SOLUTION RESPIRATORY (INHALATION) at 20:53

## 2025-01-02 RX ADMIN — BENZTROPINE MESYLATE 1 MG: 1 TABLET ORAL at 09:06

## 2025-01-02 RX ADMIN — PANTOPRAZOLE SODIUM 40 MG: 40 TABLET, DELAYED RELEASE ORAL at 09:06

## 2025-01-02 RX ADMIN — PANTOPRAZOLE SODIUM 40 MG: 40 TABLET, DELAYED RELEASE ORAL at 15:54

## 2025-01-02 RX ADMIN — ACETAMINOPHEN 650 MG: 325 TABLET, FILM COATED ORAL at 15:54

## 2025-01-02 RX ADMIN — DICLOFENAC 2 G: 10 GEL TOPICAL at 18:38

## 2025-01-02 RX ADMIN — DICLOFENAC 2 G: 10 GEL TOPICAL at 13:47

## 2025-01-02 RX ADMIN — AMANTADINE HYDROCHLORIDE 100 MG: 100 CAPSULE ORAL at 22:28

## 2025-01-02 RX ADMIN — CARBOXYMETHYLCELLULOSE SODIUM 2 DROP: 5 SOLUTION/ DROPS OPHTHALMIC at 18:38

## 2025-01-02 RX ADMIN — IOPAMIDOL 65 ML: 755 INJECTION, SOLUTION INTRAVENOUS at 12:21

## 2025-01-02 RX ADMIN — SERTRALINE HYDROCHLORIDE 150 MG: 100 TABLET ORAL at 22:28

## 2025-01-02 RX ADMIN — AMANTADINE HYDROCHLORIDE 100 MG: 100 CAPSULE ORAL at 09:06

## 2025-01-02 RX ADMIN — ENOXAPARIN SODIUM 40 MG: 40 INJECTION SUBCUTANEOUS at 15:13

## 2025-01-02 RX ADMIN — MICONAZOLE NITRATE: 2 POWDER TOPICAL at 09:07

## 2025-01-02 RX ADMIN — PIPERACILLIN AND TAZOBACTAM 4.5 G: 4; .5 INJECTION, POWDER, FOR SOLUTION INTRAVENOUS at 09:02

## 2025-01-02 RX ADMIN — DICLOFENAC 2 G: 10 GEL TOPICAL at 22:29

## 2025-01-02 RX ADMIN — GUAIFENESIN 600 MG: 600 TABLET, EXTENDED RELEASE ORAL at 18:38

## 2025-01-02 RX ADMIN — Medication 1 TABLET: at 09:06

## 2025-01-02 RX ADMIN — MICONAZOLE NITRATE: 2 POWDER TOPICAL at 20:42

## 2025-01-02 RX ADMIN — LEVOTHYROXINE SODIUM 175 MCG: 125 TABLET ORAL at 09:05

## 2025-01-02 RX ADMIN — PIPERACILLIN AND TAZOBACTAM 4.5 G: 4; .5 INJECTION, POWDER, FOR SOLUTION INTRAVENOUS at 02:41

## 2025-01-02 RX ADMIN — Medication 1 TABLET: at 20:39

## 2025-01-02 RX ADMIN — PIPERACILLIN AND TAZOBACTAM 4.5 G: 4; .5 INJECTION, POWDER, FOR SOLUTION INTRAVENOUS at 15:13

## 2025-01-02 RX ADMIN — DICLOFENAC 2 G: 10 GEL TOPICAL at 09:07

## 2025-01-02 RX ADMIN — AMANTADINE HYDROCHLORIDE 100 MG: 100 CAPSULE ORAL at 15:54

## 2025-01-02 ASSESSMENT — ACTIVITIES OF DAILY LIVING (ADL)
ADLS_ACUITY_SCORE: 78
ADLS_ACUITY_SCORE: 75
ADLS_ACUITY_SCORE: 75
ADLS_ACUITY_SCORE: 78
ADLS_ACUITY_SCORE: 77
ADLS_ACUITY_SCORE: 74
ADLS_ACUITY_SCORE: 77
ADLS_ACUITY_SCORE: 76
ADLS_ACUITY_SCORE: 75
ADLS_ACUITY_SCORE: 73
ADLS_ACUITY_SCORE: 74
ADLS_ACUITY_SCORE: 74
ADLS_ACUITY_SCORE: 77
ADLS_ACUITY_SCORE: 77
ADLS_ACUITY_SCORE: 74
ADLS_ACUITY_SCORE: 77
ADLS_ACUITY_SCORE: 75
ADLS_ACUITY_SCORE: 75
ADLS_ACUITY_SCORE: 74
ADLS_ACUITY_SCORE: 78
ADLS_ACUITY_SCORE: 75
ADLS_ACUITY_SCORE: 78
ADLS_ACUITY_SCORE: 73

## 2025-01-02 NOTE — PLAN OF CARE
"Patient Name: Federica  MRN: [id number]  Date of Admission: 12/30/2024  Reason for Admission: Tachypnea   Level of Care: medical     Vitals:   BP Readings from Last 1 Encounters:   01/02/25 105/57     Pulse Readings from Last 1 Encounters:   01/02/25 85     Wt Readings from Last 1 Encounters:   12/30/24 61.8 kg (136 lb 3.9 oz)     Ht Readings from Last 1 Encounters:   12/30/24 1.651 m (5' 5\")     Estimated body mass index is 22.67 kg/m  as calculated from the following:    Height as of this encounter: 1.651 m (5' 5\").    Weight as of this encounter: 61.8 kg (136 lb 3.9 oz).  Temp Readings from Last 1 Encounters:   01/02/25 98.4  F (36.9  C) (Axillary)       Pain: Denies    CV Surgery Patient: No    Assessment    Resp: LS dim on 1L NC  Telemetry: NSR  Neuro: lethargic, alert and oriented. Arouses with gentle touch. Follows commands. Garbled/slurred speech - baseline per family. No involuntary movements noted  GI/: Incontinent of B/B. 2 loose brown BM,  good UOP.  Skin/Wounds: L buttock skin tear and redness. Perineum rash.   Lines/Drains: Purewick in place. 1 PIV, SL  Activity: Ax2 lift. Turn and repo.  Sleep: slept all throughout the night     Aggression Stop Light: Green          Patient Care Plan: TCU placement. Continue plan of care   "

## 2025-01-02 NOTE — CONSULTS
[facility] Psychiatric Consult Progress Note    Interval History:   Pt seen, chart reviewed, case discussed with nursing staff and treating clinicians.   Patient seen today sitting comfortably in a chair.  She just ate her breakfast.  She has no abnormal movements of her head shoulders body.      Patient seen today.  She is alert oriented.  She denies any psychosis.  She denies any active suicidal ideation plan or intent   she reports her mood is better.  Patient does not have any shoulder shrugging, patient does not have any rocking swaying movements, she does not have any irregular breathing tachypnea.  She has very minimal symptoms of tardive dyskinesia.  In terms of her tardive dyskinesia she appears to be at her baseline.  Patient denies any symptoms of depression or anxiety.         Medications:     Current Facility-Administered Medications   Medication Dose Route Frequency Provider Last Rate Last Admin    acetaminophen (TYLENOL) tablet 1,000 mg  1,000 mg Oral BID Anton Dale MD   1,000 mg at 01/02/25 0905    albuterol (PROVENTIL) neb solution 2.5 mg  2.5 mg Nebulization 2 times daily Anton Dale MD   2.5 mg at 12/31/24 0729    amantadine (SYMMETREL) capsule 100 mg  100 mg Oral TID Anton Dale MD   100 mg at 01/02/25 0906    benztropine (COGENTIN) tablet 1 mg  1 mg Oral TID Anton Dale MD   1 mg at 01/02/25 0906    calcium carbonate-vitamin D (OSCAL) 500-5 MG-MCG per tablet 1 tablet  1 tablet Oral BID Anton Dale MD   1 tablet at 01/02/25 0906    carboxymethylcellulose PF (REFRESH PLUS) 0.5 % ophthalmic solution 2 drop  2 drop Both Eyes 4x Daily Anton Dale MD   2 drop at 01/02/25 0907    cetirizine (zyrTEC) tablet 5 mg  5 mg Oral Daily Anton Dale MD   5 mg at 01/02/25 0905    cloZAPine (CLOZARIL) tablet 250 mg  250 mg Oral At Bedtime Anton Dale MD   250 mg at 01/01/25 2108    diclofenac (VOLTAREN) 1 % topical gel 2 g  2 g Topical 4x Daily Anton Dale MD   2 g at  01/02/25 0907    enoxaparin ANTICOAGULANT (LOVENOX) injection 40 mg  40 mg Subcutaneous Q24H Anton Dale MD   40 mg at 12/30/24 1556    fluticasone (FLONASE) 50 MCG/ACT spray 2 spray  2 spray Both Nostrils Daily Anton Dale MD   2 spray at 01/02/25 0907    fluticasone (FLOVENT HFA) 110 MCG/ACT Inhaler 1 puff  1 puff Inhalation BID Anton Dale MD   1 puff at 01/02/25 0907    gabapentin (NEURONTIN) capsule 100 mg  100 mg Oral At Bedtime Haley Chin MD   100 mg at 01/01/25 2108    levothyroxine (SYNTHROID/LEVOTHROID) tablet 175 mcg  175 mcg Oral Daily Anton Dale MD   175 mcg at 01/02/25 0905    miconazole (MICATIN) 2 % powder   Topical BID Anton Dale MD   Given at 01/02/25 0907    pantoprazole (PROTONIX) EC tablet 40 mg  40 mg Oral BID AC Anton Dale MD   40 mg at 01/02/25 0906    piperacillin-tazobactam (ZOSYN) 4.5 g vial to attach to  mL bag  4.5 g Intravenous Q6H Anton Dale MD   4.5 g at 01/02/25 0902    sertraline (ZOLOFT) tablet 150 mg  150 mg Oral At Bedtime Anton Dale MD   150 mg at 01/01/25 2107    sodium chloride (PF) 0.9% PF flush 3 mL  3 mL Intracatheter Q8H Anton Dale MD   3 mL at 01/01/25 2117     Current Facility-Administered Medications   Medication Dose Route Frequency Provider Last Rate Last Admin    acetaminophen (TYLENOL) tablet 650 mg  650 mg Oral Q4H PRN Anton Dale MD        Or    acetaminophen (TYLENOL) Suppository 650 mg  650 mg Rectal Q4H PRN Anton Dale MD        albuterol (PROVENTIL) neb solution 2.5 mg  2.5 mg Nebulization Q8H PRN Anton Dale MD        calcium carbonate (TUMS) chewable tablet 1,000 mg  1,000 mg Oral 4x Daily PRN Anton Dale MD        diazepam (VALIUM) injection 2.5 mg  2.5 mg Intravenous Q4H PRN Wendi Gamino APRN CNP   2.5 mg at 12/30/24 2038    diazepam (VALIUM) tablet 2 mg  2 mg Oral Q6H PRN Anton Dale MD   2 mg at 12/30/24 1642    lidocaine (LMX4) cream   Topical Q1H PRN Anton Dale MD        lidocaine 1 %  0.1-1 mL  0.1-1 mL Other Q1H PRN Anton Dale MD        polyethylene glycol (MIRALAX) Packet 17 g  17 g Oral BID PRN Anton Dale MD        senna-docusate (SENOKOT-S/PERICOLACE) 8.6-50 MG per tablet 1 tablet  1 tablet Oral BID PRN Anton Dale MD        Or    senna-docusate (SENOKOT-S/PERICOLACE) 8.6-50 MG per tablet 2 tablet  2 tablet Oral BID PRN Anton Dale MD        sodium chloride (PF) 0.9% PF flush 3 mL  3 mL Intracatheter q1 min prn Anton Dale MD   3 mL at 01/01/25 0853       Mental Status Examination:     Appearance:  awake, alert and adequately groomed  Eye Contact:  good  Speech:  dysarthria  Language:Normal  Psychomotor Behavior: no  evidence of tardive dyskinesia  Mood:  better  Affect:  appropriate and in normal range and mood congruent  Thought Process:  logical no loose associations  Thought Content:  no evidence of suicidal ideation or homicidal ideation, no evidence of psychotic thought, no auditory hallucinations present, and no visual hallucinations present  Oriented to:  time, person, and place  Attention Span and Concentration:  intact  Recent and Remote Memory:  intact  Fund of Knowledge: appropriate    Gait and Station: Normal  Insight:  fair  Judgment:  fair        Labs/Vitals:     Recent Results (from the past 24 hours)   Potassium    Collection Time: 01/01/25  8:06 PM   Result Value Ref Range    Potassium 4.1 3.4 - 5.3 mmol/L   Creatinine    Collection Time: 01/01/25  8:06 PM   Result Value Ref Range    Creatinine 0.53 0.51 - 0.95 mg/dL    GFR Estimate >90 >60 mL/min/1.73m2   Platelet count    Collection Time: 01/01/25  8:06 PM   Result Value Ref Range    Platelet Count 217 150 - 450 10e3/uL   Basic metabolic panel    Collection Time: 01/02/25  5:32 AM   Result Value Ref Range    Sodium 143 135 - 145 mmol/L    Potassium 4.2 3.4 - 5.3 mmol/L    Chloride 106 98 - 107 mmol/L    Carbon Dioxide (CO2) 29 22 - 29 mmol/L    Anion Gap 8 7 - 15 mmol/L    Urea Nitrogen 6.0 (L) 8.0 - 23.0  mg/dL    Creatinine 0.55 0.51 - 0.95 mg/dL    GFR Estimate >90 >60 mL/min/1.73m2    Calcium 8.6 (L) 8.8 - 10.4 mg/dL    Glucose 118 (H) 70 - 99 mg/dL   Hepatic panel    Collection Time: 01/02/25  5:32 AM   Result Value Ref Range    Protein Total 5.9 (L) 6.4 - 8.3 g/dL    Albumin 2.9 (L) 3.5 - 5.2 g/dL    Bilirubin Total <0.2 <=1.2 mg/dL    Alkaline Phosphatase 131 40 - 150 U/L    AST 34 0 - 45 U/L    ALT 44 0 - 50 U/L    Bilirubin Direct <0.20 0.00 - 0.30 mg/dL     B/P: 132/66, T: 98.2, P: 95, R: 18  Most Recent 3 CBC's:  Recent Labs   Lab Test 01/01/25 2006 12/31/24  0407 12/30/24  2214 12/30/24  0829   WBC  --  6.8 10.1 9.6   HGB  --  10.1* 9.4* 9.8*   MCV  --  84 84 85    275 290 418      Most Recent 3 BMP's:  Recent Labs   Lab Test 01/02/25  0532 01/01/25 2006 01/01/25  0903 12/31/24  0540 12/31/24  0407     --  137 139 138  137   POTASSIUM 4.2 4.1 3.0*  --  3.5   CHLORIDE 106  --  101  --  103   CO2 29  --  26  --  20*   BUN 6.0*  --  5.5*  --  7.4*   CR 0.55 0.53 0.56  --  0.61   ANIONGAP 8  --  10  --  15   ADRIAN 8.6*  --  8.2*  --  8.3*   *  --  107*  --  83     Most Recent 2 LFT's:  Recent Labs   Lab Test 01/02/25  0532 01/01/25  0903   AST 34 57*   ALT 44 56*   ALKPHOS 131 138   BILITOTAL <0.2 <0.2     Most Recent INR's and Anticoagulation Dosing History:  Anticoagulation Dose History          Latest Ref Rng & Units 5/18/2011 3/1/2019 7/26/2020 5/9/2021 4/12/2022   Recent Dosing and Labs   INR 0.85 - 1.15 1.11  1.06  1.02  0.98  1.10      Most Recent 3 Troponin's:  Recent Labs   Lab Test 05/25/21  1932 07/26/20  1857 10/21/16  1456   TROPI <0.015 <0.015 <0.015  The 99th percentile for upper reference range is 0.045 ug/L.  Troponin values in   the range of 0.045 - 0.120 ug/L may be associated with risks of adverse   clinical events.       Most Recent Cholesterol Panel:  Recent Labs   Lab Test 05/25/22  1025   CHOL 133   LDL 51   HDL 51   TRIG 155*     Most Recent 6 Bacteria Isolates  From Any Culture (See EPIC Reports for Culture Details):No lab results found.  Most Recent TSH, T4 and A1c Labs:  Recent Labs   Lab Test 10/09/24  0654 10/30/23  0610 05/25/22  1025   TSH 3.90   < >  --    A1C  --   --  5.9*    < > = values in this interval not displayed.     Results for orders placed or performed during the hospital encounter of 12/30/24   XR Chest Port 1 View    Narrative    CHEST PORTABLE ONE VIEW December 30, 2024 8:44 AM     HISTORY: Shortness of breath.    COMPARISON: 3/2/2019.      Impression    IMPRESSION: Normal cardiac silhouette. Diffuse bilateral interstitial  prominence appears mildly increased and this could be mild pulmonary  edema. Mild left base atelectasis.    XIOMARA CAMERON MD         SYSTEM ID:  [id number]   XR Chest Port 1 View    Narrative    EXAM: XR CHEST PORT 1 VIEW  LOCATION: [facility]  DATE: 12/30/2024    INDICATION: increased O2 needs  COMPARISON: X-ray 12/30/2024      Impression    IMPRESSION: Cardiomegaly with central vascular congestion and interstitial edema. Mild bibasilar opacities likely reflect atelectasis. No definite pleural effusion.   US Abdomen Limited    Narrative    ULTRASOUND ABDOMEN LIMITED December 31, 2024 8:31 AM    CLINICAL HISTORY: Elevated liver function tests, bacteremia of unclear  source - assess for gallbladder pathology.     TECHNIQUE: Limited abdominal ultrasound.    COMPARISON: None.    FINDINGS:    GALLBLADDER: The gallbladder is normal. No gallstones, wall  thickening, or pericholecystic fluid. Negative sonographic Barr's  sign.    BILE DUCTS: There is no biliary dilatation. The common duct measures 6  mm, which is likely within normal limits given the patient's age.    LIVER: Homogeneous parenchyma. No focal mass.    RIGHT KIDNEY: No hydronephrosis.    PANCREAS: The visualized portions of the pancreas are normal.    No ascites.      Impression    IMPRESSION: Unremarkable gallbladder. No cholelithiasis  or  cholecystitis.    FAUZIA ONEILL MD         SYSTEM ID:  [id number]       Impression:   Patient has paranoid schizophrenia with chronic tardive dyskinesia.  From a psychiatric standpoint patient is at baseline.  She denies any active suicide ideation plan or intent denies any auditory or visual hallucinations.  Patient's dyskinesia is improving.      DIagnoses:     Schizophrenia  Tardive dyskinesia         Plan:   1.  Continue Clozaril 250 mg at bedtime.  Amantadine 100 mg 3 times a day  Benztropine 1 mg 3 times a day.  Patient was briefly on benzodiazepines last received on 12/30  Patient's tardive dyskinesia symptoms have improved remarkably.  Would recommend that no benzodiazepines to be ordered at the time of discharge.  4.  Prior authorization is attempted forVMAT2 INHIBITORS , these are FDA approved for first line for td.  5.medical management as per primary trt team.   After being medically stable patient can be discharged to her long-term facility.  Patient does not need to be transferred to inpatient psychiatry.  Psychiatry will sign off  Attestation:  Patient has been seen and evaluated by me,  Ky Gonzalez MD

## 2025-01-02 NOTE — PROGRESS NOTES
01/02/25 1212   Appointment Info   Signing Clinician's Name / Credentials (PT) Rebeca Fernández DPT   Living Environment   People in Home facility resident   Current Living Arrangements extended care facility;residential facility   Home Accessibility no concerns   Transportation Anticipated family or friend will provide;public transportation   Self-Care   Usual Activity Tolerance moderate   Current Activity Tolerance fair   Equipment Currently Used at Home grab bar, toilet;grab bar, tub/shower;shower chair;raised toilet seat;walker, standard   Fall history within last six months no   Activity/Exercise/Self-Care Comment Per chart and confirmed by pt report: Pt uses FWW (reports she sometimes needs reminders), indep dressing, toileting, grooming and goes out with friends arranging own transportation. The facility provides A with bathing (once per week), provides all meals, med mgmt and cleaning.   General Information   Onset of Illness/Injury or Date of Surgery 12/30/24   Referring Physician Anton Dale MD   Patient/Family Therapy Goals Statement (PT) Return home   Pertinent History of Current Problem (include personal factors and/or comorbidities that impact the POC) Liz Lieberman is a 74 year old female admitted on 12/30/2024.  Past history of paranoid schizophrenia, TD, depression, dementia, asthma, HTN, among others who was send from her facility due to worsening of jerking movements.  She was found to have multiple electrolyte abnormalities, hypotension and tachycardia with lactic acidosis concerning for sepsis.   Existing Precautions/Restrictions fall   Cognition   Affect/Mental Status (Cognition) confused   Orientation Status (Cognition) oriented to;person;place   Follows Commands (Cognition) follows one-step commands   Pain Assessment   Patient Currently in Pain   (Pt reports itching in her chest)   Integumentary/Edema   Integumentary/Edema Comments L buttock skin tear and redness. Perineum rash   Posture     Posture Forward head position;Protracted shoulders   Range of Motion (ROM)   Range of Motion ROM is WFL   Strength (Manual Muscle Testing)   Strength (Manual Muscle Testing) Deficits observed during functional mobility   Bed Mobility   Comment, (Bed Mobility) Sit to supine CGA   Transfers   Comment, (Transfers) Sit to stand Min A   Gait/Stairs (Locomotion)   Comment, (Gait/Stairs) Not tested d/t fatigue and impaired balance   Balance   Balance Comments Fair seated and poor standing   Sensory Examination   Sensory Perception patient reports no sensory changes   Clinical Impression   Criteria for Skilled Therapeutic Intervention Yes, treatment indicated   PT Diagnosis (PT) Impaired functional mobility and gait   Influenced by the following impairments Pain, weakness, decreased activity tolerance, impaired balance   Functional limitations due to impairments Limited functional mobility requiring AD and assist   Clinical Presentation (PT Evaluation Complexity) stable   Clinical Presentation Rationale Based on PMH, current status, and social support   Clinical Decision Making (Complexity) low complexity   Planned Therapy Interventions (PT) balance training;bed mobility training;gait training;transfer training;progressive activity/exercise;strengthening   Risk & Benefits of therapy have been explained evaluation/treatment results reviewed;care plan/treatment goals reviewed;risks/benefits reviewed;current/potential barriers reviewed;participants voiced agreement with care plan;participants included;patient   PT Total Evaluation Time   PT Eval, Low Complexity Minutes (54456) 10   Physical Therapy Goals   PT Frequency 6x/week   PT Predicted Duration/Target Date for Goal Attainment 01/09/25   PT Goals Bed Mobility;Transfers;Gait   PT: Bed Mobility Independent;Supine to/from sit   PT: Transfers Modified independent;Sit to/from stand;Assistive device   PT: Gait Modified independent;Assistive device;50 feet   Interventions    Interventions Quick Adds Therapeutic Activity   Therapeutic Activity   Therapeutic Activities: dynamic activities to improve functional performance Minutes (27117) 23   Symptoms Noted During/After Treatment Fatigue   Treatment Detail/Skilled Intervention Pt in recliner upon therapist arrival, falling asleeping, head nodding down. Pt takes a few minutes to wake. Pt soft spoken throughout session. Pt performed sit to stand w/ FWW and Min A, pt reported she was urintating, returned to sitting. Pt performed sit to stand w/ FWW and Min A, upon removal of brief, noted pt had loose BM. Pt stood for 4 minutes w/ Min A and FWW, total assist for pericares. Pt demos one instance of L UE jerking movement, UE falling off walker. Seated rest break. Pt performed sit to stand w/ FWW and Min A. Pt performed standing marches w/ FWW and Min A x2, mild instability. Pt performed stand pivot transfer to EOB w/ FWW and Min A x2. Pt sat EOB, reported fatigue. D/t mild involuntary movements and poor posture, pt transferred to supine w/ Min A. Pt in bed at end of session, RN updated and in room.   PT Discharge Planning   PT Plan Progress bed mob, transfers, amb w/ close WC follow   PT Discharge Recommendation (DC Rec) Transitional Care Facility   PT Rationale for DC Rec Pt below baseline, currently Ax1-2 for mobility. Pt limited by weakness, TD, fatigue. Recommend TCU to increase strength and functional mobility.   PT Brief overview of current status Ax1-2 w/ FWW. Goals of therapy will be to address safe mobility and make recs for d/c to next level of care. Pt and RN will continue to follow all falls risk precautions as documented by RN staff while hospitalized   Physical Therapy Time and Intention   Timed Code Treatment Minutes 23   Total Session Time (sum of timed and untimed services) 33

## 2025-01-02 NOTE — PROGRESS NOTES
[facility]    Medicine Progress Note - Hospitalist Service    Date of Admission:  12/30/2024    Assessment & Plan      Liz Lieberman is a 74 year old female admitted on 12/30/2024.  Past history of paranoid schizophrenia, TD, depression, dementia, asthma, HTN, among others who was send from her facility due to worsening of jerking movements.  She was found to have multiple electrolyte abnormalities, hypotension and tachycardia with lactic acidosis concerning for sepsis.       Atypical pneumonia  Strep and GPB on blood culture, suspected contaminants  Severe lactic acidosis with anion gap metabolic acidosis  *on arrival hypotensive, tachycardic, with lactate >9, procal >3 - BP and heart rate improved and lactate normalized with fluid bolus  *CXR with mild edema, but denies any pneumonia symptoms, COVID/FLU/RSV negative, UA unremarkable; otherwise denies any symptoms to suggest focal infection other than stooling  *LFT's mildly elevated with benign abdominal exam - RUQ US 12/31 negative for biliary or hepatic pathology  *1/2 admission blood cultures growing Strep species, 1/2 cultures growing gram positive bacilli - discussed with ID and suspect contaminant as only identified in single culture  *vanco discontinued 12/31  * CT of the CAP on 1/2 with findings c/w atypical pneumonia  - continue zosyn, add doxycycline  - off oxygen  - therapies recommend TCU, discussed with sister her HCPOA    Diarrhea  Plan  - c diff testing    Acute hypoxic respiratory failure  ACS ruled out  *on 2L oxygen on admission without respiratory complaints  *admission CXR with possible mild pulmonary edema  *RRT called 12/30 due to worsening resp status requiring HFNC - Pro-BNP and troponin elevated with concern for CHF and given lasix 20 mg IV x1 and initiated on heparin gtt  Cardiology consulted and did not suspect ACS  TTE without WMA  CT PE negative, but confirms atypical pneumonia  - weaned to RA  - start  therapies    Hyponatremia, suspected hypovolemic  *hx mild intermittent hyponatremia about 130, though most recent level 11/18/24 wnl  *admission Na 121 - patient reporting significant stooling over past 2 days, appears dry on admission exam  *received 2L IVF in ED due to hypotension/concern for sepsis  *Na initially trending up appropriately, significant jump to 139 on 12/31 following lasix for resp failure  - will not try to drive Na back down with D5W with concern for CHF above    Acute on chronic tardive dyskinesia  Paranoid schizophrenia  Depression  Dementia  *last seen by Psychiatry 11/2023 at which point her TD was felt to be worsening, but declined any dosage adjustment to amantadine  *marked involuntary movements of body and extremities on arrival   *alert and oriented x4 at time of admission  *Psych consulted, suspect exacerbation due to metabolic issues and also receiving haldol in ED, though recommended neuro consult given the rarity of truncal TD  *neuro consulted, feel exam is consistent with TD and recommend continued management of metabolic issues - no need for EEG, signed off 12/31  *TD symptoms improved 12/31, Psych recommending continue PTA psychiatric regimen and follow up with outpatient Psychiatrist for consideration of valbenazine for TD (would require prior auth per Pharmacy liaison), signed off 12/31  plan  - Prior authorization is attempted [id number] INHIBITORS , these are FDA approved for first line for td - done through psychiatry. Advised sister of this and told her to follow-up with her outpatient psychiatry team  - prn valium while inpatient per Psych (NO antipsychotics, would not discharge on benzos)  - slurred speech at baseline per famly  - MRI of the brain cancelled    HTN  - hold PTA amlodipine as remains normotensive    HLD  - hold PTA statin with mildly elevated LFT's    Chronic pain  - continue PTA tylenol (decreased to bid dosing due to LFT's), voltaren,  gabapentin    Asthma  - continue PTA inhalers/nebs    Hypothyroid  - continue PTA levothyroxine    Hx alcohol use disorder  *reports drinking about once monthly    GERD  - continue PTA PPI    MASD of buttocks and coccyx  - WOC RN following          Diet: Mechanical/Dental Soft Diet    DVT Prophylaxis: heparin gtt  [place] Catheter: Not present  Lines: None     Cardiac Monitoring: None  Code Status: No CPR- Do NOT Intubate      Clinically Significant Risk Factors        # Hypokalemia: Lowest K = 3 mmol/L in last 2 days, will replace as needed        # Hypoalbuminemia: Lowest albumin = 2.9 g/dL at 1/2/2025  5:32 AM, will monitor as appropriate     # Hypertension: Noted on problem list                  # Financial/Environmental Concerns: none  # Asthma: noted on problem list        Social Drivers of Health    Tobacco Use: Medium Risk (2/17/2024)    Received from [facility]    Patient History     Smoking Tobacco Use: Former     Smokeless Tobacco Use: Never   Alcohol Use: Unknown (12/30/2020)    [facility]    AUDIT-C     Frequency of Alcohol Consumption: 2-3 times a week     Average Number of Drinks: 1 or 2          Disposition Plan     Medically Ready for Discharge: Anticipated in 2-4 Days             Robinson James DO  Hospitalist Service  [facility]  Securely message with [employer] (more info)  Text page via [employer] Paging/Directory   ______________________________________________________________________    Interval History   She reports ongoing dyspnea, also reports some cough.  No fever/chills.  Stable. Discussed with sister who states sob and slurred speech are chronic    Physical Exam   Vital Signs: Temp: 97.7  F (36.5  C) Temp src: Oral BP: 135/76 Pulse: 70   Resp: 14 SpO2: 100 % O2 Device: None (Room air) Oxygen Delivery: 1 LPM  Weight: 123 lbs 10.85 oz    General Appearance: Thin female in NAD  Respiratory: lungs CTAB, no wheezes or  crackles  Cardiovascular: RRR, normal s1/s2 without murmur  GI: abdomen soft, nondistended, normal bowel sounds, nontender  Skin: no peripheral edema  Other: Awake and alert with chronic dysarthric speech with CN II-XII otherwise intact, extremity strength appears intact, unable to test coordination due to TD and limited patient effort, oriented x3    Medical Decision Making       55 MINUTES SPENT BY ME on the date of service doing chart review, history, exam, documentation & further activities per the note.      Data     I have personally reviewed the following data over the past 24 hrs:    N/A  \   N/A   / 217     143 106 6.0 (L) /  118 (H)   4.2 29 0.55 \     ALT: 44 AST: 34 AP: 131 TBILI: <0.2   ALB: 2.9 (L) TOT PROTEIN: 5.9 (L) LIPASE: N/A       Imaging results reviewed over the past 24 hrs:   Recent Results (from the past 24 hours)   CT Chest (PE) Abdomen Pelvis w Contrast    Narrative    CT CHEST PE ABDOMEN PELVIS W CONTRAST 1/2/2025 12:46 PM    CLINICAL HISTORY: shortness of breath, diarrhea, sepsis of unclear  source. troponin elevation. rule out PE or occult source of sepsis    TECHNIQUE: TECHNIQUE: CT angiogram chest and routine CT abdomen pelvis  with IV contrast. Arterial phase through the chest and venous phase  through the abdomen and pelvis. 2D and 3D MIP reconstructions were  performed by the CT technologist. Dose reduction techniques were used.      CONTRAST: 65mL Isovue 370    COMPARISON: CT chest 6/18/2013.    FINDINGS:   ANGIOGRAM CHEST: Pulmonary arteries are normal caliber and negative  for pulmonary emboli. Thoracic aorta is negative for dissection within  the limitations of cardiac motion artifact.    LUNGS AND PLEURA: Respiratory motion artifact. Mild fibrotic changes  of the lungs. Mild burden of nodular consolidation in the left upper  lobe and left lower lobe. No pleural effusion.    MEDIASTINUM/AXILLAE: Patulous esophagus. Moderate size hiatal hernia.  Normal heart size without  pericardial effusion. Right greater than  left hilar adenopathy such as a right hilar lymph node measuring 1.3  cm in short axis (series 3, image 147).    CORONARY ARTERY CALCIFICATIONS: Moderate.    HEPATOBILIARY: Incomplete imaging of the liver on portal venous phase.  Probable focal fatty sparing adjacent to the falciform ligament. No  radiopaque gallstone. No biliary ductal dilatation.    PANCREAS: Atrophic.    SPLEEN: Incompletely imaged on portal venous phase. The spleen is  likely mildly enlarged.    ADRENAL GLANDS: No significant nodules.    KIDNEYS/BLADDER: Symmetric renal enhancement without hydronephrosis.  Left lower pole renal cyst, not requiring specific follow-up. Mild  diffuse bladder wall thickening.    BOWEL: No obstruction or inflammatory change.    PELVIC ORGANS: No pelvic masses.    ADDITIONAL FINDINGS: No ascites. No adenopathy in the abdomen or  pelvis. Moderate burden of vascular calcifications without abdominal  aortic aneurysm.    MUSCULOSKELETAL: Polyarticular degenerative changes including  multilevel degenerative changes of the spine.      Impression    IMPRESSION:  1.  No evidence of pulmonary embolism.  2.  Mild burden of nodular opacities of the left upper lobe and left  lower lobe which likely represent atypical pneumonia.  3.  Mildly enlarged right greater than left hilar lymph nodes,  nonspecific but favored reactive. Consider follow-up contrast enhanced  chest CT in 3 months.  4.  Mild fibrotic changes of the lungs.  5.  Moderate size hiatal hernia and patulous esophagus.  6.  Mild nonspecific bladder wall thickening which can be correlated  with urinalysis as clinically indicated.  7.  Probable mild splenomegaly.    CAR FERRO MD         SYSTEM ID:  [id number]

## 2025-01-02 NOTE — PLAN OF CARE
[id number]    A&O; VSS, on RA; calm and cooperative; no involuntary jerky movements; no significant changes.

## 2025-01-03 ENCOUNTER — APPOINTMENT (OUTPATIENT)
Dept: SPEECH THERAPY | Facility: CLINIC | Age: 75
DRG: 193 | End: 2025-01-03
Attending: HOSPITALIST
Payer: MEDICARE

## 2025-01-03 ENCOUNTER — APPOINTMENT (OUTPATIENT)
Dept: PHYSICAL THERAPY | Facility: CLINIC | Age: 75
DRG: 193 | End: 2025-01-03
Payer: MEDICARE

## 2025-01-03 LAB
ANION GAP SERPL CALCULATED.3IONS-SCNC: 6 MMOL/L (ref 7–15)
BUN SERPL-MCNC: 7.8 MG/DL (ref 8–23)
C DIFF TOX B STL QL: NEGATIVE
CALCIUM SERPL-MCNC: 8.6 MG/DL (ref 8.8–10.4)
CHLORIDE SERPL-SCNC: 106 MMOL/L (ref 98–107)
CREAT SERPL-MCNC: 0.52 MG/DL (ref 0.51–0.95)
EGFRCR SERPLBLD CKD-EPI 2021: >90 ML/MIN/1.73M2
GLUCOSE SERPL-MCNC: 112 MG/DL (ref 70–99)
HCO3 SERPL-SCNC: 29 MMOL/L (ref 22–29)
POTASSIUM SERPL-SCNC: 4.1 MMOL/L (ref 3.4–5.3)
SODIUM SERPL-SCNC: 141 MMOL/L (ref 135–145)

## 2025-01-03 PROCEDURE — 999N000157 HC STATISTIC RCP TIME EA 10 MIN

## 2025-01-03 PROCEDURE — 250N000009 HC RX 250: Performed by: HOSPITALIST

## 2025-01-03 PROCEDURE — 80048 BASIC METABOLIC PNL TOTAL CA: CPT | Performed by: HOSPITALIST

## 2025-01-03 PROCEDURE — 250N000013 HC RX MED GY IP 250 OP 250 PS 637: Performed by: STUDENT IN AN ORGANIZED HEALTH CARE EDUCATION/TRAINING PROGRAM

## 2025-01-03 PROCEDURE — 87493 C DIFF AMPLIFIED PROBE: CPT | Performed by: HOSPITALIST

## 2025-01-03 PROCEDURE — 99233 SBSQ HOSP IP/OBS HIGH 50: CPT | Performed by: HOSPITALIST

## 2025-01-03 PROCEDURE — 250N000011 HC RX IP 250 OP 636: Performed by: HOSPITALIST

## 2025-01-03 PROCEDURE — 97530 THERAPEUTIC ACTIVITIES: CPT | Mod: GP

## 2025-01-03 PROCEDURE — 250N000013 HC RX MED GY IP 250 OP 250 PS 637: Performed by: HOSPITALIST

## 2025-01-03 PROCEDURE — 92610 EVALUATE SWALLOWING FUNCTION: CPT | Mod: GN

## 2025-01-03 PROCEDURE — 94640 AIRWAY INHALATION TREATMENT: CPT

## 2025-01-03 PROCEDURE — 36415 COLL VENOUS BLD VENIPUNCTURE: CPT | Performed by: HOSPITALIST

## 2025-01-03 PROCEDURE — 120N000001 HC R&B MED SURG/OB

## 2025-01-03 RX ORDER — ALBUTEROL SULFATE 0.83 MG/ML
2.5 SOLUTION RESPIRATORY (INHALATION) EVERY 4 HOURS PRN
Status: DISCONTINUED | OUTPATIENT
Start: 2025-01-03 | End: 2025-01-04 | Stop reason: HOSPADM

## 2025-01-03 RX ORDER — LOPERAMIDE HYDROCHLORIDE 2 MG/1
2 CAPSULE ORAL 4 TIMES DAILY PRN
Status: DISCONTINUED | OUTPATIENT
Start: 2025-01-03 | End: 2025-01-04 | Stop reason: HOSPADM

## 2025-01-03 RX ORDER — DOXYCYCLINE 100 MG/1
100 CAPSULE ORAL EVERY 12 HOURS SCHEDULED
Status: DISCONTINUED | OUTPATIENT
Start: 2025-01-03 | End: 2025-01-04 | Stop reason: HOSPADM

## 2025-01-03 RX ADMIN — CLOZAPINE 250 MG: 200 TABLET ORAL at 21:15

## 2025-01-03 RX ADMIN — Medication 1 TABLET: at 08:54

## 2025-01-03 RX ADMIN — CETIRIZINE HYDROCHLORIDE 5 MG: 5 TABLET ORAL at 08:54

## 2025-01-03 RX ADMIN — PANTOPRAZOLE SODIUM 40 MG: 40 TABLET, DELAYED RELEASE ORAL at 16:10

## 2025-01-03 RX ADMIN — GUAIFENESIN 600 MG: 600 TABLET, EXTENDED RELEASE ORAL at 21:17

## 2025-01-03 RX ADMIN — DOXYCYCLINE HYCLATE 100 MG: 100 CAPSULE ORAL at 21:16

## 2025-01-03 RX ADMIN — FLUTICASONE PROPIONATE 1 PUFF: 110 AEROSOL, METERED RESPIRATORY (INHALATION) at 09:07

## 2025-01-03 RX ADMIN — PIPERACILLIN AND TAZOBACTAM 4.5 G: 4; .5 INJECTION, POWDER, FOR SOLUTION INTRAVENOUS at 03:11

## 2025-01-03 RX ADMIN — PANTOPRAZOLE SODIUM 40 MG: 40 TABLET, DELAYED RELEASE ORAL at 08:54

## 2025-01-03 RX ADMIN — FLUTICASONE PROPIONATE 1 PUFF: 110 AEROSOL, METERED RESPIRATORY (INHALATION) at 21:22

## 2025-01-03 RX ADMIN — DOXYCYCLINE 100 MG: 100 INJECTION, POWDER, LYOPHILIZED, FOR SOLUTION INTRAVENOUS at 03:45

## 2025-01-03 RX ADMIN — LEVOTHYROXINE SODIUM 175 MCG: 125 TABLET ORAL at 08:53

## 2025-01-03 RX ADMIN — DICLOFENAC 2 G: 10 GEL TOPICAL at 09:12

## 2025-01-03 RX ADMIN — ALBUTEROL SULFATE 2.5 MG: 2.5 SOLUTION RESPIRATORY (INHALATION) at 07:41

## 2025-01-03 RX ADMIN — SERTRALINE HYDROCHLORIDE 150 MG: 100 TABLET ORAL at 21:17

## 2025-01-03 RX ADMIN — PIPERACILLIN AND TAZOBACTAM 4.5 G: 4; .5 INJECTION, POWDER, FOR SOLUTION INTRAVENOUS at 08:51

## 2025-01-03 RX ADMIN — DICLOFENAC 2 G: 10 GEL TOPICAL at 21:21

## 2025-01-03 RX ADMIN — BENZTROPINE MESYLATE 1 MG: 1 TABLET ORAL at 21:15

## 2025-01-03 RX ADMIN — MICONAZOLE NITRATE: 2 POWDER TOPICAL at 09:30

## 2025-01-03 RX ADMIN — DOXYCYCLINE HYCLATE 100 MG: 100 CAPSULE ORAL at 10:40

## 2025-01-03 RX ADMIN — AMANTADINE HYDROCHLORIDE 100 MG: 100 CAPSULE ORAL at 08:53

## 2025-01-03 RX ADMIN — BENZTROPINE MESYLATE 1 MG: 1 TABLET ORAL at 08:53

## 2025-01-03 RX ADMIN — CARBOXYMETHYLCELLULOSE SODIUM 2 DROP: 5 SOLUTION/ DROPS OPHTHALMIC at 13:56

## 2025-01-03 RX ADMIN — GUAIFENESIN 600 MG: 600 TABLET, EXTENDED RELEASE ORAL at 08:53

## 2025-01-03 RX ADMIN — Medication 1 TABLET: at 21:17

## 2025-01-03 RX ADMIN — CARBOXYMETHYLCELLULOSE SODIUM 2 DROP: 5 SOLUTION/ DROPS OPHTHALMIC at 21:17

## 2025-01-03 RX ADMIN — CARBOXYMETHYLCELLULOSE SODIUM 2 DROP: 5 SOLUTION/ DROPS OPHTHALMIC at 08:53

## 2025-01-03 RX ADMIN — AMOXICILLIN AND CLAVULANATE POTASSIUM 1 TABLET: 875; 125 TABLET, FILM COATED ORAL at 10:40

## 2025-01-03 RX ADMIN — LOPERAMIDE HYDROCHLORIDE 2 MG: 2 CAPSULE ORAL at 18:21

## 2025-01-03 RX ADMIN — CARBOXYMETHYLCELLULOSE SODIUM 2 DROP: 5 SOLUTION/ DROPS OPHTHALMIC at 17:46

## 2025-01-03 RX ADMIN — DICLOFENAC 2 G: 10 GEL TOPICAL at 13:56

## 2025-01-03 RX ADMIN — DICLOFENAC 2 G: 10 GEL TOPICAL at 17:48

## 2025-01-03 RX ADMIN — ENOXAPARIN SODIUM 40 MG: 40 INJECTION SUBCUTANEOUS at 14:00

## 2025-01-03 RX ADMIN — GABAPENTIN 100 MG: 100 CAPSULE ORAL at 21:17

## 2025-01-03 RX ADMIN — AMANTADINE HYDROCHLORIDE 100 MG: 100 CAPSULE ORAL at 21:16

## 2025-01-03 RX ADMIN — AMANTADINE HYDROCHLORIDE 100 MG: 100 CAPSULE ORAL at 16:10

## 2025-01-03 RX ADMIN — MICONAZOLE NITRATE: 2 POWDER TOPICAL at 21:22

## 2025-01-03 RX ADMIN — FLUTICASONE PROPIONATE 2 SPRAY: 50 SPRAY, METERED NASAL at 09:11

## 2025-01-03 RX ADMIN — AMOXICILLIN AND CLAVULANATE POTASSIUM 1 TABLET: 875; 125 TABLET, FILM COATED ORAL at 21:16

## 2025-01-03 RX ADMIN — BENZTROPINE MESYLATE 1 MG: 1 TABLET ORAL at 16:10

## 2025-01-03 RX ADMIN — ACETAMINOPHEN 1000 MG: 500 TABLET, FILM COATED ORAL at 21:15

## 2025-01-03 RX ADMIN — ACETAMINOPHEN 1000 MG: 500 TABLET, FILM COATED ORAL at 08:53

## 2025-01-03 ASSESSMENT — ACTIVITIES OF DAILY LIVING (ADL)
ADLS_ACUITY_SCORE: 81

## 2025-01-03 NOTE — PROGRESS NOTES
Care Management Follow Up    Length of Stay (days): 4    Expected Discharge Date: 01/04/2025     Concerns to be Addressed: discharge planning     Patient plan of care discussed at interdisciplinary rounds: Yes    Anticipated Discharge Disposition: Transitional Care     Anticipated Discharge Services:    Anticipated Discharge DME:      Patient/family educated on Medicare website which has current facility and service quality ratings: yes  Education Provided on the Discharge Plan: Yes  Patient/Family in Agreement with the Plan: yes    Referrals Placed by CM/SW: Post Acute Facilities  Private pay costs discussed: transportation costs    Discussed  Partnership in Safe Discharge Planning  document with patient/family: No     Handoff Completed: No, handoff not indicated or clinically appropriate    Additional Information:  SW reviewed chart. PT recommends TCU. SW sent a referral to [facility], on the same campus where pt resides in [facility]. They can accept pt this weekend, if ready. Weekend phone number to [facility] is [phone]. SW updated pt who is in agreement. Let her know a ride can arranged to get her there and she will receive a bill for the cost. Voicemail left for her sister, Alexandria, with plan.     Next Steps: Coordinate discharge to TCU. SW following for discharge planning.     FRANCISCO Mckeon, LICSW  [phone] Desk phone  [phone] Cell/text (Preferred)  [facility]    Addendum: Return phone call from sister, Alexandria. She is in agreement with TCU plan at [facility]. Let her know pt will receive a bill for the cost of transportation. Alexandria will let her other sisters know to back a bag of clothes for pt.

## 2025-01-03 NOTE — PLAN OF CARE
Goal Outcome Evaluation:    Orientations: A&O x4, garbled speech, still having some muscle jerking and involuntary trunk movements  Vitals/Pain: VSS on RA, 1 L while sleeping  Lines/Drains: PIV x1 SL  Skin/Wounds: rash to chest, scattered bruising/scabs, r shin abrasion, rash/excoriation to labia/buttocks/pily area  GI/: incontinent, AUOP, 3 loose BMs  Labs: K protocol, c-diff negative  Ambulation/Assist: x1-2 GBW, pivot to chair/commode  Sleep Quality: good  Plan: SW following for discharge planning, TCU when medically ready

## 2025-01-03 NOTE — PLAN OF CARE
"Goal Outcome Evaluation:  Patient Name: Federica  MRN: [id number]  Date of Admission: 12/30/2024  Reason for Admission: Electrolyte imbalance, elevated lactic  Level of Care: Med/surg    Vitals:   BP Readings from Last 1 Encounters:  01/02/25 : (!) 140/63    Pulse Readings from Last 1 Encounters:  01/02/25 : 85    Wt Readings from Last 1 Encounters:  01/02/25 : 56.1 kg (123 lb 10.9 oz)    Ht Readings from Last 1 Encounters:  12/30/24 : 1.651 m (5' 5\")    Estimated body mass index is 20.58 kg/m  as calculated from the following:    Height as of this encounter: 1.651 m (5' 5\").    Weight as of this encounter: 56.1 kg (123 lb 10.9 oz).  Temp Readings from Last 1 Encounters:  01/02/25 : 98  F (36.7  C) (Oral)      Pain: Pain goal 0/10 Pain Rating 3/10 Effective pain medication/regimen Scheduled Tylenol    CV Surgery Patient: No    Assessment    Resp: RA. 2 L with sleep.   Telemetry: No tele  Neuro: A&Ox4  GI/: Mech. Soft diet. Adequate UOP. Multiple loose BM's, C diff sample needs to be sent. No samples this afternoon since test was ordered.   Skin/Wounds: Rash to chest and pily area/ buttocks. Right shin abrasion.   Lines/Drains: PIVx2 SL  Activity: Ax1-2, pivot to chair. Up to chair x2 today.   Sleep: Fair  Abnormal Labs: None    Aggression Stop Light: Green          Patient Care Plan: CT scan obtained today, see results tab. Psych signed off today. PT recs TCU at discharge. IV antibiotics.    Addendum 1855: Pt up in chair having some involuntary trunk muscle movement. Will continue to monitor.   "

## 2025-01-03 NOTE — PROGRESS NOTES
[facility]    Medicine Progress Note - Hospitalist Service    Date of Admission:  12/30/2024    Assessment & Plan      Liz Lieberman is a 74 year old female admitted on 12/30/2024.  Past history of paranoid schizophrenia, TD, depression, dementia, asthma, HTN, among others who was send from her facility due to worsening of jerking movements.  She was found to have multiple electrolyte abnormalities, hypotension and tachycardia with lactic acidosis concerning for sepsis.       Atypical pneumonia  Strep and GPB on blood culture, suspected contaminants  Severe lactic acidosis with anion gap metabolic acidosis  *on arrival hypotensive, tachycardic, with lactate >9, procal >3 - BP and heart rate improved and lactate normalized with fluid bolus  *CXR with mild edema, but denies any pneumonia symptoms, COVID/FLU/RSV negative, UA unremarkable; otherwise denies any symptoms to suggest focal infection other than stooling  *LFT's mildly elevated with benign abdominal exam - RUQ US 12/31 negative for biliary or hepatic pathology  *1/2 admission blood cultures growing Strep species, 1/2 cultures growing gram positive bacilli - discussed with ID and suspect contaminant as only identified in single culture  *vanco discontinued 12/31  * CT of the CAP on 1/2 with findings c/w atypical pneumonia  - transition to oral doxycycline and augmentin  - off oxygen  - therapies recommend TCU, discussed with sister her HCPOA,. Likely on 1/4 if loose stools slow down    Diarrhea  Plan  - c diff testing negative, add imodium  - daily bmp to ensure she does not become dehydrated or develop DEBORAH    Acute hypoxic respiratory failure  ACS ruled out  *on 2L oxygen on admission without respiratory complaints  *admission CXR with possible mild pulmonary edema  *RRT called 12/30 due to worsening resp status requiring HFNC - Pro-BNP and troponin elevated with concern for CHF and given lasix 20 mg IV x1 and initiated on heparin  gtt  Cardiology consulted and did not suspect ACS  TTE without WMA  CT PE negative, but confirms atypical pneumonia  - weaned to RA  - start therapies    Hyponatremia, suspected hypovolemic  *hx mild intermittent hyponatremia about 130, though most recent level 11/18/24 wnl  *admission Na 121 - patient reporting significant stooling over past 2 days, appears dry on admission exam  *received 2L IVF in ED due to hypotension/concern for sepsis  *Na initially trending up appropriately, significant jump to 139 on 12/31 following lasix for resp failure  - monitor BMP    Acute on chronic tardive dyskinesia  Paranoid schizophrenia  Depression  Dementia  *last seen by Psychiatry 11/2023 at which point her TD was felt to be worsening, but declined any dosage adjustment to amantadine  *marked involuntary movements of body and extremities on arrival   *alert and oriented x4 at time of admission  *Psych consulted, suspect exacerbation due to metabolic issues and also receiving haldol in ED, though recommended neuro consult given the rarity of truncal TD  *neuro consulted, feel exam is consistent with TD and recommend continued management of metabolic issues - no need for EEG, signed off 12/31  *TD symptoms improved 12/31, Psych recommending continue PTA psychiatric regimen and follow up with outpatient Psychiatrist for consideration of valbenazine for TD (would require prior auth per Pharmacy liaison), signed off 12/31  plan  - Prior authorization is attempted forVMAT2 INHIBITORS , these are FDA approved for first line for td - done through psychiatry. Advised sister of this and told her to follow-up with her outpatient psychiatry team  - prn valium while inpatient per Psych (NO antipsychotics, would not discharge on benzos)  - slurred speech at baseline per famly  - MRI of the brain cancelled    HTN  - hold PTA amlodipine as remains normotensive    HLD  - hold PTA statin with mildly elevated LFT's    Chronic pain  - continue  PTA tylenol (decreased to bid dosing due to LFT's), voltaren, gabapentin    Asthma  - continue PTA inhalers/nebs    Hypothyroid  - continue PTA levothyroxine    Hx alcohol use disorder  *reports drinking about once monthly    GERD  - continue PTA PPI    MASD of buttocks and coccyx  - WOC RN following          Diet: Combination Diet Minced and Moist Diet (level 5); Thin Liquids (level 0) (upright, slow rate, single bites/sips, alternate between solids/liquids)    DVT Prophylaxis: heparin gtt  [place] Catheter: Not present  Lines: None     Cardiac Monitoring: None  Code Status: No CPR- Do NOT Intubate      Clinically Significant Risk Factors               # Hypoalbuminemia: Lowest albumin = 2.9 g/dL at 1/2/2025  5:32 AM, will monitor as appropriate     # Hypertension: Noted on problem list                  # Financial/Environmental Concerns: none  # Asthma: noted on problem list        Social Drivers of Health    Tobacco Use: Medium Risk (2/17/2024)    Received from [facility] [employer]    Patient History     Smoking Tobacco Use: Former     Smokeless Tobacco Use: Never   Alcohol Use: Unknown (12/30/2020)    Received from [facility]    AUDIT-C     Frequency of Alcohol Consumption: 2-3 times a week     Average Number of Drinks: 1 or 2          Disposition Plan     Medically Ready for Discharge: Anticipated in 2-4 Days             Robinson James DO  Hospitalist Service  [facility]  Securely message with [employer] (more info)  Text page via [employer] Paging/Directory   ______________________________________________________________________    Interval History   No dyspnea    Physical Exam   Vital Signs: Temp: 97.8  F (36.6  C) Temp src: Axillary BP: (!) 144/66 Pulse: 81   Resp: 18 SpO2: 94 % O2 Device: None (Room air) Oxygen Delivery: 1 LPM  Weight: 123 lbs 10.85 oz    General Appearance: Thin female in NAD  Respiratory: lungs CTAB, no wheezes or crackles  Cardiovascular: RRR, normal  s1/s2 without murmur  GI: abdomen soft, nondistended, normal bowel sounds, nontender  Skin: no peripheral edema  Other: Awake and alert with chronic dysarthric speech with CN II-XII otherwise intact. Speech is less slurred today, extremity strength appears intact, unable to test coordination due to TD and limited patient effort, oriented x3    Medical Decision Making       55 MINUTES SPENT BY ME on the date of service doing chart review, history, exam, documentation & further activities per the note.      Data     I have personally reviewed the following data over the past 24 hrs:    N/A  \   N/A   / N/A     141 106 7.8 (L) /  112 (H)   4.1 29 0.52 \       Imaging results reviewed over the past 24 hrs:   No results found for this or any previous visit (from the past 24 hours).

## 2025-01-03 NOTE — PROGRESS NOTES
"Clinical Swallow Evaluation (CSE):     01/03/25 1104   Appointment Info   Signing Clinician's Name / Credentials (SLP) Haley Parrish MS CCC-SLP   General Information   Onset of Illness/Injury or Date of Surgery 12/30/25   Referring Physician Robinson James, DO   Patient/Family Therapy Goal Statement (SLP) Did not state   Pertinent History of Current Problem   Per provider \"Liz Lieberman is a 74 year old female admitted on 12/30/2024.  Past history of paranoid schizophrenia, TD, depression, dementia, asthma, HTN, among others who was send from her facility due to worsening of jerking movements.  She was found to have multiple electrolyte abnormalities, hypotension and tachycardia with lactic acidosis concerning for sepsis.\" SLP for swallow evaluation.     General Observations Pt alert, upright in bed. Coughing prior to any PO.   Pain Assessment   Patient Currently in Pain No   Type of Evaluation   Type of Evaluation Swallow Evaluation   Oral Motor   Oral Musculature anomalies present  (tardive dyskinesia)   Structural Abnormalities none present   Mucosal Quality dry   Dentition (Oral Motor)   Dentition (Oral Motor) edentulous   Vocal Quality/Secretion Management (Oral Motor)   Vocal Quality (Oral Motor) WNL   Secretion Management (Oral Motor) WNL   General Swallowing Observations   Past History of Dysphagia   Prior VFSS in 2016 which was deemed WFL: flash laryngeal penetration with sequential sips of thin liquids only, none with single sips.     Respiratory Support room air   Current Diet/Method of Nutritional Intake (General Swallowing Observations, NIS)   (mechanical soft/thin)   Swallowing Evaluation Clinical swallow evaluation   Clinical Swallow Evaluation   Feeding Assistance no assistance needed   Clinical Swallow Evaluation Textures Trialed thin liquids;pureed;solid foods   Clinical Swallow Eval: Thin Liquid Texture Trial   Mode of Presentation, Thin Liquids straw   Volume of Liquid or Food Presented 8 " oz; x2 meds whole with water via RN   Oral Phase of Swallow WFL   Pharyngeal Phase of Swallow coughing/choking   Diagnostic Statement delayed cough x1   Clinical Swallow Evaluation: Puree Solid Texture Trial   Mode of Presentation, Puree spoon;self-fed   Volume of Puree Presented 4 oz   Oral Phase, Puree WFL   Pharyngeal Phase, Puree intact   Diagnostic Statement no overt clinical signs/sx aspiration noted   Clinical Swallow Evaluation: Solid Food Texture Trial   Mode of Presentation self-fed   Volume Presented soft middle bites of bread   Oral Phase impaired mastication   Pharyngeal Phase impaired;feeling of something stuck in throat   Diagnostic Statement prolonged/difficult mastication given edentulous status; sticking sensation needing liquid wash   General Therapy Interventions   Planned Therapy Interventions Dysphagia Treatment   Clinical Impression   Criteria for Skilled Therapeutic Interventions Met (SLP Eval) Yes, treatment indicated   SLP Diagnosis mild oral, potential pharyngeal dysphagia   Risks & Benefits of therapy have been explained evaluation/treatment results reviewed;care plan/treatment goals reviewed;risks/benefits reviewed;current/potential barriers reviewed;participants voiced agreement with care plan;participants included;patient   Clinical Impression Comments   Clinical swallow evaluation completed with thin liquids (+meds whole), puree solids, soft chewable solids. Pt currently presents with mild oral, potential pharyngeal dysphagia. Pt edentulous and at baseline reports eating very soft foods such as soups given same. Given tardive dyskinesia: ~mild-mod prolonged oral transit noted. Even with soft middle piece of bread, pt demonstrating prolonged/difficulty mastication and reports sticking sensation in throat, which liquid wash clears. Cough x1 across thin liquids, when drinking rapidly (of note, same quality cough also prior to eval/any PO); no overt clinical signs/sx aspiration across  remaining. Diet modifications and strategies indicated.     SLP Total Evaluation Time   Eval: oral/pharyngeal swallow function, clinical swallow Minutes (28520) 15   SLP Goals   Therapy Frequency (SLP Eval) 3 times/week   SLP Predicted Duration/Target Date for Goal Attainment 01/10/25   SLP Goals Swallow   SLP: Safely tolerate diet without signs/symptoms of aspiration Minced & moist diet;Soft & bite sized diet;Thin liquids;With use of swallow precautions;Independently   SLP Discharge Planning   SLP Plan PO tolerance, ADAT   SLP Discharge Recommendation   (baseline facility)   SLP Rationale for DC Rec Pt appears near basline and anticipate goals will be met prior to d/c with no post-acute SLP needs   SLP Brief overview of current status  Recommend: Minced and Moist Diet (level 5); Thin Liquids (level 0) when upright, slow rate, single bites/sips, alternate between solids/liquids.   SLP Time and Intention   Total Session Time (sum of timed and untimed services) 15

## 2025-01-04 VITALS
BODY MASS INDEX: 20.61 KG/M2 | TEMPERATURE: 97.5 F | SYSTOLIC BLOOD PRESSURE: 156 MMHG | RESPIRATION RATE: 17 BRPM | HEART RATE: 81 BPM | WEIGHT: 123.68 LBS | OXYGEN SATURATION: 91 % | HEIGHT: 65 IN | DIASTOLIC BLOOD PRESSURE: 76 MMHG

## 2025-01-04 LAB
ANION GAP SERPL CALCULATED.3IONS-SCNC: 8 MMOL/L (ref 7–15)
BACTERIA BLD CULT: NO GROWTH
BUN SERPL-MCNC: 5.8 MG/DL (ref 8–23)
CALCIUM SERPL-MCNC: 8.7 MG/DL (ref 8.8–10.4)
CHLORIDE SERPL-SCNC: 105 MMOL/L (ref 98–107)
CREAT SERPL-MCNC: 0.51 MG/DL (ref 0.51–0.95)
EGFRCR SERPLBLD CKD-EPI 2021: >90 ML/MIN/1.73M2
GLUCOSE SERPL-MCNC: 106 MG/DL (ref 70–99)
HCO3 SERPL-SCNC: 28 MMOL/L (ref 22–29)
HOLD SPECIMEN: NORMAL
POTASSIUM SERPL-SCNC: 4 MMOL/L (ref 3.4–5.3)
SODIUM SERPL-SCNC: 141 MMOL/L (ref 135–145)

## 2025-01-04 PROCEDURE — 250N000013 HC RX MED GY IP 250 OP 250 PS 637: Performed by: HOSPITALIST

## 2025-01-04 PROCEDURE — 36415 COLL VENOUS BLD VENIPUNCTURE: CPT | Performed by: HOSPITALIST

## 2025-01-04 PROCEDURE — 99239 HOSP IP/OBS DSCHRG MGMT >30: CPT | Performed by: HOSPITALIST

## 2025-01-04 PROCEDURE — 82310 ASSAY OF CALCIUM: CPT | Performed by: HOSPITALIST

## 2025-01-04 PROCEDURE — 82565 ASSAY OF CREATININE: CPT | Performed by: HOSPITALIST

## 2025-01-04 PROCEDURE — 80048 BASIC METABOLIC PNL TOTAL CA: CPT | Performed by: HOSPITALIST

## 2025-01-04 RX ORDER — DOXYCYCLINE 100 MG/1
100 CAPSULE ORAL EVERY 12 HOURS
Qty: 12 CAPSULE | Refills: 0 | Status: SHIPPED | OUTPATIENT
Start: 2025-01-04 | End: 2025-01-10

## 2025-01-04 RX ORDER — LOPERAMIDE HYDROCHLORIDE 2 MG/1
2 CAPSULE ORAL 4 TIMES DAILY PRN
DISCHARGE
Start: 2025-01-04

## 2025-01-04 RX ADMIN — PANTOPRAZOLE SODIUM 40 MG: 40 TABLET, DELAYED RELEASE ORAL at 06:38

## 2025-01-04 RX ADMIN — DICLOFENAC 2 G: 10 GEL TOPICAL at 09:57

## 2025-01-04 RX ADMIN — FLUTICASONE PROPIONATE 1 PUFF: 110 AEROSOL, METERED RESPIRATORY (INHALATION) at 09:35

## 2025-01-04 RX ADMIN — DICLOFENAC 2 G: 10 GEL TOPICAL at 13:22

## 2025-01-04 RX ADMIN — CARBOXYMETHYLCELLULOSE SODIUM 2 DROP: 5 SOLUTION/ DROPS OPHTHALMIC at 13:21

## 2025-01-04 RX ADMIN — Medication 1 TABLET: at 09:34

## 2025-01-04 RX ADMIN — BENZTROPINE MESYLATE 1 MG: 1 TABLET ORAL at 09:34

## 2025-01-04 RX ADMIN — ACETAMINOPHEN 1000 MG: 500 TABLET, FILM COATED ORAL at 09:33

## 2025-01-04 RX ADMIN — MICONAZOLE NITRATE: 2 POWDER TOPICAL at 09:52

## 2025-01-04 RX ADMIN — LEVOTHYROXINE SODIUM 175 MCG: 125 TABLET ORAL at 09:34

## 2025-01-04 RX ADMIN — AMANTADINE HYDROCHLORIDE 100 MG: 100 CAPSULE ORAL at 09:34

## 2025-01-04 RX ADMIN — AMOXICILLIN AND CLAVULANATE POTASSIUM 1 TABLET: 875; 125 TABLET, FILM COATED ORAL at 09:34

## 2025-01-04 RX ADMIN — DOXYCYCLINE HYCLATE 100 MG: 100 CAPSULE ORAL at 09:34

## 2025-01-04 RX ADMIN — GUAIFENESIN 600 MG: 600 TABLET, EXTENDED RELEASE ORAL at 09:33

## 2025-01-04 RX ADMIN — LOPERAMIDE HYDROCHLORIDE 2 MG: 2 CAPSULE ORAL at 13:19

## 2025-01-04 RX ADMIN — CARBOXYMETHYLCELLULOSE SODIUM 2 DROP: 5 SOLUTION/ DROPS OPHTHALMIC at 09:33

## 2025-01-04 RX ADMIN — CETIRIZINE HYDROCHLORIDE 5 MG: 5 TABLET ORAL at 09:34

## 2025-01-04 RX ADMIN — FLUTICASONE PROPIONATE 2 SPRAY: 50 SPRAY, METERED NASAL at 09:35

## 2025-01-04 ASSESSMENT — ACTIVITIES OF DAILY LIVING (ADL)
ADLS_ACUITY_SCORE: 80
ADLS_ACUITY_SCORE: 81
ADLS_ACUITY_SCORE: 85
ADLS_ACUITY_SCORE: 85
ADLS_ACUITY_SCORE: 81
ADLS_ACUITY_SCORE: 80
ADLS_ACUITY_SCORE: 81
ADLS_ACUITY_SCORE: 80
ADLS_ACUITY_SCORE: 80
ADLS_ACUITY_SCORE: 83
ADLS_ACUITY_SCORE: 85
ADLS_ACUITY_SCORE: 81

## 2025-01-04 NOTE — PLAN OF CARE
"  Problem: Adult Inpatient Plan of Care  Goal: Plan of Care Review  Description: The Plan of Care Review/Shift note should be completed every shift.  The Outcome Evaluation is a brief statement about your assessment that the patient is improving, declining, or no change.  This information will be displayed automatically on your shift  note.  Outcome: Progressing  Goal: Patient-Specific Goal (Individualized)  Description: You can add care plan individualizations to a care plan. Examples of Individualization might be:  \"Parent requests to be called daily at 9am for status\", \"I have a hard time hearing out of my right ear\", or \"Do not touch me to wake me up as it startles  me\".  Outcome: Progressing  Goal: Absence of Hospital-Acquired Illness or Injury  Outcome: Progressing  Intervention: Identify and Manage Fall Risk  Recent Flowsheet Documentation  Taken 1/3/2025 1620 by Reggie Huffman RN  Safety Promotion/Fall Prevention:   activity supervised   assistive device/personal items within reach   clutter free environment maintained   nonskid shoes/slippers when out of bed   patient and family education   safety round/check completed   supervised activity   room organization consistent   room near nurse's station  Taken 1/3/2025 0900 by Reggie Huffman RN  Safety Promotion/Fall Prevention:   activity supervised   assistive device/personal items within reach   clutter free environment maintained   nonskid shoes/slippers when out of bed   patient and family education   safety round/check completed   supervised activity   room organization consistent   room near nurse's station  Intervention: Prevent Skin Injury  Recent Flowsheet Documentation  Taken 1/3/2025 1620 by Reggie Huffman RN  Body Position:   turned   right  Skin Protection: adhesive use limited  Taken 1/3/2025 1200 by Reggie Huffman RN  Body Position:   turned   left  Taken 1/3/2025 0900 by Reggie Huffman RN  Body Position:   turned   right  Skin " Protection: adhesive use limited  Intervention: Prevent and Manage VTE (Venous Thromboembolism) Risk  Recent Flowsheet Documentation  Taken 1/3/2025 1620 by Reggie Huffman RN  VTE Prevention/Management: SCDs off (sequential compression devices)  Taken 1/3/2025 0900 by Reggie Huffman RN  VTE Prevention/Management: SCDs off (sequential compression devices)  Intervention: Prevent Infection  Recent Flowsheet Documentation  Taken 1/3/2025 1620 by Reggie Huffman RN  Infection Prevention:   cohorting utilized   personal protective equipment utilized   rest/sleep promoted   single patient room provided  Taken 1/3/2025 0900 by Reggie Huffman RN  Infection Prevention:   cohorting utilized   personal protective equipment utilized   rest/sleep promoted   single patient room provided  Goal: Optimal Comfort and Wellbeing  Outcome: Progressing  Goal: Readiness for Transition of Care  Outcome: Progressing     Problem: Skin Injury Risk Increased  Goal: Skin Health and Integrity  Outcome: Progressing  Intervention: Plan: Nurse Driven Intervention: Moisture Management  Recent Flowsheet Documentation  Taken 1/3/2025 1620 by Reggie Huffman RN  Moisture Interventions:   Encourage regular toileting   Incontinence pad   Urinary collection device   Perineal cleanser  Bathing/Skin Care: incontinence care  Taken 1/3/2025 0900 by Reggie Huffman RN  Moisture Interventions:   Encourage regular toileting   Incontinence pad   Urinary collection device   Perineal cleanser  Bathing/Skin Care: incontinence care  Intervention: Plan: Nurse Driven Intervention: Friction and Shear  Recent Flowsheet Documentation  Taken 1/3/2025 1620 by Reggie Huffman RN  Friction/Shear Interventions:   HOB 30 degrees or less   Silicone foam sacral dressing  Taken 1/3/2025 0900 by Reggie Huffman RN  Friction/Shear Interventions:   HOB 30 degrees or less   Silicone foam sacral dressing  Intervention: Optimize Skin Protection  Recent Flowsheet  Documentation  Taken 1/3/2025 1620 by Reggie Huffman RN  Pressure Reduction Techniques:   frequent weight shift encouraged   heels elevated off bed   pressure points protected   weight shift assistance provided  Pressure Reduction Devices: positioning supports utilized  Skin Protection: adhesive use limited  Activity Management: activity adjusted per tolerance  Head of Bed (HOB) Positioning: HOB at 20-30 degrees  Taken 1/3/2025 1200 by Reggie Huffman RN  Head of Bed (HOB) Positioning: HOB at 20-30 degrees  Taken 1/3/2025 0900 by Reggie Huffman RN  Pressure Reduction Techniques:   frequent weight shift encouraged   heels elevated off bed   pressure points protected   weight shift assistance provided  Pressure Reduction Devices: positioning supports utilized  Skin Protection: adhesive use limited  Activity Management: activity adjusted per tolerance  Head of Bed (HOB) Positioning: HOB at 20-30 degrees     Problem: Restraint, Nonviolent  Goal: Absence of Harm or Injury  Outcome: Met  Intervention: Protect Skin and Joint Integrity  Recent Flowsheet Documentation  Taken 1/3/2025 1620 by Reggie Huffman RN  Body Position:   turned   right  Skin Protection: adhesive use limited  Taken 1/3/2025 1200 by Reggie Huffman RN  Body Position:   turned   left  Taken 1/3/2025 0900 by Reggie Huffman RN  Body Position:   turned   right  Skin Protection: adhesive use limited    End of Shift Nursing Summary *For vital signs and complete assessments, please see documentation flowsheets      Neuro:  A&Ox4, garbled speech at baseline, PERRLA, strengths equal bilaterally generalized weakness, able to make needs known, calls appropriately, up Ax1-2 with walker and gait belt.  Pain: denied pain this shift  CV:  BP and HR within ordered parameters, denies CP  Pulm: LS diminished bilaterally, RA, dyspnea on exertion, frequent cough MD paged for cough medication read no new meds ordered  GI/: Loose incontinent BM this shiftx2,  imodium added, C-diff negative, incontinent of urine adequate output  Skin: Scattered scabbing scattered bruising, small abrasion to R shin, rash to chest, redness to perineum, sacrum/coccyx, and buttock/IT  Lines: PIV SL  Additional:     Plan (Upcoming Events): Discharge to TCU once medically cleared    Bedside Shift Report Completed : yes  Bedside Safety Check Completed: yes

## 2025-01-04 NOTE — DISCHARGE SUMMARY
"  [facility]        NAME: Marybel Dee is a 34 y.o. female  : 1989    MRN: [id number]  DATE: 2023  TIME: 12:17 PM    Assessment and Plan   Rib pain on right side [R07.81]  1. Rib pain on right side          I explained to the patient that imaging is not recommended in pregnancy.  I also explained that she is limited with the pain medications that she is able to take. Tylenol and lidoderm patches are safe during pregnancy.     Patient Instructions     Maintain activity as tolerated.  Apply heating pad or warm compresses.   Take OTC Acetaminophen 975 or 1000mg every 6-8 hours.  OTC 4% lidocaine patch or lidocaine creams, Bengay, Icy Hot or capsaicin creams may be used  Follow up with PCP in 3-5 days.  Proceed to  ER if symptoms worsen.    Chief Complaint     Chief Complaint   Patient presents with    Rib Injury     Right sided rib pain. \"Baby kicked rib out\" and  put it back into place last night. Now having spasms          History of Present Illness       Patient is a 34YOF presenting with right rib pain. She is 32 weeks pregnant with her third child. She states the baby kicked her in the right side of  ribs last night. She states her  \"popped\" the rib back in but she still has pain. She took tylenol and used icy hot without any relief. She states she has felt the baby move since the incident and was concerned about the pain. She is a high risk pregnancy.         Review of Systems   Review of Systems   Respiratory:  Negative for chest tightness, shortness of breath and wheezing.    Cardiovascular:  Negative for chest pain and palpitations.   Gastrointestinal:  Negative for abdominal pain, nausea and vomiting.   Genitourinary:  Negative for vaginal bleeding, vaginal discharge and vaginal pain.   Musculoskeletal:  Positive for back pain.   All other systems reviewed and are negative.        Current Medications       Current Outpatient Medications:     Prenatal MV-Min-Fe " Pt transferred w/EMS to TCU. Pt sent w/belongings & packet.   "Fum-FA-DHA (PRENATAL 1 PO), Take by mouth, Disp: , Rfl:     Current Allergies     Allergies as of 12/24/2023 - Reviewed 12/24/2023   Allergen Reaction Noted    Apple - food allergy Anaphylaxis 10/28/2020    Black cherry fruit extract [cherry extract - food allergy] Anaphylaxis 10/28/2020    Peach [prunus persica] Hives 10/28/2020            The following portions of the patient's history were reviewed and updated as appropriate: allergies, current medications, past family history, past medical history, past social history, past surgical history and problem list.     Past Medical History:   Diagnosis Date    Anxiety and depression     Known health problems: none        Past Surgical History:   Procedure Laterality Date    MOUTH SURGERY         Family History   Problem Relation Age of Onset    Diabetes Mother     Lung cancer Father     Stroke Father 79    Diabetes Father     Lung cancer Paternal Grandmother     Lung cancer Paternal Grandfather     Pulmonary embolism Neg Hx     Deep vein thrombosis Neg Hx          Medications have been verified.        Objective   /60   Pulse 92   Temp (!) 96.8 °F (36 °C) (Tympanic)   Resp 18   Ht 5' 1.5\" (1.562 m)   Wt 88.5 kg (195 lb)   SpO2 98%   BMI 36.25 kg/m²        Physical Exam     Physical Exam  Vitals and nursing note reviewed.   Constitutional:       General: She is not in acute distress.     Appearance: Normal appearance. She is normal weight. She is not ill-appearing or toxic-appearing.   Cardiovascular:      Rate and Rhythm: Normal rate and regular rhythm.      Pulses: Normal pulses.      Heart sounds: Normal heart sounds.   Pulmonary:      Effort: Pulmonary effort is normal.      Breath sounds: Normal breath sounds.   Abdominal:      Palpations: Abdomen is soft.      Tenderness: There is no abdominal tenderness.   Musculoskeletal:        Arms:    Neurological:      General: No focal deficit present.      Mental Status: She is alert and oriented to person, " place, and time.

## 2025-01-04 NOTE — PLAN OF CARE
"Goal Outcome Evaluation:    Patient Name: Federica  MRN: [id number]  Date of Admission: 12/30/2024  Reason for Admission: Electrolyte Imbalance  Level of Care: MS    Vitals:   BP (!) 152/83 (BP Location: Right arm, Patient Position: Semi-Cardona's, Cuff Size: Adult Small)   Pulse 77   Temp 98.2  F (36.8  C) (Oral)   Resp 18   Ht 1.651 m (5' 5\")   Wt 56.1 kg (123 lb 10.9 oz)   SpO2 92%   BMI 20.58 kg/m      Pain: Denies pain  CV Surgery Patient: No    Assessment    Resp: LSD, 2L Oxymask overnight.  Telemetry: na  Neuro: A&O x4, baseline garbled speech.  GI/: + BS, using a purewick  Skin/Wounds: Scattered bruises and scabs, rash to the chest, R shin abrasion, redness to the coccyx.   Lines/Drains: R PIV SL  Activity: A1-2 GB/W  Sleep: Well  Abnormal Labs: K+ okay at  4.1, Ca+ 8.7    Aggression Stop Light: Green          Patient Care Plan: Oral Abx, pending discharge.    "

## 2025-01-04 NOTE — PLAN OF CARE
Date/Time: 1/4  Summary: Tachypnea  Precautions: Fall, aspiration  Cognitive Concerns/Orientation: A/O, garbled speech(baseline), forgetful  Behavior and Aggression Color: Calm & cooperative  ABNL VS/O2: WDL's, RA  CMS: BLE ROM moderately impaired  Edema: BLE trace edema  Mobility: 1-2A w/FWW & gait belt  Pain Management: denies  Diet: Minced & moist  Bowel/Bladder: Incontinent, 1/4 BM  ABNL Labs/BG: WDL's  Drain/Devices: SL PIV, ext catheter  Telemetry Rhythm: -  Skin: Chest rash/perineum, gen scabs  Tests/Procedures: -  Discharge Date: 1/4 TCU  Other Info:

## 2025-01-04 NOTE — DISCHARGE SUMMARY
[facility]  Hospitalist Discharge Summary      Date of Admission:  12/30/2024  Date of Discharge:  1/4/2025  Discharging Provider: Robinson James DO  Discharge Service: Hospitalist Service    Discharge Diagnoses   See below    Clinically Significant Risk Factors          Follow-ups Needed After Discharge   Follow-up Appointments       Follow Up and recommended labs and tests      Follow up with longterm physician.  The following labs/tests are recommended: bmp and lfts in 1 week.  Needs follow-up with her psychiatrist to consider specific anti tardive dyskinesia treatment per in house psychiatrist.  Consider evaluation for obstructive sleep apnea  Follow-up CT of the chest in 3 months to ensure pneumonia resolution recommended, defer to primary team                Unresulted Labs Ordered in the Past 30 Days of this Admission       No orders found from 11/30/2024 to 12/31/2024.        These results will be followed up by PCP    Discharge Disposition   Discharged to home  Condition at discharge: Stable    Hospital Course   Atypical pneumonia  Strep and GPB on blood culture, suspected contaminants  Severe lactic acidosis with anion gap metabolic acidosis  *on arrival hypotensive, tachycardic, with lactate >9, procal >3 - BP and heart rate improved and lactate normalized with fluid bolus  *CXR with mild edema, but denies any pneumonia symptoms, COVID/FLU/RSV negative, UA unremarkable; otherwise denies any symptoms to suggest focal infection other than stooling  *LFT's mildly elevated with benign abdominal exam - RUQ US 12/31 negative for biliary or hepatic pathology  *1/2 admission blood cultures growing Strep species, 1/2 cultures growing gram positive bacilli - discussed with ID and suspect contaminant as only identified in single culture  *vanco discontinued 12/31  * CT of the CAP on 1/2 with findings c/w atypical pneumonia  - transition to oral doxycycline and augmentin  - off  oxygen  - therapies recommend TCU, discussed with sister her HCPOA,. Likely on 1/4 if loose stools slow down     Diarrhea  Plan  - c diff testing negative, add imodium  - daily bmp to ensure she does not become dehydrated or develop DEBORAH     Acute hypoxic respiratory failure  ACS ruled out  *on 2L oxygen on admission without respiratory complaints  *admission CXR with possible mild pulmonary edema  *RRT called 12/30 due to worsening resp status requiring HFNC - Pro-BNP and troponin elevated with concern for CHF and given lasix 20 mg IV x1 and initiated on heparin gtt  Cardiology consulted and did not suspect ACS  TTE without WMA  CT PE negative, but confirms atypical pneumonia  - weaned to RA  - start therapies  - O2 overnight ordered. She may have some undiagnosed JOSEPH     Hyponatremia, suspected hypovolemic  *hx mild intermittent hyponatremia about 130, though most recent level 11/18/24 wnl  *admission Na 121 - patient reporting significant stooling over past 2 days, appears dry on admission exam  *received 2L IVF in ED due to hypotension/concern for sepsis  *Na initially trending up appropriately, significant jump to 139 on 12/31 following lasix for resp failure  - monitor BMP     Acute on chronic tardive dyskinesia  Paranoid schizophrenia  Depression  Dementia  *last seen by Psychiatry 11/2023 at which point her TD was felt to be worsening, but declined any dosage adjustment to amantadine  *marked involuntary movements of body and extremities on arrival   *alert and oriented x4 at time of admission  *Psych consulted, suspect exacerbation due to metabolic issues and also receiving haldol in ED, though recommended neuro consult given the rarity of truncal TD  *neuro consulted, feel exam is consistent with TD and recommend continued management of metabolic issues - no need for EEG, signed off 12/31  *TD symptoms improved 12/31, Psych recommending continue PTA psychiatric regimen and follow up with outpatient  Psychiatrist for consideration of valbenazine for TD (would require prior auth per Pharmacy liaison), signed off 12/31  plan  - Prior authorization is attempted forVMAT2 INHIBITORS , these are FDA approved for first line for td - done through psychiatry. Advised sister of this and told her to follow-up with her outpatient psychiatry team     HTN  - resume PTA amlodipine as remains normotensive     HLD  - resume PTA statin as LFT abnormality resolved     Chronic pain  - continue PTA tylenol (decreased to bid dosing due to LFT's), voltaren, gabapentin     Asthma  - continue PTA inhalers/nebs     Hypothyroid  - continue PTA levothyroxine     Hx alcohol use disorder  *reports drinking about once monthly     GERD  - continue PTA PPI     MASD of buttocks and coccyx  - WOC RN following    Consultations This Hospital Stay   PHARMACY TO DOSE VANCO  PSYCHIATRY IP CONSULT  PSYCHIATRY IP CONSULT  PHYSICAL THERAPY ADULT IP CONSULT  OCCUPATIONAL THERAPY ADULT IP CONSULT  PHARMACY TO DOSE VANCO  WOUND OSTOMY CONTINENCE NURSE  IP CONSULT  PHARMACY LIAISON FOR MEDICATION COVERAGE CONSULT  NEUROLOGY IP CONSULT  PHARMACY IP CONSULT  CARDIOLOGY IP CONSULT  CARE MANAGEMENT / SOCIAL WORK IP CONSULT  INFECTIOUS DISEASES IP CONSULT  SPEECH LANGUAGE PATH ADULT IP CONSULT  PHYSICAL THERAPY ADULT IP CONSULT  OCCUPATIONAL THERAPY ADULT IP CONSULT    Code Status   No CPR- Do NOT Intubate    Time Spent on this Encounter   I, Robinson James DO, personally saw the patient today and spent greater than 30 minutes discharging this patient.       Robinson James DO  [address] [place] [address]  Phone: [phone]  ______________________________________________________________________    Physical Exam   Vital Signs: Temp: 97.5  F (36.4  C) Temp src: Oral BP: (!) 156/76 Pulse: 81   Resp: 17 SpO2: 91 % O2 Device: None (Room air) Oxygen Delivery: 2 LPM  Weight: 123 lbs 10.85 oz  General  Appearance:  Thin female in NAD  Respiratory: lungs CTAB, no wheezes or crackles  Cardiovascular: RRR, normal s1/s2 without murmur  GI: abdomen soft, nondistended, normal bowel sounds, nontender  Skin: no peripheral edema  Other:  Awake and alert with chronic dysarthric speech with CN II-XII otherwise intact. Speech is less slurred today, extremity strength appears intact, unable to test coordination due to TD and limited patient effort, oriented x3       Primary Care Physician   Era Livingston    Discharge Orders      General info for SNF    Length of Stay Estimate: Short Term Care: Estimated # of Days <30  Condition at Discharge: Stable  Level of care:skilled   Rehabilitation Potential: Good  Admission H&P remains valid and up-to-date: Yes  Recent Chemotherapy: N/A  Use Nursing Home Standing Orders: Yes     Mantoux instructions    Give two-step Mantoux (PPD) Per Facility Policy Yes     Follow Up and recommended labs and tests    Follow up with correction physician.  The following labs/tests are recommended: bmp and lfts in 1 week.  Needs follow-up with her psychiatrist to consider specific anti tardive dyskinesia treatment per in house psychiatrist.  Consider evaluation for obstructive sleep apnea  Follow-up CT of the chest in 3 months to ensure pneumonia resolution recommended, defer to primary team     Reason for your hospital stay    Atypical pneumonia with respiratory failure     Activity - Up with nursing assistance     Physical Therapy Adult Consult    Evaluate and treat as clinically indicated.    Reason:  deconditioning     Occupational Therapy Adult Consult    Evaluate and treat as clinically indicated.    Reason:  deconditioning     Oxygen (SNF/TCU) Discharge     Diet    Follow this diet upon discharge: Current Diet:Orders Placed This Encounter      Combination Diet Minced and Moist Diet (level 5); Thin Liquids (level 0) (upright, slow rate, single bites/sips, alternate between solids/liquids)        Significant Results and Procedures   Most Recent 3 CBC's:  Recent Labs   Lab Test 01/01/25  2006 12/31/24  0407 12/30/24  2214 12/30/24  0829   WBC  --  6.8 10.1 9.6   HGB  --  10.1* 9.4* 9.8*   MCV  --  84 84 85    275 290 418     Most Recent 3 BMP's:  Recent Labs   Lab Test 01/04/25  0556 01/03/25  0535 01/02/25  0532    141 143   POTASSIUM 4.0 4.1 4.2   CHLORIDE 105 106 106   CO2 28 29 29   BUN 5.8* 7.8* 6.0*   CR 0.51 0.52 0.55   ANIONGAP 8 6* 8   ADRIAN 8.7* 8.6* 8.6*   * 112* 118*     Most Recent 2 LFT's:  Recent Labs   Lab Test 01/02/25  0532 01/01/25  0903   AST 34 57*   ALT 44 56*   ALKPHOS 131 138   BILITOTAL <0.2 <0.2   ,   Results for orders placed or performed during the hospital encounter of 12/30/24   XR Chest Port 1 View    Narrative    CHEST PORTABLE ONE VIEW December 30, 2024 8:44 AM     HISTORY: Shortness of breath.    COMPARISON: 3/2/2019.      Impression    IMPRESSION: Normal cardiac silhouette. Diffuse bilateral interstitial  prominence appears mildly increased and this could be mild pulmonary  edema. Mild left base atelectasis.    XIOMARA CAMERON MD         SYSTEM ID:  [id number]   XR Chest Port 1 View    Narrative    EXAM: XR CHEST PORT 1 VIEW  LOCATION: [facility]  DATE: 12/30/2024    INDICATION: increased O2 needs  COMPARISON: X-ray 12/30/2024      Impression    IMPRESSION: Cardiomegaly with central vascular congestion and interstitial edema. Mild bibasilar opacities likely reflect atelectasis. No definite pleural effusion.   US Abdomen Limited    Narrative    ULTRASOUND ABDOMEN LIMITED December 31, 2024 8:31 AM    CLINICAL HISTORY: Elevated liver function tests, bacteremia of unclear  source - assess for gallbladder pathology.     TECHNIQUE: Limited abdominal ultrasound.    COMPARISON: None.    FINDINGS:    GALLBLADDER: The gallbladder is normal. No gallstones, wall  thickening, or pericholecystic fluid. Negative sonographic  Barr's  sign.    BILE DUCTS: There is no biliary dilatation. The common duct measures 6  mm, which is likely within normal limits given the patient's age.    LIVER: Homogeneous parenchyma. No focal mass.    RIGHT KIDNEY: No hydronephrosis.    PANCREAS: The visualized portions of the pancreas are normal.    No ascites.      Impression    IMPRESSION: Unremarkable gallbladder. No cholelithiasis or  cholecystitis.    FAUZIA ONEILL MD         SYSTEM ID:  [id number]   CT Head w/o Contrast    Narrative    EXAM: CT HEAD W/O CONTRAST  LOCATION: [facility]  DATE: 1/1/2025    INDICATION: increased somnolence, has been on heparin gtt   rule out intracranial bleed  COMPARISON: 6/23/2024 CT the.  TECHNIQUE: Routine CT Head without IV contrast. Multiplanar reformats. Dose reduction techniques were used.    FINDINGS:  INTRACRANIAL CONTENTS: No intracranial hemorrhage, extraaxial collection, or mass effect.  No CT evidence of acute infarct. Mild presumed chronic small vessel ischemic changes. Mild generalized volume loss. No hydrocephalus.     VISUALIZED ORBITS/SINUSES/MASTOIDS: No intraorbital abnormality. No paranasal sinus mucosal disease. Scattered fluid/membrane thickening in the mastoid air cells bilaterally.    BONES/SOFT TISSUES: No acute abnormality.      Impression    IMPRESSION:  1.  No acute intracranial abnormality.  2.  Mild presumed proximal vessel ischemic changes.  3.  Nonspecific mild mastoid effusions bilaterally.   CT Chest (PE) Abdomen Pelvis w Contrast    Narrative    CT CHEST PE ABDOMEN PELVIS W CONTRAST 1/2/2025 12:46 PM    CLINICAL HISTORY: shortness of breath, diarrhea, sepsis of unclear  source. troponin elevation. rule out PE or occult source of sepsis    TECHNIQUE: TECHNIQUE: CT angiogram chest and routine CT abdomen pelvis  with IV contrast. Arterial phase through the chest and venous phase  through the abdomen and pelvis. 2D and 3D MIP reconstructions were  performed  by the CT technologist. Dose reduction techniques were used.      CONTRAST: 65mL Isovue 370    COMPARISON: CT chest 6/18/2013.    FINDINGS:   ANGIOGRAM CHEST: Pulmonary arteries are normal caliber and negative  for pulmonary emboli. Thoracic aorta is negative for dissection within  the limitations of cardiac motion artifact.    LUNGS AND PLEURA: Respiratory motion artifact. Mild fibrotic changes  of the lungs. Mild burden of nodular consolidation in the left upper  lobe and left lower lobe. No pleural effusion.    MEDIASTINUM/AXILLAE: Patulous esophagus. Moderate size hiatal hernia.  Normal heart size without pericardial effusion. Right greater than  left hilar adenopathy such as a right hilar lymph node measuring 1.3  cm in short axis (series 3, image 147).    CORONARY ARTERY CALCIFICATIONS: Moderate.    HEPATOBILIARY: Incomplete imaging of the liver on portal venous phase.  Probable focal fatty sparing adjacent to the falciform ligament. No  radiopaque gallstone. No biliary ductal dilatation.    PANCREAS: Atrophic.    SPLEEN: Incompletely imaged on portal venous phase. The spleen is  likely mildly enlarged.    ADRENAL GLANDS: No significant nodules.    KIDNEYS/BLADDER: Symmetric renal enhancement without hydronephrosis.  Left lower pole renal cyst, not requiring specific follow-up. Mild  diffuse bladder wall thickening.    BOWEL: No obstruction or inflammatory change.    PELVIC ORGANS: No pelvic masses.    ADDITIONAL FINDINGS: No ascites. No adenopathy in the abdomen or  pelvis. Moderate burden of vascular calcifications without abdominal  aortic aneurysm.    MUSCULOSKELETAL: Polyarticular degenerative changes including  multilevel degenerative changes of the spine.      Impression    IMPRESSION:  1.  No evidence of pulmonary embolism.  2.  Mild burden of nodular opacities of the left upper lobe and left  lower lobe which likely represent atypical pneumonia.  3.  Mildly enlarged right greater than left hilar lymph  nodes,  nonspecific but favored reactive. Consider follow-up contrast enhanced  chest CT in 3 months.  4.  Mild fibrotic changes of the lungs.  5.  Moderate size hiatal hernia and patulous esophagus.  6.  Mild nonspecific bladder wall thickening which can be correlated  with urinalysis as clinically indicated.  7.  Probable mild splenomegaly.    CAR FERRO MD         SYSTEM ID:  [id number]   Echocardiogram Complete     Value    LVEF  65-70%    Narrative    [id number]  [id number]  [id number]  [id number]^RADHA^LEVI^STERLING     [facility]  Echocardiography Laboratory  [address] [place] [id number]     Name: KATT DILL  MRN: [id number]  : 1950  Study Date: 2025 09:39 AM  Age: 74 yrs  Gender: Female  Patient Location: [facility]  Reason For Study: SOB  Ordering Physician: LEVI GARCIA  Referring Physician: Era Livingston  Performed By: Swetha Reid     BSA: 1.7 m2  Height: 65 in  Weight: 136 lb  HR: 90  BP: 143/79 mmHg  ______________________________________________________________________________  Procedure  Echocardiogram with two-dimensional, color and spectral Doppler. Definity (NDC  #[id number]) given intravenously.  ______________________________________________________________________________  Interpretation Summary     The visual ejection fraction is 65-70%.  No regional wall motion abnormalities noted.  The right ventricle is mildly dilated.  The right ventricular systolic function is normal.  There is no pericardial effusion.  ______________________________________________________________________________  Left Ventricle  The left ventricle is normal in size. There is normal left ventricular wall  thickness. The visual ejection fraction is 65-70%. Left ventricular diastolic  function is indeterminate. No regional wall motion abnormalities noted.     Right Ventricle  The right ventricle is mildly dilated. The right ventricular systolic function  is normal.     Atria  Normal  left atrial size. Right atrial size is normal.     Mitral Valve  There is trace mitral regurgitation.     Tricuspid Valve  There is mild (1+) tricuspid regurgitation.     Aortic Valve  No aortic regurgitation is present.     Pulmonic Valve  The pulmonic valve is not well visualized.     Vessels  The aortic root is normal size. Normal size ascending aorta. The inferior vena  cava was normal in size with preserved respiratory variability.     Pericardium  There is no pericardial effusion.     ______________________________________________________________________________  MMode/2D Measurements & Calculations  IVSd: 0.92 cm  LVIDd: 4.1 cm  LVIDs: 2.5 cm  LVPWd: 0.98 cm  FS: 38.3 %  LV mass(C)d: 122.0 grams  LV mass(C)dI: 72.7 grams/m2     Ao root diam: 2.9 cm  LA dimension: 2.7 cm  asc Aorta Diam: 2.9 cm  LA/Ao: 0.91  Ao root diam index Ht(cm/m): 1.8  Ao root diam index BSA (cm/m2): 1.7  Asc Ao diam index BSA (cm/m2): 1.7  Asc Ao diam index Ht(cm/m): 1.7  LA Volume (BP): 49.9 ml  LA Volume Index (BP): 29.7 ml/m2  RV Base: 4.2 cm     RWT: 0.48  TAPSE: 2.4 cm     Doppler Measurements & Calculations  MV E max mauro: 78.0 cm/sec  MV A max mauro: 91.5 cm/sec  MV E/A: 0.85  MV dec slope: 381.8 cm/sec2  MV dec time: 0.20 sec  PA acc time: 0.11 sec  E/E' av.3  Lateral E/e': 7.1  Medial E/e': 11.4     ______________________________________________________________________________  Report approved by: TAY Pascula on 2025 10:37 AM             Discharge Medications   Discharge Medication List as of 2025  2:14 PM        START taking these medications    Details   amoxicillin-clavulanate (AUGMENTIN) 875-125 MG tablet Take 1 tablet by mouth every 12 hours for 2 days., Disp-4 tablet, R-0, E-Prescribe      doxycycline hyclate (VIBRAMYCIN) 100 MG capsule Take 1 capsule (100 mg) by mouth every 12 hours for 6 days., Disp-12 capsule, R-0, E-Prescribe      loperamide (IMODIUM) 2 MG capsule Take 1 capsule (2 mg) by mouth 4  times daily as needed for diarrhea., Transitional           CONTINUE these medications which have NOT CHANGED    Details   acetaminophen (TYLENOL) 500 MG tablet Take 1,000 mg by mouth 3 times daily., Historical      albuterol (PROAIR HFA/PROVENTIL HFA/VENTOLIN HFA) 108 (90 Base) MCG/ACT inhaler Inhale 1-2 puffs into the lungs every 6 hours as needed for wheezing , Historical      !! albuterol (PROVENTIL) (2.5 MG/3ML) 0.083% neb solution Take 2.5 mg by nebulization 2 times daily., Historical      !! albuterol (PROVENTIL) (2.5 MG/3ML) 0.083% neb solution Take 2.5 mg by nebulization every 8 hours as needed for wheezing., Historical      amantadine (SYMMETREL) 100 MG capsule Take 100 mg by mouth 3 times daily., Historical      amLODIPine (NORVASC) 5 MG tablet Take 5 mg by mouth at bedtime., Historical      atorvastatin (LIPITOR) 20 MG tablet Take 20 mg by mouth At Bedtime , Historical      benztropine (COGENTIN) 1 MG tablet Take 1 mg by mouth 3 times daily , Historical      calcium carbonate-vitamin D (OSCAL) 500-5 MG-MCG tablet Take 1 tablet by mouth 2 times daily., Historical      cetirizine (ZYRTEC) 5 MG tablet Take 5 mg by mouth daily. For itching, Historical      cloZAPine (CLOZARIL) 100 MG tablet Take 2.5 tablets (250 mg) by mouth At Bedtime, Disp-60 tablet,R-0, Local PrintFuture refills by PCP Dr. Era Livingston with phone number [phone].      desoximetasone (TOPICORT) 0.25 % OINT Apply  topically as needed.Historical      diclofenac (VOLTAREN) 1 % topical gel Apply 2 g topically 4 times daily. Apply to wrists, Historical      emollient (VANICREAM) external cream Apply topically 3 times daily as needed for other (irritation)Historical      fluocinonide (LIDEX) 0.05 % external solution Apply topically 2 times daily. For itchingHistorical      fluticasone (FLONASE) 50 MCG/ACT nasal spray Spray 2 sprays into both nostrils daily., Historical      fluticasone (FLOVENT HFA) 110 MCG/ACT inhaler Inhale 1 puff  into the lungs 2 times daily , Historical      gabapentin (NEURONTIN) 100 MG capsule Take 100 mg by mouth at bedtime., Historical      !! guaiFENesin (MUCINEX) 600 MG 12 hr tablet Take 600 mg by mouth daily., Historical      !! guaiFENesin (MUCINEX) 600 MG 12 hr tablet Take 600 mg by mouth 2 times daily as needed for congestion., Historical      levothyroxine (SYNTHROID/LEVOTHROID) 175 MCG tablet Take 175 mcg by mouth daily, Historical      multivitamin w/minerals (THERA-VIT-M) tablet Take 1 tablet by mouth daily, Disp-30 tablet, R-0, Local Print      nystatin (MYCOSTATIN) 852696 UNIT/GM external cream Apply topically daily as needed (rash). Apply to groin and upper thighsHistorical      omeprazole (PRILOSEC) 20 MG DR capsule Take 20 mg by mouth 2 times daily, Historical      ondansetron (ZOFRAN ODT) 4 MG ODT tab Take 4 mg by mouth every 8 hours as needed for nausea., Historical      polyethylene glycol (MIRALAX/GLYCOLAX) packet Take 17 g by mouth daily as needed for constipation , Historical      polyethylene glycol-propylene glycol (SYSTANE ULTRA) 0.4-0.3 % SOLN ophthalmic solution Place 2 drops into both eyes 4 times daily., Historical      sertraline (ZOLOFT) 50 MG tablet Take 150 mg by mouth At Bedtime , Historical      sodium fluoride dental gel (PREVIDENT) 1.1 % GEL topical gel Apply to affected area dailyHistorical      triamcinolone (KENALOG) 0.1 % external cream Apply topically daily as needed for irritation.Historical      Ferrous Gluconate 324 (37.5 Fe) MG TABS Take 324 mg by mouth daily (with breakfast) , Historical       !! - Potential duplicate medications found. Please discuss with provider.        STOP taking these medications       sennosides (SENOKOT) 8.6 MG tablet Comments:   Reason for Stopping:             Allergies   Allergies   Allergen Reactions    Formaldehyde Rash    Latex Rash

## 2025-01-04 NOTE — PROGRESS NOTES
Care Management Discharge Note    Discharge Date: 01/04/2025       Discharge Disposition: Transitional Care    Discharge Services:      Discharge DME:      Discharge Transportation:     Private pay costs discussed: transportation costs    Does the patient's insurance plan have a 3 day qualifying hospital stay waiver?  No    PAS Confirmation Code: [id number]  Patient/family educated on Medicare website which has current facility and service quality ratings: yes    Education Provided on the Discharge Plan: Yes  Persons Notified of Discharge Plans: Pt, RN, sister, MD, facility  Patient/Family in Agreement with the Plan: yes    Handoff Referral Completed: No, handoff not indicated or clinically appropriate    Additional Information:  Pt will discharge today to [facility] via [facility] between [id number]. Per Raisa at [facility], no more wheelchairs left today so they will bring a stretcher, however, pt will be billed for a wheelchair. Orders faxed and facility updated. Pt's sister, Alexandria, updated and in agreement. Nursing aware.     FRANCISCO Mckeon, [facility]  [phone] Desk phone  [phone] Cell/text (Preferred)  [facility]    PAS-RR    D: Per DHS regulation, SW completed and submitted PAS-RR to [place] [employer] Direct Connect via the Senior LinkAge Line.  PAS-RR confirmation # is : [id number]    P: Further questions may be directed to [facility] LinkAge Line at #[phone], option #4 for PAS-RR staff.

## 2025-01-05 ENCOUNTER — DOCUMENTATION ONLY (OUTPATIENT)
Dept: GERIATRICS | Facility: CLINIC | Age: 75
End: 2025-01-05
Payer: MEDICARE

## 2025-01-05 NOTE — PLAN OF CARE
"Speech Language Therapy Discharge Summary    Reason for therapy discharge:    Discharged to transitional care facility.    Progress towards therapy goal(s). See goals on Care Plan in [facility] electronic health record for goal details.  Goals partially met.  Barriers to achieving goals:   discharge from facility.    Therapy recommendation(s):    Continued therapy is recommended.  Rationale/Recommendations:  consider short-term SLP For dysphagia follow up/tolerance. At time of discharge: \"Pt appears near basline and anticipate goals will be met prior to d/c with no post-acute SLP needs. Recommend: Minced and Moist Diet (level 5); Thin Liquids (level 0) when upright, slow rate, single bites/sips, alternate between solids/liquids.\"        "

## 2025-01-06 ENCOUNTER — PATIENT OUTREACH (OUTPATIENT)
Dept: CARE COORDINATION | Facility: CLINIC | Age: 75
End: 2025-01-06

## 2025-01-06 NOTE — PLAN OF CARE
Physical Therapy Discharge Summary    Reason for therapy discharge:    Discharged to transitional care facility.    Progress towards therapy goal(s). See goals on Care Plan in [facility] electronic health record for goal details.  Goals partially met.  Barriers to achieving goals:   discharge from facility.    Therapy recommendation(s):    Continued therapy is recommended.  Rationale/Recommendations:   . Pt below baseline, currently Ax1-2 for mobility. Pt limited by weakness, TD, fatigue. Recommend TCU to increase strength and functional mobility.  PT Brief overview of current status: Ax1-2 w/ FWW. Goals of therapy will be to address safe mobility and make recs for d/c to next level of care. Pt and RN will continue to follow all falls risk precautions as documented by RN staff while hospitalized          Recommendation above provided by last treating therapist.

## 2025-01-06 NOTE — PROGRESS NOTES
Occupational Therapy Discharge Summary    Reason for therapy discharge:    Discharged to transitional care facility.    Progress towards therapy goal(s). See goals on Care Plan in [facility] electronic health record for goal details.  Goals partially met.  Barriers to achieving goals:   discharge from facility.    Therapy recommendation(s):    No further therapy was recommended and home was recommended. Pt discharged to TCU per chart review

## 2025-01-06 NOTE — PROGRESS NOTES
Connected Care Resource Center: Connected [facility] [place]    Background: Transitional Care Management program identified per system criteria and reviewed by Connected Care Resource Center team for possible outreach.    Assessment: Upon chart review, CCRC Team member will not proceed with patient outreach related to this episode of Transitional Care Management program due to reason below:    Non-[facility] TCU: CCRC team member noted patient discharged to TCU/ARU/LTACH. Patient is not established with a [facility] currently supported by Primary Care-Care Coordination therefore handoff to Primary Care-Care Coordination is not appropriate at this time.    Plan: Transitional Care Management episode addressed appropriately per reason noted above.      SURI Mckeon  [facility], [facility]    *Connected Care Resource Team does NOT follow patient ongoing. Referrals are identified based on internal discharge reports and the outreach is to ensure patient has an understanding of their discharge instructions.

## 2025-01-08 ENCOUNTER — DOCUMENTATION ONLY (OUTPATIENT)
Dept: OTHER | Facility: CLINIC | Age: 75
End: 2025-01-08
Payer: MEDICARE

## 2025-01-08 ENCOUNTER — LAB REQUISITION (OUTPATIENT)
Dept: LAB | Facility: CLINIC | Age: 75
End: 2025-01-08
Payer: MEDICARE

## 2025-01-08 DIAGNOSIS — J18.9 PNEUMONIA, UNSPECIFIED ORGANISM: ICD-10-CM

## 2025-01-09 LAB
ALBUMIN SERPL BCG-MCNC: 3.5 G/DL (ref 3.5–5.2)
ALP SERPL-CCNC: 148 U/L (ref 40–150)
ALT SERPL W P-5'-P-CCNC: 31 U/L (ref 0–50)
ANION GAP SERPL CALCULATED.3IONS-SCNC: 17 MMOL/L (ref 7–15)
AST SERPL W P-5'-P-CCNC: 42 U/L (ref 0–45)
BILIRUB DIRECT SERPL-MCNC: <0.2 MG/DL (ref 0–0.3)
BILIRUB SERPL-MCNC: <0.2 MG/DL
BUN SERPL-MCNC: 7 MG/DL (ref 8–23)
CALCIUM SERPL-MCNC: 9.4 MG/DL (ref 8.8–10.4)
CHLORIDE SERPL-SCNC: 104 MMOL/L (ref 98–107)
CREAT SERPL-MCNC: 0.52 MG/DL (ref 0.51–0.95)
EGFRCR SERPLBLD CKD-EPI 2021: >90 ML/MIN/1.73M2
GLUCOSE SERPL-MCNC: 57 MG/DL (ref 70–99)
HCO3 SERPL-SCNC: 18 MMOL/L (ref 22–29)
POTASSIUM SERPL-SCNC: 4.9 MMOL/L (ref 3.4–5.3)
PROT SERPL-MCNC: 7 G/DL (ref 6.4–8.3)
SODIUM SERPL-SCNC: 139 MMOL/L (ref 135–145)

## 2025-01-09 PROCEDURE — 36415 COLL VENOUS BLD VENIPUNCTURE: CPT | Mod: ORL | Performed by: INTERNAL MEDICINE

## 2025-01-09 PROCEDURE — 82248 BILIRUBIN DIRECT: CPT | Mod: ORL | Performed by: INTERNAL MEDICINE

## 2025-01-09 PROCEDURE — P9604 ONE-WAY ALLOW PRORATED TRIP: HCPCS | Mod: ORL | Performed by: INTERNAL MEDICINE

## 2025-01-09 PROCEDURE — 80053 COMPREHEN METABOLIC PANEL: CPT | Mod: ORL | Performed by: INTERNAL MEDICINE

## 2025-02-12 ENCOUNTER — LAB REQUISITION (OUTPATIENT)
Dept: LAB | Facility: CLINIC | Age: 75
End: 2025-02-12
Payer: MEDICARE

## 2025-02-13 LAB
BASOPHILS # BLD AUTO: 0 10E3/UL (ref 0–0.2)
BASOPHILS NFR BLD AUTO: 0 %
EOSINOPHIL # BLD AUTO: 0 10E3/UL (ref 0–0.7)
EOSINOPHIL NFR BLD AUTO: 0 %
ERYTHROCYTE [DISTWIDTH] IN BLOOD BY AUTOMATED COUNT: 14.6 % (ref 10–15)
HCT VFR BLD AUTO: 38.4 % (ref 35–47)
HGB BLD-MCNC: 11.6 G/DL (ref 11.7–15.7)
IMM GRANULOCYTES # BLD: 0 10E3/UL
IMM GRANULOCYTES NFR BLD: 1 %
LYMPHOCYTES # BLD AUTO: 1.3 10E3/UL (ref 0.8–5.3)
LYMPHOCYTES NFR BLD AUTO: 28 %
MCH RBC QN AUTO: 26.6 PG (ref 26.5–33)
MCHC RBC AUTO-ENTMCNC: 30.2 G/DL (ref 31.5–36.5)
MCV RBC AUTO: 88 FL (ref 78–100)
MONOCYTES # BLD AUTO: 0.4 10E3/UL (ref 0–1.3)
MONOCYTES NFR BLD AUTO: 8 %
NEUTROPHILS # BLD AUTO: 3 10E3/UL (ref 1.6–8.3)
NEUTROPHILS NFR BLD AUTO: 63 %
NRBC # BLD AUTO: 0 10E3/UL
NRBC BLD AUTO-RTO: 0 /100
PLATELET # BLD AUTO: 259 10E3/UL (ref 150–450)
RBC # BLD AUTO: 4.36 10E6/UL (ref 3.8–5.2)
WBC # BLD AUTO: 4.7 10E3/UL (ref 4–11)

## 2025-02-13 PROCEDURE — 36415 COLL VENOUS BLD VENIPUNCTURE: CPT | Mod: ORL | Performed by: NURSE PRACTITIONER

## 2025-02-13 PROCEDURE — P9604 ONE-WAY ALLOW PRORATED TRIP: HCPCS | Mod: ORL | Performed by: NURSE PRACTITIONER

## 2025-02-13 PROCEDURE — 85025 COMPLETE CBC W/AUTO DIFF WBC: CPT | Mod: ORL | Performed by: NURSE PRACTITIONER

## 2025-02-24 ENCOUNTER — LAB REQUISITION (OUTPATIENT)
Dept: LAB | Facility: CLINIC | Age: 75
End: 2025-02-24
Payer: COMMERCIAL

## 2025-02-26 LAB
BASOPHILS # BLD AUTO: 0 10E3/UL (ref 0–0.2)
BASOPHILS NFR BLD AUTO: 0 %
EOSINOPHIL # BLD AUTO: 0 10E3/UL (ref 0–0.7)
EOSINOPHIL NFR BLD AUTO: 0 %
ERYTHROCYTE [DISTWIDTH] IN BLOOD BY AUTOMATED COUNT: 14.7 % (ref 10–15)
HCT VFR BLD AUTO: 39.6 % (ref 35–47)
HGB BLD-MCNC: 11.7 G/DL (ref 11.7–15.7)
IMM GRANULOCYTES # BLD: 0 10E3/UL
IMM GRANULOCYTES NFR BLD: 1 %
LYMPHOCYTES # BLD AUTO: 1.4 10E3/UL (ref 0.8–5.3)
LYMPHOCYTES NFR BLD AUTO: 27 %
MCH RBC QN AUTO: 26.7 PG (ref 26.5–33)
MCHC RBC AUTO-ENTMCNC: 29.5 G/DL (ref 31.5–36.5)
MCV RBC AUTO: 90 FL (ref 78–100)
MONOCYTES # BLD AUTO: 0.4 10E3/UL (ref 0–1.3)
MONOCYTES NFR BLD AUTO: 8 %
NEUTROPHILS # BLD AUTO: 3.5 10E3/UL (ref 1.6–8.3)
NEUTROPHILS NFR BLD AUTO: 65 %
NRBC # BLD AUTO: 0 10E3/UL
NRBC BLD AUTO-RTO: 0 /100
PLATELET # BLD AUTO: 275 10E3/UL (ref 150–450)
RBC # BLD AUTO: 4.38 10E6/UL (ref 3.8–5.2)
WBC # BLD AUTO: 5.4 10E3/UL (ref 4–11)

## 2025-02-26 PROCEDURE — 85025 COMPLETE CBC W/AUTO DIFF WBC: CPT | Mod: ORL | Performed by: NURSE PRACTITIONER

## 2025-02-26 PROCEDURE — 36415 COLL VENOUS BLD VENIPUNCTURE: CPT | Mod: ORL | Performed by: NURSE PRACTITIONER

## 2025-02-26 PROCEDURE — P9604 ONE-WAY ALLOW PRORATED TRIP: HCPCS | Mod: ORL | Performed by: NURSE PRACTITIONER

## 2025-03-05 ENCOUNTER — LAB REQUISITION (OUTPATIENT)
Dept: LAB | Facility: CLINIC | Age: 75
End: 2025-03-05
Payer: MEDICARE

## 2025-03-10 LAB
BASOPHILS # BLD AUTO: 0 10E3/UL (ref 0–0.2)
BASOPHILS NFR BLD AUTO: 0 %
EOSINOPHIL # BLD AUTO: 0 10E3/UL (ref 0–0.7)
EOSINOPHIL NFR BLD AUTO: 0 %
ERYTHROCYTE [DISTWIDTH] IN BLOOD BY AUTOMATED COUNT: 14.7 % (ref 10–15)
HCT VFR BLD AUTO: 38.7 % (ref 35–47)
HGB BLD-MCNC: 11.4 G/DL (ref 11.7–15.7)
IMM GRANULOCYTES # BLD: 0 10E3/UL
IMM GRANULOCYTES NFR BLD: 1 %
LYMPHOCYTES # BLD AUTO: 0.9 10E3/UL (ref 0.8–5.3)
LYMPHOCYTES NFR BLD AUTO: 18 %
MCH RBC QN AUTO: 26.3 PG (ref 26.5–33)
MCHC RBC AUTO-ENTMCNC: 29.5 G/DL (ref 31.5–36.5)
MCV RBC AUTO: 89 FL (ref 78–100)
MONOCYTES # BLD AUTO: 0.4 10E3/UL (ref 0–1.3)
MONOCYTES NFR BLD AUTO: 7 %
NEUTROPHILS # BLD AUTO: 3.6 10E3/UL (ref 1.6–8.3)
NEUTROPHILS NFR BLD AUTO: 74 %
NRBC # BLD AUTO: 0 10E3/UL
NRBC BLD AUTO-RTO: 0 /100
PLATELET # BLD AUTO: 244 10E3/UL (ref 150–450)
RBC # BLD AUTO: 4.33 10E6/UL (ref 3.8–5.2)
WBC # BLD AUTO: 4.9 10E3/UL (ref 4–11)

## 2025-03-10 PROCEDURE — 36415 COLL VENOUS BLD VENIPUNCTURE: CPT | Mod: ORL | Performed by: NURSE PRACTITIONER

## 2025-03-10 PROCEDURE — P9604 ONE-WAY ALLOW PRORATED TRIP: HCPCS | Mod: ORL | Performed by: NURSE PRACTITIONER

## 2025-03-10 PROCEDURE — 85025 COMPLETE CBC W/AUTO DIFF WBC: CPT | Mod: ORL | Performed by: NURSE PRACTITIONER

## 2025-03-18 ENCOUNTER — LAB REQUISITION (OUTPATIENT)
Dept: LAB | Facility: CLINIC | Age: 75
End: 2025-03-18
Payer: MEDICARE

## 2025-03-24 LAB
BASOPHILS # BLD AUTO: 0 10E3/UL (ref 0–0.2)
BASOPHILS NFR BLD AUTO: 0 %
EOSINOPHIL # BLD AUTO: 0 10E3/UL (ref 0–0.7)
EOSINOPHIL NFR BLD AUTO: 0 %
ERYTHROCYTE [DISTWIDTH] IN BLOOD BY AUTOMATED COUNT: 15 % (ref 10–15)
HCT VFR BLD AUTO: 42.3 % (ref 35–47)
HGB BLD-MCNC: 12.4 G/DL (ref 11.7–15.7)
IMM GRANULOCYTES # BLD: 0 10E3/UL
IMM GRANULOCYTES NFR BLD: 1 %
LYMPHOCYTES # BLD AUTO: 1.1 10E3/UL (ref 0.8–5.3)
LYMPHOCYTES NFR BLD AUTO: 20 %
MCH RBC QN AUTO: 26.6 PG (ref 26.5–33)
MCHC RBC AUTO-ENTMCNC: 29.3 G/DL (ref 31.5–36.5)
MCV RBC AUTO: 91 FL (ref 78–100)
MONOCYTES # BLD AUTO: 0.4 10E3/UL (ref 0–1.3)
MONOCYTES NFR BLD AUTO: 7 %
NEUTROPHILS # BLD AUTO: 4.1 10E3/UL (ref 1.6–8.3)
NEUTROPHILS NFR BLD AUTO: 72 %
NRBC # BLD AUTO: 0 10E3/UL
NRBC BLD AUTO-RTO: 0 /100
PLATELET # BLD AUTO: 267 10E3/UL (ref 150–450)
RBC # BLD AUTO: 4.67 10E6/UL (ref 3.8–5.2)
WBC # BLD AUTO: 5.7 10E3/UL (ref 4–11)

## 2025-03-24 PROCEDURE — P9604 ONE-WAY ALLOW PRORATED TRIP: HCPCS | Mod: ORL | Performed by: NURSE PRACTITIONER

## 2025-03-24 PROCEDURE — 85025 COMPLETE CBC W/AUTO DIFF WBC: CPT | Mod: ORL | Performed by: NURSE PRACTITIONER

## 2025-03-24 PROCEDURE — 36415 COLL VENOUS BLD VENIPUNCTURE: CPT | Mod: ORL | Performed by: NURSE PRACTITIONER

## 2025-04-25 ENCOUNTER — LAB REQUISITION (OUTPATIENT)
Dept: LAB | Facility: CLINIC | Age: 75
End: 2025-04-25
Payer: MEDICARE

## 2025-04-28 LAB
BASOPHILS # BLD AUTO: 0 10E3/UL (ref 0–0.2)
BASOPHILS NFR BLD AUTO: 0 %
EOSINOPHIL # BLD AUTO: 0 10E3/UL (ref 0–0.7)
EOSINOPHIL NFR BLD AUTO: 0 %
ERYTHROCYTE [DISTWIDTH] IN BLOOD BY AUTOMATED COUNT: 14.8 % (ref 10–15)
HCT VFR BLD AUTO: 42.3 % (ref 35–47)
HGB BLD-MCNC: 12.6 G/DL (ref 11.7–15.7)
IMM GRANULOCYTES # BLD: 0 10E3/UL
IMM GRANULOCYTES NFR BLD: 1 %
LYMPHOCYTES # BLD AUTO: 1.1 10E3/UL (ref 0.8–5.3)
LYMPHOCYTES NFR BLD AUTO: 14 %
MCH RBC QN AUTO: 26.3 PG (ref 26.5–33)
MCHC RBC AUTO-ENTMCNC: 29.8 G/DL (ref 31.5–36.5)
MCV RBC AUTO: 88 FL (ref 78–100)
MONOCYTES # BLD AUTO: 0.5 10E3/UL (ref 0–1.3)
MONOCYTES NFR BLD AUTO: 6 %
NEUTROPHILS # BLD AUTO: 6.6 10E3/UL (ref 1.6–8.3)
NEUTROPHILS NFR BLD AUTO: 80 %
NRBC # BLD AUTO: 0 10E3/UL
NRBC BLD AUTO-RTO: 0 /100
PLATELET # BLD AUTO: 240 10E3/UL (ref 150–450)
RBC # BLD AUTO: 4.8 10E6/UL (ref 3.8–5.2)
WBC # BLD AUTO: 8.3 10E3/UL (ref 4–11)

## 2025-05-07 ENCOUNTER — LAB REQUISITION (OUTPATIENT)
Dept: LAB | Facility: CLINIC | Age: 75
End: 2025-05-07
Payer: COMMERCIAL

## 2025-05-08 LAB
ANION GAP SERPL CALCULATED.3IONS-SCNC: 11 MMOL/L (ref 7–15)
BUN SERPL-MCNC: 16.4 MG/DL (ref 8–23)
CALCIUM SERPL-MCNC: 9.2 MG/DL (ref 8.8–10.4)
CHLORIDE SERPL-SCNC: 101 MMOL/L (ref 98–107)
CREAT SERPL-MCNC: 0.67 MG/DL (ref 0.51–0.95)
EGFRCR SERPLBLD CKD-EPI 2021: >90 ML/MIN/1.73M2
GLUCOSE SERPL-MCNC: 186 MG/DL (ref 70–99)
HCO3 SERPL-SCNC: 25 MMOL/L (ref 22–29)
POTASSIUM SERPL-SCNC: 4.5 MMOL/L (ref 3.4–5.3)
SODIUM SERPL-SCNC: 137 MMOL/L (ref 135–145)

## 2025-05-08 PROCEDURE — 80048 BASIC METABOLIC PNL TOTAL CA: CPT | Mod: ORL | Performed by: NURSE PRACTITIONER

## 2025-05-08 PROCEDURE — P9604 ONE-WAY ALLOW PRORATED TRIP: HCPCS | Mod: ORL | Performed by: NURSE PRACTITIONER

## 2025-05-08 PROCEDURE — 36415 COLL VENOUS BLD VENIPUNCTURE: CPT | Mod: ORL | Performed by: NURSE PRACTITIONER

## 2025-05-15 ENCOUNTER — LAB REQUISITION (OUTPATIENT)
Dept: LAB | Facility: CLINIC | Age: 75
End: 2025-05-15
Payer: MEDICARE

## 2025-05-15 DIAGNOSIS — Z51.81 ENCOUNTER FOR THERAPEUTIC DRUG LEVEL MONITORING: ICD-10-CM

## 2025-05-19 LAB
BASOPHILS # BLD AUTO: 0 10E3/UL (ref 0–0.2)
BASOPHILS NFR BLD AUTO: 0 %
EOSINOPHIL # BLD AUTO: 0 10E3/UL (ref 0–0.7)
EOSINOPHIL NFR BLD AUTO: 0 %
ERYTHROCYTE [DISTWIDTH] IN BLOOD BY AUTOMATED COUNT: 14.9 % (ref 10–15)
HCT VFR BLD AUTO: 41.1 % (ref 35–47)
HGB BLD-MCNC: 12.2 G/DL (ref 11.7–15.7)
IMM GRANULOCYTES # BLD: 0 10E3/UL
IMM GRANULOCYTES NFR BLD: 1 %
LYMPHOCYTES # BLD AUTO: 1 10E3/UL (ref 0.8–5.3)
LYMPHOCYTES NFR BLD AUTO: 12 %
MCH RBC QN AUTO: 26.9 PG (ref 26.5–33)
MCHC RBC AUTO-ENTMCNC: 29.7 G/DL (ref 31.5–36.5)
MCV RBC AUTO: 91 FL (ref 78–100)
MONOCYTES # BLD AUTO: 0.5 10E3/UL (ref 0–1.3)
MONOCYTES NFR BLD AUTO: 5 %
NEUTROPHILS # BLD AUTO: 6.8 10E3/UL (ref 1.6–8.3)
NEUTROPHILS NFR BLD AUTO: 82 %
NRBC # BLD AUTO: 0 10E3/UL
NRBC BLD AUTO-RTO: 0 /100
PLATELET # BLD AUTO: 218 10E3/UL (ref 150–450)
RBC # BLD AUTO: 4.53 10E6/UL (ref 3.8–5.2)
WBC # BLD AUTO: 8.3 10E3/UL (ref 4–11)

## 2025-05-19 PROCEDURE — P9604 ONE-WAY ALLOW PRORATED TRIP: HCPCS | Mod: ORL | Performed by: NURSE PRACTITIONER

## 2025-05-19 PROCEDURE — 85025 COMPLETE CBC W/AUTO DIFF WBC: CPT | Mod: ORL | Performed by: NURSE PRACTITIONER

## 2025-05-19 PROCEDURE — 36415 COLL VENOUS BLD VENIPUNCTURE: CPT | Mod: ORL | Performed by: NURSE PRACTITIONER

## 2025-06-06 ENCOUNTER — LAB REQUISITION (OUTPATIENT)
Dept: LAB | Facility: CLINIC | Age: 75
End: 2025-06-06
Payer: MEDICARE

## 2025-06-06 DIAGNOSIS — Z51.81 ENCOUNTER FOR THERAPEUTIC DRUG LEVEL MONITORING: ICD-10-CM

## 2025-06-09 LAB
BASOPHILS # BLD AUTO: 0 10E3/UL (ref 0–0.2)
BASOPHILS NFR BLD AUTO: 1 %
EOSINOPHIL # BLD AUTO: 0 10E3/UL (ref 0–0.7)
EOSINOPHIL NFR BLD AUTO: 0 %
ERYTHROCYTE [DISTWIDTH] IN BLOOD BY AUTOMATED COUNT: 14.9 % (ref 10–15)
HCT VFR BLD AUTO: 38.8 % (ref 35–47)
HGB BLD-MCNC: 11.9 G/DL (ref 11.7–15.7)
IMM GRANULOCYTES # BLD: 0 10E3/UL
IMM GRANULOCYTES NFR BLD: 1 %
LYMPHOCYTES # BLD AUTO: 1 10E3/UL (ref 0.8–5.3)
LYMPHOCYTES NFR BLD AUTO: 23 %
MCH RBC QN AUTO: 26.6 PG (ref 26.5–33)
MCHC RBC AUTO-ENTMCNC: 30.7 G/DL (ref 31.5–36.5)
MCV RBC AUTO: 87 FL (ref 78–100)
MONOCYTES # BLD AUTO: 0.3 10E3/UL (ref 0–1.3)
MONOCYTES NFR BLD AUTO: 7 %
NEUTROPHILS # BLD AUTO: 2.9 10E3/UL (ref 1.6–8.3)
NEUTROPHILS NFR BLD AUTO: 68 %
NRBC # BLD AUTO: 0 10E3/UL
NRBC BLD AUTO-RTO: 0 /100
PLATELET # BLD AUTO: 167 10E3/UL (ref 150–450)
RBC # BLD AUTO: 4.48 10E6/UL (ref 3.8–5.2)
WBC # BLD AUTO: 4.3 10E3/UL (ref 4–11)

## 2025-06-09 PROCEDURE — 85025 COMPLETE CBC W/AUTO DIFF WBC: CPT | Mod: ORL | Performed by: NURSE PRACTITIONER

## 2025-06-09 PROCEDURE — 36415 COLL VENOUS BLD VENIPUNCTURE: CPT | Mod: ORL | Performed by: NURSE PRACTITIONER

## 2025-06-09 PROCEDURE — P9604 ONE-WAY ALLOW PRORATED TRIP: HCPCS | Mod: ORL | Performed by: NURSE PRACTITIONER

## 2025-07-21 ENCOUNTER — LAB REQUISITION (OUTPATIENT)
Dept: LAB | Facility: CLINIC | Age: 75
End: 2025-07-21
Payer: MEDICARE

## 2025-07-21 DIAGNOSIS — Z51.81 ENCOUNTER FOR THERAPEUTIC DRUG LEVEL MONITORING: ICD-10-CM

## 2025-07-23 LAB
BASOPHILS # BLD AUTO: 0 10E3/UL (ref 0–0.2)
BASOPHILS NFR BLD AUTO: 1 %
EOSINOPHIL # BLD AUTO: 0 10E3/UL (ref 0–0.7)
EOSINOPHIL NFR BLD AUTO: 0 %
ERYTHROCYTE [DISTWIDTH] IN BLOOD BY AUTOMATED COUNT: 14.5 % (ref 10–15)
HCT VFR BLD AUTO: 43.6 % (ref 35–47)
HGB BLD-MCNC: 13.3 G/DL (ref 11.7–15.7)
IMM GRANULOCYTES # BLD: 0 10E3/UL
IMM GRANULOCYTES NFR BLD: 1 %
LYMPHOCYTES # BLD AUTO: 1.2 10E3/UL (ref 0.8–5.3)
LYMPHOCYTES NFR BLD AUTO: 27 %
MCH RBC QN AUTO: 26.9 PG (ref 26.5–33)
MCHC RBC AUTO-ENTMCNC: 30.5 G/DL (ref 31.5–36.5)
MCV RBC AUTO: 88 FL (ref 78–100)
MONOCYTES # BLD AUTO: 0.4 10E3/UL (ref 0–1.3)
MONOCYTES NFR BLD AUTO: 9 %
NEUTROPHILS # BLD AUTO: 2.8 10E3/UL (ref 1.6–8.3)
NEUTROPHILS NFR BLD AUTO: 63 %
NRBC # BLD AUTO: 0 10E3/UL
NRBC BLD AUTO-RTO: 0 /100
PLATELET # BLD AUTO: 179 10E3/UL (ref 150–450)
RBC # BLD AUTO: 4.94 10E6/UL (ref 3.8–5.2)
WBC # BLD AUTO: 4.4 10E3/UL (ref 4–11)

## 2025-07-23 PROCEDURE — 85025 COMPLETE CBC W/AUTO DIFF WBC: CPT | Mod: ORL | Performed by: NURSE PRACTITIONER

## 2025-07-23 PROCEDURE — P9604 ONE-WAY ALLOW PRORATED TRIP: HCPCS | Mod: ORL | Performed by: NURSE PRACTITIONER

## 2025-07-23 PROCEDURE — 36415 COLL VENOUS BLD VENIPUNCTURE: CPT | Mod: ORL | Performed by: NURSE PRACTITIONER

## 2025-08-12 ENCOUNTER — LAB REQUISITION (OUTPATIENT)
Dept: LAB | Facility: CLINIC | Age: 75
End: 2025-08-12
Payer: MEDICARE

## 2025-08-27 ENCOUNTER — LAB REQUISITION (OUTPATIENT)
Dept: LAB | Facility: CLINIC | Age: 75
End: 2025-08-27
Payer: COMMERCIAL